# Patient Record
Sex: MALE | Race: WHITE | NOT HISPANIC OR LATINO
[De-identification: names, ages, dates, MRNs, and addresses within clinical notes are randomized per-mention and may not be internally consistent; named-entity substitution may affect disease eponyms.]

---

## 2017-04-24 ENCOUNTER — APPOINTMENT (OUTPATIENT)
Dept: THORACIC SURGERY | Facility: CLINIC | Age: 82
End: 2017-04-24

## 2017-05-02 ENCOUNTER — OUTPATIENT (OUTPATIENT)
Dept: OUTPATIENT SERVICES | Facility: HOSPITAL | Age: 82
LOS: 1 days | End: 2017-05-02
Payer: MEDICARE

## 2017-05-02 ENCOUNTER — APPOINTMENT (OUTPATIENT)
Dept: CT IMAGING | Facility: HOSPITAL | Age: 82
End: 2017-05-02

## 2017-05-02 DIAGNOSIS — Z98.89 OTHER SPECIFIED POSTPROCEDURAL STATES: Chronic | ICD-10-CM

## 2017-05-02 PROCEDURE — 71250 CT THORAX DX C-: CPT

## 2017-05-15 ENCOUNTER — APPOINTMENT (OUTPATIENT)
Dept: THORACIC SURGERY | Facility: CLINIC | Age: 82
End: 2017-05-15

## 2017-05-15 VITALS
BODY MASS INDEX: 21.76 KG/M2 | HEART RATE: 57 BPM | WEIGHT: 152 LBS | RESPIRATION RATE: 16 BRPM | OXYGEN SATURATION: 98 % | HEIGHT: 70 IN | DIASTOLIC BLOOD PRESSURE: 66 MMHG | SYSTOLIC BLOOD PRESSURE: 124 MMHG

## 2017-07-10 ENCOUNTER — APPOINTMENT (OUTPATIENT)
Dept: DERMATOLOGY | Facility: CLINIC | Age: 82
End: 2017-07-10

## 2017-07-10 VITALS — WEIGHT: 162 LBS | BODY MASS INDEX: 22.93 KG/M2 | HEIGHT: 70.5 IN

## 2017-07-10 DIAGNOSIS — Z87.39 PERSONAL HISTORY OF OTHER DISEASES OF THE MUSCULOSKELETAL SYSTEM AND CONNECTIVE TISSUE: ICD-10-CM

## 2017-07-11 ENCOUNTER — APPOINTMENT (OUTPATIENT)
Dept: GASTROENTEROLOGY | Facility: CLINIC | Age: 82
End: 2017-07-11

## 2017-07-11 VITALS
DIASTOLIC BLOOD PRESSURE: 75 MMHG | HEART RATE: 54 BPM | SYSTOLIC BLOOD PRESSURE: 153 MMHG | WEIGHT: 162 LBS | BODY MASS INDEX: 22.93 KG/M2 | OXYGEN SATURATION: 98 % | HEIGHT: 70.5 IN

## 2017-07-11 DIAGNOSIS — Z78.9 OTHER SPECIFIED HEALTH STATUS: ICD-10-CM

## 2017-07-13 LAB — CORE LAB BIOPSY: NORMAL

## 2017-07-21 ENCOUNTER — APPOINTMENT (OUTPATIENT)
Dept: ULTRASOUND IMAGING | Facility: HOSPITAL | Age: 82
End: 2017-07-21

## 2017-07-21 ENCOUNTER — OUTPATIENT (OUTPATIENT)
Dept: OUTPATIENT SERVICES | Facility: HOSPITAL | Age: 82
LOS: 1 days | End: 2017-07-21
Payer: MEDICARE

## 2017-07-21 DIAGNOSIS — Z98.89 OTHER SPECIFIED POSTPROCEDURAL STATES: Chronic | ICD-10-CM

## 2017-07-21 PROCEDURE — 76856 US EXAM PELVIC COMPLETE: CPT

## 2017-07-21 PROCEDURE — 76700 US EXAM ABDOM COMPLETE: CPT

## 2017-08-02 ENCOUNTER — APPOINTMENT (OUTPATIENT)
Dept: DERMATOLOGY | Facility: CLINIC | Age: 82
End: 2017-08-02

## 2017-08-14 ENCOUNTER — APPOINTMENT (OUTPATIENT)
Dept: DERMATOLOGY | Facility: CLINIC | Age: 82
End: 2017-08-14
Payer: MEDICARE

## 2017-08-14 PROCEDURE — 99213 OFFICE O/P EST LOW 20 MIN: CPT

## 2017-09-29 ENCOUNTER — APPOINTMENT (OUTPATIENT)
Dept: CT IMAGING | Facility: HOSPITAL | Age: 82
End: 2017-09-29
Payer: MEDICARE

## 2017-09-29 ENCOUNTER — OUTPATIENT (OUTPATIENT)
Dept: OUTPATIENT SERVICES | Facility: HOSPITAL | Age: 82
LOS: 1 days | End: 2017-09-29
Payer: MEDICARE

## 2017-09-29 DIAGNOSIS — Z98.89 OTHER SPECIFIED POSTPROCEDURAL STATES: Chronic | ICD-10-CM

## 2017-09-29 PROCEDURE — 70491 CT SOFT TISSUE NECK W/DYE: CPT | Mod: 26

## 2017-09-29 PROCEDURE — 70491 CT SOFT TISSUE NECK W/DYE: CPT

## 2017-09-29 PROCEDURE — 71260 CT THORAX DX C+: CPT | Mod: 26

## 2017-09-29 PROCEDURE — 71260 CT THORAX DX C+: CPT

## 2017-10-09 ENCOUNTER — APPOINTMENT (OUTPATIENT)
Dept: THORACIC SURGERY | Facility: CLINIC | Age: 82
End: 2017-10-09
Payer: MEDICARE

## 2017-10-09 VITALS
WEIGHT: 162 LBS | BODY MASS INDEX: 22.93 KG/M2 | RESPIRATION RATE: 16 BRPM | HEIGHT: 70.5 IN | HEART RATE: 60 BPM | SYSTOLIC BLOOD PRESSURE: 146 MMHG | OXYGEN SATURATION: 98 % | DIASTOLIC BLOOD PRESSURE: 63 MMHG

## 2017-10-09 DIAGNOSIS — R91.1 SOLITARY PULMONARY NODULE: ICD-10-CM

## 2017-10-09 PROCEDURE — 99214 OFFICE O/P EST MOD 30 MIN: CPT

## 2017-10-17 ENCOUNTER — APPOINTMENT (OUTPATIENT)
Dept: DERMATOLOGY | Facility: CLINIC | Age: 82
End: 2017-10-17
Payer: MEDICARE

## 2017-10-17 VITALS — WEIGHT: 155 LBS | BODY MASS INDEX: 21.94 KG/M2 | HEIGHT: 70.5 IN

## 2017-10-17 DIAGNOSIS — Z85.828 PERSONAL HISTORY OF OTHER MALIGNANT NEOPLASM OF SKIN: ICD-10-CM

## 2017-10-17 DIAGNOSIS — L90.5 SCAR CONDITIONS AND FIBROSIS OF SKIN: ICD-10-CM

## 2017-10-17 PROCEDURE — 99213 OFFICE O/P EST LOW 20 MIN: CPT

## 2017-10-23 ENCOUNTER — APPOINTMENT (OUTPATIENT)
Dept: DERMATOLOGY | Facility: CLINIC | Age: 82
End: 2017-10-23

## 2017-11-16 ENCOUNTER — APPOINTMENT (OUTPATIENT)
Dept: DERMATOLOGY | Facility: CLINIC | Age: 82
End: 2017-11-16
Payer: MEDICARE

## 2017-11-16 PROCEDURE — 99213 OFFICE O/P EST LOW 20 MIN: CPT

## 2018-04-24 ENCOUNTER — TRANSCRIPTION ENCOUNTER (OUTPATIENT)
Age: 83
End: 2018-04-24

## 2018-07-22 PROBLEM — Z78.9 ALCOHOL USE: Status: ACTIVE | Noted: 2017-07-11

## 2018-09-13 ENCOUNTER — TRANSCRIPTION ENCOUNTER (OUTPATIENT)
Age: 83
End: 2018-09-13

## 2018-09-28 ENCOUNTER — OUTPATIENT (OUTPATIENT)
Dept: OUTPATIENT SERVICES | Facility: HOSPITAL | Age: 83
LOS: 1 days | End: 2018-09-28
Payer: MEDICARE

## 2018-09-28 ENCOUNTER — APPOINTMENT (OUTPATIENT)
Dept: CT IMAGING | Facility: HOSPITAL | Age: 83
End: 2018-09-28
Payer: MEDICARE

## 2018-09-28 DIAGNOSIS — Z98.89 OTHER SPECIFIED POSTPROCEDURAL STATES: Chronic | ICD-10-CM

## 2018-09-28 DIAGNOSIS — D36.10 BENIGN NEOPLASM OF PERIPHERAL NERVES AND AUTONOMIC NERVOUS SYSTEM, UNSPECIFIED: ICD-10-CM

## 2018-09-28 DIAGNOSIS — R91.1 SOLITARY PULMONARY NODULE: ICD-10-CM

## 2018-09-28 PROCEDURE — 71250 CT THORAX DX C-: CPT

## 2018-09-28 PROCEDURE — 71250 CT THORAX DX C-: CPT | Mod: 26

## 2018-10-01 ENCOUNTER — APPOINTMENT (OUTPATIENT)
Dept: THORACIC SURGERY | Facility: CLINIC | Age: 83
End: 2018-10-01
Payer: MEDICARE

## 2018-10-08 ENCOUNTER — APPOINTMENT (OUTPATIENT)
Dept: THORACIC SURGERY | Facility: CLINIC | Age: 83
End: 2018-10-08
Payer: MEDICARE

## 2018-10-08 VITALS
TEMPERATURE: 98.2 F | SYSTOLIC BLOOD PRESSURE: 130 MMHG | HEIGHT: 70 IN | DIASTOLIC BLOOD PRESSURE: 74 MMHG | RESPIRATION RATE: 16 BRPM | OXYGEN SATURATION: 98 % | BODY MASS INDEX: 21.33 KG/M2 | WEIGHT: 149 LBS | HEART RATE: 70 BPM

## 2018-10-08 PROCEDURE — 99213 OFFICE O/P EST LOW 20 MIN: CPT

## 2018-12-04 ENCOUNTER — APPOINTMENT (OUTPATIENT)
Dept: DERMATOLOGY | Facility: CLINIC | Age: 83
End: 2018-12-04
Payer: MEDICARE

## 2018-12-04 DIAGNOSIS — L28.0 LICHEN SIMPLEX CHRONICUS: ICD-10-CM

## 2018-12-04 PROCEDURE — 11900 INJECT SKIN LESIONS </W 7: CPT | Mod: 59

## 2018-12-04 PROCEDURE — 99214 OFFICE O/P EST MOD 30 MIN: CPT | Mod: 25

## 2018-12-04 PROCEDURE — 17000 DESTRUCT PREMALG LESION: CPT | Mod: 59

## 2018-12-04 PROCEDURE — 17110 DESTRUCTION B9 LES UP TO 14: CPT | Mod: 59

## 2018-12-04 PROCEDURE — 11100 BX SKIN SUBCUTANEOUS&/MUCOUS MEMBRANE 1 LESION: CPT | Mod: 59

## 2018-12-06 LAB — CORE LAB BIOPSY: NORMAL

## 2018-12-10 ENCOUNTER — APPOINTMENT (OUTPATIENT)
Dept: DERMATOLOGY | Facility: CLINIC | Age: 83
End: 2018-12-10
Payer: MEDICARE

## 2018-12-10 PROCEDURE — 17110 DESTRUCTION B9 LES UP TO 14: CPT | Mod: 79

## 2018-12-10 PROCEDURE — 99213 OFFICE O/P EST LOW 20 MIN: CPT | Mod: 25,24

## 2018-12-10 PROCEDURE — 17000 DESTRUCT PREMALG LESION: CPT | Mod: 59,79

## 2019-06-09 ENCOUNTER — TRANSCRIPTION ENCOUNTER (OUTPATIENT)
Age: 84
End: 2019-06-09

## 2019-06-17 ENCOUNTER — APPOINTMENT (OUTPATIENT)
Dept: DERMATOLOGY | Facility: CLINIC | Age: 84
End: 2019-06-17
Payer: MEDICARE

## 2019-06-17 PROCEDURE — 99213 OFFICE O/P EST LOW 20 MIN: CPT

## 2019-06-17 PROCEDURE — 0023D: CPT

## 2019-06-17 PROCEDURE — 0050D: CPT

## 2019-06-17 NOTE — HISTORY OF PRESENT ILLNESS
[FreeTextEntry1] : Patient presents for skin examination. [de-identified] : Denies new, changing, bleeding or tender lesions on the skin over the past 6 months.\par

## 2019-06-17 NOTE — PHYSICAL EXAM
[Alert] : alert [Oriented x 3] : ~L oriented x 3 [Full Body Skin Exam Performed] : performed [Well Nourished] : well nourished [FreeTextEntry3] : A full skin exam was performed including the scalp, face (including lips, ears, nose and eyes), neck, chest, abdomen, back, buttocks, upper extremities and lower extremities.  The patient declined examination of the genitalia.  \par The exam revealed the following benign growths:\par Morrison pigmented nevi.\par Seborrheic keratoses.\par Lentigines.\par Cherry angioma.\par

## 2019-07-06 ENCOUNTER — INPATIENT (INPATIENT)
Facility: HOSPITAL | Age: 84
LOS: 2 days | Discharge: NURSING, REHAB OR LONG-TERM CARE | DRG: 470 | End: 2019-07-09
Attending: INTERNAL MEDICINE | Admitting: INTERNAL MEDICINE
Payer: MEDICARE

## 2019-07-06 ENCOUNTER — TRANSCRIPTION ENCOUNTER (OUTPATIENT)
Age: 84
End: 2019-07-06

## 2019-07-06 VITALS
SYSTOLIC BLOOD PRESSURE: 132 MMHG | RESPIRATION RATE: 15 BRPM | HEART RATE: 66 BPM | WEIGHT: 149.91 LBS | OXYGEN SATURATION: 97 % | TEMPERATURE: 98 F | HEIGHT: 70 IN | DIASTOLIC BLOOD PRESSURE: 63 MMHG

## 2019-07-06 DIAGNOSIS — Z98.89 OTHER SPECIFIED POSTPROCEDURAL STATES: Chronic | ICD-10-CM

## 2019-07-06 DIAGNOSIS — S72.002S FRACTURE OF UNSPECIFIED PART OF NECK OF LEFT FEMUR, SEQUELA: ICD-10-CM

## 2019-07-06 DIAGNOSIS — Z29.9 ENCOUNTER FOR PROPHYLACTIC MEASURES, UNSPECIFIED: ICD-10-CM

## 2019-07-06 DIAGNOSIS — S72.002A FRACTURE OF UNSPECIFIED PART OF NECK OF LEFT FEMUR, INITIAL ENCOUNTER FOR CLOSED FRACTURE: ICD-10-CM

## 2019-07-06 LAB
ALBUMIN SERPL ELPH-MCNC: 3.8 G/DL — SIGNIFICANT CHANGE UP (ref 3.3–5)
ALP SERPL-CCNC: 68 U/L — SIGNIFICANT CHANGE UP (ref 30–120)
ALT FLD-CCNC: 23 U/L DA — SIGNIFICANT CHANGE UP (ref 10–60)
ANION GAP SERPL CALC-SCNC: 11 MMOL/L — SIGNIFICANT CHANGE UP (ref 5–17)
APPEARANCE UR: ABNORMAL
APTT BLD: 27.6 SEC — LOW (ref 28.5–37)
AST SERPL-CCNC: 22 U/L — SIGNIFICANT CHANGE UP (ref 10–40)
BASOPHILS # BLD AUTO: 0.02 K/UL — SIGNIFICANT CHANGE UP (ref 0–0.2)
BASOPHILS NFR BLD AUTO: 0.3 % — SIGNIFICANT CHANGE UP (ref 0–2)
BILIRUB SERPL-MCNC: 0.4 MG/DL — SIGNIFICANT CHANGE UP (ref 0.2–1.2)
BILIRUB UR-MCNC: NEGATIVE — SIGNIFICANT CHANGE UP
BLD GP AB SCN SERPL QL: SIGNIFICANT CHANGE UP
BUN SERPL-MCNC: 23 MG/DL — SIGNIFICANT CHANGE UP (ref 7–23)
CALCIUM SERPL-MCNC: 9.4 MG/DL — SIGNIFICANT CHANGE UP (ref 8.4–10.5)
CHLORIDE SERPL-SCNC: 105 MMOL/L — SIGNIFICANT CHANGE UP (ref 96–108)
CO2 SERPL-SCNC: 25 MMOL/L — SIGNIFICANT CHANGE UP (ref 22–31)
COLOR SPEC: YELLOW — SIGNIFICANT CHANGE UP
CREAT SERPL-MCNC: 1.29 MG/DL — SIGNIFICANT CHANGE UP (ref 0.5–1.3)
DIFF PNL FLD: NEGATIVE — SIGNIFICANT CHANGE UP
EOSINOPHIL # BLD AUTO: 0.09 K/UL — SIGNIFICANT CHANGE UP (ref 0–0.5)
EOSINOPHIL NFR BLD AUTO: 1.2 % — SIGNIFICANT CHANGE UP (ref 0–6)
GLUCOSE SERPL-MCNC: 106 MG/DL — HIGH (ref 70–99)
GLUCOSE UR QL: NEGATIVE MG/DL — SIGNIFICANT CHANGE UP
HCT VFR BLD CALC: 39.7 % — SIGNIFICANT CHANGE UP (ref 39–50)
HGB BLD-MCNC: 13.6 G/DL — SIGNIFICANT CHANGE UP (ref 13–17)
IMM GRANULOCYTES NFR BLD AUTO: 0.3 % — SIGNIFICANT CHANGE UP (ref 0–1.5)
INR BLD: 0.96 RATIO — SIGNIFICANT CHANGE UP (ref 0.88–1.16)
KETONES UR-MCNC: ABNORMAL
LEUKOCYTE ESTERASE UR-ACNC: NEGATIVE — SIGNIFICANT CHANGE UP
LYMPHOCYTES # BLD AUTO: 0.96 K/UL — LOW (ref 1–3.3)
LYMPHOCYTES # BLD AUTO: 12.9 % — LOW (ref 13–44)
MCHC RBC-ENTMCNC: 32.2 PG — SIGNIFICANT CHANGE UP (ref 27–34)
MCHC RBC-ENTMCNC: 34.3 GM/DL — SIGNIFICANT CHANGE UP (ref 32–36)
MCV RBC AUTO: 94.1 FL — SIGNIFICANT CHANGE UP (ref 80–100)
MONOCYTES # BLD AUTO: 0.62 K/UL — SIGNIFICANT CHANGE UP (ref 0–0.9)
MONOCYTES NFR BLD AUTO: 8.3 % — SIGNIFICANT CHANGE UP (ref 2–14)
NEUTROPHILS # BLD AUTO: 5.74 K/UL — SIGNIFICANT CHANGE UP (ref 1.8–7.4)
NEUTROPHILS NFR BLD AUTO: 77 % — SIGNIFICANT CHANGE UP (ref 43–77)
NITRITE UR-MCNC: NEGATIVE — SIGNIFICANT CHANGE UP
NRBC # BLD: 0 /100 WBCS — SIGNIFICANT CHANGE UP (ref 0–0)
PH UR: 8 — SIGNIFICANT CHANGE UP (ref 5–8)
PLATELET # BLD AUTO: 151 K/UL — SIGNIFICANT CHANGE UP (ref 150–400)
POTASSIUM SERPL-MCNC: 3.7 MMOL/L — SIGNIFICANT CHANGE UP (ref 3.5–5.3)
POTASSIUM SERPL-SCNC: 3.7 MMOL/L — SIGNIFICANT CHANGE UP (ref 3.5–5.3)
PROT SERPL-MCNC: 6.9 G/DL — SIGNIFICANT CHANGE UP (ref 6–8.3)
PROT UR-MCNC: NEGATIVE MG/DL — SIGNIFICANT CHANGE UP
PROTHROM AB SERPL-ACNC: 10.5 SEC — SIGNIFICANT CHANGE UP (ref 10–12.9)
RBC # BLD: 4.22 M/UL — SIGNIFICANT CHANGE UP (ref 4.2–5.8)
RBC # FLD: 12.4 % — SIGNIFICANT CHANGE UP (ref 10.3–14.5)
RBC CASTS # UR COMP ASSIST: NEGATIVE /HPF — SIGNIFICANT CHANGE UP (ref 0–4)
SODIUM SERPL-SCNC: 141 MMOL/L — SIGNIFICANT CHANGE UP (ref 135–145)
SP GR SPEC: 1.01 — SIGNIFICANT CHANGE UP (ref 1.01–1.02)
UROBILINOGEN FLD QL: NEGATIVE MG/DL — SIGNIFICANT CHANGE UP
WBC # BLD: 7.45 K/UL — SIGNIFICANT CHANGE UP (ref 3.8–10.5)
WBC # FLD AUTO: 7.45 K/UL — SIGNIFICANT CHANGE UP (ref 3.8–10.5)
WBC UR QL: NEGATIVE — SIGNIFICANT CHANGE UP

## 2019-07-06 PROCEDURE — 93010 ELECTROCARDIOGRAM REPORT: CPT

## 2019-07-06 PROCEDURE — 99284 EMERGENCY DEPT VISIT MOD MDM: CPT

## 2019-07-06 PROCEDURE — 99223 1ST HOSP IP/OBS HIGH 75: CPT | Mod: AI

## 2019-07-06 PROCEDURE — 71045 X-RAY EXAM CHEST 1 VIEW: CPT | Mod: 26

## 2019-07-06 PROCEDURE — 73502 X-RAY EXAM HIP UNI 2-3 VIEWS: CPT | Mod: 26,LT

## 2019-07-06 RX ORDER — OXYCODONE HYDROCHLORIDE 5 MG/1
10 TABLET ORAL
Refills: 0 | Status: DISCONTINUED | OUTPATIENT
Start: 2019-07-06 | End: 2019-07-06

## 2019-07-06 RX ORDER — SENNA PLUS 8.6 MG/1
2 TABLET ORAL AT BEDTIME
Refills: 0 | Status: DISCONTINUED | OUTPATIENT
Start: 2019-07-06 | End: 2019-07-07

## 2019-07-06 RX ORDER — HYDROMORPHONE HYDROCHLORIDE 2 MG/ML
0.5 INJECTION INTRAMUSCULAR; INTRAVENOUS; SUBCUTANEOUS
Refills: 0 | Status: DISCONTINUED | OUTPATIENT
Start: 2019-07-06 | End: 2019-07-07

## 2019-07-06 RX ORDER — FAMOTIDINE 10 MG/ML
20 INJECTION INTRAVENOUS DAILY
Refills: 0 | Status: DISCONTINUED | OUTPATIENT
Start: 2019-07-06 | End: 2019-07-07

## 2019-07-06 RX ORDER — OXYCODONE HYDROCHLORIDE 5 MG/1
5 TABLET ORAL
Refills: 0 | Status: DISCONTINUED | OUTPATIENT
Start: 2019-07-06 | End: 2019-07-06

## 2019-07-06 RX ORDER — MORPHINE SULFATE 50 MG/1
1 CAPSULE, EXTENDED RELEASE ORAL EVERY 4 HOURS
Refills: 0 | Status: DISCONTINUED | OUTPATIENT
Start: 2019-07-06 | End: 2019-07-07

## 2019-07-06 RX ORDER — SODIUM CHLORIDE 9 MG/ML
1000 INJECTION, SOLUTION INTRAVENOUS
Refills: 0 | Status: DISCONTINUED | OUTPATIENT
Start: 2019-07-06 | End: 2019-07-07

## 2019-07-06 RX ORDER — DOCUSATE SODIUM 100 MG
100 CAPSULE ORAL THREE TIMES A DAY
Refills: 0 | Status: DISCONTINUED | OUTPATIENT
Start: 2019-07-06 | End: 2019-07-07

## 2019-07-06 RX ORDER — MORPHINE SULFATE 50 MG/1
2 CAPSULE, EXTENDED RELEASE ORAL EVERY 4 HOURS
Refills: 0 | Status: DISCONTINUED | OUTPATIENT
Start: 2019-07-06 | End: 2019-07-07

## 2019-07-06 RX ORDER — ENOXAPARIN SODIUM 100 MG/ML
40 INJECTION SUBCUTANEOUS EVERY 24 HOURS
Refills: 0 | Status: DISCONTINUED | OUTPATIENT
Start: 2019-07-06 | End: 2019-07-07

## 2019-07-06 RX ADMIN — ENOXAPARIN SODIUM 40 MILLIGRAM(S): 100 INJECTION SUBCUTANEOUS at 23:23

## 2019-07-06 RX ADMIN — MORPHINE SULFATE 1 MILLIGRAM(S): 50 CAPSULE, EXTENDED RELEASE ORAL at 23:38

## 2019-07-06 RX ADMIN — SODIUM CHLORIDE 125 MILLILITER(S): 9 INJECTION, SOLUTION INTRAVENOUS at 23:23

## 2019-07-06 RX ADMIN — MORPHINE SULFATE 1 MILLIGRAM(S): 50 CAPSULE, EXTENDED RELEASE ORAL at 23:23

## 2019-07-06 NOTE — ED ADULT NURSE NOTE - PMH
Lactose intolerance in adult    Osteoarthritis    Scoliosis    Shellfish allergy    Vitamin D deficiency

## 2019-07-06 NOTE — PATIENT PROFILE ADULT - FAMILY NEEDS
From: Nicole Lincoln  To: ALLEN Montano  Sent: 5/21/2018 8:39 AM CDT  Subject: Blood sugar levels 5/17-5/21    Good morning Debby,   I couldn’t remember when you wanted me to send my levels next so I thought I should just do it today. Overall I feel like my levels have been pretty good, but there are a couple highs. I actually had two high numbers yesterday that I thought were a little odd. The only thing is could think that was different was that I was pretty stressed yesterday, I don’t know if that makes a difference or not.    Thursday R- 87   B 78   L 78   D 107   Insulin at 11:00  Friday R- 83   B 85   L 126- just a bad choice for lunch   D 91   Insulin at 11:10  Saturday R- 86   B 98   L 75- I did test about a half hour late on this one    D 122- few too many chips with dinner    Insulin at 12:30  Sunday R-86   B 140- one of the highs I can’t explain other than possibly stress   L 76   D 150-combo of pizza and stress? Maybe just the pizza   Insulin at 10:10  Monday R- 99-didn’t sleep very well    Thanks Giana Guardado  
no

## 2019-07-06 NOTE — ED PROVIDER NOTE - MUSCULOSKELETAL, MLM
decrease rom left hip secondary to pain, no obvious deformity tenderness lateral aspect of hip and neurovascularly intact.
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
## 2019-07-06 NOTE — H&P ADULT - PROBLEM SELECTOR PLAN 2
IMPROVE VTE Individual Risk Assessment          RISK                                                          Points    [  ] Previous VTE                                                3  [  ] Thrombophilia                                             2  [  ] Lower limb paralysis                                    2        (unable to hold up >15 seconds)    [  ] Current Cancer                                             2         (within 6 months)  [ x ] Immobilization > 24 hrs                              1  [  ] ICU/CCU stay > 24 hours                            1  [ x ] Age > 60                                                    1    IMPROVE VTE Score 2.      **IMPROVE score of 2 in addition to the other risk factors not included in this scoring system, started on LMWH 40 mg sub Q daily for DVT prophylaxis.

## 2019-07-06 NOTE — H&P ADULT - NSICDXPASTMEDICALHX_GEN_ALL_CORE_FT
PAST MEDICAL HISTORY:  Lactose intolerance in adult     Osteoarthritis     Scoliosis     Shellfish allergy     Vitamin D deficiency

## 2019-07-06 NOTE — ED ADULT NURSE NOTE - NSIMPLEMENTINTERV_GEN_ALL_ED
Implemented All Fall Risk Interventions:  [place] to call system. Call bell, personal items and telephone within reach. Instruct patient to call for assistance. Room bathroom lighting operational. Non-slip footwear when patient is off stretcher. Physically safe environment: no spills, clutter or unnecessary equipment. Stretcher in lowest position, wheels locked, appropriate side rails in place. Provide visual cue, wrist band, yellow gown, etc. Monitor gait and stability. Monitor for mental status changes and reorient to person, place, and time. Review medications for side effects contributing to fall risk. Reinforce activity limits and safety measures with patient and family.
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
## 2019-07-06 NOTE — H&P ADULT - ATTENDING COMMENTS
Patient is asymptomatic with no active issues, medically optimized, he is a former smoker, does a lot of walking & stairs climbing as per wife at the bedside, never got chest pain on effort or SOB, at low to intermediate risk for cardiopulmonary events for an intermediate risk surgery, can proceed with ORIF surgery without further cardiopulmonary testing.

## 2019-07-06 NOTE — ED PROVIDER NOTE - NS_ ATTENDINGSCRIBEDETAILS _ED_A_ED_FT
Efra Og MD - The scribe's documentation has been prepared under my direction and personally reviewed by me in its entirety. I confirm that the note above accurately reflects all work, treatment, procedures, and medical decision making performed by me.

## 2019-07-06 NOTE — H&P ADULT - NSHPREVIEWOFSYSTEMS_GEN_ALL_CORE
-    CONSTITUTIONAL: No fever, weight loss, or fatigue.  EYES: No eye pain, visual disturbances, or discharge.  ENMT:  No difficulty hearing, tinnitus, vertigo; No sinus or throat pain.  NECK: No pain or stiffness.	  RESPIRATORY: No cough, wheezing, or hemoptysis; No shortness of breath.  CARDIOVASCULAR: No chest pain, palpitations, dizziness, or leg swelling.  GASTROINTESTINAL: No abdominal pain, no nausea, vomiting, or hematemesis; No diarrhea or Change in bowel habits. No melena or hematochezia.  GENITOURINARY: No dysuria, frequency, hematuria, or incontinence.  NEUROLOGICAL: No headaches, focal muscle weakness, numbness, or tremors.  SKIN: No itching, burning or rashes.  MUSCULOSKELETAL: Left hip pain radiating to groin.  PSYCHIATRIC: No depression, anxiety, or agitation.  HEME/LYMPH: No easy bruising, bleeding gums, or nose bleed.  ALLERGY AND IMMUNOLOGIC: No hives or eczema.

## 2019-07-06 NOTE — H&P ADULT - NSHPLABSRESULTS_GEN_ALL_CORE
-                            13.6   7.45  )-----------( 151      ( 2019 19:40 )             39.7            -    141  |  105  |  23  ----------------------------<  106<H>  3.7   |  25  |  1.29    Ca    9.4      2019 19:40    TPro  6.9  /  Alb  3.8  /  TBili  0.4  /  DBili  x   /  AST  22  /  ALT  23  /  AlkPhos  68  -          Urinalysis Basic - ( 2019 20:19 )    Color: Yellow / Appearance: Slightly Turbid / S.015 / pH: x  Gluc: x / Ketone: Small  / Bili: Negative / Urobili: Negative mg/dL   Blood: x / Protein: Negative mg/dL / Nitrite: Negative   Leuk Esterase: Negative / RBC: Negative /HPF / WBC Negative   Sq Epi: x / Non Sq Epi: x / Bacteria: x      PT/INR - ( 2019 19:40 )   PT: 10.5 sec;   INR: 0.96 ratio         PTT - ( 2019 19:40 )  PTT:27.6 sec           LEFT HIP X RAY:  As per my review shows femoral neck fracture, B/L hip degenerative changes, normal bowel gas pattern. Pending official report.          CXR:	    As per my review shows elevated left hemidiaphragm, normal cardiac shadow size, clear lung fields B/L, no pulmonary infiltrates, pleural effusion, or pneumothorax. Pending official report.          EKG:    As per my review shows SR at 70/min, with infrequent PACs, normal MD  interval, prolonged QTc interval, normal QRS voltage, duration, and axis (+60), with normal transition, no ST-T abnormality.        -

## 2019-07-06 NOTE — H&P ADULT - PROBLEM SELECTOR PLAN 1
Admitted to medicine, pain control, type & screen, NPO after midnight, IVF hydration with LR at 125 ml/h, monitor I & O, orthopedic consult with Dr. Melendez was called earlier by ED team.

## 2019-07-06 NOTE — H&P ADULT - NSHPPHYSICALEXAM_GEN_ALL_CORE
-    Vital Signs Last 24 Hrs  T(C): 36.8 (06 Jul 2019 18:17), Max: 36.8 (06 Jul 2019 18:17)  T(F): 98.3 (06 Jul 2019 18:17), Max: 98.3 (06 Jul 2019 18:17)  HR: 66 (06 Jul 2019 18:17) (66 - 66)  BP: 132/63 (06 Jul 2019 18:17) (132/63 - 132/63)  BP(mean): --  RR: 15 (06 Jul 2019 18:17) (15 - 15)  SpO2: 97% (06 Jul 2019 18:17) (97% - 97%)        PHYSICAL EXAM:  	  GENERAL: NAD, well-groomed, well-developed.  HEAD:  Atraumatic, Norm cephalic.  EYES: PERRLA, conjunctiva clear, (+) B/L IOL.  ENMT: no nasal discharge, no leah-pharyngeal erythema or exudates, MMM.   NECK: Supple, No JVD.  NERVOUS SYSTEM:  Alert & oriented X3, neurologically intact grossly.  CHEST/LUNG: Good air entry B/L, no rales, rhonchi, or wheezing.  HEART: Normal S1 & S2, no murmurs, or extra sounds.  ABDOMEN: Soft, non-tender, non-distended; bowel sounds present, no palpable masses or organomegaly.  EXTREMITIES:  No clubbing, cyanosis, or edema.  VASCULAR: 2+ radial, DPA / PTA pulses B/L.  SKIN: No rashes or lesions.  PSYCH: normal affect & behavior.
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
## 2019-07-07 ENCOUNTER — RESULT REVIEW (OUTPATIENT)
Age: 84
End: 2019-07-07

## 2019-07-07 DIAGNOSIS — C61 MALIGNANT NEOPLASM OF PROSTATE: ICD-10-CM

## 2019-07-07 LAB
ABO RH CONFIRMATION: SIGNIFICANT CHANGE UP
ANION GAP SERPL CALC-SCNC: 6 MMOL/L — SIGNIFICANT CHANGE UP (ref 5–17)
ANION GAP SERPL CALC-SCNC: 7 MMOL/L — SIGNIFICANT CHANGE UP (ref 5–17)
BUN SERPL-MCNC: 14 MG/DL — SIGNIFICANT CHANGE UP (ref 7–23)
BUN SERPL-MCNC: 18 MG/DL — SIGNIFICANT CHANGE UP (ref 7–23)
CALCIUM SERPL-MCNC: 8.5 MG/DL — SIGNIFICANT CHANGE UP (ref 8.4–10.5)
CALCIUM SERPL-MCNC: 8.9 MG/DL — SIGNIFICANT CHANGE UP (ref 8.4–10.5)
CHLORIDE SERPL-SCNC: 104 MMOL/L — SIGNIFICANT CHANGE UP (ref 96–108)
CHLORIDE SERPL-SCNC: 106 MMOL/L — SIGNIFICANT CHANGE UP (ref 96–108)
CO2 SERPL-SCNC: 28 MMOL/L — SIGNIFICANT CHANGE UP (ref 22–31)
CO2 SERPL-SCNC: 30 MMOL/L — SIGNIFICANT CHANGE UP (ref 22–31)
CREAT SERPL-MCNC: 1.06 MG/DL — SIGNIFICANT CHANGE UP (ref 0.5–1.3)
CREAT SERPL-MCNC: 1.1 MG/DL — SIGNIFICANT CHANGE UP (ref 0.5–1.3)
GLUCOSE SERPL-MCNC: 116 MG/DL — HIGH (ref 70–99)
GLUCOSE SERPL-MCNC: 139 MG/DL — HIGH (ref 70–99)
HCT VFR BLD CALC: 38.3 % — LOW (ref 39–50)
HCT VFR BLD CALC: 39.5 % — SIGNIFICANT CHANGE UP (ref 39–50)
HGB BLD-MCNC: 12.9 G/DL — LOW (ref 13–17)
HGB BLD-MCNC: 13.1 G/DL — SIGNIFICANT CHANGE UP (ref 13–17)
MCHC RBC-ENTMCNC: 32 PG — SIGNIFICANT CHANGE UP (ref 27–34)
MCHC RBC-ENTMCNC: 32.2 PG — SIGNIFICANT CHANGE UP (ref 27–34)
MCHC RBC-ENTMCNC: 33.2 GM/DL — SIGNIFICANT CHANGE UP (ref 32–36)
MCHC RBC-ENTMCNC: 33.7 GM/DL — SIGNIFICANT CHANGE UP (ref 32–36)
MCV RBC AUTO: 95 FL — SIGNIFICANT CHANGE UP (ref 80–100)
MCV RBC AUTO: 97.1 FL — SIGNIFICANT CHANGE UP (ref 80–100)
NRBC # BLD: 0 /100 WBCS — SIGNIFICANT CHANGE UP (ref 0–0)
NRBC # BLD: 0 /100 WBCS — SIGNIFICANT CHANGE UP (ref 0–0)
PLATELET # BLD AUTO: 141 K/UL — LOW (ref 150–400)
PLATELET # BLD AUTO: 152 K/UL — SIGNIFICANT CHANGE UP (ref 150–400)
POTASSIUM SERPL-MCNC: 3.6 MMOL/L — SIGNIFICANT CHANGE UP (ref 3.5–5.3)
POTASSIUM SERPL-MCNC: 3.8 MMOL/L — SIGNIFICANT CHANGE UP (ref 3.5–5.3)
POTASSIUM SERPL-SCNC: 3.6 MMOL/L — SIGNIFICANT CHANGE UP (ref 3.5–5.3)
POTASSIUM SERPL-SCNC: 3.8 MMOL/L — SIGNIFICANT CHANGE UP (ref 3.5–5.3)
RBC # BLD: 4.03 M/UL — LOW (ref 4.2–5.8)
RBC # BLD: 4.07 M/UL — LOW (ref 4.2–5.8)
RBC # FLD: 12.5 % — SIGNIFICANT CHANGE UP (ref 10.3–14.5)
RBC # FLD: 12.6 % — SIGNIFICANT CHANGE UP (ref 10.3–14.5)
SODIUM SERPL-SCNC: 140 MMOL/L — SIGNIFICANT CHANGE UP (ref 135–145)
SODIUM SERPL-SCNC: 141 MMOL/L — SIGNIFICANT CHANGE UP (ref 135–145)
WBC # BLD: 13.83 K/UL — HIGH (ref 3.8–10.5)
WBC # BLD: 7.83 K/UL — SIGNIFICANT CHANGE UP (ref 3.8–10.5)
WBC # FLD AUTO: 13.83 K/UL — HIGH (ref 3.8–10.5)
WBC # FLD AUTO: 7.83 K/UL — SIGNIFICANT CHANGE UP (ref 3.8–10.5)

## 2019-07-07 PROCEDURE — 27236 TREAT THIGH FRACTURE: CPT | Mod: LT

## 2019-07-07 PROCEDURE — 88305 TISSUE EXAM BY PATHOLOGIST: CPT | Mod: 26

## 2019-07-07 PROCEDURE — 88311 DECALCIFY TISSUE: CPT | Mod: 26

## 2019-07-07 PROCEDURE — 99233 SBSQ HOSP IP/OBS HIGH 50: CPT

## 2019-07-07 PROCEDURE — 73502 X-RAY EXAM HIP UNI 2-3 VIEWS: CPT | Mod: 26,LT

## 2019-07-07 RX ORDER — ACETAMINOPHEN 500 MG
1000 TABLET ORAL ONCE
Refills: 0 | Status: DISCONTINUED | OUTPATIENT
Start: 2019-07-07 | End: 2019-07-09

## 2019-07-07 RX ORDER — HYDROMORPHONE HYDROCHLORIDE 2 MG/ML
0.25 INJECTION INTRAMUSCULAR; INTRAVENOUS; SUBCUTANEOUS
Refills: 0 | Status: DISCONTINUED | OUTPATIENT
Start: 2019-07-07 | End: 2019-07-07

## 2019-07-07 RX ORDER — ONDANSETRON 8 MG/1
4 TABLET, FILM COATED ORAL EVERY 6 HOURS
Refills: 0 | Status: DISCONTINUED | OUTPATIENT
Start: 2019-07-07 | End: 2019-07-09

## 2019-07-07 RX ORDER — OXYCODONE HYDROCHLORIDE 5 MG/1
5 TABLET ORAL
Refills: 0 | Status: DISCONTINUED | OUTPATIENT
Start: 2019-07-07 | End: 2019-07-09

## 2019-07-07 RX ORDER — APREPITANT 80 MG/1
40 CAPSULE ORAL ONCE
Refills: 0 | Status: DISCONTINUED | OUTPATIENT
Start: 2019-07-07 | End: 2019-07-09

## 2019-07-07 RX ORDER — SODIUM CHLORIDE 9 MG/ML
1000 INJECTION, SOLUTION INTRAVENOUS
Refills: 0 | Status: DISCONTINUED | OUTPATIENT
Start: 2019-07-07 | End: 2019-07-09

## 2019-07-07 RX ORDER — MAGNESIUM HYDROXIDE 400 MG/1
30 TABLET, CHEWABLE ORAL DAILY
Refills: 0 | Status: DISCONTINUED | OUTPATIENT
Start: 2019-07-07 | End: 2019-07-09

## 2019-07-07 RX ORDER — POLYETHYLENE GLYCOL 3350 17 G/17G
17 POWDER, FOR SOLUTION ORAL DAILY
Refills: 0 | Status: DISCONTINUED | OUTPATIENT
Start: 2019-07-07 | End: 2019-07-09

## 2019-07-07 RX ORDER — DIPHENHYDRAMINE HCL 50 MG
25 CAPSULE ORAL EVERY 4 HOURS
Refills: 0 | Status: DISCONTINUED | OUTPATIENT
Start: 2019-07-07 | End: 2019-07-09

## 2019-07-07 RX ORDER — DOCUSATE SODIUM 100 MG
100 CAPSULE ORAL THREE TIMES A DAY
Refills: 0 | Status: DISCONTINUED | OUTPATIENT
Start: 2019-07-07 | End: 2019-07-09

## 2019-07-07 RX ORDER — HYDROMORPHONE HYDROCHLORIDE 2 MG/ML
0.5 INJECTION INTRAMUSCULAR; INTRAVENOUS; SUBCUTANEOUS
Refills: 0 | Status: DISCONTINUED | OUTPATIENT
Start: 2019-07-07 | End: 2019-07-09

## 2019-07-07 RX ORDER — ACETAMINOPHEN 500 MG
1000 TABLET ORAL EVERY 6 HOURS
Refills: 0 | Status: COMPLETED | OUTPATIENT
Start: 2019-07-07 | End: 2019-07-08

## 2019-07-07 RX ORDER — ACETAMINOPHEN 500 MG
1000 TABLET ORAL EVERY 8 HOURS
Refills: 0 | Status: DISCONTINUED | OUTPATIENT
Start: 2019-07-08 | End: 2019-07-09

## 2019-07-07 RX ORDER — SENNA PLUS 8.6 MG/1
2 TABLET ORAL AT BEDTIME
Refills: 0 | Status: DISCONTINUED | OUTPATIENT
Start: 2019-07-07 | End: 2019-07-09

## 2019-07-07 RX ORDER — CEFAZOLIN SODIUM 1 G
2000 VIAL (EA) INJECTION EVERY 8 HOURS
Refills: 0 | Status: COMPLETED | OUTPATIENT
Start: 2019-07-07 | End: 2019-07-08

## 2019-07-07 RX ORDER — OXYCODONE HYDROCHLORIDE 5 MG/1
10 TABLET ORAL
Refills: 0 | Status: DISCONTINUED | OUTPATIENT
Start: 2019-07-07 | End: 2019-07-09

## 2019-07-07 RX ORDER — SODIUM CHLORIDE 9 MG/ML
1000 INJECTION, SOLUTION INTRAVENOUS
Refills: 0 | Status: DISCONTINUED | OUTPATIENT
Start: 2019-07-07 | End: 2019-07-07

## 2019-07-07 RX ORDER — CHLORHEXIDINE GLUCONATE 213 G/1000ML
1 SOLUTION TOPICAL ONCE
Refills: 0 | Status: DISCONTINUED | OUTPATIENT
Start: 2019-07-07 | End: 2019-07-09

## 2019-07-07 RX ORDER — PANTOPRAZOLE SODIUM 20 MG/1
40 TABLET, DELAYED RELEASE ORAL
Refills: 0 | Status: DISCONTINUED | OUTPATIENT
Start: 2019-07-07 | End: 2019-07-09

## 2019-07-07 RX ORDER — ENOXAPARIN SODIUM 100 MG/ML
40 INJECTION SUBCUTANEOUS EVERY 24 HOURS
Refills: 0 | Status: DISCONTINUED | OUTPATIENT
Start: 2019-07-08 | End: 2019-07-09

## 2019-07-07 RX ORDER — CEFAZOLIN SODIUM 1 G
2000 VIAL (EA) INJECTION ONCE
Refills: 0 | Status: DISCONTINUED | OUTPATIENT
Start: 2019-07-07 | End: 2019-07-07

## 2019-07-07 RX ADMIN — Medication 1000 MILLIGRAM(S): at 20:15

## 2019-07-07 RX ADMIN — MORPHINE SULFATE 1 MILLIGRAM(S): 50 CAPSULE, EXTENDED RELEASE ORAL at 05:14

## 2019-07-07 RX ADMIN — Medication 400 MILLIGRAM(S): at 19:54

## 2019-07-07 RX ADMIN — SODIUM CHLORIDE 100 MILLILITER(S): 9 INJECTION, SOLUTION INTRAVENOUS at 16:32

## 2019-07-07 RX ADMIN — MORPHINE SULFATE 1 MILLIGRAM(S): 50 CAPSULE, EXTENDED RELEASE ORAL at 05:29

## 2019-07-07 RX ADMIN — Medication 100 MILLIGRAM(S): at 21:23

## 2019-07-07 RX ADMIN — SODIUM CHLORIDE 125 MILLILITER(S): 9 INJECTION, SOLUTION INTRAVENOUS at 21:23

## 2019-07-07 NOTE — CONSULT NOTE ADULT - ASSESSMENT
The patient is an 83 year old male with a history of OA, borderline HTN who is admitted with hip fracture.    Plan:  - ECG with no evidence of ischemia or infarction  - Recent echo one year ago reportedly normal  - The patient is asymptomatic from a cardiac perspective and there are no active cardiac issues. He is at low/intermediate risk for cardiac events for an intermediate risk surgery. He is optimized to proceed for surgery without additional cardiac testing.

## 2019-07-07 NOTE — PROGRESS NOTE ADULT - PROBLEM SELECTOR PLAN 1
hydration with LR at 125 ml/h, monitor I & O, orthopedic consult with Dr. Melendez was called earlier by ED team.  cardio clearence obtained

## 2019-07-07 NOTE — PROGRESS NOTE ADULT - SUBJECTIVE AND OBJECTIVE BOX
INTERVAL HPI/OVERNIGHT EVENTS:   Patient seen and examined. left hip pain 2/2 fx    MEDICATIONS  (STANDING):  ceFAZolin   IVPB 2000 milliGRAM(s) IV Intermittent once  docusate sodium 100 milliGRAM(s) Oral three times a day  enoxaparin Injectable 40 milliGRAM(s) SubCutaneous every 24 hours  famotidine    Tablet 20 milliGRAM(s) Oral daily  lactated ringers. 1000 milliLiter(s) (125 mL/Hr) IV Continuous <Continuous>  senna 2 Tablet(s) Oral at bedtime    MEDICATIONS  (PRN):  HYDROmorphone  Injectable 0.5 milliGRAM(s) IV Push every 3 hours PRN Severe Pain (7 - 10)  morphine  - Injectable 2 milliGRAM(s) IV Push every 4 hours PRN Moderate Pain (4 - 6)  morphine  - Injectable 1 milliGRAM(s) IV Push every 4 hours PRN Mild Pain (1 - 3)      REVIEW OF SYSTEMS:  See HPI,  all others negative    PHYSICAL EXAM:  Vital Signs Last 24 Hrs  T(C): 36.9 (2019 07:40), Max: 36.9 (2019 02:45)  T(F): 98.5 (2019 07:40), Max: 98.5 (2019 02:45)  HR: 81 (2019 07:40) (66 - 86)  BP: 140/64 (2019 07:40) (121/75 - 163/80)  BP(mean): --  RR: 16 (2019 07:40) (15 - 16)  SpO2: 96% (2019 07:40) (96% - 98%)    GENERAL: NAD, well-groomed, well-developed, awake, alert, oriented x 3, fluent and coherent speech  HEAD:  Atraumatic, Normocephalic  EYES: EOMI, PERRLA, conjunctiva and sclera clear  ENMT: No tonsillar erythema, exudates, or enlargement; Moist mucous membranes, Good dentition, No lesions  NECK: Supple, No JVD, No Cervical LAD, No thyromegaly, No thyroid nodules felt  NERVOUS SYSTEM:  Good concentration; Moving all 4 extremities; No gross sensory deficits, No facial droop  CHEST WALL: No masses  CHEST/LUNG: Clear to auscultation bilaterally; No rales, rhonchi, wheezing, or rubs  HEART: Regular rate and rhythm; No murmurs, rubs, or gallops  ABDOMEN: Soft, Nontender, Nondistended, Bowel sounds present, No palpable masses or organomegaly, No bruits  EXTREMITIES:  left hip pain  LYMPH: No lymphadenopathy  SKIN: No rashes or lesions    LABS:                        12.9   7.83  )-----------( 152      ( 2019 06:53 )             38.3     2019 06:53    140    |  106    |  18     ----------------------------<  116    3.6     |  28     |  1.06     Ca    8.9        2019 06:53    TPro  6.9    /  Alb  3.8    /  TBili  0.4    /  DBili  x      /  AST  22     /  ALT  23     /  AlkPhos  68     2019 19:40    PT/INR - ( 2019 19:40 )   PT: 10.5 sec;   INR: 0.96 ratio         PTT - ( 2019 19:40 )  PTT:27.6 sec  Urinalysis Basic - ( 2019 20:19 )    Color: Yellow / Appearance: Slightly Turbid / S.015 / pH: x  Gluc: x / Ketone: Small  / Bili: Negative / Urobili: Negative mg/dL   Blood: x / Protein: Negative mg/dL / Nitrite: Negative   Leuk Esterase: Negative / RBC: Negative /HPF / WBC Negative   Sq Epi: x / Non Sq Epi: x / Bacteria: x         RADIOLOGY & ADDITIONAL TESTS:

## 2019-07-07 NOTE — PROGRESS NOTE ADULT - SUBJECTIVE AND OBJECTIVE BOX
Ortho Preop Note    Patient is a 83y old  Male who presents with a chief complaint of Left hip pain s/p fall. (2019 11:21)    Diagnosis: Displaced left femoral neck fracture  Procedure: Left hip hemiarthroplasty  Surgeon: Vega Melendez                          12.9   7.83  )-----------( 152      ( 2019 06:53 )             38.3     07-    140  |  106  |  18  ----------------------------<  116<H>  3.6   |  28  |  1.06    Ca    8.9      2019 06:53    TPro  6.9  /  Alb  3.8  /  TBili  0.4  /  DBili  x   /  AST  22  /  ALT  23  /  AlkPhos  68  07-06    PT/INR - ( 2019 19:40 )   PT: 10.5 sec;   INR: 0.96 ratio         PTT - ( 2019 19:40 )  PTT:27.6 sec  Urinalysis Basic - ( 2019 20:19 )    Color: Yellow / Appearance: Slightly Turbid / S.015 / pH: x  Gluc: x / Ketone: Small  / Bili: Negative / Urobili: Negative mg/dL   Blood: x / Protein: Negative mg/dL / Nitrite: Negative   Leuk Esterase: Negative / RBC: Negative /HPF / WBC Negative   Sq Epi: x / Non Sq Epi: x / Bacteria: x        Type & Screen at blood bank  Chest X-ray: NAD  EKG - NSR, no acute abnormalities  NPO/IVF ordered  Medical and cardiology Clearance in EMG  Added on to OR Schedule  Anti-coagulation held        Assessment & Plan: Left Femoral Neck fracture  82yo Male with Left Hip Hemiarthroplasty  -For OR today

## 2019-07-07 NOTE — PROGRESS NOTE ADULT - ASSESSMENT
84 y/o M with PMH of Prostate CA s/p RT 20 years ago, OA, and Lactose Intolerance presented with left hip fracture s/p mechanical fall.

## 2019-07-08 LAB
ANION GAP SERPL CALC-SCNC: 7 MMOL/L — SIGNIFICANT CHANGE UP (ref 5–17)
BUN SERPL-MCNC: 11 MG/DL — SIGNIFICANT CHANGE UP (ref 7–23)
CALCIUM SERPL-MCNC: 8.4 MG/DL — SIGNIFICANT CHANGE UP (ref 8.4–10.5)
CHLORIDE SERPL-SCNC: 105 MMOL/L — SIGNIFICANT CHANGE UP (ref 96–108)
CO2 SERPL-SCNC: 27 MMOL/L — SIGNIFICANT CHANGE UP (ref 22–31)
CREAT SERPL-MCNC: 0.97 MG/DL — SIGNIFICANT CHANGE UP (ref 0.5–1.3)
GLUCOSE SERPL-MCNC: 132 MG/DL — HIGH (ref 70–99)
HCT VFR BLD CALC: 34.7 % — LOW (ref 39–50)
HGB BLD-MCNC: 11.4 G/DL — LOW (ref 13–17)
MCHC RBC-ENTMCNC: 31.7 PG — SIGNIFICANT CHANGE UP (ref 27–34)
MCHC RBC-ENTMCNC: 32.9 GM/DL — SIGNIFICANT CHANGE UP (ref 32–36)
MCV RBC AUTO: 96.4 FL — SIGNIFICANT CHANGE UP (ref 80–100)
NRBC # BLD: 0 /100 WBCS — SIGNIFICANT CHANGE UP (ref 0–0)
PLATELET # BLD AUTO: 118 K/UL — LOW (ref 150–400)
POTASSIUM SERPL-MCNC: 4.1 MMOL/L — SIGNIFICANT CHANGE UP (ref 3.5–5.3)
POTASSIUM SERPL-SCNC: 4.1 MMOL/L — SIGNIFICANT CHANGE UP (ref 3.5–5.3)
RBC # BLD: 3.6 M/UL — LOW (ref 4.2–5.8)
RBC # FLD: 12.7 % — SIGNIFICANT CHANGE UP (ref 10.3–14.5)
SODIUM SERPL-SCNC: 139 MMOL/L — SIGNIFICANT CHANGE UP (ref 135–145)
WBC # BLD: 10.35 K/UL — SIGNIFICANT CHANGE UP (ref 3.8–10.5)
WBC # FLD AUTO: 10.35 K/UL — SIGNIFICANT CHANGE UP (ref 3.8–10.5)

## 2019-07-08 PROCEDURE — 99233 SBSQ HOSP IP/OBS HIGH 50: CPT

## 2019-07-08 RX ADMIN — Medication 1000 MILLIGRAM(S): at 22:14

## 2019-07-08 RX ADMIN — Medication 100 MILLIGRAM(S): at 22:08

## 2019-07-08 RX ADMIN — SENNA PLUS 2 TABLET(S): 8.6 TABLET ORAL at 22:08

## 2019-07-08 RX ADMIN — Medication 1000 MILLIGRAM(S): at 13:05

## 2019-07-08 RX ADMIN — Medication 1000 MILLIGRAM(S): at 09:15

## 2019-07-08 RX ADMIN — Medication 100 MILLIGRAM(S): at 13:07

## 2019-07-08 RX ADMIN — Medication 400 MILLIGRAM(S): at 02:08

## 2019-07-08 RX ADMIN — Medication 400 MILLIGRAM(S): at 09:01

## 2019-07-08 RX ADMIN — Medication 1000 MILLIGRAM(S): at 22:07

## 2019-07-08 RX ADMIN — Medication 100 MILLIGRAM(S): at 05:25

## 2019-07-08 RX ADMIN — PANTOPRAZOLE SODIUM 40 MILLIGRAM(S): 20 TABLET, DELAYED RELEASE ORAL at 05:26

## 2019-07-08 RX ADMIN — SODIUM CHLORIDE 125 MILLILITER(S): 9 INJECTION, SOLUTION INTRAVENOUS at 05:25

## 2019-07-08 RX ADMIN — Medication 1000 MILLIGRAM(S): at 02:30

## 2019-07-08 RX ADMIN — ENOXAPARIN SODIUM 40 MILLIGRAM(S): 100 INJECTION SUBCUTANEOUS at 09:04

## 2019-07-08 NOTE — PROGRESS NOTE ADULT - SUBJECTIVE AND OBJECTIVE BOX
Chief Complaint: Fall    Interval Events: Underwent surgery yesterday. Some pain.    Review of Systems:  General: No fevers, chills, weight loss or gain  Skin: No rashes, color changes  Cardiovascular: No chest pain, orthopnea  Respiratory: No shortness of breath, cough  Gastrointestinal: No nausea, abdominal pain  Genitourinary: No incontinence, pain with urination  Musculoskeletal: (+) pain, swelling, decreased range of motion  Neurological: No headache, weakness  Psychiatric: No depression, anxiety  Endocrine: No weight loss or gain, increased thirst  All other systems are comprehensively negative.    Physical Exam:  Vitals:        Vital Signs Last 24 Hrs  T(C): 37 (08 Jul 2019 07:37), Max: 37 (08 Jul 2019 07:37)  T(F): 98.6 (08 Jul 2019 07:37), Max: 98.6 (08 Jul 2019 07:37)  HR: 71 (08 Jul 2019 07:37) (52 - 93)  BP: 153/66 (08 Jul 2019 07:37) (128/86 - 163/58)  BP(mean): --  RR: 17 (08 Jul 2019 07:37) (13 - 18)  SpO2: 98% (08 Jul 2019 07:37) (98% - 100%)  General: NAD  HEENT: MMM  Neck: No JVD, no carotid bruit  Lungs: CTAB  CV: RRR, nl S1/S2, no M/R/G  Abdomen: S/NT/ND, +BS  Extremities: No LE edema, no cyanosis  Neuro: AAOx3, non-focal  Skin: No rash    Labs:                        11.4   10.35 )-----------( 118      ( 08 Jul 2019 07:05 )             34.7     07-08    139  |  105  |  11  ----------------------------<  132<H>  4.1   |  27  |  0.97    Ca    8.4      08 Jul 2019 07:05    TPro  6.9  /  Alb  3.8  /  TBili  0.4  /  DBili  x   /  AST  22  /  ALT  23  /  AlkPhos  68  07-06        PT/INR - ( 06 Jul 2019 19:40 )   PT: 10.5 sec;   INR: 0.96 ratio         PTT - ( 06 Jul 2019 19:40 )  PTT:27.6 sec

## 2019-07-08 NOTE — PROGRESS NOTE ADULT - SUBJECTIVE AND OBJECTIVE BOX
INTERVAL HPI/OVERNIGHT EVENTS:   Patient seen and examined.    MEDICATIONS  (STANDING):  acetaminophen   Tablet .. 1000 milliGRAM(s) Oral every 8 hours  acetaminophen  IVPB .. 1000 milliGRAM(s) IV Intermittent once  aprepitant 40 milliGRAM(s) Oral once  chlorhexidine 2% Cloths 1 Application(s) Topical once  docusate sodium 100 milliGRAM(s) Oral three times a day  enoxaparin Injectable 40 milliGRAM(s) SubCutaneous every 24 hours  lactated ringers. 1000 milliLiter(s) (125 mL/Hr) IV Continuous <Continuous>  pantoprazole    Tablet 40 milliGRAM(s) Oral before breakfast  senna 2 Tablet(s) Oral at bedtime    MEDICATIONS  (PRN):  aluminum hydroxide/magnesium hydroxide/simethicone Suspension 30 milliLiter(s) Oral four times a day PRN Indigestion  diphenhydrAMINE 25 milliGRAM(s) Oral every 4 hours PRN Rash and/or Itching  HYDROmorphone  Injectable 0.5 milliGRAM(s) IV Push every 3 hours PRN Severe Pain (7 - 10)  magnesium hydroxide Suspension 30 milliLiter(s) Oral daily PRN Constipation  ondansetron Injectable 4 milliGRAM(s) IV Push every 6 hours PRN Nausea and/or Vomiting  oxyCODONE    IR 5 milliGRAM(s) Oral every 3 hours PRN Mild Pain (1 - 3)  oxyCODONE    IR 10 milliGRAM(s) Oral every 3 hours PRN Moderate Pain (4 - 6)  polyethylene glycol 3350 17 Gram(s) Oral daily PRN Constipation      REVIEW OF SYSTEMS:  See HPI,  all others negative    PHYSICAL EXAM:  Vital Signs Last 24 Hrs  T(C): 37 (2019 07:37), Max: 37 (2019 07:37)  T(F): 98.6 (2019 07:37), Max: 98.6 (2019 07:37)  HR: 71 (2019 07:37) (52 - 93)  BP: 153/66 (2019 07:37) (128/86 - 163/58)  BP(mean): --  RR: 17 (2019 07:37) (13 - 18)  SpO2: 98% (2019 07:37) (98% - 100%)    GENERAL: NAD, well-groomed, well-developed, awake, alert, oriented x 3, fluent and coherent speech  HEAD:  Atraumatic, Normocephalic  EYES: EOMI, PERRLA, conjunctiva and sclera clear  ENMT: No tonsillar erythema, exudates, or enlargement; Moist mucous membranes, Good dentition, No lesions  NECK: Supple, No JVD, No Cervical LAD, No thyromegaly, No thyroid nodules felt  NERVOUS SYSTEM:  Good concentration; Moving all 4 extremities; No gross sensory deficits, No facial droop  CHEST WALL: No masses  CHEST/LUNG: Clear to auscultation bilaterally; No rales, rhonchi, wheezing, or rubs  HEART: Regular rate and rhythm; No murmurs, rubs, or gallops  ABDOMEN: Soft, Nontender, Nondistended, Bowel sounds present, No palpable masses or organomegaly, No bruits  EXTREMITIES:  rom dec in lle  LYMPH: No lymphadenopathy  SKIN: No rashes or lesions  LABS:                        11.4   10.35 )-----------( 118      ( 2019 07:05 )             34.7     2019 07:05    139    |  105    |  11     ----------------------------<  132    4.1     |  27     |  0.97     Ca    8.4        2019 07:05      PT/INR - ( 2019 19:40 )   PT: 10.5 sec;   INR: 0.96 ratio         PTT - ( 2019 19:40 )  PTT:27.6 sec  Urinalysis Basic - ( 2019 20:19 )    Color: Yellow / Appearance: Slightly Turbid / S.015 / pH: x  Gluc: x / Ketone: Small  / Bili: Negative / Urobili: Negative mg/dL   Blood: x / Protein: Negative mg/dL / Nitrite: Negative   Leuk Esterase: Negative / RBC: Negative /HPF / WBC Negative   Sq Epi: x / Non Sq Epi: x / Bacteria: x         RADIOLOGY & ADDITIONAL TESTS:
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
## 2019-07-08 NOTE — PHYSICAL THERAPY INITIAL EVALUATION ADULT - ADDITIONAL COMMENTS
Pt lives in a 2 story home with 3 steps to enter and 13 steps inside the home both with hand rails. Pt lives with wife and reports that he will need to get to the second floor to shower.

## 2019-07-08 NOTE — PROGRESS NOTE ADULT - PROBLEM SELECTOR PLAN 1
hydration with LR at 125 ml/h, monitor I & O, orthopedic consult with Dr. Melendez was called earlier by ED team.  cardio clearence obtained for surg  post -op day 1

## 2019-07-08 NOTE — PROGRESS NOTE ADULT - SUBJECTIVE AND OBJECTIVE BOX
Post Op     SASKIA CROCKETT      83y        Male                                                                                                                 T(C): 36.6 (07-08-19 @ 02:25), Max: 36.9 (07-07-19 @ 07:40)  HR: 52 (07-08-19 @ 02:25) (52 - 93)  BP: 139/67 (07-08-19 @ 02:25) (128/86 - 163/58)  RR: 17 (07-08-19 @ 02:25) (13 - 18)  SpO2: 98% (07-08-19 @ 02:25) (96% - 100%)      S/P left hip elgin    Patient denies shortness of breath, chest pain, dyspnea, No complaints  Pain is 3/10    Physical Exam    Extremity: Bilaterally:  No holmon                                           No Cord                                          PAS on                                          Neurovascular intact                                          Motor intact EHL/FHL                                          Sensation intact                                          Pulses intact DP/PT                                         Calves Soft                                         Dressing Clean / Dry / Intact aquacell                                         Capillary refill with 5 seconds                          13.1   13.83 )-----------( 141      ( 07 Jul 2019 18:23 )             39.5       07-07    141  |  104  |  14  ----------------------------<  139<H>  3.8   |  30  |  1.10    Ca    8.5      07 Jul 2019 18:23    TPro  6.9  /  Alb  3.8  /  TBili  0.4  /  DBili  x   /  AST  22  /  ALT  23  /  AlkPhos  68  07-06      A/P  -- S/P left hip elgin     -  Medicine To Follow   - DVT prophylaxis PAS/ lovenox    - PT & OT   - Analagesia  - Incentive Spirometry  - Discharge Planning  - Safety Precautions  -  CBC , BMP daily

## 2019-07-08 NOTE — PROGRESS NOTE ADULT - ASSESSMENT
The patient is an 83 year old male with a history of OA, borderline HTN who is admitted with hip fracture.    Plan:  - ECG with no evidence of ischemia or infarction  - BP acceptable  - Pain control  - PT  - Ortho follow-up

## 2019-07-09 ENCOUNTER — TRANSCRIPTION ENCOUNTER (OUTPATIENT)
Age: 84
End: 2019-07-09

## 2019-07-09 VITALS
TEMPERATURE: 98 F | OXYGEN SATURATION: 96 % | SYSTOLIC BLOOD PRESSURE: 116 MMHG | HEART RATE: 105 BPM | RESPIRATION RATE: 16 BRPM | DIASTOLIC BLOOD PRESSURE: 74 MMHG

## 2019-07-09 PROCEDURE — 88305 TISSUE EXAM BY PATHOLOGIST: CPT

## 2019-07-09 PROCEDURE — 81001 URINALYSIS AUTO W/SCOPE: CPT

## 2019-07-09 PROCEDURE — 80048 BASIC METABOLIC PNL TOTAL CA: CPT

## 2019-07-09 PROCEDURE — 85730 THROMBOPLASTIN TIME PARTIAL: CPT

## 2019-07-09 PROCEDURE — 97161 PT EVAL LOW COMPLEX 20 MIN: CPT

## 2019-07-09 PROCEDURE — 97116 GAIT TRAINING THERAPY: CPT

## 2019-07-09 PROCEDURE — 73502 X-RAY EXAM HIP UNI 2-3 VIEWS: CPT

## 2019-07-09 PROCEDURE — 85610 PROTHROMBIN TIME: CPT

## 2019-07-09 PROCEDURE — 86850 RBC ANTIBODY SCREEN: CPT

## 2019-07-09 PROCEDURE — 97110 THERAPEUTIC EXERCISES: CPT

## 2019-07-09 PROCEDURE — 86900 BLOOD TYPING SEROLOGIC ABO: CPT

## 2019-07-09 PROCEDURE — 97535 SELF CARE MNGMENT TRAINING: CPT

## 2019-07-09 PROCEDURE — 88311 DECALCIFY TISSUE: CPT

## 2019-07-09 PROCEDURE — 71045 X-RAY EXAM CHEST 1 VIEW: CPT

## 2019-07-09 PROCEDURE — 80053 COMPREHEN METABOLIC PANEL: CPT

## 2019-07-09 PROCEDURE — 97530 THERAPEUTIC ACTIVITIES: CPT

## 2019-07-09 PROCEDURE — 99285 EMERGENCY DEPT VISIT HI MDM: CPT | Mod: 25

## 2019-07-09 PROCEDURE — 86901 BLOOD TYPING SEROLOGIC RH(D): CPT

## 2019-07-09 PROCEDURE — 93005 ELECTROCARDIOGRAM TRACING: CPT

## 2019-07-09 PROCEDURE — 85027 COMPLETE CBC AUTOMATED: CPT

## 2019-07-09 PROCEDURE — 97165 OT EVAL LOW COMPLEX 30 MIN: CPT

## 2019-07-09 PROCEDURE — C1776: CPT

## 2019-07-09 PROCEDURE — 36415 COLL VENOUS BLD VENIPUNCTURE: CPT

## 2019-07-09 PROCEDURE — 86923 COMPATIBILITY TEST ELECTRIC: CPT

## 2019-07-09 RX ORDER — MAGNESIUM HYDROXIDE 400 MG/1
30 TABLET, CHEWABLE ORAL
Qty: 0 | Refills: 0 | DISCHARGE
Start: 2019-07-09

## 2019-07-09 RX ADMIN — Medication 1000 MILLIGRAM(S): at 05:59

## 2019-07-09 RX ADMIN — Medication 100 MILLIGRAM(S): at 05:49

## 2019-07-09 RX ADMIN — PANTOPRAZOLE SODIUM 40 MILLIGRAM(S): 20 TABLET, DELAYED RELEASE ORAL at 05:49

## 2019-07-09 RX ADMIN — Medication 1000 MILLIGRAM(S): at 05:49

## 2019-07-09 RX ADMIN — ENOXAPARIN SODIUM 40 MILLIGRAM(S): 100 INJECTION SUBCUTANEOUS at 09:11

## 2019-07-09 NOTE — DISCHARGE NOTE PROVIDER - NSDCCPCAREPLAN_GEN_ALL_CORE_FT
PRINCIPAL DISCHARGE DIAGNOSIS  Diagnosis: Closed fracture of left hip, initial encounter  Assessment and Plan of Treatment: PRINCIPAL DISCHARGE DIAGNOSIS  Diagnosis: Left displaced femoral neck fracture  Assessment and Plan of Treatment: Physical Therapy /Occupational Therapy  Total Hip Protocol (hemiarthroplasty)  - Ambulation  - Transfers   - Stairs   - ADLs (activities of daily living)  Posterior Total Hip Replacement precautions for 4 weeks  - Abduction pillow   - High hip chair for sitting  - No internal rotation,   - No crossing legs   - No sleeping on opposite sides  - Ice packs to hip following Physical Therapy

## 2019-07-09 NOTE — PROGRESS NOTE ADULT - REASON FOR ADMISSION
Left hip pain s/p fall.

## 2019-07-09 NOTE — DISCHARGE NOTE PROVIDER - NSDCACTIVITY_GEN_ALL_CORE
Do not drive or operate machinery/Walking - Indoors allowed/No heavy lifting/straining/Walking - Outdoors allowed/Stairs allowed

## 2019-07-09 NOTE — DISCHARGE NOTE PROVIDER - HOSPITAL COURSE
82 y/o M with PMH of Prostate CA s/p RT 20 years ago, OA, and Lactose Intolerance presented with left hip fracture s/p mechanical fall. Pt underwent surgery by dr. mata and recovered well. His     pain is well controlled and is stable for dc to [facility].    pt was seen and examined on day of dc 82 y/o M with PMH of Prostate CA s/p RT 20 years ago, OA, and Lactose Intolerance presented with left femoral neck fracture s/p mechanical fall on 7/6/19. After medical, cardiac, and anesthesia clearance, patient underwent Left hip hemiarthroplasty by Dr. Vega Melendez without complication. Patient received perioperative antibiotics per SCIP protocol. Lovenox has been given for DVT prophylaxis.  Patient has been receiving physical therapy and occupational therapy per total hip protocol. Hospital course is unremarkable. Pain is well controlled and is stable for dc to [facility].    pt was seen and examined on day of dc

## 2019-07-09 NOTE — DISCHARGE NOTE NURSING/CASE MANAGEMENT/SOCIAL WORK - NSDCDPATPORTLINK_GEN_ALL_CORE
You can access the [facility] Patient Portal, offered by [facility], by registering with the following website: [email or web address]

## 2019-07-09 NOTE — DISCHARGE NOTE PROVIDER - NSDCCPTREATMENT_GEN_ALL_CORE_FT
PRINCIPAL PROCEDURE  Procedure: Fusion of left hip joint  Findings and Treatment: PRINCIPAL PROCEDURE  Procedure: Hemiarthroplasty of left hip  Findings and Treatment: Displaced Left femoral neck fracture

## 2019-07-09 NOTE — DISCHARGE NOTE PROVIDER - CARE PROVIDER_API CALL
Vega Melendez)  Orthopaedic Surgery; Sports Medicine  [address] [place] [address]  Phone: [phone]  Fax: [phone]  Follow Up Time: Vega Melendez)  Orthopaedic Surgery; Sports Medicine  [address] [place] [address]  Phone: [phone]  Fax: [phone]  Follow Up Time: 2 weeks

## 2019-07-09 NOTE — PROGRESS NOTE ADULT - SUBJECTIVE AND OBJECTIVE BOX
Chief Complaint: Fall    Interval Events: No events overnight. No complaints.    Review of Systems:  General: No fevers, chills, weight loss or gain  Skin: No rashes, color changes  Cardiovascular: No chest pain, orthopnea  Respiratory: No shortness of breath, cough  Gastrointestinal: No nausea, abdominal pain  Genitourinary: No incontinence, pain with urination  Musculoskeletal: No pain, swelling, decreased range of motion  Neurological: No headache, weakness  Psychiatric: No depression, anxiety  Endocrine: No weight loss or gain, increased thirst  All other systems are comprehensively negative.    Physical Exam:  Vital Signs Last 24 Hrs  T(C): 36.4 (09 Jul 2019 07:47), Max: 38.3 (08 Jul 2019 23:30)  T(F): 97.6 (09 Jul 2019 07:47), Max: 101 (08 Jul 2019 23:30)  HR: 95 (09 Jul 2019 07:47) (81 - 110)  BP: 115/59 (09 Jul 2019 07:47) (115/59 - 124/68)  BP(mean): --  RR: 16 (09 Jul 2019 07:47) (16 - 16)  SpO2: 95% (09 Jul 2019 07:47) (87% - 98%)  General: NAD  HEENT: MMM  Neck: No JVD, no carotid bruit  Lungs: CTAB  CV: RRR, nl S1/S2, no M/R/G  Abdomen: S/NT/ND, +BS  Extremities: No LE edema, no cyanosis  Neuro: AAOx3, non-focal  Skin: No rash    Labs:             07-08    139  |  105  |  11  ----------------------------<  132<H>  4.1   |  27  |  0.97    Ca    8.4      08 Jul 2019 07:05                          11.4   10.35 )-----------( 118      ( 08 Jul 2019 07:05 )             34.7

## 2019-07-09 NOTE — PROGRESS NOTE ADULT - ASSESSMENT
The patient is an 83 year old male with a history of OA, borderline HTN who is admitted with hip fracture.    Plan:  - ECG with no evidence of ischemia or infarction  - BP acceptable  - Pain control  - PT  - Ortho follow-up  - Discharge planning

## 2019-07-09 NOTE — PROGRESS NOTE ADULT - SUBJECTIVE AND OBJECTIVE BOX
Post Op Day # 2  SUBJECTIVE    84yo Male status post left hip hemiarthroplasty .   Patient is alert and comfortable.    Pain is controlled with current pain regimen.  Denies nausea, vomiting, chest pain, shortness of breath, abdominal pain or fever.   No new complaints.    OBJECTIVE    Vital Signs Last 24 Hrs  T(C): 37.1 (09 Jul 2019 02:39), Max: 38.3 (08 Jul 2019 23:30)  T(F): 98.8 (09 Jul 2019 02:39), Max: 101 (08 Jul 2019 23:30)  HR: 90 (09 Jul 2019 02:39) (81 - 110)  BP: 119/58 (09 Jul 2019 02:39) (115/61 - 124/68)  BP(mean): --  RR: 16 (09 Jul 2019 02:39) (16 - 16)  SpO2: 95% (09 Jul 2019 02:39) (87% - 98%)  I&O's Summary      PHYSICAL EXAM    Left hip incision  is clean, dry and intact.   No erythema/ No exudate/ No blistering/ No ecchymosis.   The calf is supple/nontender.   Sensation to light touch is grossly intact distally.   Motor function distally is intact.   No foot drop.   (2+) dorsalis pedis pulse. Capillary refill is less than 2 seconds. No cyanosis.                          11.4<L>  10.35 )-----------( 118<L>    ( 08 Jul 2019 07:05 )             34.7<L>  08 Jul 2019 07:05                        13.1   13.83<H> )-----------( 141<L>    ( 07 Jul 2019 18:23 )             39.5   07 Jul 2019 18:23    08 Jul 2019 07:05    139    |  105    |  11     ----------------------------<  132<H>  4.1     |  27     |  0.97   07 Jul 2019 18:23    141    |  104    |  14     ----------------------------<  139<H>  3.8     |  30     |  1.10     Ca    8.4        08 Jul 2019 07:05  Ca    8.5        07 Jul 2019 18:23        ASSESSMENT AND PLAN    - Orthopedically stable  - DVT prophylaxis: PAS + Lovenox 40mg daily  - Continue physical therapy and occupational therapy  - Weight bearing as tolerated of the left lower extremity with assistance of a walker  - Incentive spirometry encouraged  - Pain control as clinically indicated  - Disposition: subacute rehabilitation

## 2019-07-09 NOTE — DISCHARGE NOTE PROVIDER - INSTRUCTIONS
regular diet  Pain medicine has been prescribed for you, as needed, and it often causes constipation.    For Constipation :   • Increase your water intake. Drink at least 8 glasses of water daily.  • Try adding fiber to your diet by eating fruits, vegetables and foods that are rich in grains.  • If you do experience constipation, you may take an over-the-counter stool softener/laxative such as Colace, Senokot or  Milk of Magnesia.

## 2019-07-24 ENCOUNTER — APPOINTMENT (OUTPATIENT)
Dept: SURGERY | Facility: ASSISTED LIVING FACILITY | Age: 84
End: 2019-07-24
Payer: MEDICARE

## 2019-07-24 PROCEDURE — 99304 1ST NF CARE SF/LOW MDM 25: CPT

## 2019-07-25 ENCOUNTER — APPOINTMENT (OUTPATIENT)
Dept: ORTHOPEDIC SURGERY | Facility: CLINIC | Age: 84
End: 2019-07-25
Payer: MEDICARE

## 2019-07-25 PROCEDURE — 73502 X-RAY EXAM HIP UNI 2-3 VIEWS: CPT

## 2019-07-25 PROCEDURE — 99024 POSTOP FOLLOW-UP VISIT: CPT

## 2019-07-25 RX ORDER — HYDROCORTISONE 25 MG/G
2.5 OINTMENT TOPICAL TWICE DAILY
Qty: 30 | Refills: 2 | Status: DISCONTINUED | COMMUNITY
Start: 2018-12-10 | End: 2019-07-25

## 2019-07-25 RX ORDER — FLUTICASONE PROPIONATE 0.5 MG/G
0.05 CREAM TOPICAL TWICE DAILY
Qty: 30 | Refills: 2 | Status: DISCONTINUED | COMMUNITY
Start: 2017-07-10 | End: 2019-07-25

## 2019-07-25 RX ORDER — HYDROCORTISONE VALERATE 2 MG/G
0.2 CREAM TOPICAL TWICE DAILY
Qty: 45 | Refills: 1 | Status: DISCONTINUED | COMMUNITY
Start: 2017-08-14 | End: 2019-07-25

## 2019-07-25 RX ORDER — MUPIROCIN 20 MG/G
2 OINTMENT TOPICAL
Qty: 44 | Refills: 0 | Status: DISCONTINUED | COMMUNITY
Start: 2017-08-10 | End: 2019-07-25

## 2019-08-22 ENCOUNTER — APPOINTMENT (OUTPATIENT)
Dept: ORTHOPEDIC SURGERY | Facility: CLINIC | Age: 84
End: 2019-08-22
Payer: MEDICARE

## 2019-08-22 VITALS
HEART RATE: 58 BPM | DIASTOLIC BLOOD PRESSURE: 74 MMHG | WEIGHT: 149 LBS | SYSTOLIC BLOOD PRESSURE: 147 MMHG | BODY MASS INDEX: 21.33 KG/M2 | HEIGHT: 70 IN

## 2019-08-22 PROCEDURE — 73502 X-RAY EXAM HIP UNI 2-3 VIEWS: CPT

## 2019-08-22 PROCEDURE — 99024 POSTOP FOLLOW-UP VISIT: CPT

## 2019-09-03 ENCOUNTER — APPOINTMENT (OUTPATIENT)
Dept: DERMATOLOGY | Facility: CLINIC | Age: 84
End: 2019-09-03
Payer: MEDICARE

## 2019-09-03 DIAGNOSIS — Z12.83 ENCOUNTER FOR SCREENING FOR MALIGNANT NEOPLASM OF SKIN: ICD-10-CM

## 2019-09-03 DIAGNOSIS — L30.0 NUMMULAR DERMATITIS: ICD-10-CM

## 2019-09-03 PROCEDURE — 11102 TANGNTL BX SKIN SINGLE LES: CPT | Mod: 59

## 2019-09-03 PROCEDURE — 17110 DESTRUCTION B9 LES UP TO 14: CPT | Mod: 59

## 2019-09-03 PROCEDURE — 99214 OFFICE O/P EST MOD 30 MIN: CPT | Mod: 25

## 2019-09-10 ENCOUNTER — APPOINTMENT (OUTPATIENT)
Dept: ULTRASOUND IMAGING | Facility: HOSPITAL | Age: 84
End: 2019-09-10

## 2019-09-16 ENCOUNTER — APPOINTMENT (OUTPATIENT)
Dept: DERMATOLOGY | Facility: CLINIC | Age: 84
End: 2019-09-16
Payer: MEDICARE

## 2019-09-16 DIAGNOSIS — D48.9 NEOPLASM OF UNCERTAIN BEHAVIOR, UNSPECIFIED: ICD-10-CM

## 2019-09-16 DIAGNOSIS — R21 RASH AND OTHER NONSPECIFIC SKIN ERUPTION: ICD-10-CM

## 2019-09-16 DIAGNOSIS — C44.310 BASAL CELL CARCINOMA OF SKIN OF UNSPECIFIED PARTS OF FACE: ICD-10-CM

## 2019-09-16 LAB — CORE LAB BIOPSY: NORMAL

## 2019-09-16 PROCEDURE — 17272 DSTR MAL LES S/N/H/F/G 1.1-2: CPT | Mod: 59

## 2019-09-16 PROCEDURE — 17000 DESTRUCT PREMALG LESION: CPT | Mod: 59

## 2019-09-16 PROCEDURE — 11102 TANGNTL BX SKIN SINGLE LES: CPT | Mod: 59

## 2019-09-16 PROCEDURE — 99213 OFFICE O/P EST LOW 20 MIN: CPT | Mod: 25

## 2019-09-26 ENCOUNTER — APPOINTMENT (OUTPATIENT)
Dept: ORTHOPEDIC SURGERY | Facility: CLINIC | Age: 84
End: 2019-09-26
Payer: MEDICARE

## 2019-09-26 VITALS
WEIGHT: 149 LBS | SYSTOLIC BLOOD PRESSURE: 155 MMHG | HEART RATE: 64 BPM | BODY MASS INDEX: 21.33 KG/M2 | DIASTOLIC BLOOD PRESSURE: 77 MMHG | HEIGHT: 70 IN

## 2019-09-26 PROCEDURE — 99024 POSTOP FOLLOW-UP VISIT: CPT

## 2019-09-26 PROCEDURE — 73502 X-RAY EXAM HIP UNI 2-3 VIEWS: CPT

## 2019-09-27 LAB — CORE LAB BIOPSY: NORMAL

## 2019-09-27 NOTE — HISTORY OF PRESENT ILLNESS
[Procedure: ___] : status post [unfilled] [2] : the patient reports pain that is 2/10 in severity [___ Months Post Op] : [unfilled] months post op [Fever] : no fever [Chills] : no chills [Nausea] : no nausea [Clean/Dry/Intact] : clean, dry and intact [Vomiting] : no vomiting [Erythema] : not erythematous [Healed] : healed [Discharge] : absent of discharge [Dehiscence] : not dehisced [Neuro Intact] : an unremarkable neurological exam [Xray (Date:___)] : [unfilled] Xray -  [Vascular Intact] : ~T peripheral vascular exam normal [Doing Well] : is doing well [No Sign of Infection] : is showing no signs of infection [Excellent Pain Control] : has excellent pain control [de-identified] : s/p Left hip hemiarthroplasty for displaced femoral neck fracture.  [de-identified] : clinically stable\par doing well overall\par no significant left hip pain complaints (other then some minor pain with bending)\par ambulates independently\par still doing therapy 2 times a week\par remains pleased with surgical results to date\par clinically stable otherwise [de-identified] : a/p pelvis and lateral left hip xrays\par s/p left hip bipolar hemiarthroplasty\par components stable\par neg loosening, migration, osteolysis or change in component position/alignment vs initial postop films\par neg new fxs\par neg dislocations [de-identified] : neg pain with gentle passive ROM left hip\par stable to stress\par mild residual antalgic gait \par  [de-identified] : continue physical therapy until reaches maximum recovery/benefit\par continue WBAT LLE\par f/up in 3 months for repeat clinical and xray f/up at that time and/or prn

## 2019-10-07 ENCOUNTER — FORM ENCOUNTER (OUTPATIENT)
Age: 84
End: 2019-10-07

## 2019-10-08 ENCOUNTER — APPOINTMENT (OUTPATIENT)
Dept: CT IMAGING | Facility: HOSPITAL | Age: 84
End: 2019-10-08
Payer: MEDICARE

## 2019-10-08 ENCOUNTER — OUTPATIENT (OUTPATIENT)
Dept: OUTPATIENT SERVICES | Facility: HOSPITAL | Age: 84
LOS: 1 days | End: 2019-10-08
Payer: MEDICARE

## 2019-10-08 DIAGNOSIS — Z98.89 OTHER SPECIFIED POSTPROCEDURAL STATES: Chronic | ICD-10-CM

## 2019-10-08 DIAGNOSIS — D36.10 BENIGN NEOPLASM OF PERIPHERAL NERVES AND AUTONOMIC NERVOUS SYSTEM, UNSPECIFIED: ICD-10-CM

## 2019-10-08 PROCEDURE — 71250 CT THORAX DX C-: CPT

## 2019-10-08 PROCEDURE — 71250 CT THORAX DX C-: CPT | Mod: 26

## 2019-10-10 ENCOUNTER — APPOINTMENT (OUTPATIENT)
Dept: THORACIC SURGERY | Facility: CLINIC | Age: 84
End: 2019-10-10
Payer: MEDICARE

## 2019-10-10 VITALS
TEMPERATURE: 98 F | HEART RATE: 59 BPM | BODY MASS INDEX: 21.05 KG/M2 | DIASTOLIC BLOOD PRESSURE: 63 MMHG | SYSTOLIC BLOOD PRESSURE: 152 MMHG | OXYGEN SATURATION: 98 % | HEIGHT: 70 IN | RESPIRATION RATE: 17 BRPM | WEIGHT: 147 LBS

## 2019-10-10 DIAGNOSIS — D36.10 BENIGN NEOPLASM OF PERIPHERAL NERVES AND AUTONOMIC NERVOUS SYSTEM, UNSPECIFIED: ICD-10-CM

## 2019-10-10 PROCEDURE — 99214 OFFICE O/P EST MOD 30 MIN: CPT

## 2019-10-10 NOTE — PHYSICAL EXAM
[PERRL With Normal Accommodation] : pupils were equal in size, round, and reactive to light [Sclera] : the sclera and conjunctiva were normal [Neck Appearance] : the appearance of the neck was normal [] : no respiratory distress [Respiration, Rhythm And Depth] : normal respiratory rhythm and effort [Auscultation Breath Sounds / Voice Sounds] : lungs were clear to auscultation bilaterally [Heart Sounds] : normal S1 and S2 [Murmurs] : no murmurs [Examination Of The Chest] : the chest was normal in appearance [Edema] : there was no peripheral edema [Abdomen Soft] : soft [Abdomen Tenderness] : non-tender [Cervical Lymph Nodes Enlarged Anterior Bilaterally] : anterior cervical [Cervical Lymph Nodes Enlarged Posterior Bilaterally] : posterior cervical [Supraclavicular Lymph Nodes Enlarged Bilaterally] : supraclavicular [Abnormal Walk] : normal gait [Skin Color & Pigmentation] : normal skin color and pigmentation [Skin Turgor] : normal skin turgor [No Focal Deficits] : no focal deficits [Mood] : the mood was normal [Affect] : the affect was normal [Oriented To Time, Place, And Person] : oriented to person, place, and time

## 2019-10-16 ENCOUNTER — APPOINTMENT (OUTPATIENT)
Dept: DERMATOLOGY | Facility: CLINIC | Age: 84
End: 2019-10-16
Payer: MEDICARE

## 2019-10-16 DIAGNOSIS — Z00.00 ENCOUNTER FOR GENERAL ADULT MEDICAL EXAMINATION W/OUT ABNORMAL FINDINGS: ICD-10-CM

## 2019-10-16 PROCEDURE — 99213 OFFICE O/P EST LOW 20 MIN: CPT

## 2019-10-16 RX ORDER — HYDROCORTISONE 25 MG/G
2.5 OINTMENT TOPICAL TWICE DAILY
Qty: 1 | Refills: 3 | Status: DISCONTINUED | COMMUNITY
Start: 2019-09-03 | End: 2019-10-16

## 2019-10-16 RX ORDER — CICLOPIROX 80 MG/ML
8 SOLUTION TOPICAL
Qty: 7 | Refills: 0 | Status: DISCONTINUED | COMMUNITY
Start: 2019-07-30 | End: 2019-10-16

## 2019-10-16 NOTE — PHYSICAL EXAM
[Alert] : alert [Oriented x 3] : ~L oriented x 3 [Well Nourished] : well nourished [Full Body Skin Exam Performed] : performed [FreeTextEntry3] : A full skin exam was performed including the scalp, face (including lips, ears, nose and eyes), neck, chest, abdomen, back, buttocks, upper extremities and lower extremities.  The patient declined examination of the genitalia.  \par The exam revealed the following benign growths:\par Pima pigmented nevi.\par Seborrheic keratoses.\par Lentigines.\par Cherry angioma.\par \par Scalp is clear.

## 2019-10-16 NOTE — HISTORY OF PRESENT ILLNESS
[FreeTextEntry1] : Patient presents for skin examination. [de-identified] : Notes lesion, bump, of the right scalp over the past 2 weeks.  No bleeding or tenderness, no self tx.
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
## 2019-10-17 NOTE — ASSESSMENT
[FreeTextEntry1] : 83 year old male who presents today for follow up.  He is s/p Right VATS, resection of chest wall mass on 9/22/2015, pathology revealed schwannoma.\par  \par Chest CT on 10/8/19 revealed:\par - Mild biapical pleural and parenchymal scarring\par - Stable RUL 2 mm calcified nodule + RLL 3 mm calcified nodule\par - Minimal nodular thickening along the right major fissure, stable \par - Few punctate nodules peripherally in RML + RLL, stable\par - No new nodules, pleural effusion, or lymphadenopathy\par \par I have reviewed the patient's medical records and diagnostic images during the time of this office visit, and I have made the following recommendation: No further visits are required at this time, however, he may return to the office as needed if any issues arise.\par \par \par Written by Sangeetha Ojeda NP, acting as a scribe for Noelle Magaña MD.\par \par The documentation recorded by the scribe accurately reflects the service I personally performed and the decisions made by me. NOELLE MAGAÑA MD

## 2019-10-17 NOTE — CONSULT LETTER
[Dear  ___] : Dear  [unfilled], [Courtesy Letter:] : I had the pleasure of seeing your patient, [unfilled], in my office today. [Please see my note below.] : Please see my note below. [Sincerely,] : Sincerely, [FreeTextEntry2] : Dr. Ankush Wilcox \par     [FreeTextEntry3] : \par \par \par Ming Singh MD\par Attending Surgeon\par Division of Thoracic Surgery\par , Cardiovascular and Thoracic Surgery\par [facility] [employer] [facility]

## 2019-10-31 ENCOUNTER — APPOINTMENT (OUTPATIENT)
Dept: ORTHOPEDIC SURGERY | Facility: CLINIC | Age: 84
End: 2019-10-31
Payer: MEDICARE

## 2019-10-31 VITALS — HEART RATE: 54 BPM | HEIGHT: 70 IN | SYSTOLIC BLOOD PRESSURE: 174 MMHG | DIASTOLIC BLOOD PRESSURE: 74 MMHG

## 2019-10-31 PROCEDURE — 99213 OFFICE O/P EST LOW 20 MIN: CPT

## 2019-10-31 PROCEDURE — 73502 X-RAY EXAM HIP UNI 2-3 VIEWS: CPT

## 2019-11-25 ENCOUNTER — APPOINTMENT (OUTPATIENT)
Dept: DERMATOLOGY | Facility: CLINIC | Age: 84
End: 2019-11-25

## 2020-04-29 ENCOUNTER — APPOINTMENT (OUTPATIENT)
Dept: DERMATOLOGY | Facility: CLINIC | Age: 85
End: 2020-04-29

## 2020-06-22 ENCOUNTER — APPOINTMENT (OUTPATIENT)
Dept: DERMATOLOGY | Facility: CLINIC | Age: 85
End: 2020-06-22

## 2020-09-01 NOTE — PATIENT PROFILE ADULT - ANY SIGNIFICANT CHANGE IN ABILITY TO PERFORM THE FOLLOWING ADL SINCE THE ONSET OF PRESENT ILLNESS?
[General Appearance - Well Developed] : well developed [General Appearance - Well Nourished] : well nourished [Normal Appearance] : normal appearance [Well Groomed] : well groomed [General Appearance - In No Acute Distress] : no acute distress [Abdomen Soft] : soft [Abdomen Tenderness] : non-tender [Costovertebral Angle Tenderness] : no ~M costovertebral angle tenderness [Urethral Meatus] : meatus normal [Urinary Bladder Findings] : the bladder was normal on palpation [Scrotum] : the scrotum was normal [Testes Mass (___cm)] : there were no testicular masses [No Prostate Nodules] : no prostate nodules [Edema] : no peripheral edema [] : no respiratory distress [Respiration, Rhythm And Depth] : normal respiratory rhythm and effort [Exaggerated Use Of Accessory Muscles For Inspiration] : no accessory muscle use [Oriented To Time, Place, And Person] : oriented to person, place, and time [Affect] : the affect was normal [Mood] : the mood was normal [Not Anxious] : not anxious [Normal Station and Gait] : the gait and station were normal for the patient's age [No Focal Deficits] : no focal deficits [No Palpable Adenopathy] : no palpable adenopathy dressing/personal hygiene

## 2020-09-18 ENCOUNTER — TRANSCRIPTION ENCOUNTER (OUTPATIENT)
Age: 85
End: 2020-09-18

## 2020-10-20 ENCOUNTER — APPOINTMENT (OUTPATIENT)
Dept: DERMATOLOGY | Facility: CLINIC | Age: 85
End: 2020-10-20
Payer: MEDICARE

## 2020-10-20 VITALS — WEIGHT: 147 LBS | HEIGHT: 70 IN | BODY MASS INDEX: 21.05 KG/M2

## 2020-10-20 DIAGNOSIS — Z85.828 PERSONAL HISTORY OF OTHER MALIGNANT NEOPLASM OF SKIN: ICD-10-CM

## 2020-10-20 PROCEDURE — 99213 OFFICE O/P EST LOW 20 MIN: CPT

## 2020-10-20 NOTE — HISTORY OF PRESENT ILLNESS
[FreeTextEntry1] : Patient presents for skin examination. [de-identified] : Notes left shoulder lesion, present for months.  No bleeding or tenderness.  No self tx.

## 2020-10-20 NOTE — PHYSICAL EXAM
[Alert] : alert [Well Nourished] : well nourished [Oriented x 3] : ~L oriented x 3 [Full Body Skin Exam Performed] : performed [FreeTextEntry3] : A full skin exam was performed including the scalp, face (including lips, ears, nose and eyes), neck, chest, abdomen, back, buttocks, upper extremities and lower extremities.  The patient declined examination of the genitalia.  \par The exam revealed the following benign growths:\par [place] pigmented nevi.\par Seborrheic keratoses - includes the left superior shoulder.\par Lentigines.\par Cherry angioma.\par

## 2021-05-05 ENCOUNTER — NON-APPOINTMENT (OUTPATIENT)
Age: 86
End: 2021-05-05

## 2021-05-05 ENCOUNTER — APPOINTMENT (OUTPATIENT)
Dept: FAMILY MEDICINE | Facility: CLINIC | Age: 86
End: 2021-05-05
Payer: MEDICARE

## 2021-05-05 VITALS
DIASTOLIC BLOOD PRESSURE: 58 MMHG | HEIGHT: 70 IN | OXYGEN SATURATION: 99 % | SYSTOLIC BLOOD PRESSURE: 122 MMHG | TEMPERATURE: 98.4 F | HEART RATE: 57 BPM | RESPIRATION RATE: 14 BRPM | BODY MASS INDEX: 22.4 KG/M2 | WEIGHT: 156.44 LBS

## 2021-05-05 DIAGNOSIS — Z96.649 PRESENCE OF UNSPECIFIED ARTIFICIAL HIP JOINT: ICD-10-CM

## 2021-05-05 DIAGNOSIS — R14.0 ABDOMINAL DISTENSION (GASEOUS): ICD-10-CM

## 2021-05-05 PROCEDURE — 99204 OFFICE O/P NEW MOD 45 MIN: CPT | Mod: 25

## 2021-05-05 PROCEDURE — 93000 ELECTROCARDIOGRAM COMPLETE: CPT

## 2021-05-05 NOTE — ASSESSMENT
[FreeTextEntry1] : Overall patient seems to be quite functional and doing well his memory is impaired he has been less active according to his wife he used to bicycle and the do a great deal of walking which she no longer does however he does do all of his activities of daily living without much in the way of help of systems is unremarkable EKG is normal we will send the patient for routine laboratory work.  Low up after lab may need further work-up for dementia

## 2021-05-05 NOTE — PHYSICAL EXAM
[No Acute Distress] : no acute distress [Well Nourished] : well nourished [Well Developed] : well developed [Well-Appearing] : well-appearing [Normal Voice/Communication] : normal voice/communication [Normal Sclera/Conjunctiva] : normal sclera/conjunctiva [PERRL] : pupils equal round and reactive to light [Normal Outer Ear/Nose] : the outer ears and nose were normal in appearance [Normal Oropharynx] : the oropharynx was normal [Normal TMs] : both tympanic membranes were normal [No JVD] : no jugular venous distention [No Lymphadenopathy] : no lymphadenopathy [Thyroid Normal, No Nodules] : the thyroid was normal and there were no nodules present [No Respiratory Distress] : no respiratory distress  [No Accessory Muscle Use] : no accessory muscle use [Normal Rate] : normal rate  [Regular Rhythm] : with a regular rhythm [No Murmur] : no murmur heard [No Edema] : there was no peripheral edema [Soft] : abdomen soft [Non Tender] : non-tender [Non-distended] : non-distended [No Masses] : no abdominal mass palpated [No HSM] : no HSM [Normal Bowel Sounds] : normal bowel sounds [Normal Supraclavicular Nodes] : no supraclavicular lymphadenopathy [Normal Posterior Cervical Nodes] : no posterior cervical lymphadenopathy [Normal Anterior Cervical Nodes] : no anterior cervical lymphadenopathy [No CVA Tenderness] : no CVA  tenderness [No Spinal Tenderness] : no spinal tenderness [No Joint Swelling] : no joint swelling [Coordination Grossly Intact] : coordination grossly intact [No Focal Deficits] : no focal deficits [de-identified] : Thin body habitus [de-identified] : Patient is alert and responds to questioning he cannot name the year or the  however

## 2021-05-07 LAB
25(OH)D3 SERPL-MCNC: 51.3 NG/ML
ALBUMIN SERPL ELPH-MCNC: 4.3 G/DL
ALP BLD-CCNC: 78 U/L
ALT SERPL-CCNC: 12 U/L
ANION GAP SERPL CALC-SCNC: 10 MMOL/L
APPEARANCE: CLEAR
AST SERPL-CCNC: 19 U/L
BASOPHILS # BLD AUTO: 0.03 K/UL
BASOPHILS NFR BLD AUTO: 0.5 %
BILIRUB SERPL-MCNC: 0.3 MG/DL
BILIRUBIN URINE: NEGATIVE
BLOOD URINE: NEGATIVE
BUN SERPL-MCNC: 19 MG/DL
CALCIUM SERPL-MCNC: 9.7 MG/DL
CHLORIDE SERPL-SCNC: 103 MMOL/L
CHOLEST SERPL-MCNC: 234 MG/DL
CO2 SERPL-SCNC: 27 MMOL/L
COLOR: NORMAL
CREAT SERPL-MCNC: 1.34 MG/DL
EOSINOPHIL # BLD AUTO: 0.19 K/UL
EOSINOPHIL NFR BLD AUTO: 3.3 %
ESTIMATED AVERAGE GLUCOSE: 123 MG/DL
FOLATE SERPL-MCNC: 18.5 NG/ML
GLUCOSE QUALITATIVE U: NEGATIVE
GLUCOSE SERPL-MCNC: 92 MG/DL
HBA1C MFR BLD HPLC: 5.9 %
HCT VFR BLD CALC: 42.5 %
HDLC SERPL-MCNC: 65 MG/DL
HGB BLD-MCNC: 13.6 G/DL
IMM GRANULOCYTES NFR BLD AUTO: 0.2 %
KETONES URINE: NEGATIVE
LDLC SERPL CALC-MCNC: 153 MG/DL
LEUKOCYTE ESTERASE URINE: NEGATIVE
LYMPHOCYTES # BLD AUTO: 1.58 K/UL
LYMPHOCYTES NFR BLD AUTO: 27.7 %
MAN DIFF?: NORMAL
MCHC RBC-ENTMCNC: 31.3 PG
MCHC RBC-ENTMCNC: 32 GM/DL
MCV RBC AUTO: 97.9 FL
MONOCYTES # BLD AUTO: 0.61 K/UL
MONOCYTES NFR BLD AUTO: 10.7 %
NEUTROPHILS # BLD AUTO: 3.29 K/UL
NEUTROPHILS NFR BLD AUTO: 57.6 %
NITRITE URINE: NEGATIVE
NONHDLC SERPL-MCNC: 169 MG/DL
PH URINE: 6.5
PLATELET # BLD AUTO: 182 K/UL
POTASSIUM SERPL-SCNC: 4.3 MMOL/L
PROT SERPL-MCNC: 6.7 G/DL
PROTEIN URINE: NEGATIVE
RBC # BLD: 4.34 M/UL
RBC # FLD: 12.6 %
SODIUM SERPL-SCNC: 141 MMOL/L
SPECIFIC GRAVITY URINE: 1.01
TRIGL SERPL-MCNC: 80 MG/DL
TSH SERPL-ACNC: 2.16 UIU/ML
UROBILINOGEN URINE: NORMAL
VIT B12 SERPL-MCNC: 738 PG/ML
WBC # FLD AUTO: 5.71 K/UL

## 2021-05-28 ENCOUNTER — APPOINTMENT (OUTPATIENT)
Dept: FAMILY MEDICINE | Facility: CLINIC | Age: 86
End: 2021-05-28
Payer: MEDICARE

## 2021-05-28 VITALS
TEMPERATURE: 98.4 F | WEIGHT: 154.44 LBS | HEIGHT: 70 IN | RESPIRATION RATE: 14 BRPM | HEART RATE: 101 BPM | DIASTOLIC BLOOD PRESSURE: 60 MMHG | OXYGEN SATURATION: 97 % | SYSTOLIC BLOOD PRESSURE: 118 MMHG | BODY MASS INDEX: 22.11 KG/M2

## 2021-05-28 PROCEDURE — 99213 OFFICE O/P EST LOW 20 MIN: CPT

## 2021-05-28 NOTE — PLAN
[FreeTextEntry1] : We had a long discussion with the patient and his wife about memory loss and have decided on a trial of Aricept 5 mg also we discussed his elevated LDLs and at 85 he would rather not start a another new medication we have told him that there might be benefit to using a low-dose statin and we will reserve that for future date he also will undergo a Cologuard test instead of a repeat colonoscopy

## 2021-05-28 NOTE — PHYSICAL EXAM
[No Acute Distress] : no acute distress [Well Nourished] : well nourished [Well Developed] : well developed [Well-Appearing] : well-appearing [Normal Sclera/Conjunctiva] : normal sclera/conjunctiva [PERRL] : pupils equal round and reactive to light [Normal Oropharynx] : the oropharynx was normal [No Respiratory Distress] : no respiratory distress  [No Accessory Muscle Use] : no accessory muscle use [Clear to Auscultation] : lungs were clear to auscultation bilaterally [Normal Rate] : normal rate  [Regular Rhythm] : with a regular rhythm [No Murmur] : no murmur heard [No Edema] : there was no peripheral edema [No CVA Tenderness] : no CVA  tenderness [de-identified] : Patient does have memory deficits but carries on a rational and logical conversation

## 2021-05-28 NOTE — HISTORY OF PRESENT ILLNESS
[FreeTextEntry1] : memory loss,irritable bowel [de-identified] : Patient is seen here today in follow-up on his memory loss he recently had laboratory work done which was all within acceptable range with the exception of a slightly elevated creatinine and an elevated LDL irritable bowel syndrome seems to be in good control on dietary measures he has received a note that he is due for a follow-up colonoscopy and he is not anxious to have this done apparently there was some difficulty with his last 1 otherwise review of systems is unremarkable he does have memory loss but still is quite functional and performs all of his ADLs and some of his IADLs he does receive some assistance from his wife who also has some memory issues

## 2021-05-28 NOTE — REVIEW OF SYSTEMS
[Chills] : no chills [Fever] : no fever [Fatigue] : no fatigue [Vision Problems] : no vision problems [Sore Throat] : no sore throat [Chest Pain] : no chest pain [Palpitations] : no palpitations [Shortness Of Breath] : no shortness of breath [Wheezing] : no wheezing [Cough] : no cough [Dyspnea on Exertion] : not dyspnea on exertion [Abdominal Pain] : no abdominal pain [Nausea] : no nausea [Vomiting] : no vomiting [Dysuria] : no dysuria [Joint Pain] : joint pain [Back Pain] : back pain [Headache] : no headache [Dizziness] : no dizziness [Confusion] : no confusion [Unsteady Walk] : ataxia [Memory Loss] : memory loss [de-identified] : Ambulates with a cane

## 2021-07-07 ENCOUNTER — APPOINTMENT (OUTPATIENT)
Dept: DERMATOLOGY | Facility: CLINIC | Age: 86
End: 2021-07-07
Payer: MEDICARE

## 2021-07-07 PROCEDURE — 17110 DESTRUCTION B9 LES UP TO 14: CPT

## 2021-07-07 RX ORDER — DICYCLOMINE HYDROCHLORIDE 10 MG/1
10 CAPSULE ORAL
Qty: 60 | Refills: 0 | Status: ACTIVE | COMMUNITY
Start: 2021-04-16

## 2021-07-15 ENCOUNTER — APPOINTMENT (OUTPATIENT)
Dept: FAMILY MEDICINE | Facility: CLINIC | Age: 86
End: 2021-07-15
Payer: MEDICARE

## 2021-07-15 VITALS
TEMPERATURE: 97.8 F | OXYGEN SATURATION: 96 % | WEIGHT: 153.25 LBS | HEART RATE: 62 BPM | DIASTOLIC BLOOD PRESSURE: 52 MMHG | SYSTOLIC BLOOD PRESSURE: 116 MMHG | BODY MASS INDEX: 21.94 KG/M2 | RESPIRATION RATE: 14 BRPM | HEIGHT: 70 IN

## 2021-07-15 DIAGNOSIS — Z98.890 OTHER SPECIFIED POSTPROCEDURAL STATES: ICD-10-CM

## 2021-07-15 DIAGNOSIS — Z87.81 OTHER SPECIFIED POSTPROCEDURAL STATES: ICD-10-CM

## 2021-07-15 PROCEDURE — 99213 OFFICE O/P EST LOW 20 MIN: CPT

## 2021-07-15 NOTE — REVIEW OF SYSTEMS
[Fever] : no fever [Chills] : no chills [Fatigue] : fatigue [Pain] : no pain [Chest Pain] : no chest pain [Palpitations] : no palpitations [Claudication] : no  leg claudication [Lower Ext Edema] : no lower extremity edema [Orthopena] : no orthopnea [Shortness Of Breath] : no shortness of breath [Wheezing] : no wheezing [Cough] : no cough [Dyspnea on Exertion] : not dyspnea on exertion [Abdominal Pain] : no abdominal pain [Nausea] : no nausea [Constipation] : no constipation [Diarrhea] : no diarrhea [Vomiting] : no vomiting [Heartburn] : no heartburn [Dysuria] : no dysuria [Incontinence] : no incontinence [Hesitancy] : no hesitancy [Hematuria] : no hematuria [Joint Pain] : joint pain [Joint Stiffness] : joint stiffness [Muscle Pain] : muscle pain [Muscle Weakness] : muscle weakness [Back Pain] : back pain [Headache] : no headache [Dizziness] : no dizziness [Fainting] : no fainting [Confusion] : confusion [Unsteady Walk] : ataxia [Memory Loss] : no memory loss

## 2021-07-15 NOTE — HISTORY OF PRESENT ILLNESS
[FreeTextEntry1] : Post left hip replacement osteoarthritis dementia [de-identified] : Patient is seen here in follow-up on his dementia memory loss osteoarthritis he is status post left hip replacement wife is asking for a parking permit form to be filled out this seems quite appropriate as his gait is quite unstable and halting and there is no likelihood of this improving significantly wise and no complaints of shortness of breath or chest pain headaches or fainting spells patient does have episodic loose stools from irritable bowel syndrome

## 2021-08-02 ENCOUNTER — APPOINTMENT (OUTPATIENT)
Dept: FAMILY MEDICINE | Facility: CLINIC | Age: 86
End: 2021-08-02
Payer: MEDICARE

## 2021-08-02 VITALS
BODY MASS INDEX: 21.91 KG/M2 | WEIGHT: 153.06 LBS | HEIGHT: 70 IN | RESPIRATION RATE: 14 BRPM | SYSTOLIC BLOOD PRESSURE: 120 MMHG | TEMPERATURE: 97.8 F | HEART RATE: 60 BPM | DIASTOLIC BLOOD PRESSURE: 56 MMHG | OXYGEN SATURATION: 98 %

## 2021-08-02 DIAGNOSIS — M65.30 TRIGGER FINGER, UNSPECIFIED FINGER: ICD-10-CM

## 2021-08-02 DIAGNOSIS — K58.9 IRRITABLE BOWEL SYNDROME W/OUT DIARRHEA: ICD-10-CM

## 2021-08-02 PROCEDURE — 99213 OFFICE O/P EST LOW 20 MIN: CPT

## 2021-08-02 NOTE — PHYSICAL EXAM
[Normal Sclera/Conjunctiva] : normal sclera/conjunctiva [No Edema] : there was no peripheral edema [No Extremity Clubbing/Cyanosis] : no extremity clubbing/cyanosis [Soft] : abdomen soft [Non Tender] : non-tender [Non-distended] : non-distended [No Masses] : no abdominal mass palpated [Normal Bowel Sounds] : normal bowel sounds [Normal] : no rash [Coordination Grossly Intact] : coordination grossly intact [No Focal Deficits] : no focal deficits [Normal Gait] : normal gait

## 2021-08-02 NOTE — HISTORY OF PRESENT ILLNESS
[FreeTextEntry1] : hand [de-identified] : Patient reports right hand fingers have been getting stuck clenched. He reports it has been going on past couple years, but more noticeably now. He denies any RA, just OA. He reports hitting hand on leg and that makes it better. He reports a little bit of pain, has not taking been taking any meds for hand/ He reports that it has been happening more times per week. Pt reports not seeing anyone for this in the past. \par \par Patient has not been taking aricept d/t IBS symptoms. Pharmacist recommends aricept evess dissolvable form no lactose. He reports IBS symptoms have been unchanged, he tried metamucil but triggered his s/s more. He has to be very careful with what he eats. He is not currently seeing GI.\par \par Pt denies any SOB, cough, chest pain, palpitations, N/V, fever, or chills. No other health concerns today.

## 2021-08-02 NOTE — PLAN
[FreeTextEntry1] : \par \par Trigger finger: will give pt ortho referral, discussed steroid injection is what is done to relieve it from sticking in same position. Pt reports not bothering him enough to get that at this time, will give referral if they change their mind. Advised patient to call with any questions or concerns. Patient verbalized understanding. \par \par IBS: patient will continue monitoring his diet, interested in seeing GI - referral placed. Advised patient to call with any questions or concerns. Patient verbalized understanding. \par \par Dementia: as per Dr. Powell, patient will stop medication for now as it might be contributing to his s/s of IBS. Patient will follow up with Dr. Powell regarding med/dementia at f/u appt in october. Advised patient to call with any questions or concerns. Patient verbalized understanding.

## 2021-08-30 ENCOUNTER — APPOINTMENT (OUTPATIENT)
Dept: DERMATOLOGY | Facility: CLINIC | Age: 86
End: 2021-08-30

## 2021-09-21 ENCOUNTER — APPOINTMENT (OUTPATIENT)
Dept: FAMILY MEDICINE | Facility: CLINIC | Age: 86
End: 2021-09-21
Payer: MEDICARE

## 2021-09-21 VITALS
WEIGHT: 148.5 LBS | OXYGEN SATURATION: 97 % | DIASTOLIC BLOOD PRESSURE: 60 MMHG | SYSTOLIC BLOOD PRESSURE: 150 MMHG | BODY MASS INDEX: 21.26 KG/M2 | TEMPERATURE: 97.7 F | HEART RATE: 69 BPM | HEIGHT: 70 IN | RESPIRATION RATE: 14 BRPM

## 2021-09-21 DIAGNOSIS — Z23 ENCOUNTER FOR IMMUNIZATION: ICD-10-CM

## 2021-09-21 PROCEDURE — 99214 OFFICE O/P EST MOD 30 MIN: CPT | Mod: 25

## 2021-09-21 PROCEDURE — G0008: CPT

## 2021-09-21 PROCEDURE — 90662 IIV NO PRSV INCREASED AG IM: CPT

## 2021-09-21 NOTE — REVIEW OF SYSTEMS
[Fatigue] : fatigue [Frequency] : frequency [Joint Stiffness] : joint stiffness [Unsteady Walk] : ataxia [Memory Loss] : memory loss [Fever] : no fever [Chills] : no chills [Vision Problems] : no vision problems [Nasal Discharge] : no nasal discharge [Sore Throat] : no sore throat [Chest Pain] : no chest pain [Palpitations] : no palpitations [Lower Ext Edema] : no lower extremity edema [Orthopena] : no orthopnea [Paroxysmal Nocturnal Dyspnea] : no paroxysmal nocturnal dyspnea [Shortness Of Breath] : no shortness of breath [Wheezing] : no wheezing [Cough] : no cough [Dyspnea on Exertion] : not dyspnea on exertion [Abdominal Pain] : no abdominal pain [Nausea] : no nausea [Constipation] : no constipation [Diarrhea] : no diarrhea [Dysuria] : no dysuria [Incontinence] : no incontinence [Hematuria] : no hematuria [Skin Rash] : no skin rash [Headache] : no headache [Dizziness] : no dizziness [Fainting] : no fainting [Confusion] : no confusion [FreeTextEntry7] : Occasionally 1 or 2 loose stools per day

## 2021-09-21 NOTE — PHYSICAL EXAM
[No Acute Distress] : no acute distress [Well Nourished] : well nourished [Well Developed] : well developed [Well-Appearing] : well-appearing [Normal Sclera/Conjunctiva] : normal sclera/conjunctiva [Normal Outer Ear/Nose] : the outer ears and nose were normal in appearance [Normal Oropharynx] : the oropharynx was normal [No JVD] : no jugular venous distention [No Lymphadenopathy] : no lymphadenopathy [Normal Rate] : normal rate  [Thyroid Normal, No Nodules] : the thyroid was normal and there were no nodules present [Regular Rhythm] : with a regular rhythm [No Murmur] : no murmur heard [No Edema] : there was no peripheral edema [Soft] : abdomen soft [Non Tender] : non-tender [No HSM] : no HSM [No Masses] : no abdominal mass palpated [Normal Supraclavicular Nodes] : no supraclavicular lymphadenopathy [Normal Posterior Cervical Nodes] : no posterior cervical lymphadenopathy [Normal Anterior Cervical Nodes] : no anterior cervical lymphadenopathy [Speech Grossly Normal] : speech grossly normal [Normal Mood] : the mood was normal

## 2021-09-21 NOTE — HISTORY OF PRESENT ILLNESS
[FreeTextEntry1] : Memory loss [de-identified] : Patient is here today in routine follow-up he feels well his memory is not good he also has somewhat of a shuffling gait he urinates frequently he states but does not lose control family members of question whether he might need a work-up for normal pressure hydrocephalus review of systems is otherwise unremarkable his vital signs are stable he is sleeping quite a bit during the day and not so well at night for many months now he did not tolerate Aricept his wife says because of a lactase intolerance and the coating of the Aricept being partially dairy derived in any event this could be a primary effect from the Aricept

## 2021-09-21 NOTE — ASSESSMENT
[FreeTextEntry1] : Patient seems stable GI side effects attributed to the sugar coating of donepezil pill may well be primary effect of Aricept patient be switched to a low-dose Exelon patch a CAT scan of the head will be performed to rule out normal pressure hydrocephalus and routine laboratory will be drawn on the patient will be followed up after that high-dose influenza shot is administered patient is up-to-date on Covid shots

## 2021-09-23 LAB
ALBUMIN SERPL ELPH-MCNC: 4.2 G/DL
ALP BLD-CCNC: 78 U/L
ALT SERPL-CCNC: 12 U/L
ANION GAP SERPL CALC-SCNC: 14 MMOL/L
AST SERPL-CCNC: 14 U/L
BASOPHILS # BLD AUTO: 0.02 K/UL
BASOPHILS NFR BLD AUTO: 0.4 %
BILIRUB SERPL-MCNC: 0.4 MG/DL
BUN SERPL-MCNC: 15 MG/DL
CALCIUM SERPL-MCNC: 9.4 MG/DL
CHLORIDE SERPL-SCNC: 101 MMOL/L
CO2 SERPL-SCNC: 25 MMOL/L
CREAT SERPL-MCNC: 1.14 MG/DL
EOSINOPHIL # BLD AUTO: 0.17 K/UL
EOSINOPHIL NFR BLD AUTO: 3 %
ESTIMATED AVERAGE GLUCOSE: 126 MG/DL
FOLATE SERPL-MCNC: >20 NG/ML
GLUCOSE SERPL-MCNC: 136 MG/DL
HBA1C MFR BLD HPLC: 6 %
HCT VFR BLD CALC: 40.9 %
HGB BLD-MCNC: 13.3 G/DL
IMM GRANULOCYTES NFR BLD AUTO: 0.2 %
LYMPHOCYTES # BLD AUTO: 0.9 K/UL
LYMPHOCYTES NFR BLD AUTO: 15.9 %
MAN DIFF?: NORMAL
MCHC RBC-ENTMCNC: 31.4 PG
MCHC RBC-ENTMCNC: 32.5 GM/DL
MCV RBC AUTO: 96.7 FL
MONOCYTES # BLD AUTO: 0.55 K/UL
MONOCYTES NFR BLD AUTO: 9.7 %
NEUTROPHILS # BLD AUTO: 4.02 K/UL
NEUTROPHILS NFR BLD AUTO: 70.8 %
PLATELET # BLD AUTO: 173 K/UL
POTASSIUM SERPL-SCNC: 3.9 MMOL/L
PROT SERPL-MCNC: 6.3 G/DL
RBC # BLD: 4.23 M/UL
RBC # FLD: 12.7 %
SODIUM SERPL-SCNC: 139 MMOL/L
TSH SERPL-ACNC: 0.77 UIU/ML
VIT B12 SERPL-MCNC: 834 PG/ML
WBC # FLD AUTO: 5.67 K/UL

## 2021-10-06 ENCOUNTER — APPOINTMENT (OUTPATIENT)
Dept: CT IMAGING | Facility: HOSPITAL | Age: 86
End: 2021-10-06
Payer: MEDICARE

## 2021-10-06 ENCOUNTER — OUTPATIENT (OUTPATIENT)
Dept: OUTPATIENT SERVICES | Facility: HOSPITAL | Age: 86
LOS: 1 days | End: 2021-10-06
Payer: MEDICARE

## 2021-10-06 DIAGNOSIS — F03.90 UNSPECIFIED DEMENTIA WITHOUT BEHAVIORAL DISTURBANCE: ICD-10-CM

## 2021-10-06 DIAGNOSIS — Z98.89 OTHER SPECIFIED POSTPROCEDURAL STATES: Chronic | ICD-10-CM

## 2021-10-06 PROCEDURE — 70450 CT HEAD/BRAIN W/O DYE: CPT | Mod: 26,MH

## 2021-10-06 PROCEDURE — 70450 CT HEAD/BRAIN W/O DYE: CPT

## 2021-10-15 ENCOUNTER — APPOINTMENT (OUTPATIENT)
Dept: FAMILY MEDICINE | Facility: CLINIC | Age: 86
End: 2021-10-15

## 2021-12-09 ENCOUNTER — APPOINTMENT (OUTPATIENT)
Dept: FAMILY MEDICINE | Facility: CLINIC | Age: 86
End: 2021-12-09
Payer: MEDICARE

## 2021-12-09 VITALS
HEART RATE: 57 BPM | DIASTOLIC BLOOD PRESSURE: 58 MMHG | HEIGHT: 70 IN | TEMPERATURE: 97.6 F | OXYGEN SATURATION: 98 % | RESPIRATION RATE: 16 BRPM | SYSTOLIC BLOOD PRESSURE: 128 MMHG | BODY MASS INDEX: 21.37 KG/M2 | WEIGHT: 149.25 LBS

## 2021-12-09 PROCEDURE — 99213 OFFICE O/P EST LOW 20 MIN: CPT

## 2021-12-09 NOTE — ASSESSMENT
[FreeTextEntry1] : Patient will be sent to neurology for evaluation regarding possible early Parkinson's disease his dementia seems to be at least the same if not somewhat better we will continue Exelon as written and follow-up again in several months review of laboratory is unremarkable patient up-to-date on Covid shots

## 2021-12-09 NOTE — HISTORY OF PRESENT ILLNESS
[FreeTextEntry1] : Dementia [de-identified] : Patient is here for follow-up visit he is accompanied by his wife his follow-up on dementia he was started on Exelon patch last visit CAT scan of the brain was unremarkable laboratory work was all essentially normal his wife states he may be a little bit more functional than he had been still has issues with the shuffling gait he had a fall several weeks ago while trying to walk in a parking lot no major injury review of systems is remarkable only for some loose stools occasionally no abdominal pain he responds to questions and is quite alert wife also says he spends a great deal of time sleeping

## 2021-12-09 NOTE — PHYSICAL EXAM
[No Acute Distress] : no acute distress [Well Nourished] : well nourished [Well Developed] : well developed [Well-Appearing] : well-appearing [Normal Voice/Communication] : normal voice/communication [Normal Sclera/Conjunctiva] : normal sclera/conjunctiva [Normal Outer Ear/Nose] : the outer ears and nose were normal in appearance [Normal Oropharynx] : the oropharynx was normal [No JVD] : no jugular venous distention [No Lymphadenopathy] : no lymphadenopathy [No Respiratory Distress] : no respiratory distress  [No Accessory Muscle Use] : no accessory muscle use [Clear to Auscultation] : lungs were clear to auscultation bilaterally [Normal Rate] : normal rate  [Regular Rhythm] : with a regular rhythm [No Murmur] : no murmur heard [No Edema] : there was no peripheral edema [Soft] : abdomen soft [Non Tender] : non-tender [Non-distended] : non-distended [No HSM] : no HSM [Normal Bowel Sounds] : normal bowel sounds [Normal Supraclavicular Nodes] : no supraclavicular lymphadenopathy [Normal Posterior Cervical Nodes] : no posterior cervical lymphadenopathy [Normal Anterior Cervical Nodes] : no anterior cervical lymphadenopathy [No CVA Tenderness] : no CVA  tenderness [No Spinal Tenderness] : no spinal tenderness [No Joint Swelling] : no joint swelling [Grossly Normal Strength/Tone] : grossly normal strength/tone [No Rash] : no rash [No Skin Lesions] : no skin lesions [Normal Affect] : the affect was normal [Normal Mood] : the mood was normal [de-identified] : He has a shuffling gait tremor

## 2022-02-04 ENCOUNTER — APPOINTMENT (OUTPATIENT)
Dept: NEUROLOGY | Facility: CLINIC | Age: 87
End: 2022-02-04
Payer: MEDICARE

## 2022-02-04 VITALS
HEIGHT: 70 IN | HEART RATE: 62 BPM | DIASTOLIC BLOOD PRESSURE: 80 MMHG | WEIGHT: 149 LBS | BODY MASS INDEX: 21.33 KG/M2 | SYSTOLIC BLOOD PRESSURE: 126 MMHG

## 2022-02-04 PROCEDURE — 99205 OFFICE O/P NEW HI 60 MIN: CPT

## 2022-02-04 NOTE — PHYSICAL EXAM
[FreeTextEntry1] : Head:  Normocephalic Neck: Supple nontender no carotid bruits.  Spine:  Nontender negative straight leg raising.\par \par Mental Status:  Alert and he knows the month but not the year date or the day of the week.  He is confused.  Recall 0 out of 3 words at 5 minutes.  He had difficulty subtracting serial sevens.  He is a poor historian.  He does not appear depressed..  Speech is not hypophonic or dysarthric.\par \par Cranial Nerves: No significant facial masking and negative Myerson sign.  Aphakic pupils., Visual Fields full  EOMI no diplopia no ptosis no nystagmus, V through XII intact.\par \par Motor: No drift no definite cogwheeling.  There is paratonia.  There is no significant tremor.  There is mild weakness and discoordination left lower extremity which in part could be related to the prior history of left hip fracture and repair.\par \par DTRs: Symmetric and 2+.  Plantars bilaterally equivocal.\par \par Sensory: Uncooperative for testing.  No lateralizing findings.\par \par Gait: Unsteady with spinal deformity short stepped favors left lower limb.  He has essentially normal arm swing and does not festinate.  Unable to tandem walk.\par

## 2022-02-04 NOTE — ASSESSMENT
[FreeTextEntry1] : Impression: This 86-year-old male patient has dementia and a chronic gait disorder.  Suspect Alzheimer's disease or possibly vascular dementia.  Doubt Parkinson's disease.  Doubt Lewy body disease.  Doubt NPH.  Doubt depression.  Rule out cervical myelopathy due to spondylosis. \par \par Recommendations: MRI brain and cervical spine both noncontrast.  Physical therapy.  Issues of fall prevention discussed with patient and his wife was present at time of the visit.  He should not be driving.  Agree with the Exelon patch.  Office follow-up as directed.\par

## 2022-02-04 NOTE — REASON FOR VISIT
[Initial Evaluation] : an initial evaluation [FreeTextEntry1] : Patient with dementia and a progressive gait disorder

## 2022-02-04 NOTE — CONSULT LETTER
[Dear  ___] : Dear  [unfilled], [Consult Letter:] : I had the pleasure of evaluating your patient, [unfilled]. [Please see my note below.] : Please see my note below. [Consult Closing:] : Thank you very much for allowing me to participate in the care of this patient.  If you have any questions, please do not hesitate to contact me. [Sincerely,] : Sincerely, [FreeTextEntry3] : Tanner Burt MD\par

## 2022-02-04 NOTE — HISTORY OF PRESENT ILLNESS
[FreeTextEntry1] : Madi Marte is seen for an office consultation with his wife.  This patient is an 86-year-old male who has a diagnosis of dementia treated with Exelon patch.  He has had progressive unsteadiness of gait which is chronic and he has occasional falls.  He has used a cane on occasion.  He is described as having short shuffling gait.  He occasionally has poorly characterized dizziness without vertigo.  He has progressive dementia with memory loss and his daughter who provides a note for this visit is concerned about whether he could be depressed.  He gets lost easily.  He is no longer driving.  He has evidence of apraxia.  Headache is denied there is no history of stroke.  He is not having any problems with his vision.  He has some decrease in hearing.  No facial droop.  No described motor weakness.  He denies sensory loss.\par \par CAT scan of the brain noncontrast on 10/6/2021 revealed atrophy and chronic microvascular change.\par \par He status post hip fracture and surgical repair left-sided in 2018.\par \par Blood test 9/22/2021 reviewed.\par \par

## 2022-02-10 ENCOUNTER — APPOINTMENT (OUTPATIENT)
Dept: FAMILY MEDICINE | Facility: CLINIC | Age: 87
End: 2022-02-10

## 2022-02-15 ENCOUNTER — APPOINTMENT (OUTPATIENT)
Dept: DERMATOLOGY | Facility: CLINIC | Age: 87
End: 2022-02-15
Payer: MEDICARE

## 2022-02-15 DIAGNOSIS — L82.0 INFLAMED SEBORRHEIC KERATOSIS: ICD-10-CM

## 2022-02-15 DIAGNOSIS — D18.01 HEMANGIOMA OF SKIN AND SUBCUTANEOUS TISSUE: ICD-10-CM

## 2022-02-15 PROCEDURE — 17110 DESTRUCTION B9 LES UP TO 14: CPT

## 2022-02-15 PROCEDURE — 99213 OFFICE O/P EST LOW 20 MIN: CPT | Mod: 25

## 2022-02-15 NOTE — PHYSICAL EXAM
[Alert] : alert [Oriented x 3] : ~L oriented x 3 [Well Nourished] : well nourished [Full Body Skin Exam Performed] : performed [FreeTextEntry3] : A full skin exam was performed including the scalp, face (including lips, ears, nose and eyes), neck, chest, abdomen, back, buttocks, upper extremities and lower extremities.  The patient declined examination of the genitalia.  \par The exam revealed the following benign growths:\par [place] pigmented nevi.\par Seborrheic keratoses.\par Lentigines.\par Cherry angioma.\par \par Greasy tan erythematous stuck-on papule, right neck.

## 2022-02-15 NOTE — HISTORY OF PRESENT ILLNESS
[FreeTextEntry1] : Patient presents for skin examination. [de-identified] : Notes irritated lesion of the right neck, present for months.  No bleeding, but with slow growth.

## 2022-02-17 ENCOUNTER — APPOINTMENT (OUTPATIENT)
Dept: MRI IMAGING | Facility: HOSPITAL | Age: 87
End: 2022-02-17
Payer: MEDICARE

## 2022-02-17 ENCOUNTER — OUTPATIENT (OUTPATIENT)
Dept: OUTPATIENT SERVICES | Facility: HOSPITAL | Age: 87
LOS: 1 days | End: 2022-02-17
Payer: MEDICARE

## 2022-02-17 DIAGNOSIS — Z98.89 OTHER SPECIFIED POSTPROCEDURAL STATES: Chronic | ICD-10-CM

## 2022-02-17 DIAGNOSIS — R26.9 UNSPECIFIED ABNORMALITIES OF GAIT AND MOBILITY: ICD-10-CM

## 2022-02-17 DIAGNOSIS — F03.90 UNSPECIFIED DEMENTIA WITHOUT BEHAVIORAL DISTURBANCE: ICD-10-CM

## 2022-02-17 PROCEDURE — G1004: CPT

## 2022-02-17 PROCEDURE — 70551 MRI BRAIN STEM W/O DYE: CPT | Mod: 26,ME

## 2022-02-17 PROCEDURE — 72141 MRI NECK SPINE W/O DYE: CPT | Mod: 26,ME

## 2022-02-17 PROCEDURE — 70551 MRI BRAIN STEM W/O DYE: CPT | Mod: ME

## 2022-02-17 PROCEDURE — 72141 MRI NECK SPINE W/O DYE: CPT | Mod: ME

## 2022-02-18 ENCOUNTER — NON-APPOINTMENT (OUTPATIENT)
Age: 87
End: 2022-02-18

## 2022-02-21 ENCOUNTER — NON-APPOINTMENT (OUTPATIENT)
Age: 87
End: 2022-02-21

## 2022-02-22 ENCOUNTER — APPOINTMENT (OUTPATIENT)
Dept: DERMATOLOGY | Facility: CLINIC | Age: 87
End: 2022-02-22

## 2022-03-04 ENCOUNTER — APPOINTMENT (OUTPATIENT)
Dept: NEUROLOGY | Facility: CLINIC | Age: 87
End: 2022-03-04
Payer: MEDICARE

## 2022-03-04 VITALS
DIASTOLIC BLOOD PRESSURE: 80 MMHG | HEIGHT: 70 IN | WEIGHT: 149 LBS | HEART RATE: 64 BPM | BODY MASS INDEX: 21.33 KG/M2 | SYSTOLIC BLOOD PRESSURE: 126 MMHG

## 2022-03-04 PROCEDURE — 99214 OFFICE O/P EST MOD 30 MIN: CPT

## 2022-03-04 NOTE — ASSESSMENT
[FreeTextEntry1] : Impression: This 86-year-old male patient has dementia and a chronic progressive gait disorder.  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  Cervical MRI is remarkable for degenerative change but no abnormal cord signal to suggest myelopathy.  Suspect the gait disorder is due to combination of microvascular disease and senile degenerative gait apraxia.  There is probably a vascular component to his dementia as well.\par \par Recommendations: Continue rivastigmine patch as ordered.  Physical therapy and fall prevention.  Office follow-up in 3 months.\par

## 2022-03-04 NOTE — HISTORY OF PRESENT ILLNESS
[FreeTextEntry1] : Madi Marte is seen for an office visit with his wife.  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  Cervical MRI performed on 2/4/2022 revealed multilevel cervical spondylosis with degenerative change at several levels.  In this setting there was normal cord signal throughout.  He has a chronic gait disorder and there is no change in his cognitive impairment while taking rivastigmine 4.6 mg / 24-hour patch.

## 2022-03-04 NOTE — PHYSICAL EXAM
[FreeTextEntry1] : Head:  Normocephalic Neck: Supple nontender no carotid bruits.\par \par Mental Status:  Alert disoriented and recall 03 words at 5 minutes.  There is evidence of dementia which is unchanged.\par \par Cranial Nerves: No significant facial masking and negative Myerson sign.  Pupils aphakic.  Fundi normal Visual Fields full  EOMI no diplopia no ptosis no nystagmus, V through XII intact.\par \par Motor: No drift and no definite cogwheeling.  There is paratonia.  There is no significant tremor.  There is some mild weakness left lower limb could be related to history of left hip fracture and repair.  Otherwise there is no focality.\par \par DTRs: Symmetric and 2+.  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

## 2022-03-04 NOTE — CONSULT LETTER
[Dear  ___] : Dear  [unfilled], [Please see my note below.] : Please see my note below. [Consult Closing:] : Thank you very much for allowing me to participate in the care of this patient.  If you have any questions, please do not hesitate to contact me. [Sincerely,] : Sincerely, [FreeTextEntry3] : Tanner Burt MD\par

## 2022-05-12 ENCOUNTER — INPATIENT (INPATIENT)
Facility: HOSPITAL | Age: 87
LOS: 2 days | Discharge: HOME | DRG: 310 | End: 2022-05-15
Attending: STUDENT IN AN ORGANIZED HEALTH CARE EDUCATION/TRAINING PROGRAM | Admitting: INTERNAL MEDICINE
Payer: MEDICARE

## 2022-05-12 VITALS
HEART RATE: 107 BPM | SYSTOLIC BLOOD PRESSURE: 181 MMHG | TEMPERATURE: 98 F | RESPIRATION RATE: 18 BRPM | WEIGHT: 156.09 LBS | OXYGEN SATURATION: 96 % | DIASTOLIC BLOOD PRESSURE: 97 MMHG | HEIGHT: 70 IN

## 2022-05-12 DIAGNOSIS — Z98.89 OTHER SPECIFIED POSTPROCEDURAL STATES: Chronic | ICD-10-CM

## 2022-05-12 DIAGNOSIS — R42 DIZZINESS AND GIDDINESS: ICD-10-CM

## 2022-05-12 DIAGNOSIS — Z98.890 OTHER SPECIFIED POSTPROCEDURAL STATES: Chronic | ICD-10-CM

## 2022-05-12 LAB
ALBUMIN SERPL ELPH-MCNC: 3.9 G/DL — SIGNIFICANT CHANGE UP (ref 3.3–5)
ALP SERPL-CCNC: 96 U/L — SIGNIFICANT CHANGE UP (ref 40–120)
ALT FLD-CCNC: 24 U/L — SIGNIFICANT CHANGE UP (ref 10–45)
ANION GAP SERPL CALC-SCNC: 10 MMOL/L — SIGNIFICANT CHANGE UP (ref 5–17)
APPEARANCE UR: CLEAR — SIGNIFICANT CHANGE UP
APTT BLD: 32.3 SEC — SIGNIFICANT CHANGE UP (ref 27.5–35.5)
AST SERPL-CCNC: 20 U/L — SIGNIFICANT CHANGE UP (ref 10–40)
BASOPHILS # BLD AUTO: 0.02 K/UL — SIGNIFICANT CHANGE UP (ref 0–0.2)
BASOPHILS NFR BLD AUTO: 0.3 % — SIGNIFICANT CHANGE UP (ref 0–2)
BILIRUB SERPL-MCNC: 0.5 MG/DL — SIGNIFICANT CHANGE UP (ref 0.2–1.2)
BILIRUB UR-MCNC: NEGATIVE — SIGNIFICANT CHANGE UP
BUN SERPL-MCNC: 15 MG/DL — SIGNIFICANT CHANGE UP (ref 7–23)
CALCIUM SERPL-MCNC: 9.7 MG/DL — SIGNIFICANT CHANGE UP (ref 8.4–10.5)
CHLORIDE SERPL-SCNC: 105 MMOL/L — SIGNIFICANT CHANGE UP (ref 96–108)
CO2 SERPL-SCNC: 29 MMOL/L — SIGNIFICANT CHANGE UP (ref 22–31)
COLOR SPEC: YELLOW — SIGNIFICANT CHANGE UP
CREAT SERPL-MCNC: 1.2 MG/DL — SIGNIFICANT CHANGE UP (ref 0.5–1.3)
D DIMER BLD IA.RAPID-MCNC: 313 NG/ML DDU — HIGH
DIFF PNL FLD: NEGATIVE — SIGNIFICANT CHANGE UP
EGFR: 60 ML/MIN/1.73M2 — SIGNIFICANT CHANGE UP
EOSINOPHIL # BLD AUTO: 0.03 K/UL — SIGNIFICANT CHANGE UP (ref 0–0.5)
EOSINOPHIL NFR BLD AUTO: 0.5 % — SIGNIFICANT CHANGE UP (ref 0–6)
GLUCOSE SERPL-MCNC: 152 MG/DL — HIGH (ref 70–99)
GLUCOSE UR QL: NEGATIVE — SIGNIFICANT CHANGE UP
HCT VFR BLD CALC: 42.1 % — SIGNIFICANT CHANGE UP (ref 39–50)
HGB BLD-MCNC: 14.2 G/DL — SIGNIFICANT CHANGE UP (ref 13–17)
IMM GRANULOCYTES NFR BLD AUTO: 0.6 % — SIGNIFICANT CHANGE UP (ref 0–1.5)
INR BLD: 1 RATIO — SIGNIFICANT CHANGE UP (ref 0.88–1.16)
KETONES UR-MCNC: ABNORMAL
LEUKOCYTE ESTERASE UR-ACNC: NEGATIVE — SIGNIFICANT CHANGE UP
LYMPHOCYTES # BLD AUTO: 1.05 K/UL — SIGNIFICANT CHANGE UP (ref 1–3.3)
LYMPHOCYTES # BLD AUTO: 16.3 % — SIGNIFICANT CHANGE UP (ref 13–44)
MCHC RBC-ENTMCNC: 32.6 PG — SIGNIFICANT CHANGE UP (ref 27–34)
MCHC RBC-ENTMCNC: 33.7 GM/DL — SIGNIFICANT CHANGE UP (ref 32–36)
MCV RBC AUTO: 96.6 FL — SIGNIFICANT CHANGE UP (ref 80–100)
MONOCYTES # BLD AUTO: 0.44 K/UL — SIGNIFICANT CHANGE UP (ref 0–0.9)
MONOCYTES NFR BLD AUTO: 6.8 % — SIGNIFICANT CHANGE UP (ref 2–14)
NEUTROPHILS # BLD AUTO: 4.88 K/UL — SIGNIFICANT CHANGE UP (ref 1.8–7.4)
NEUTROPHILS NFR BLD AUTO: 75.5 % — SIGNIFICANT CHANGE UP (ref 43–77)
NITRITE UR-MCNC: NEGATIVE — SIGNIFICANT CHANGE UP
NRBC # BLD: 0 /100 WBCS — SIGNIFICANT CHANGE UP (ref 0–0)
PH UR: 7 — SIGNIFICANT CHANGE UP (ref 5–8)
PLATELET # BLD AUTO: 200 K/UL — SIGNIFICANT CHANGE UP (ref 150–400)
POTASSIUM SERPL-MCNC: 3.8 MMOL/L — SIGNIFICANT CHANGE UP (ref 3.5–5.3)
POTASSIUM SERPL-SCNC: 3.8 MMOL/L — SIGNIFICANT CHANGE UP (ref 3.5–5.3)
PROCALCITONIN SERPL-MCNC: 0.06 NG/ML — SIGNIFICANT CHANGE UP
PROT SERPL-MCNC: 7.3 G/DL — SIGNIFICANT CHANGE UP (ref 6–8.3)
PROT UR-MCNC: NEGATIVE — SIGNIFICANT CHANGE UP
PROTHROM AB SERPL-ACNC: 11.6 SEC — SIGNIFICANT CHANGE UP (ref 10.5–13.4)
RAPID RVP RESULT: SIGNIFICANT CHANGE UP
RBC # BLD: 4.36 M/UL — SIGNIFICANT CHANGE UP (ref 4.2–5.8)
RBC # FLD: 12.4 % — SIGNIFICANT CHANGE UP (ref 10.3–14.5)
SARS-COV-2 RNA SPEC QL NAA+PROBE: SIGNIFICANT CHANGE UP
SARS-COV-2 RNA SPEC QL NAA+PROBE: SIGNIFICANT CHANGE UP
SODIUM SERPL-SCNC: 144 MMOL/L — SIGNIFICANT CHANGE UP (ref 135–145)
SP GR SPEC: 1.01 — SIGNIFICANT CHANGE UP (ref 1.01–1.02)
TROPONIN I, HIGH SENSITIVITY RESULT: 12.2 NG/L — SIGNIFICANT CHANGE UP
TROPONIN I, HIGH SENSITIVITY RESULT: 9.4 NG/L — SIGNIFICANT CHANGE UP
UROBILINOGEN FLD QL: NEGATIVE — SIGNIFICANT CHANGE UP
WBC # BLD: 6.46 K/UL — SIGNIFICANT CHANGE UP (ref 3.8–10.5)
WBC # FLD AUTO: 6.46 K/UL — SIGNIFICANT CHANGE UP (ref 3.8–10.5)

## 2022-05-12 PROCEDURE — 71045 X-RAY EXAM CHEST 1 VIEW: CPT | Mod: 26

## 2022-05-12 PROCEDURE — 70450 CT HEAD/BRAIN W/O DYE: CPT | Mod: 26,MA

## 2022-05-12 PROCEDURE — 99285 EMERGENCY DEPT VISIT HI MDM: CPT | Mod: FS

## 2022-05-12 PROCEDURE — 99222 1ST HOSP IP/OBS MODERATE 55: CPT

## 2022-05-12 PROCEDURE — 93010 ELECTROCARDIOGRAM REPORT: CPT

## 2022-05-12 RX ORDER — ENOXAPARIN SODIUM 100 MG/ML
40 INJECTION SUBCUTANEOUS EVERY 24 HOURS
Refills: 0 | Status: DISCONTINUED | OUTPATIENT
Start: 2022-05-12 | End: 2022-05-15

## 2022-05-12 RX ORDER — ACETAMINOPHEN 500 MG
650 TABLET ORAL EVERY 6 HOURS
Refills: 0 | Status: DISCONTINUED | OUTPATIENT
Start: 2022-05-12 | End: 2022-05-15

## 2022-05-12 RX ORDER — METOPROLOL TARTRATE 50 MG
12.5 TABLET ORAL
Refills: 0 | Status: DISCONTINUED | OUTPATIENT
Start: 2022-05-12 | End: 2022-05-14

## 2022-05-12 RX ORDER — RIVASTIGMINE 4.6 MG/24H
1 PATCH, EXTENDED RELEASE TRANSDERMAL EVERY 24 HOURS
Refills: 0 | Status: DISCONTINUED | OUTPATIENT
Start: 2022-05-12 | End: 2022-05-15

## 2022-05-12 RX ORDER — MECLIZINE HCL 12.5 MG
25 TABLET ORAL ONCE
Refills: 0 | Status: COMPLETED | OUTPATIENT
Start: 2022-05-12 | End: 2022-05-12

## 2022-05-12 RX ADMIN — Medication 25 MILLIGRAM(S): at 18:27

## 2022-05-12 NOTE — H&P ADULT - NSHPLABSRESULTS_GEN_ALL_CORE
14.2   6.46  )-----------( 200      ( 12 May 2022 17:07 )             42.1       05-12    144  |  105  |  15  ----------------------------<  152<H>  3.8   |  29  |  1.20    Ca    9.7      12 May 2022 17:07    TPro  7.3  /  Alb  3.9  /  TBili  0.5  /  DBili  x   /  AST  20  /  ALT  24  /  AlkPhos  96  05-12         LIVER FUNCTIONS - ( 12 May 2022 17:07 )  Alb: 3.9 g/dL / Pro: 7.3 g/dL / ALK PHOS: 96 U/L / ALT: 24 U/L / AST: 20 U/L / GGT: x               PT/INR - ( 12 May 2022 17:07 )   PT: 11.6 sec;   INR: 1.00 ratio         PTT - ( 12 May 2022 17:07 )  PTT:32.3 sec          Urinalysis Basic - ( 12 May 2022 17:15 )    Color: Yellow / Appearance: Clear / S.010 / pH: x  Gluc: x / Ketone: Moderate  / Bili: Negative / Urobili: Negative   Blood: x / Protein: Negative / Nitrite: Negative   Leuk Esterase: Negative / RBC: x / WBC x   Sq Epi: x / Non Sq Epi: x / Bacteria: x        EKG: rapid afib 135bpm, no acute ST changes.       CXR: wet read PENDING 14.2   6.46  )-----------( 200      ( 12 May 2022 17:07 )             42.1       0512    144  |  105  |  15  ----------------------------<  152<H>  3.8   |  29  |  1.20    Ca    9.7      12 May 2022 17:07    TPro  7.3  /  Alb  3.9  /  TBili  0.5  /  DBili  x   /  AST  20  /  ALT  24  /  AlkPhos  96  05-12         LIVER FUNCTIONS - ( 12 May 2022 17:07 )  Alb: 3.9 g/dL / Pro: 7.3 g/dL / ALK PHOS: 96 U/L / ALT: 24 U/L / AST: 20 U/L / GGT: x               PT/INR - ( 12 May 2022 17:07 )   PT: 11.6 sec;   INR: 1.00 ratio         PTT - ( 12 May 2022 17:07 )  PTT:32.3 sec          Urinalysis Basic - ( 12 May 2022 17:15 )    Color: Yellow / Appearance: Clear / S.010 / pH: x  Gluc: x / Ketone: Moderate  / Bili: Negative / Urobili: Negative   Blood: x / Protein: Negative / Nitrite: Negative   Leuk Esterase: Negative / RBC: x / WBC x   Sq Epi: x / Non Sq Epi: x / Bacteria: x        EKG: rapid afib 135bpm, no acute ST changes.       CXR: wet read PENDING - no overt infiltrate.

## 2022-05-12 NOTE — ED PROVIDER NOTE - NS ED ATTENDING STATEMENT MOD
I have seen and examined this patient and fully participated in the care of this patient as the teaching attending.  The service was shared with the PITER.  I reviewed and verified the documentation and independently performed the documented:

## 2022-05-12 NOTE — ED PROVIDER NOTE - ATTENDING CONTRIBUTION TO CARE
86M hx of Dementia, Prostate/RTx PTED with wife because of increasing falls and dizziness as described pw chronic weakness dizziness and multiple falls past month. Patient is a poor historian but recent MRI of the brain "2/17/22 revealed atrophy and micro vasculopathy with small lacunar infarcts in the bilateral cerebellum and right corona radiata. Cervical MRI was remarkable for degenerative change but no abnormal cord signal to suggest myelopathy. "  EKG AF@ 135; pt at time of interview noted to be NSR@67  labs pending  Plan  to be admitted for w/u and for SW needs accessment

## 2022-05-12 NOTE — ED ADULT NURSE NOTE - OBJECTIVE STATEMENT
Pt is confused to time, date, knows bday and full name.  Pt reports progressive dizziness with multiple falls ( 4 in last month).  Pt never received medical care after falls.  Pt reports feeling nauseated.  Pt has baseline dementia and is a poor historian.

## 2022-05-12 NOTE — ED ADULT NURSE NOTE - NSICDXPASTMEDICALHX_GEN_ALL_CORE_FT
PAST MEDICAL HISTORY:  Dementia     Dementia     Lactose intolerance in adult     Osteoarthritis     Scoliosis     Shellfish allergy     Vitamin D deficiency

## 2022-05-12 NOTE — H&P ADULT - NSHPPHYSICALEXAM_GEN_ALL_CORE
Vital Signs Last 24 Hrs  T(C): 37.1 (12 May 2022 20:37), Max: 37.1 (12 May 2022 20:37)  T(F): 98.7 (12 May 2022 20:37), Max: 98.7 (12 May 2022 20:37)  HR: 92 (12 May 2022 20:37) (70 - 107)  BP: 146/74 (12 May 2022 20:37) (146/74 - 181/97)  BP(mean): --  RR: 18 (12 May 2022 20:37) (17 - 24)  SpO2: 95% (12 May 2022 20:37) (95% - 99%)  Daily Height in cm: 177.8 (12 May 2022 16:22)    Daily   CAPILLARY BLOOD GLUCOSE        I&O's Summary      GENERAL: NAD  HEAD:  Normocephalic  EYES: EOMI, PERRLA, conjunctiva and sclera clear  ENMT: No tonsillar erythema, exudates, or enlargement; Moist mucous membranes, No lesions poor dentition  NECK: Supple, No JVD, no bruit, normal thyroid  NERVOUS SYSTEM:  Alert & Oriented x2, grossly moves all fours. no facial droop, tongue midline, neg pronator drift. finger nose and heel shin intact.   CHEST/LUNG: Clear to auscultation bilaterally; No rales, rhonchi, wheezing, or rubs  HEART: Regular rate and rhythm; No murmurs, rubs, or gallops  ABDOMEN: Soft, Nontender, Nondistended; Bowel sounds present  EXTREMITIES:  2+ Peripheral Pulses, No clubbing, cyanosis, or edema  LYMPH: No lymphadenopathy noted  SKIN: No rashes or lesions

## 2022-05-12 NOTE — H&P ADULT - ASSESSMENT
86M hx of dementia, remote prostate ca s/p RT pw chronic dizziness and multiple falls past month pw new onset afib.    #New onset afib  [id number]: 4 but high risk of falls.   trend trops, serial ekgs, ECHO, TSH, d-dimer, cardiology consult.   UA neg. Check CXR, add RVP to exclude occult infection.     CAPRINI SCORE [CLOT]    AGE RELATED RISK FACTORS                                                       MOBILITY RELATED FACTORS  [ ] Age 41-60 years                                            (1 Point)                  [ ] Bed rest                                                        (1 Point)  [ ] Age: 61-74 years                                           (2 Points)                 [ ] Plaster cast                                                   (2 Points)  [3 ] Age= 75 years                                              (3 Points)                 [ ] Bed bound for more than 72 hours                 (2 Points)    DISEASE RELATED RISK FACTORS                                               GENDER SPECIFIC FACTORS  [ ] Edema in the lower extremities                       (1 Point)                  [ ] Pregnancy                                                     (1 Point)  [ ] Varicose veins                                               (1 Point)                  [ ] Post-partum < 6 weeks                                   (1 Point)             [ ] BMI > 25 Kg/m2                                            (1 Point)                  [ ] Hormonal therapy  or oral contraception          (1 Point)                 [ ] Sepsis (in the previous month)                        (1 Point)                  [ ] History of pregnancy complications                 (1 point)  [ ] Pneumonia or serious lung disease                                               [ ] Unexplained or recurrent                     (1 Point)           (in the previous month)                               (1 Point)  [ ] Abnormal pulmonary function test                     (1 Point)                 SURGERY RELATED RISK FACTORS  [ ] Acute myocardial infarction                              (1 Point)                 [ ]  Section                                             (1 Point)  [ ] Congestive heart failure (in the previous month)  (1 Point)               [ ] Minor surgery                                                  (1 Point)   [ ] Inflammatory bowel disease                             (1 Point)                 [ ] Arthroscopic surgery                                        (2 Points)  [ ] Central venous access                                      (2 Points)                [ ] General surgery lasting more than 45 minutes   (2 Points)       [ ] Stroke (in the previous month)                          (5 Points)               [ ] Elective arthroplasty                                         (5 Points)                                                                                                                                               HEMATOLOGY RELATED FACTORS                                                 TRAUMA RELATED RISK FACTORS  [ ] Prior episodes of VTE                                     (3 Points)                [ ] Fracture of the hip, pelvis, or leg                       (5 Points)  [ ] Positive family history for VTE                         (3 Points)                 [ ] Acute spinal cord injury (in the previous month)  (5 Points)  [ ] Prothrombin 37064 A                                     (3 Points)                 [ ] Paralysis  (less than 1 month)                             (5 Points)  [ ] Factor V Leiden                                             (3 Points)                  [ ] Multiple Trauma within 1 month                        (5 Points)  [ ] Lupus anticoagulants                                     (3 Points)                                                           [ ] Anticardiolipin antibodies                               (3 Points)                                                       [ ] High homocysteine in the blood                      (3 Points)                                             [ ] Other congenital or acquired thrombophilia      (3 Points)                                                [ ] Heparin induced thrombocytopenia                  (3 Points)                                          Total Score [  3        ]    Caprini Score 0 - 2:  Low Risk, No VTE Prophylaxis required for most patients, encourage ambulation  Caprini Score 3 - 6:  At Risk, pharmacologic VTE prophylaxis is indicated for most patients (in the absence of a contraindication)  Caprini Score Greater than or = 7:  High Risk, pharmacologic VTE prophylaxis is indicated for most patients (in the absence of a contraindication) 86M hx of dementia, remote prostate ca s/p RT pw chronic dizziness and multiple falls past month pw new onset afib.    #New onset afib  [id number]: 4 but high risk of falls.   intermittent rapid afib mostly on movement - trial of lopressor 12.5mg bid.   trend trops, serial ekgs, ECHO, TSH, d-dimer, cardiology consult.   UA neg. Check CXR, add RVP/procal to exclude occult infection.     #Dementia  cont Rivastigmine for now.     #DVT proph    Updated wife Loren Marte [phone]      CAPRINI SCORE [CLOT]    AGE RELATED RISK FACTORS                                                       MOBILITY RELATED FACTORS  [ ] Age 41-60 years                                            (1 Point)                  [ ] Bed rest                                                        (1 Point)  [ ] Age: 61-74 years                                           (2 Points)                 [ ] Plaster cast                                                   (2 Points)  [3 ] Age= 75 years                                              (3 Points)                 [ ] Bed bound for more than 72 hours                 (2 Points)    DISEASE RELATED RISK FACTORS                                               GENDER SPECIFIC FACTORS  [ ] Edema in the lower extremities                       (1 Point)                  [ ] Pregnancy                                                     (1 Point)  [ ] Varicose veins                                               (1 Point)                  [ ] Post-partum < 6 weeks                                   (1 Point)             [ ] BMI > 25 Kg/m2                                            (1 Point)                  [ ] Hormonal therapy  or oral contraception          (1 Point)                 [ ] Sepsis (in the previous month)                        (1 Point)                  [ ] History of pregnancy complications                 (1 point)  [ ] Pneumonia or serious lung disease                                               [ ] Unexplained or recurrent                     (1 Point)           (in the previous month)                               (1 Point)  [ ] Abnormal pulmonary function test                     (1 Point)                 SURGERY RELATED RISK FACTORS  [ ] Acute myocardial infarction                              (1 Point)                 [ ]  Section                                             (1 Point)  [ ] Congestive heart failure (in the previous month)  (1 Point)               [ ] Minor surgery                                                  (1 Point)   [ ] Inflammatory bowel disease                             (1 Point)                 [ ] Arthroscopic surgery                                        (2 Points)  [ ] Central venous access                                      (2 Points)                [ ] General surgery lasting more than 45 minutes   (2 Points)       [ ] Stroke (in the previous month)                          (5 Points)               [ ] Elective arthroplasty                                         (5 Points)                                                                                                                                               HEMATOLOGY RELATED FACTORS                                                 TRAUMA RELATED RISK FACTORS  [ ] Prior episodes of VTE                                     (3 Points)                [ ] Fracture of the hip, pelvis, or leg                       (5 Points)  [ ] Positive family history for VTE                         (3 Points)                 [ ] Acute spinal cord injury (in the previous month)  (5 Points)  [ ] Prothrombin 33580 A                                     (3 Points)                 [ ] Paralysis  (less than 1 month)                             (5 Points)  [ ] Factor V Leiden                                             (3 Points)                  [ ] Multiple Trauma within 1 month                        (5 Points)  [ ] Lupus anticoagulants                                     (3 Points)                                                           [ ] Anticardiolipin antibodies                               (3 Points)                                                       [ ] High homocysteine in the blood                      (3 Points)                                             [ ] Other congenital or acquired thrombophilia      (3 Points)                                                [ ] Heparin induced thrombocytopenia                  (3 Points)                                          Total Score [  3        ]    Caprini Score 0 - 2:  Low Risk, No VTE Prophylaxis required for most patients, encourage ambulation  Caprini Score 3 - 6:  At Risk, pharmacologic VTE prophylaxis is indicated for most patients (in the absence of a contraindication)  Caprini Score Greater than or = 7:  High Risk, pharmacologic VTE prophylaxis is indicated for most patients (in the absence of a contraindication)

## 2022-05-12 NOTE — ED PROVIDER NOTE - CLINICAL SUMMARY MEDICAL DECISION MAKING FREE TEXT BOX
pt 87 yo m brought in by his wife for multiple falls over the last month and dizziness. 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 Cervical MRI performed on 2/4/2022 revealed multilevel cervical spondylosis with degenerative change at several levels. In this setting there was normal cord signal throughout. He has a chronic gait disorder and there is no change in his cognitive impairment while taking rivastigmine 4.6 mg / 24-hour patch.    while in ed pt going from rapid afib 130's to nsr 70s    will admit

## 2022-05-12 NOTE — ED PROVIDER NOTE - PHYSICAL EXAMINATION
General:     NAD  Eyes: PERRL  Head:     NC/AT   Neck:     trachea midline  Lungs:     CTA b/l  CVS:     RRR  Abd:     +BS, s/nt/nd  Ext:   no deformities   Neuro: AAOx2, confused, follows commands

## 2022-05-12 NOTE — H&P ADULT - NSICDXPASTSURGICALHX_GEN_ALL_CORE_FT
PAST SURGICAL HISTORY:  History of hip surgery L.    History of tonsillectomy in childhood    S/P colonoscopy

## 2022-05-12 NOTE — ED PROVIDER NOTE - OBJECTIVE STATEMENT
pt 87 yo m is seen for an office visit with his wife. 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 Cervical MRI performed on 2/4/2022 revealed multilevel cervical spondylosis with degenerative change at several levels. In this setting there was normal cord signal throughout. He has a chronic gait disorder and there is no change in his cognitive impairment while taking rivastigmine 4.6 mg / 24-hour patch. pt 87 yo m brought in by his waife for multiple falls over the last month and dizziness. 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 Cervical MRI performed on 2/4/2022 revealed multilevel cervical spondylosis with degenerative change at several levels. In this setting there was normal cord signal throughout. He has a chronic gait disorder and there is no change in his cognitive impairment while taking rivastigmine 4.6 mg / 24-hour patch. pt 85 yo m brought in by his wife for multiple falls over the last month and dizziness. 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 Cervical MRI performed on 2/4/2022 revealed multilevel cervical spondylosis with degenerative change at several levels. In this setting there was normal cord signal throughout. He has a chronic gait disorder and there is no change in his cognitive impairment while taking rivastigmine 4.6 mg / 24-hour patch.

## 2022-05-12 NOTE — H&P ADULT - HISTORY OF PRESENT ILLNESS
86M hx of dementia, remote prostate ca s/p RT pw chronic dizziness and multiple falls past month. 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 Cervical MRI was remarkable for degenerative change but no abnormal cord signal to suggest myelopathy.  86M hx of dementia, remote prostate ca s/p RT pw chronic dizziness and multiple falls past month. 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 Cervical MRI was remarkable for degenerative change but no abnormal cord signal to suggest myelopathy. Brought in by wife to ED for chronic dizziness and frequent falls. Per pt, wife is 'upset because he likes to sleep in bed all day'. He denied any dizziness. He states he didn't feel that he is falling much but that his wife says he does. Pt is poor historian and denied any HA, dizziness, SOB, CP, palps, NVD, dysuria. In ED afebrile P: 107 BP: 181/97 sat 96% on RA. with noted intermittent brief episodes of rapid afib. CT head neg for any acute changes.     Denied use of any meds but R arm with rivastigmine patch.   Hx obtained from review of [facility] charts.  86M hx of dementia, remote prostate ca s/p RT pw chronic dizziness and multiple falls past month. 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 Cervical MRI was remarkable for degenerative change but no abnormal cord signal to suggest myelopathy. Brought in by wife to ED for chronic dizziness and frequent falls. Per pt, wife is 'upset because he likes to sleep in bed all day'. He denied any dizziness. He states he didn't feel that he is falling much but that his wife says he does. Pt is poor historian and denied any HA, dizziness, SOB, CP, palps, NVD, dysuria. In ED afebrile P: 107 BP: 181/97 sat 96% on RA. with noted intermittent brief episodes of rapid afib. CT head neg for any acute changes.     Denied use of any meds but R arm with rivastigmine patch.   Hx obtained from review of [facility] charts.     Spoke with wife Loren over phone - stated that pt has been sleeping a lot more than usual. This am was especially dizzy and wouldn't get out of bed. She had taken his temp which was normal and checked for COVID which was negative and called PMD who advised that she bring pt to hospital.

## 2022-05-13 LAB
ALBUMIN SERPL ELPH-MCNC: 3.3 G/DL — SIGNIFICANT CHANGE UP (ref 3.3–5)
ALP SERPL-CCNC: 88 U/L — SIGNIFICANT CHANGE UP (ref 40–120)
ALT FLD-CCNC: 19 U/L — SIGNIFICANT CHANGE UP (ref 10–45)
ANION GAP SERPL CALC-SCNC: 8 MMOL/L — SIGNIFICANT CHANGE UP (ref 5–17)
AST SERPL-CCNC: 21 U/L — SIGNIFICANT CHANGE UP (ref 10–40)
BASOPHILS # BLD AUTO: 0.04 K/UL — SIGNIFICANT CHANGE UP (ref 0–0.2)
BASOPHILS NFR BLD AUTO: 0.5 % — SIGNIFICANT CHANGE UP (ref 0–2)
BILIRUB SERPL-MCNC: 0.5 MG/DL — SIGNIFICANT CHANGE UP (ref 0.2–1.2)
BUN SERPL-MCNC: 17 MG/DL — SIGNIFICANT CHANGE UP (ref 7–23)
CALCIUM SERPL-MCNC: 9.3 MG/DL — SIGNIFICANT CHANGE UP (ref 8.4–10.5)
CHLORIDE SERPL-SCNC: 109 MMOL/L — HIGH (ref 96–108)
CO2 SERPL-SCNC: 28 MMOL/L — SIGNIFICANT CHANGE UP (ref 22–31)
CREAT SERPL-MCNC: 1.1 MG/DL — SIGNIFICANT CHANGE UP (ref 0.5–1.3)
EGFR: 66 ML/MIN/1.73M2 — SIGNIFICANT CHANGE UP
EOSINOPHIL # BLD AUTO: 0.16 K/UL — SIGNIFICANT CHANGE UP (ref 0–0.5)
EOSINOPHIL NFR BLD AUTO: 2.1 % — SIGNIFICANT CHANGE UP (ref 0–6)
GLUCOSE SERPL-MCNC: 95 MG/DL — SIGNIFICANT CHANGE UP (ref 70–99)
HCT VFR BLD CALC: 40 % — SIGNIFICANT CHANGE UP (ref 39–50)
HGB BLD-MCNC: 13.3 G/DL — SIGNIFICANT CHANGE UP (ref 13–17)
IMM GRANULOCYTES NFR BLD AUTO: 0.4 % — SIGNIFICANT CHANGE UP (ref 0–1.5)
LYMPHOCYTES # BLD AUTO: 1.45 K/UL — SIGNIFICANT CHANGE UP (ref 1–3.3)
LYMPHOCYTES # BLD AUTO: 19.4 % — SIGNIFICANT CHANGE UP (ref 13–44)
MCHC RBC-ENTMCNC: 31.8 PG — SIGNIFICANT CHANGE UP (ref 27–34)
MCHC RBC-ENTMCNC: 33.3 GM/DL — SIGNIFICANT CHANGE UP (ref 32–36)
MCV RBC AUTO: 95.7 FL — SIGNIFICANT CHANGE UP (ref 80–100)
MONOCYTES # BLD AUTO: 0.67 K/UL — SIGNIFICANT CHANGE UP (ref 0–0.9)
MONOCYTES NFR BLD AUTO: 8.9 % — SIGNIFICANT CHANGE UP (ref 2–14)
NEUTROPHILS # BLD AUTO: 5.14 K/UL — SIGNIFICANT CHANGE UP (ref 1.8–7.4)
NEUTROPHILS NFR BLD AUTO: 68.7 % — SIGNIFICANT CHANGE UP (ref 43–77)
NRBC # BLD: 0 /100 WBCS — SIGNIFICANT CHANGE UP (ref 0–0)
PLATELET # BLD AUTO: 181 K/UL — SIGNIFICANT CHANGE UP (ref 150–400)
POTASSIUM SERPL-MCNC: 3.5 MMOL/L — SIGNIFICANT CHANGE UP (ref 3.5–5.3)
POTASSIUM SERPL-SCNC: 3.5 MMOL/L — SIGNIFICANT CHANGE UP (ref 3.5–5.3)
PROT SERPL-MCNC: 6.6 G/DL — SIGNIFICANT CHANGE UP (ref 6–8.3)
RBC # BLD: 4.18 M/UL — LOW (ref 4.2–5.8)
RBC # FLD: 12.5 % — SIGNIFICANT CHANGE UP (ref 10.3–14.5)
SODIUM SERPL-SCNC: 145 MMOL/L — SIGNIFICANT CHANGE UP (ref 135–145)
TROPONIN I, HIGH SENSITIVITY RESULT: 17.3 NG/L — SIGNIFICANT CHANGE UP
TSH SERPL-MCNC: 0.9 UIU/ML — SIGNIFICANT CHANGE UP (ref 0.36–3.74)
WBC # BLD: 7.49 K/UL — SIGNIFICANT CHANGE UP (ref 3.8–10.5)
WBC # FLD AUTO: 7.49 K/UL — SIGNIFICANT CHANGE UP (ref 3.8–10.5)

## 2022-05-13 PROCEDURE — 93306 TTE W/DOPPLER COMPLETE: CPT | Mod: 26

## 2022-05-13 PROCEDURE — 99222 1ST HOSP IP/OBS MODERATE 55: CPT

## 2022-05-13 PROCEDURE — 99233 SBSQ HOSP IP/OBS HIGH 50: CPT

## 2022-05-13 RX ADMIN — RIVASTIGMINE 1 PATCH: 4.6 PATCH, EXTENDED RELEASE TRANSDERMAL at 05:24

## 2022-05-13 RX ADMIN — Medication 12.5 MILLIGRAM(S): at 17:22

## 2022-05-13 RX ADMIN — ENOXAPARIN SODIUM 40 MILLIGRAM(S): 100 INJECTION SUBCUTANEOUS at 05:24

## 2022-05-13 RX ADMIN — Medication 12.5 MILLIGRAM(S): at 05:24

## 2022-05-13 RX ADMIN — RIVASTIGMINE 1 PATCH: 4.6 PATCH, EXTENDED RELEASE TRANSDERMAL at 07:00

## 2022-05-13 RX ADMIN — RIVASTIGMINE 1 PATCH: 4.6 PATCH, EXTENDED RELEASE TRANSDERMAL at 19:49

## 2022-05-13 RX ADMIN — Medication 1 TABLET(S): at 14:56

## 2022-05-13 NOTE — PROGRESS NOTE ADULT - ASSESSMENT
87 yo M with PMH of Dementia, remote Prostate CA s/p RT who presents with chronic dizziness, recent falls; admitted for new onset A-fib.     # New onset Atrial Fibrillation  -     # Dementia   -    DVT Prophylaxis:  - Lovenox     Updated wife at bedside.  87 yo M with PMH of Dementia, remote Prostate CA s/p RT who presents with chronic dizziness, recent falls; admitted for new onset A-fib.     # New onset Atrial Fibrillation  - TTE pending  - Cardio consult - Dr. Castillo  - [id number] of 2. Wife and daughter report frequent falls due to debility. AC discussion ongoing. has had 3 falls within 1 week.   - Tele monitoring   - Continue lopressor 12.5mg bid     # Dementia   - Rivastigmine patch daily     DVT Prophylaxis:  - Lovenox     Discussed patient's overall clinical progress since admission, reviewed most recent labs and medications with patient and wife at bedside. The opportunity for questions was provided and all questions asked were answered.   Updated daughter Lola [phone], who would like daily updates as she is in [place].  85 yo M with PMH of Dementia, remote Prostate CA s/p RT who presents with chronic dizziness, recent falls; admitted for new onset A-fib.     # New onset Atrial Fibrillation  - TTE pending  - Cardio consult - Dr. Castillo  - [id number] of 2. Wife and daughter report frequent falls due to debility. AC discussion ongoing. has had 3 falls within 1 week.   - Tele monitoring   - Continue lopressor 12.5mg bid   - TSH wnl    # Dementia   - Rivastigmine patch daily     DVT Prophylaxis:  - Lovenox     Discussed patient's overall clinical progress since admission, reviewed most recent labs and medications with patient and wife at bedside. The opportunity for questions was provided and all questions asked were answered.   Updated daughter Lola [phone], who would like daily updates as she is in [place].

## 2022-05-13 NOTE — PATIENT PROFILE ADULT - FALL HARM RISK - HARM RISK INTERVENTIONS

## 2022-05-13 NOTE — CONSULT NOTE ADULT - SUBJECTIVE AND OBJECTIVE BOX
`Chief Complaint:     86M hx of dementia, remote prostate ca s/p RT pw chronic dizziness and multiple falls past month. 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 Cervical MRI was remarkable for degenerative change but no abnormal cord signal to suggest myelopathy. Brought in by wife to ED for chronic dizziness and frequent falls. Per pt, wife is 'upset because he likes to sleep in bed all day'. He denied any dizziness. He states he didn't feel that he is falling much but that his wife says he does. Pt is poor historian and denied any HA, dizziness, SOB, CP, palps, NVD, dysuria. In ED afebrile P: 107 BP: 181/97 sat 96% on RA. with noted intermittent brief episodes of rapid afib. CT head neg for any acute changes. Denied use of any meds but R arm with rivastigmine patch. Hx obtained from review of [facility] charts. Spoke with wife Loren over phone - stated that pt has been sleeping a lot more than usual. This am was especially dizzy and wouldn't get out of bed. She had taken his temp which was normal and checked for COVID which was negative and called PMD who advised that she bring pt to hospital. cardio requested for afib    HPI:    PMH:   Lactose intolerance in adult    Shellfish allergy    Osteoarthritis    Scoliosis    Vitamin D deficiency    Dementia    Dementia      PSH:   History of tonsillectomy    S/P colonoscopy    History of hip surgery      Family History:  FAMILY HISTORY:  No pertinent family history in first degree relatives        Social History:  Smoking:  Alcohol:  Drugs:    Allergies:  lactose (Diarrhea)  No Known Drug Allergies  shellfish (Other)      Medications:  acetaminophen     Tablet .. 650 milliGRAM(s) Oral every 6 hours PRN  enoxaparin Injectable 40 milliGRAM(s) SubCutaneous every 24 hours  metoprolol tartrate 12.5 milliGRAM(s) Oral two times a day  multivitamin 1 Tablet(s) Oral daily  rivastigmine patch  4.6 mG/24 Hr(s). 1 Patch Transdermal every 24 hours      REVIEW OF SYSTEMS:  CONSTITUTIONAL: No fever, weight loss, or fatigue  EYES: No eye pain, visual disturbances, or discharge  ENMT:  No difficulty hearing, tinnitus, vertigo; No sinus or throat pain  NECK: No pain or stiffness  BREASTS: No pain, masses, or nipple discharge  RESPIRATORY: No cough, wheezing, chills or hemoptysis; No shortness of breath  CARDIOVASCULAR: No chest pain, palpitations, dizziness, or leg swelling  GASTROINTESTINAL: No abdominal or epigastric pain. No nausea, vomiting, or hematemesis; No diarrhea or constipation. No melena or hematochezia.  GENITOURINARY: No dysuria, frequency, hematuria, or incontinence  NEUROLOGICAL: No headaches, memory loss, loss of strength, numbness, or tremors  SKIN: No itching, burning, rashes, or lesions   LYMPH NODES: No enlarged glands  ENDOCRINE: No heat or cold intolerance; No hair loss  MUSCULOSKELETAL: No joint pain or swelling; No muscle, back, or extremity pain  PSYCHIATRIC: No depression, anxiety, mood swings, or difficulty sleeping  HEME/LYMPH: No easy bruising, or bleeding gums  ALLERY AND IMMUNOLOGIC: No hives or eczema    Physical Exam:  T(C): 36.5 (05-13-22 @ 17:04), Max: 37.1 (05-12-22 @ 20:37)  HR: 62 (05-13-22 @ 17:04) (52 - 92)  BP: 169/61 (05-13-22 @ 17:04) (140/49 - 169/61)  RR: 20 (05-13-22 @ 17:04) (15 - 20)  SpO2: 98% (05-13-22 @ 17:04) (95% - 98%)  Wt(kg): --    GENERAL: NAD, eldelry poor functinal sttus  HEAD:  Atraumatic, Normocephalic  EYES: EOMI, conjunctiva and sclera clear  ENT: Moist mucous membranes,  NECK: Supple, No JVD, no bruits  CHEST/LUNG: Clear to percussion bilaterally; No rales, rhonchi, wheezing, or rubs  HEART: Regular rate and rhythm; No murmurs, rubs, or gallops PMI non displaced.  ABDOMEN: Soft, Nontender, Nondistended; Bowel sounds present  EXTREMITIES:  2+ Peripheral Pulses, No clubbing, cyanosis, or edema  SKIN: No rashes or lesions  NERVOUS SYSTEM:  Cranial Nerves II-XII intact     Cardiovascular Diagnostic Testing:  ECG:    < from: 12 Lead ECG (07.06.19 @ 20:07) >  Diagnosis Line Sinus rhythm with premature atrial complexes  Prolonged QT  Abnormal ECG  No previous ECGs available  Confirmed by Palla MD, Christiano ([id number]) on 7/8/2019 2:43:12 PM    < end of copied text >      ECHO:    < from: TTE Echo Complete w/o Contrast w/ Doppler (05.13.22 @ 14:02) >    Summary:   1. Left ventricular ejection fraction, by visualestimation, is 55%%.   2. Normal global left ventricular systolic function.   3. Increased LV wall thickness.   4. Normal left ventricular internal cavity size.   5. Spectral Doppler shows impaired relaxation pattern of left   ventricular myocardial filling (Grade I diastolic dysfunction).   6. There is mild concentric left ventricular hypertrophy.   7. Mild mitral annular calcification.   8. No evidence of mitral valve regurgitation.   9. Thickening and calcification of the anterior and posterior mitral   valve leaflets.    [id number] Juan C Castillo , Electronically signed on 5/13/2022 at   3:16:02 PM            *** Final ***    < end of copied text >      Labs:                        13.3   7.49  )-----------( 181      ( 13 May 2022 06:15 )             40.0     05-13    145  |  109<H>  |  17  ----------------------------<  95  3.5   |  28  |  1.10    Ca    9.3      13 May 2022 06:15    TPro  6.6  /  Alb  3.3  /  TBili  0.5  /  DBili  x   /  AST  21  /  ALT  19  /  AlkPhos  88  05-13    PT/INR - ( 12 May 2022 17:07 )   PT: 11.6 sec;   INR: 1.00 ratio         PTT - ( 12 May 2022 17:07 )  PTT:32.3 sec      Thyroid Stimulating Hormone, Serum: 0.900 uIU/mL (05-13 @ 06:15)      Imaging:    < from: Xray Chest 1 View- PORTABLE-Urgent (Xray Chest 1 View- PORTABLE-Urgent .) (05.12.22 @ 21:34) >  IMPRESSION:  No acute findings and no change    --- End of Report ---      JOSEPHINE PAYAN DO; Attending Radiologist  This document has been electronically signed. May 13 2022  9:20AM    < end of copied text >    impression  elderly gentleman with paroxysmal atrial fibrillation with prior risk fo remote tobacco ho right VATS resection of chest wall mass skin cancer s/p mohs surgery seen post ca sp rt y cxr neg pcr neg head negative cervic spine c56 disease neg PCr neg would consider patiens for anticoagulant with Eliquis if deemed a canidae we note a history of falls. pros and cons [facility]  [facility] consider ischemic evaluation if patient deemed a candidate down the road.     care coordinated with daughter serg [phone]

## 2022-05-13 NOTE — PROGRESS NOTE ADULT - SUBJECTIVE AND OBJECTIVE BOX
Patient is a 86y old  Male who presents with a chief complaint of new onset afib. (12 May 2022 21:05)    Patient seen and examined at bedside. No acute overnight events. Denies chest pain/discomfort. Reports better sleep overnight.     ALLERGIES:  lactose (Diarrhea)  No Known Drug Allergies  shellfish (Other)    MEDICATIONS  (STANDING):  enoxaparin Injectable 40 milliGRAM(s) SubCutaneous every 24 hours  metoprolol tartrate 12.5 milliGRAM(s) Oral two times a day  multivitamin 1 Tablet(s) Oral daily  rivastigmine patch  4.6 mG/24 Hr(s). 1 Patch Transdermal every 24 hours    MEDICATIONS  (PRN):  acetaminophen     Tablet .. 650 milliGRAM(s) Oral every 6 hours PRN Temp greater or equal to 38C (100.4F), Mild Pain (1 - 3)    Vital Signs Last 24 Hrs  T(F): 98.4 (13 May 2022 08:27), Max: 98.7 (12 May 2022 20:37)  HR: 52 (13 May 2022 08:27) (52 - 107)  BP: 146/57 (13 May 2022 08:27) (146/57 - 181/97)  RR: 16 (13 May 2022 08:27) (15 - 24)  SpO2: 96% (13 May 2022 08:27) (95% - 99%)  I&O's Summary    BMI (kg/m2): 21.7 (22 @ 02:47)    PHYSICAL EXAM:  General: NAD, awake, alert elderly M   ENT: MMM, no tonsilar exudate  Neck: Supple, No JVD  Lungs: Clear to auscultation bilaterally, no wheezes. Good air entry bilaterally   Cardio: RRR, S1/S2, No murmurs  Abdomen: Soft, Nontender, Nondistended; Bowel sounds present  Extremities: No calf tenderness, No pitting edema    LABS:                        13.3   7.49  )-----------( 181      ( 13 May 2022 06:15 )             40.0           145  |  109  |  17  ----------------------------<  95  3.5   |  28  |  1.10    Ca    9.3      13 May 2022 06:15    TPro  6.6  /  Alb  3.3  /  TBili  0.5  /  DBili  x   /  AST  21  /  ALT  19  /  AlkPhos  88  05-13       PT/INR - ( 12 May 2022 17:07 )   PT: 11.6 sec;   INR: 1.00 ratio         PTT - ( 12 May 2022 17:07 )  PTT:32.3 sec     CARDIAC MARKERS ( 13 May 2022 06:15 )  x     / 17.3 ng/L / x     / x     / x      CARDIAC MARKERS ( 12 May 2022 23:05 )  x     / 12.2 ng/L / x     / x     / x      CARDIAC MARKERS ( 12 May 2022 17:07 )  x     / 9.4 ng/L / x     / x     / x        TSH 0.900   TSH with FT4 reflex --  Total T3 --    Urinalysis Basic - ( 12 May 2022 17:15 )    Color: Yellow / Appearance: Clear / S.010 / pH: x  Gluc: x / Ketone: Moderate  / Bili: Negative / Urobili: Negative   Blood: x / Protein: Negative / Nitrite: Negative   Leuk Esterase: Negative / RBC: x / WBC x   Sq Epi: x / Non Sq Epi: x / Bacteria: x    COVID-19 PCR: NotDetec (22 @ 17:07)    RADIOLOGY & ADDITIONAL TESTS:     Care Discussed with Consultants/Other Providers: d/w Dr. Castillo who will eval

## 2022-05-14 LAB
A1C WITH ESTIMATED AVERAGE GLUCOSE RESULT: 5.9 % — HIGH (ref 4–5.6)
ESTIMATED AVERAGE GLUCOSE: 123 MG/DL — HIGH (ref 68–114)

## 2022-05-14 PROCEDURE — 99233 SBSQ HOSP IP/OBS HIGH 50: CPT

## 2022-05-14 RX ORDER — METOPROLOL TARTRATE 50 MG
25 TABLET ORAL
Refills: 0 | Status: DISCONTINUED | OUTPATIENT
Start: 2022-05-14 | End: 2022-05-15

## 2022-05-14 RX ORDER — METOPROLOL TARTRATE 50 MG
12.5 TABLET ORAL ONCE
Refills: 0 | Status: COMPLETED | OUTPATIENT
Start: 2022-05-14 | End: 2022-05-14

## 2022-05-14 RX ADMIN — Medication 12.5 MILLIGRAM(S): at 05:42

## 2022-05-14 RX ADMIN — RIVASTIGMINE 1 PATCH: 4.6 PATCH, EXTENDED RELEASE TRANSDERMAL at 09:42

## 2022-05-14 RX ADMIN — RIVASTIGMINE 1 PATCH: 4.6 PATCH, EXTENDED RELEASE TRANSDERMAL at 05:43

## 2022-05-14 RX ADMIN — ENOXAPARIN SODIUM 40 MILLIGRAM(S): 100 INJECTION SUBCUTANEOUS at 05:42

## 2022-05-14 RX ADMIN — RIVASTIGMINE 1 PATCH: 4.6 PATCH, EXTENDED RELEASE TRANSDERMAL at 19:48

## 2022-05-14 RX ADMIN — Medication 12.5 MILLIGRAM(S): at 12:22

## 2022-05-14 RX ADMIN — Medication 25 MILLIGRAM(S): at 17:33

## 2022-05-14 RX ADMIN — Medication 1 TABLET(S): at 11:20

## 2022-05-14 NOTE — PROGRESS NOTE ADULT - NS ATTEND AMEND GEN_ALL_CORE FT
Case, medication discussed with cardio Dr Zarate - increased PO Lopressor. Discussed risks and benefits of AC in A fib with patient's wife Loren Marte and cardio - given history of falls and wife's hesitancy on being able to ensure he will not fall again, wife decided not to start AC.  PT eval pending - wife does not want BAKARI, open to HCPT.

## 2022-05-14 NOTE — PROGRESS NOTE ADULT - SUBJECTIVE AND OBJECTIVE BOX
SUBJ:  Patient is a 86y old  Male who presents with a chief complaint of new onset afib. (14 May 2022 07:01)  patient seen and examined  chart is reviewed  case discussed with Dr. Castillo  patient in bed... no distress     PAST MEDICAL & SURGICAL HISTORY:  Lactose intolerance in adult      Shellfish allergy      Osteoarthritis      Scoliosis      Vitamin D deficiency      Dementia      Dementia      History of tonsillectomy  in childhood      S/P colonoscopy      History of hip surgery  L.      Home Medications:  multivitamin: 1 tab(s) orally once a day stopped on 9/8/2015 (13 May 2022 10:07)  rivastigmine 4.6 mg/24 hr transdermal film, extended release: 1 patch transdermal once a day (13 May 2022 10:07)      MEDICATIONS  (STANDING):  enoxaparin Injectable 40 milliGRAM(s) SubCutaneous every 24 hours  metoprolol tartrate 25 milliGRAM(s) Oral two times a day  multivitamin 1 Tablet(s) Oral daily  rivastigmine patch  4.6 mG/24 Hr(s). 1 Patch Transdermal every 24 hours    MEDICATIONS  (PRN):  acetaminophen     Tablet .. 650 milliGRAM(s) Oral every 6 hours PRN Temp greater or equal to 38C (100.4F), Mild Pain (1 - 3)          Vital Signs Last 24 Hrs  T(C): 36.4 (14 May 2022 12:16), Max: 37.1 (14 May 2022 04:47)  T(F): 97.6 (14 May 2022 12:16), Max: 98.7 (14 May 2022 04:47)  HR: 67 (14 May 2022 12:16) (62 - 89)  BP: 112/62 (14 May 2022 12:16) (112/62 - 169/61)  BP(mean): --  RR: 14 (14 May 2022 12:16) (14 - 20)  SpO2: 97% (14 May 2022 12:16) (96% - 98%)    REVIEW OF SYSTEMS:  CONSTITUTIONAL: No fever, weight loss, or fatigue  RESPIRATORY: No cough, wheezing, chills or hemoptysis; No shortness of breath  CARDIOVASCULAR: No chest pain or chest pressure.  No shortness of breath or dyspnea on exertion.  No palpitations, dizziness, light headedness, syncope or near syncope.  No edema, no orthopnea.   NEUROLOGICAL: No headaches, memory loss, loss of strength, numbness, or tremors      PHYSICAL EXAM  Constitutional:   elderly man in NAD   HEENT: normocephalic, atraumatic.  PERRLA. EOMI  Neck : No JVD. no carotid bruits  Lungs: decreased breath sounds bilateral bases   Heart:  S1 and S2. No S3, S4. II/VI systolic murmur.  Abdomen:  soft, non tender.  Extremities: No clubbing, cyanoisis or edema  Nuerologic:  A+O x 3. No focal deficits  Skin:  no rashes        LABS:                        13.3   7.49  )-----------( 181      ( 13 May 2022 06:15 )             40.0     05-13    145  |  109<H>  |  17  ----------------------------<  95  3.5   |  28  |  1.10    Ca    9.3      13 May 2022 06:15    TPro  6.6  /  Alb  3.3  /  TBili  0.5  /  DBili  x   /  AST  21  /  ALT  19  /  AlkPhos  88  05-13    < from: 12 Lead ECG (05.12.22 @ 16:34) >  Diagnosis Line Atrial fibrillation with rapid ventricular response with premature ventricular or aberrantly conducted complexes  Abnormal ECG  When compared with ECG of 07-AUG-2015 20:24,  Atrial fibrillation has replaced Sinus rhythm  Vent. rate has increased BY  61 BPM  Nonspecific T wave abnormality now evident in Inferiorleads    < end of copied text >  < from: 12 Lead ECG (07.06.19 @ 20:07) >  Diagnosis Line Sinus rhythm with premature atrial complexes  Prolonged QT  Abnormal ECG  No previous ECGs available    < end of copied text >  < from: TTE Echo Complete w/o Contrast w/ Doppler (05.13.22 @ 14:02) >  1. Left ventricular ejection fraction, by visualestimation, is 55%%.   2. Normal global left ventricular systolic function.   3. Increased LV wall thickness.   4. Normal left ventricular internal cavity size.   5. Spectral Doppler shows impaired relaxation pattern of left   ventricular myocardial filling (Grade I diastolic dysfunction).   6. There is mild concentric left ventricular hypertrophy.   7. Mild mitral annular calcification.   8. No evidence of mitral valve regurgitation.   9. Thickening and calcification of the anterior and posterior mitral   valve leaflets.    [id number] Juan C Castillo , Electronically signed on 5/13/2022 at   3:16:02 PM      < end of copied text >    tele SR and AF

## 2022-05-14 NOTE — PROGRESS NOTE ADULT - ASSESSMENT
86 year old man admitted with dizziness and falls.    Found to have new atrial fibrillation   Medical problems include dementia  Echo with normal LV function, no significant valvular or pericardial disease     Plan  - continue metoprolol for rate control of AF  - currently not on anticoagulation... multiple falls in past, chronic gait disorder - the risks of a/c felt to outweigh benefits  - continue telemetry monitoring    discussed with Medicine team

## 2022-05-14 NOTE — PROGRESS NOTE ADULT - SUBJECTIVE AND OBJECTIVE BOX
< from: TTE Echo Complete w/o Contrast w/ Doppler (05.13.22 @ 14:02) >  Summary:   1. Left ventricular ejection fraction, by visualestimation, is 55%%.   2. Normal global left ventricular systolic function.   3. Increased LV wall thickness.   4. Normal left ventricular internal cavity size.   5. Spectral Doppler shows impaired relaxation pattern of left   ventricular myocardial filling (Grade I diastolic dysfunction).   6. There is mild concentric left ventricular hypertrophy.   7. Mild mitral annular calcification.   8. No evidence of mitral valve regurgitation.   9. Thickening and calcification of the anterior and posterior mitral   valve leaflets.    < end of copied text >   Patient is a 86y old  Male who presents with a chief complaint of new onset afib. (14 May 2022 07:01)    Patient seen and examined at bedside. Overnight patient was in SR. While in room this morning patient briefly went into A Fib RVR 130s, then back to SR    ALLERGIES:  lactose (Diarrhea)  No Known Drug Allergies  shellfish (Other)    MEDICATIONS  (STANDING):  enoxaparin Injectable 40 milliGRAM(s) SubCutaneous every 24 hours  metoprolol tartrate 25 milliGRAM(s) Oral two times a day  multivitamin 1 Tablet(s) Oral daily  rivastigmine patch  4.6 mG/24 Hr(s). 1 Patch Transdermal every 24 hours    MEDICATIONS  (PRN):  acetaminophen     Tablet .. 650 milliGRAM(s) Oral every 6 hours PRN Temp greater or equal to 38C (100.4F), Mild Pain (1 - 3)    Vital Signs Last 24 Hrs  T(F): 97.6 (14 May 2022 12:16), Max: 98.7 (14 May 2022 04:47)  HR: 67 (14 May 2022 12:16) (62 - 89)  BP: 112/62 (14 May 2022 12:16) (112/62 - 169/61)  RR: 14 (14 May 2022 12:16) (14 - 20)  SpO2: 97% (14 May 2022 12:16) (96% - 98%)    I&O's Summary  13 May 2022 07:01  -  14 May 2022 07:00  --------------------------------------------------------  IN: 100 mL / OUT: 900 mL / NET: -800 mL    PHYSICAL EXAM:  General: NAD, A/O x 1  ENT: No gross hearing impairment, Moist mucous membranes, no thrush  Neck: Supple, No JVD  Lungs: Clear to auscultation bilaterally, good air entry, non-labored breathing  Cardio: RRR, S1/S2, No murmur  Abdomen: Soft, Nontender, Nondistended; Bowel sounds present  Extremities: No calf tenderness, No cyanosis, No pitting edema  Psych: Appropriate mood and affect    LABS:                        13.3   7.49  )-----------( 181      ( 13 May 2022 06:15 )             40.0     05-    145  |  109  |  17  ----------------------------<  95  3.5   |  28  |  1.10    Ca    9.3      13 May 2022 06:15    TPro  6.6  /  Alb  3.3  /  TBili  0.5  /  DBili  x   /  AST  21  /  ALT  19  /  AlkPhos  88  0513    PT/INR - ( 12 May 2022 17:07 )   PT: 11.6 sec;   INR: 1.00 ratio      PTT - ( 12 May 2022 17:07 )  PTT:32.3 sec    Procalcitonin, Serum: 0.06 ng/mL (22 @ 17:07)    CARDIAC MARKERS ( 13 May 2022 06:15 )  x     / 17.3 ng/L / x     / x     / x      CARDIAC MARKERS ( 12 May 2022 23:05 )  x     / 12.2 ng/L / x     / x     / x      CARDIAC MARKERS ( 12 May 2022 17:07 )  x     / 9.4 ng/L / x     / x     / x        TSH 0.900   TSH with FT4 reflex --  Total T3 --    Urinalysis Basic - ( 12 May 2022 17:15 )    Color: Yellow / Appearance: Clear / S.010 / pH: x  Gluc: x / Ketone: Moderate  / Bili: Negative / Urobili: Negative   Blood: x / Protein: Negative / Nitrite: Negative   Leuk Esterase: Negative / RBC: x / WBC x   Sq Epi: x / Non Sq Epi: x / Bacteria: x    COVID-19 PCR: NotDetec (22 @ 17:07)    RADIOLOGY & ADDITIONAL TESTS:  < from: TTE Echo Complete w/o Contrast w/ Doppler (22 @ 14:02) >  Summary:   1. Left ventricular ejection fraction, by visualestimation, is 55%%.   2. Normal global left ventricular systolic function.   3. Increased LV wall thickness.   4. Normal left ventricular internal cavity size.   5. Spectral Doppler shows impaired relaxation pattern of left   ventricular myocardial filling (Grade I diastolic dysfunction).   6. There is mild concentric left ventricular hypertrophy.   7. Mild mitral annular calcification.   8. No evidence of mitral valve regurgitation.   9. Thickening and calcification of the anterior and posterior mitral   valve leaflets.    < end of copied text >

## 2022-05-14 NOTE — PROGRESS NOTE ADULT - ASSESSMENT
87 yo M with PMH of Dementia, remote Prostate CA s/p RT who presents with chronic dizziness, recent falls; admitted for new onset A-fib.     #New onset Atrial Fibrillation  - HR controlled. Overnight tele monitor showed ____________  - TTE reviewed   - TSH wnl  - [id number] of 2. Wife and daughter report frequent falls due to debility. AC discussion ongoing. has had 3 falls within 1 week.   - Continue lopressor 12.5mg BID  - Cardiology following    #Dementia   - Rivastigmine patch daily     #DVT Prophylaxis:  - Lovenox     Dispo: PT eval? Ev?      Daughter Lola [phone], in [place] 85 yo M with PMH of Dementia, remote Prostate CA s/p RT who presents with chronic dizziness, recent falls; admitted for new onset A-fib.     #New onset Atrial Fibrillation  - HR controlled. Overnight tele monitor showed ____________  - TTE reviewed   - TSH wnl  - [id number] of 2. Wife and daughter report frequent falls due to debility. 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 AC discussion ongoing. has had 3 falls within 1 week.   - Continue lopressor 12.5mg BID  - Cardiology following    #Dementia   - Rivastigmine patch daily     #DVT Prophylaxis:  - Lovenox     Dispo: PT omid? Ev?      Daughter Lola [phone], in [place] 85 yo M with PMH of Dementia, remote Prostate CA s/p RT who presents with chronic dizziness, recent falls; admitted for new onset A-fib.     #New onset Atrial Fibrillation  - HR controlled. Overnight tele monitor showed SR 58-67, but this AM went back into rapid A fib briefly   - TTE reviewed   - TSH wnl  - [id number] of 2. Wife and daughter report frequent falls due to debility. 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 Discussed with family and with cardiology, will not start AC at this time as risks outweigh benefits   - Increase lopressor from 12.5mg BID to 25mg BID with hold parameters   - Cardiology following    #Dementia   - Rivastigmine patch daily     #DVT Prophylaxis:  - Lovenox     Dispo: Pending above, will observe response to new dose of metoprolol and PT eval pending.     5/14: Updated wife Loren ([phone]) at bedside with daughter Lola (in [place]) on speakerphone at [phone]

## 2022-05-15 ENCOUNTER — TRANSCRIPTION ENCOUNTER (OUTPATIENT)
Age: 87
End: 2022-05-15

## 2022-05-15 VITALS — TEMPERATURE: 99 F

## 2022-05-15 LAB
ANION GAP SERPL CALC-SCNC: 9 MMOL/L — SIGNIFICANT CHANGE UP (ref 5–17)
BUN SERPL-MCNC: 25 MG/DL — HIGH (ref 7–23)
CALCIUM SERPL-MCNC: 9.5 MG/DL — SIGNIFICANT CHANGE UP (ref 8.4–10.5)
CHLORIDE SERPL-SCNC: 105 MMOL/L — SIGNIFICANT CHANGE UP (ref 96–108)
CO2 SERPL-SCNC: 28 MMOL/L — SIGNIFICANT CHANGE UP (ref 22–31)
CREAT SERPL-MCNC: 1.14 MG/DL — SIGNIFICANT CHANGE UP (ref 0.5–1.3)
EGFR: 63 ML/MIN/1.73M2 — SIGNIFICANT CHANGE UP
GLUCOSE SERPL-MCNC: 87 MG/DL — SIGNIFICANT CHANGE UP (ref 70–99)
HCT VFR BLD CALC: 42.6 % — SIGNIFICANT CHANGE UP (ref 39–50)
HGB BLD-MCNC: 14 G/DL — SIGNIFICANT CHANGE UP (ref 13–17)
MCHC RBC-ENTMCNC: 31.6 PG — SIGNIFICANT CHANGE UP (ref 27–34)
MCHC RBC-ENTMCNC: 32.9 GM/DL — SIGNIFICANT CHANGE UP (ref 32–36)
MCV RBC AUTO: 96.2 FL — SIGNIFICANT CHANGE UP (ref 80–100)
NRBC # BLD: 0 /100 WBCS — SIGNIFICANT CHANGE UP (ref 0–0)
PLATELET # BLD AUTO: 188 K/UL — SIGNIFICANT CHANGE UP (ref 150–400)
POTASSIUM SERPL-MCNC: 3.5 MMOL/L — SIGNIFICANT CHANGE UP (ref 3.5–5.3)
POTASSIUM SERPL-SCNC: 3.5 MMOL/L — SIGNIFICANT CHANGE UP (ref 3.5–5.3)
RBC # BLD: 4.43 M/UL — SIGNIFICANT CHANGE UP (ref 4.2–5.8)
RBC # FLD: 12.6 % — SIGNIFICANT CHANGE UP (ref 10.3–14.5)
SODIUM SERPL-SCNC: 142 MMOL/L — SIGNIFICANT CHANGE UP (ref 135–145)
WBC # BLD: 6.91 K/UL — SIGNIFICANT CHANGE UP (ref 3.8–10.5)
WBC # FLD AUTO: 6.91 K/UL — SIGNIFICANT CHANGE UP (ref 3.8–10.5)

## 2022-05-15 PROCEDURE — 84443 ASSAY THYROID STIM HORMONE: CPT

## 2022-05-15 PROCEDURE — 85027 COMPLETE CBC AUTOMATED: CPT

## 2022-05-15 PROCEDURE — 71045 X-RAY EXAM CHEST 1 VIEW: CPT

## 2022-05-15 PROCEDURE — 85379 FIBRIN DEGRADATION QUANT: CPT

## 2022-05-15 PROCEDURE — 85730 THROMBOPLASTIN TIME PARTIAL: CPT

## 2022-05-15 PROCEDURE — 80048 BASIC METABOLIC PNL TOTAL CA: CPT

## 2022-05-15 PROCEDURE — 0225U NFCT DS DNA&RNA 21 SARSCOV2: CPT

## 2022-05-15 PROCEDURE — 85025 COMPLETE CBC W/AUTO DIFF WBC: CPT

## 2022-05-15 PROCEDURE — 84145 PROCALCITONIN (PCT): CPT

## 2022-05-15 PROCEDURE — 99285 EMERGENCY DEPT VISIT HI MDM: CPT | Mod: 25

## 2022-05-15 PROCEDURE — 84484 ASSAY OF TROPONIN QUANT: CPT

## 2022-05-15 PROCEDURE — 97162 PT EVAL MOD COMPLEX 30 MIN: CPT

## 2022-05-15 PROCEDURE — 93306 TTE W/DOPPLER COMPLETE: CPT

## 2022-05-15 PROCEDURE — 70450 CT HEAD/BRAIN W/O DYE: CPT | Mod: MA

## 2022-05-15 PROCEDURE — 87635 SARS-COV-2 COVID-19 AMP PRB: CPT

## 2022-05-15 PROCEDURE — 80053 COMPREHEN METABOLIC PANEL: CPT

## 2022-05-15 PROCEDURE — 85610 PROTHROMBIN TIME: CPT

## 2022-05-15 PROCEDURE — 83036 HEMOGLOBIN GLYCOSYLATED A1C: CPT

## 2022-05-15 PROCEDURE — 99239 HOSP IP/OBS DSCHRG MGMT >30: CPT

## 2022-05-15 PROCEDURE — 93005 ELECTROCARDIOGRAM TRACING: CPT

## 2022-05-15 PROCEDURE — 36415 COLL VENOUS BLD VENIPUNCTURE: CPT

## 2022-05-15 RX ORDER — METOPROLOL TARTRATE 50 MG
25 TABLET ORAL
Refills: 0 | Status: DISCONTINUED | OUTPATIENT
Start: 2022-05-15 | End: 2022-05-15

## 2022-05-15 RX ORDER — METOPROLOL TARTRATE 50 MG
1 TABLET ORAL
Qty: 60 | Refills: 0
Start: 2022-05-15 | End: 2022-06-13

## 2022-05-15 RX ORDER — METOPROLOL TARTRATE 50 MG
12.5 TABLET ORAL
Refills: 0 | Status: DISCONTINUED | OUTPATIENT
Start: 2022-05-15 | End: 2022-05-15

## 2022-05-15 RX ORDER — METOPROLOL TARTRATE 50 MG
1 TABLET ORAL
Qty: 60 | Refills: 0 | DISCHARGE
Start: 2022-05-15 | End: 2022-06-13

## 2022-05-15 RX ADMIN — ENOXAPARIN SODIUM 40 MILLIGRAM(S): 100 INJECTION SUBCUTANEOUS at 05:30

## 2022-05-15 RX ADMIN — RIVASTIGMINE 1 PATCH: 4.6 PATCH, EXTENDED RELEASE TRANSDERMAL at 07:37

## 2022-05-15 RX ADMIN — Medication 25 MILLIGRAM(S): at 05:30

## 2022-05-15 RX ADMIN — Medication 1 TABLET(S): at 11:43

## 2022-05-15 RX ADMIN — RIVASTIGMINE 1 PATCH: 4.6 PATCH, EXTENDED RELEASE TRANSDERMAL at 05:30

## 2022-05-15 NOTE — PROGRESS NOTE ADULT - SUBJECTIVE AND OBJECTIVE BOX
Patient is a 86y old  Male who presents with a chief complaint of new onset afib. (14 May 2022 13:22)    Patient seen and examined at bedside. No overnight events reported.     ALLERGIES:  lactose (Diarrhea)  No Known Drug Allergies  shellfish (Other)    MEDICATIONS  (STANDING):  enoxaparin Injectable 40 milliGRAM(s) SubCutaneous every 24 hours  metoprolol tartrate 12.5 milliGRAM(s) Oral two times a day  multivitamin 1 Tablet(s) Oral daily  rivastigmine patch  4.6 mG/24 Hr(s). 1 Patch Transdermal every 24 hours    MEDICATIONS  (PRN):  acetaminophen     Tablet .. 650 milliGRAM(s) Oral every 6 hours PRN Temp greater or equal to 38C (100.4F), Mild Pain (1 - 3)    Vital Signs Last 24 Hrs  T(F): 98 (15 May 2022 08:03), Max: 98.6 (14 May 2022 20:03)  HR: 50 (15 May 2022 08:03) (50 - 77)  BP: 132/68 (15 May 2022 08:03) (110/66 - 132/68)  RR: 17 (15 May 2022 08:03) (14 - 17)  SpO2: 96% (15 May 2022 08:03) (96% - 98%)    PHYSICAL EXAM:  General: NAD, A/O x 1  ENT: No gross hearing impairment, Moist mucous membranes, no thrush  Neck: Supple, No JVD  Lungs: Clear to auscultation bilaterally, good air entry, non-labored breathing  Cardio: bradycardic, S1/S2, No murmur  Abdomen: Soft, Nontender, Nondistended; Bowel sounds present  Extremities: No calf tenderness, No cyanosis, No pitting edema  Psych: Appropriate mood and affect    LABS:                        14.0   6.91  )-----------( 188      ( 15 May 2022 06:09 )             42.6     05-15    142  |  105  |  25  ----------------------------<  87  3.5   |  28  |  1.14    Ca    9.5      15 May 2022 06:09    TPro  6.6  /  Alb  3.3  /  TBili  0.5  /  DBili  x   /  AST  21  /  ALT  19  /  AlkPhos  88  05-13    PT/INR - ( 12 May 2022 17:07 )   PT: 11.6 sec;   INR: 1.00 ratio      PTT - ( 12 May 2022 17:07 )  PTT:32.3 sec    Procalcitonin, Serum: 0.06 ng/mL (22 @ 17:07)    CARDIAC MARKERS ( 13 May 2022 06:15 )  x     / 17.3 ng/L / x     / x     / x      CARDIAC MARKERS ( 12 May 2022 23:05 )  x     / 12.2 ng/L / x     / x     / x      CARDIAC MARKERS ( 12 May 2022 17:07 )  x     / 9.4 ng/L / x     / x     / x        TSH 0.900   TSH with FT4 reflex --  Total T3 --    Urinalysis Basic - ( 12 May 2022 17:15 )    Color: Yellow / Appearance: Clear / S.010 / pH: x  Gluc: x / Ketone: Moderate  / Bili: Negative / Urobili: Negative   Blood: x / Protein: Negative / Nitrite: Negative   Leuk Esterase: Negative / RBC: x / WBC x   Sq Epi: x / Non Sq Epi: x / Bacteria: x    COVID-19 PCR: NotDetec (22 @ 17:07)    RADIOLOGY & ADDITIONAL TESTS:    Care Discussed with Consultants/Other Providers:

## 2022-05-15 NOTE — DISCHARGE NOTE PROVIDER - NSDCFUSCHEDAPPT_GEN_ALL_CORE_FT
Tanner Burt  [facility]  Neurology [address]  Scheduled Appointment: 06/06/2022

## 2022-05-15 NOTE — DISCHARGE NOTE PROVIDER - NSDCMRMEDTOKEN_GEN_ALL_CORE_FT
multivitamin: 1 tab(s) orally once a day stopped on 9/8/2015  rivastigmine 4.6 mg/24 hr transdermal film, extended release: 1 patch transdermal once a day   metoprolol tartrate 25 mg oral tablet: 1 tab(s) orally 2 times a day  multivitamin: 1 tab(s) orally once a day stopped on 9/8/2015  rivastigmine 4.6 mg/24 hr transdermal film, extended release: 1 patch transdermal once a day

## 2022-05-15 NOTE — DISCHARGE NOTE PROVIDER - NSDCCPCAREPLAN_GEN_ALL_CORE_FT
PRINCIPAL DISCHARGE DIAGNOSIS  Diagnosis: Rapid atrial fibrillation  Assessment and Plan of Treatment: - You came to the hospital for dizziness.   - You were diagnosed with rapid atrial fibrillation, which is an abnormal heart rhythm.  - Your heart rate is improved. Continue taking medication to help control your heart rate.   - Use caution when walking and changing positions.  - Follow up with your primary care doctor and your cardiologist.

## 2022-05-15 NOTE — PHYSICAL THERAPY INITIAL EVALUATION ADULT - GAIT DEVIATIONS NOTED, PT EVAL
increased time in double stance/decreased step length/decreased stride length/decreased swing-to-stance ratio

## 2022-05-15 NOTE — PHYSICAL THERAPY INITIAL EVALUATION ADULT - ORIENTATION, REHAB EVAL
Pt can report he is "at the hospital", states the year is "2000-something" unable to state further, pt reoriented/person/place

## 2022-05-15 NOTE — DISCHARGE NOTE PROVIDER - HOSPITAL COURSE
Hospital Course  HPI:  86M hx of dementia, remote prostate ca s/p RT pw chronic dizziness and multiple falls past month. 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 Cervical MRI was remarkable for degenerative change but no abnormal cord signal to suggest myelopathy. Brought in by wife to ED for chronic dizziness and frequent falls. Per pt, wife is 'upset because he likes to sleep in bed all day'. He denied any dizziness. He states he didn't feel that he is falling much but that his wife says he does. Pt is poor historian and denied any HA, dizziness, SOB, CP, palps, NVD, dysuria. In ED afebrile P: 107 BP: 181/97 sat 96% on RA. with noted intermittent brief episodes of rapid afib. CT head neg for any acute changes.     Denied use of any meds but R arm with rivastigmine patch.   Hx obtained from review of [facility] charts.     Spoke with wife Loren over phone - stated that pt has been sleeping a lot more than usual. This am was especially dizzy and wouldn't get out of bed. She had taken his temp which was normal and checked for COVID which was negative and called PMD who advised that she bring pt to hospital.  (12 May 2022 21:05)    Patient was found to be in new onset atrial fibrillation. He was started on metoprolol for rate control, HR controlled on 25 mg BID. [id number] of 2. Discussed with family and with cardiology, will not start AC at this time as risks outweigh benefits due to frequent falls.     PT evaluated patient, recommended BAKARI, family would prefer to bring patient home, patient to go home with home PT    Discharging Provider:  No Arceo NP  Contact Info: Cell [phone] - Please call with any questions or concerns.    Outpatient Provider: Dr Powell - notified

## 2022-05-15 NOTE — DISCHARGE NOTE PROVIDER - CARE PROVIDER_API CALL
Madi Powell)  Family Medicine; Geriatric Medicine  [address] [place] [address]  Phone: [phone]  Fax: [phone]  Follow Up Time:     Allan Zarate  CARDIOLOGY  [address], [address]  [place] [address]  Phone: [phone]  Fax: [phone]  Follow Up Time:

## 2022-05-15 NOTE — PROGRESS NOTE ADULT - NS ATTEND AMEND GEN_ALL_CORE FT
Patient bradycardic overnight while sleeping, HR increased by time of AM Lopressor 25 mg which he tolerated - will continue same dosing. Plan for discharge today.

## 2022-05-15 NOTE — DISCHARGE NOTE PROVIDER - ATTENDING DISCHARGE PHYSICAL EXAMINATION:
General: NAD, A/O x 1  ENT: No gross hearing impairment, Moist mucous membranes, no thrush  Neck: Supple, No JVD  Lungs: Clear to auscultation bilaterally, good air entry, non-labored breathing  Cardio: bradycardic, S1/S2, No murmur  Abdomen: Soft, Nontender, Nondistended; Bowel sounds present  Extremities: No calf tenderness, No cyanosis, No pitting edema  Psych: Appropriate mood and affect

## 2022-05-15 NOTE — DISCHARGE NOTE NURSING/CASE MANAGEMENT/SOCIAL WORK - PATIENT PORTAL LINK FT
You can access the FollowMyHealth Patient Portal offered by [facility] by registering at the following website: [email or web address]. By joining [facility]’s FollowMyHealth portal, you will also be able to view your health information using other applications (apps) compatible with our system.

## 2022-05-15 NOTE — PROGRESS NOTE ADULT - ASSESSMENT
85 yo M with PMH of Dementia, remote Prostate CA s/p RT who presents with chronic dizziness, recent falls; admitted for new onset A-fib.     #New onset Atrial Fibrillation  - HR controlled. Overnight tele monitor showed SR 58-67, but this AM went back into rapid A fib briefly   - TTE reviewed   - TSH wnl  - [id number] of 2. Wife and daughter report frequent falls due to debility. 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 Discussed with family and with cardiology, will not start AC at this time as risks outweigh benefits   - Yesterday increased lopressor from 12.5mg BID to 25mg BID with hold parameters, patient tolerating this dose   - Cardiology following    #Dementia   - Rivastigmine patch daily     #DVT Prophylaxis:  - Lovenox     Dispo: PT eval pending. Family wants to bring patient home     5/14: Updated wife Loren ([phone]) at bedside with daughter Lola (in [place]) on speakerphone at [phone]  87 yo M with PMH of Dementia, remote Prostate CA s/p RT who presents with chronic dizziness, recent falls; admitted for new onset A-fib.     #New onset Atrial Fibrillation  - HR controlled. Overnight tele monitor showed SR 58-67, but this AM went back into rapid A fib briefly   - TTE reviewed   - TSH wnl  - [id number] of 2. Wife and daughter report frequent falls due to debility. 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 Discussed with family and with cardiology, will not start AC at this time as risks outweigh benefits   - Yesterday increased lopressor from 12.5mg BID to 25mg BID with hold parameters, patient tolerating this dose   - Cardiology following    #Dementia   - Rivastigmine patch daily     #DVT Prophylaxis:  - Lovenox     Dispo: PT eval pending. Family wants to bring patient home     5/15: Updated wife Loren ([phone]) at bedside. Also updated daughter Lola (in [place]) [phone]

## 2022-05-16 ENCOUNTER — NON-APPOINTMENT (OUTPATIENT)
Age: 87
End: 2022-05-16

## 2022-05-16 PROBLEM — F03.90 UNSPECIFIED DEMENTIA WITHOUT BEHAVIORAL DISTURBANCE: Chronic | Status: ACTIVE | Noted: 2022-05-12

## 2022-05-18 ENCOUNTER — APPOINTMENT (OUTPATIENT)
Dept: FAMILY MEDICINE | Facility: CLINIC | Age: 87
End: 2022-05-18
Payer: MEDICARE

## 2022-05-18 VITALS
HEART RATE: 80 BPM | OXYGEN SATURATION: 97 % | RESPIRATION RATE: 18 BRPM | DIASTOLIC BLOOD PRESSURE: 70 MMHG | HEIGHT: 70 IN | BODY MASS INDEX: 20.76 KG/M2 | TEMPERATURE: 98.9 F | SYSTOLIC BLOOD PRESSURE: 110 MMHG | WEIGHT: 145 LBS

## 2022-05-18 PROCEDURE — 99496 TRANSJ CARE MGMT HIGH F2F 7D: CPT

## 2022-05-19 ENCOUNTER — APPOINTMENT (OUTPATIENT)
Dept: FAMILY MEDICINE | Facility: CLINIC | Age: 87
End: 2022-05-19

## 2022-05-19 LAB
APPEARANCE: CLEAR
BACTERIA: NEGATIVE
BILIRUBIN URINE: NEGATIVE
BLOOD URINE: NEGATIVE
COLOR: YELLOW
GLUCOSE QUALITATIVE U: NEGATIVE
HYALINE CASTS: 0 /LPF
KETONES URINE: NEGATIVE
LEUKOCYTE ESTERASE URINE: NEGATIVE
MICROSCOPIC-UA: NORMAL
NITRITE URINE: NEGATIVE
PH URINE: 6
PROTEIN URINE: NORMAL
RED BLOOD CELLS URINE: 4 /HPF
SPECIFIC GRAVITY URINE: 1.03
SQUAMOUS EPITHELIAL CELLS: 0 /HPF
UROBILINOGEN URINE: NORMAL
WHITE BLOOD CELLS URINE: 2 /HPF

## 2022-05-20 LAB — BACTERIA UR CULT: NORMAL

## 2022-05-25 ENCOUNTER — NON-APPOINTMENT (OUTPATIENT)
Age: 87
End: 2022-05-25

## 2022-05-25 ENCOUNTER — APPOINTMENT (OUTPATIENT)
Dept: CARDIOLOGY | Facility: CLINIC | Age: 87
End: 2022-05-25
Payer: MEDICARE

## 2022-05-25 VITALS
OXYGEN SATURATION: 100 % | RESPIRATION RATE: 18 BRPM | HEART RATE: 61 BPM | HEIGHT: 70 IN | SYSTOLIC BLOOD PRESSURE: 170 MMHG | DIASTOLIC BLOOD PRESSURE: 60 MMHG | TEMPERATURE: 98.2 F | BODY MASS INDEX: 21.05 KG/M2 | WEIGHT: 147 LBS

## 2022-05-25 PROCEDURE — 93000 ELECTROCARDIOGRAM COMPLETE: CPT

## 2022-05-25 PROCEDURE — 99215 OFFICE O/P EST HI 40 MIN: CPT

## 2022-05-29 ENCOUNTER — EMERGENCY (EMERGENCY)
Facility: HOSPITAL | Age: 87
LOS: 1 days | Discharge: HOME | End: 2022-05-29
Attending: EMERGENCY MEDICINE | Admitting: EMERGENCY MEDICINE
Payer: MEDICARE

## 2022-05-29 ENCOUNTER — TRANSCRIPTION ENCOUNTER (OUTPATIENT)
Age: 87
End: 2022-05-29

## 2022-05-29 VITALS
OXYGEN SATURATION: 100 % | HEIGHT: 70 IN | TEMPERATURE: 98 F | WEIGHT: 160.06 LBS | DIASTOLIC BLOOD PRESSURE: 67 MMHG | HEART RATE: 69 BPM | RESPIRATION RATE: 16 BRPM | SYSTOLIC BLOOD PRESSURE: 131 MMHG

## 2022-05-29 DIAGNOSIS — Z98.89 OTHER SPECIFIED POSTPROCEDURAL STATES: Chronic | ICD-10-CM

## 2022-05-29 DIAGNOSIS — Z98.890 OTHER SPECIFIED POSTPROCEDURAL STATES: Chronic | ICD-10-CM

## 2022-05-29 LAB — SARS-COV-2 RNA SPEC QL NAA+PROBE: DETECTED

## 2022-05-29 PROCEDURE — 87635 SARS-COV-2 COVID-19 AMP PRB: CPT

## 2022-05-29 PROCEDURE — 71045 X-RAY EXAM CHEST 1 VIEW: CPT | Mod: 26

## 2022-05-29 PROCEDURE — 71045 X-RAY EXAM CHEST 1 VIEW: CPT

## 2022-05-29 PROCEDURE — 99284 EMERGENCY DEPT VISIT MOD MDM: CPT | Mod: FS,CS

## 2022-05-29 PROCEDURE — 99284 EMERGENCY DEPT VISIT MOD MDM: CPT | Mod: 25

## 2022-05-29 PROCEDURE — 70450 CT HEAD/BRAIN W/O DYE: CPT | Mod: 26,MA

## 2022-05-29 PROCEDURE — 70450 CT HEAD/BRAIN W/O DYE: CPT | Mod: MA

## 2022-05-29 NOTE — ED PROVIDER NOTE - ATTENDING APP SHARED VISIT CONTRIBUTION OF CARE
I, Rachel Jerome MD, performed the initial face to face bedside interview with this patient regarding history of present illness, review of symptoms and relevant past medical, social and family history.  I completed an independent physical examination.  I was the initial provider who evaluated this patient. I have signed out the follow up of any pending tests (i.e. labs, radiological studies) to the ACP.  I have communicated the patient’s plan of care and disposition with the ACP.  The history, relevant review of systems, past medical and surgical history, medical decision making, and physical examination was documented by the scribe in my presence and I attest to the accuracy of the documentation.

## 2022-05-29 NOTE — ED ADULT TRIAGE NOTE - PAIN: PRESENCE, MLM
denies pain/discomfort Full Thickness Lip Wedge Repair (Flap) Text: Given the location of the defect and the proximity to free margins a full thickness wedge repair was deemed most appropriate.  Using a sterile surgical marker, the appropriate repair was drawn incorporating the defect and placing the expected incisions perpendicular to the vermilion border.  The vermilion border was also meticulously outlined to ensure appropriate reapproximation during the repair.  The area thus outlined was incised through and through with a #15 scalpel blade.  The muscularis and dermis were reaproximated with deep sutures following hemostasis. Care was taken to realign the vermilion border before proceeding with the superficial closure.  Once the vermilion was realigned the superfical and mucosal closure was finished.

## 2022-05-29 NOTE — ED PROVIDER NOTE - CLINICAL SUMMARY MEDICAL DECISION MAKING FREE TEXT BOX
86M hx of dementia, remote prostate ca s/p RT, Recent admission for chronic dizziness and multiple falls past month dx with new onset afib during his admission and started on metoprolol, NO AC due to freq fall.  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 Pt was BIB his wife today because he fell out of bed while sleeping this morning, incidentally she reported pt has a cough x few days and wants him covid tested. pt denies all medical complaints, including pain, HA, dizziness, CP, SOB or f/c. PE as noted above. head CT pending. CXR. covid swab, reassess 86M hx of dementia, remote prostate ca s/p RT, Recent admission for chronic dizziness and multiple falls past month dx with new onset afib during his admission and started on metoprolol, NO AC due to freq fall.  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 Pt was BIB his wife today because he fell out of bed while sleeping this morning, incidentally she reported pt has a cough x few days and wants him covid tested. pt denies all medical complaints, including pain, HA, dizziness, CP, SOB or f/c. PE as noted above. head CT pending. CXR. covid swab, reassess    5:15pm: head CT results reviewed with Dr. Jerome and family at bedside. NPH noted. Pt follows with Dr. Burt, will dc with neuro f/u. Pt is also COVID+, on multiple medications. will refer for MAB. CXR negative.  will dc with pulse OX and return precautions, pt is vaccinated x 3 86M hx of dementia, remote prostate ca s/p RT, Recent admission for chronic dizziness and multiple falls past month dx with new onset afib during his admission and started on metoprolol, NO AC due to freq fall.  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 Pt was BIB his wife today because he fell out of bed while sleeping this morning, incidentally she reported pt has a cough x few days and wants him covid tested. pt denies all medical complaints, including pain, HA, dizziness, CP, SOB or f/c. PE as noted above. head CT pending. CXR. covid swab, reassess    5:15pm: head CT results reviewed with Dr. Jerome and family at bedside. NPH noted. Pt follows with Dr. Burt, will dc with neuro f/u. Pt is also COVID+. will refer for MAB. CXR negative.  will dc with pulse OX and return precautions, pt is vaccinated x 3

## 2022-05-29 NOTE — ED PROVIDER NOTE - CARE PROVIDER_API CALL
Tanner Burt)  Neurology  [address], [address]  [place], [place] [id number]  Phone: [phone]  Fax: [phone]  Follow Up Time:     Madi Powell)  Family Medicine; Geriatric Medicine  [address] [place] [address]  Phone: [phone]  Fax: [phone]  Follow Up Time:

## 2022-05-29 NOTE — ED PROVIDER NOTE - MUSCULOSKELETAL, MLM
Spine appears normal, range of motion is not limited, no muscle or joint tenderness. no midline spinal TTP. pelvis stable. FROM of hips and shoulders b/l

## 2022-05-29 NOTE — ED PROVIDER NOTE - PATIENT PORTAL LINK FT
You can access the FollowMyHealth Patient Portal offered by [facility] by registering at the following website: [email or web address]. By joining [employer]’s FollowMyHealth portal, you will also be able to view your health information using other applications (apps) compatible with our system.

## 2022-05-29 NOTE — ED ADULT TRIAGE NOTE - CHIEF COMPLAINT QUOTE
Trip and fall this morning - pt has no complaints but has hx of dementia. Sent by visiting nurse for evaluation. denies blood thinners or loc.

## 2022-05-29 NOTE — ED ADULT NURSE NOTE - NSIMPLEMENTINTERV_GEN_ALL_ED
Implemented All Fall Risk Interventions:  [place] to call system. Call bell, personal items and telephone within reach. Instruct patient to call for assistance. Room bathroom lighting operational. Non-slip footwear when patient is off stretcher. Physically safe environment: no spills, clutter or unnecessary equipment. Stretcher in lowest position, wheels locked, appropriate side rails in place. Provide visual cue, wrist band, yellow gown, etc. Monitor gait and stability. Monitor for mental status changes and reorient to person, place, and time. Review medications for side effects contributing to fall risk. Reinforce activity limits and safety measures with patient and family.

## 2022-05-29 NOTE — ED PROVIDER NOTE - OBJECTIVE STATEMENT
86M hx of dementia, remote prostate ca s/p RT, Recent admission for chronic dizziness and multiple falls past month dx with new onset afib during his admission and started on metoprolol, NO AC due to freq fall.  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 Pt was BIB his wife today because he fell out of bed while sleeping this morning, incidentally she reported pt has a cough x few days and wants him covid tested. pt denies all medical complaints, including pain, HA, dizziness, CP, SOB or f/c.

## 2022-05-29 NOTE — ED PROVIDER NOTE - CARE PLAN
Principal Discharge DX:	Fall at home  Secondary Diagnosis:	2019 novel coronavirus disease (COVID-19)   1

## 2022-05-29 NOTE — ED PROVIDER NOTE - NSFOLLOWUPINSTRUCTIONS_ED_ALL_ED_FT
A     Take Tylenol 650mg every 6 hours as needed for fever or pain.     RETURN TO THE HOSPITAL IF YOUR OXYGEN LEVEL DROPS BELOW 92% ON THE DEVICE YOU WERE GIVEN     Drink fluids, rest, and sanitize at home!    Home quarantine is recommended to monitor symptoms.     Isolate from others as much as possible.     We will contact you if there are any findings.     Worsening, continued or ANY new concerning symptoms return to the emergency department.    -------------    What is a coronavirus?  Coronaviruses are a large family of viruses that cause illnesses ranging from the common cold to more severe diseases such as Middle East Respiratory Syndrome (MERS) and Severe Acute Respiratory Syndrome (SARS).    What is Novel Coronavirus (COVID-19)?  The Centers for Disease Control and Prevention (CDC) is closely monitoring the outbreak caused by COVID-19. For the latest information about COVID-19, visit the CDC website at CDC.gov/Coronavirus    How are coronaviruses spread?  Coronaviruses can be transmitted from person to person, usually after close contact with an infected  person (for example, in a household, workplace, or healthcare setting), via droplets that become airborne after a cough or sneeze. These droplets can then infect a nearby person. Transmission can also occur by touching recently contaminated surfaces.    Is there a treatment for a COVID-19?  There is no specific treatment for disease caused by COVID-19. However, many of the symptoms can be treated based on the patient’s clinical condition. Supportive care for infected persons can be highly effective.    What are the symptoms of coronavirus infection?  It depends on the virus, but common signs include fever and/or respiratory symptoms such as cough and shortness of breath. In more severe cases, infection can cause pneumonia, severe acute respiratory syndrome, kidney failure and even death. Fortunately, most cases of COVID-19 have an illness no different than the influenza (flu), with a majority of these patients having mild symptoms and overall mortality which appears to be not much different than the flu.    What can I do to protect myself?  The best precautionary measures:  – washing your hands  – covering your cough  – disinfecting surfaces  – it is also advisable to avoid close contact with anyone showing symptoms of respiratory illness such as coughing and sneezing  – those with symptoms should wear a surgical mask when around others    What can I do to protect those around me?  If you have been identified as someone who may be infected with COVID-19, we recommend you follow the self-isolation procedures outlined on the following page to protect those around you and to limit the spread of this virus.    We recommend the below precautionary steps from now until 14 days from when you returned from your travel or date of your last known possible contact:    — Do not go to work, school or public areas. Avoid using public transportation, ridesharing or taxis.  — As much as possible, separate yourself from other people in your home. If you can, you should stay in a room and away from other people. Also, you should use a separate bathroom if available.  — Wear the supplied mask whenever you are around other people.  — If you have a non-urgent medical appointment, please reschedule for a later date. If the appointment is urgent, please call the health care provider and tell them that you are on self-isolation for possible COVID-19. This will help the health care provider’s office take steps to keep other people from getting infected or exposed. If you can reschedule routine appointments, do so.  — Wash your hands often with soap and water for at least 15 to 20 seconds or clean your hands with an alcohol-based hand  that contains 60 to 95% alcohol, covering all surfaces of your hands and rubbing them together until they feel dry. Soap and water should be used preferentially if hands are visibly dirty.  — Cover your mouth and nose with a tissue when you cough or sneeze. Throw used tissues in a lined trash can. Immediately wash your hands.  — Avoid touching your eyes, nose, and mouth with your hands.  — Avoid sharing personal household items. You should not share dishes, drinking glasses, cups, eating utensils, towels, or bedding with other people or pets in your home. After using these items, they should be washed thoroughly with soap and water.  — Clean and disinfect all “high-touch” surfaces every day. High touch surfaces include counters, tabletops, doorknobs, light switches, remote controls, bathroom fixtures, toilets, phones, keyboards, tablets, and bedside tables. Also, clean any surfaces that may have blood, stool, or body fluids on them.    ------------------------------------------  Information for patients who have received a COVID-19 test.    The COVID-19 (novel coronavirus) test  Results may take up to 7 days to become available.      If your result is positive, you will receive a phone call from one of our coronavirus specialists. While we will do our best to also call patients with a negative test result, the sheer volume of tests being performed may make this difficult to do in a timely fashion. If you haven’t heard from us within 5 days and you’d like to check on your results, you can check our [employer] melody or call one of our coronavirus specialists at [address] [place] [address] (available 24/7)    Please DO NOT call the site where you received the test to obtain your results.    If the test is positive -   You will continue home isolation until you are completely well, you have no fever, and it has been at least 14 days since your positive test. The health department in your city or county may also contact you with additional instructions.    If your test is negative -    You will be able to stop home isolation and resume standard precautions, similiar to how you would manage the common cold or flu.  If you have any questions, you can reach out to one of our coronavirus specialists at [address] [place] [address].    REMEMBER - a negative COVID test means you were negative AT THE TIME OF TESTING, and it is possible to have contracted COVID after being tested.  **********************************  Fall Prevention for Older Adults    WHAT YOU NEED TO KNOW:    As you age, your muscles weaken and your risk for falls increases. Your risk also increases if you take medicines that make you sleepy or dizzy. You may also be at risk if you have vision or joint problems, have low blood pressure, or are not active.    DISCHARGE INSTRUCTIONS:    Call 911 or have someone else call if:   •You have fallen and are unconscious.      •You have fallen and cannot move part of your body.      Contact your healthcare provider if:   •You have fallen and have pain or a headache.      •You have questions or concerns about your condition or care.      Fall prevention tips:   •Stay active. Exercise can help strengthen your muscles and improve your balance. Your healthcare provider may recommend water aerobics, walking, or Dionte Chi. He or she may also recommend physical therapy to improve your coordination. Never start an exercise program without asking your healthcare provider first.  Water Aerobics for Seniors       Dionte Chi for Seniors           •Wear shoes that fit well and have soles that . Wear shoes both inside and outside. Use slippers with good . Avoid shoes with high heels.      •Use assistive devices as directed. Your healthcare provider may suggest that you use a cane or walker to help you keep your balance. You may need to have grab bars put in your bathroom near the toilet or in the shower.      •Stand or sit up slowly. This may help you keep your balance and prevent falls.      •Wear a personal alarm. This is a device that allows you to call 911 if you need help. Ask for more information on personal alarms.      •Manage your medical conditions.  Keep all appointments with your healthcare providers. Visit your eye doctor as directed.      Home safety tips:     Fall Prevention for Seniors     •Add items to prevent falls in the bathroom. Put nonslip strips on your bath or shower floor to prevent you from slipping. Use a bath mat if you do not have carpet in the bathroom. This will prevent you from falling when you step out of the bath or shower. Use a shower seat so you do not need to stand while you shower. Sit on the toilet or a chair in your bathroom to dry yourself and put on clothing. This will prevent you from losing your balance from drying or dressing yourself while you are standing.      •Keep paths clear. Remove books, shoes, and other objects from walkways and stairs. Place cords for telephones and lamps out of the way so that you do not need to walk over them. Tape them down if you cannot move them. Remove small rugs. If you cannot remove a rug, secure it with double-sided tape. This will prevent you from tripping.      •Install bright lights in your home. Use night lights to help light paths to the bathroom or kitchen. Always turn on the light before you start walking.      •Keep items you use often on shelves within reach. Do not use a step stool to help you reach an item.      •Paint or place reflective tape on the edges of your stairs. This will help you see the stairs better.      Follow up with your healthcare provider as directed: Write down your questions so you remember to ask them during your visits. A REFERRAL FOR MONOCLONAL ANTIBODY INFUSION WAS DONE IN THE ER.  REFERENCE#[id number]    Take Tylenol 650mg every 6 hours as needed for fever or pain.     RETURN TO THE HOSPITAL IF YOUR OXYGEN LEVEL DROPS BELOW 92% ON THE DEVICE YOU WERE GIVEN     Drink fluids, rest, and sanitize at home!    Home quarantine is recommended to monitor symptoms.     Isolate from others as much as possible.     We will contact you if there are any findings.     Worsening, continued or ANY new concerning symptoms return to the emergency department.    -------------    What is a coronavirus?  Coronaviruses are a large family of viruses that cause illnesses ranging from the common cold to more severe diseases such as Middle East Respiratory Syndrome (MERS) and Severe Acute Respiratory Syndrome (SARS).    What is Novel Coronavirus (COVID-19)?  The Centers for Disease Control and Prevention (CDC) is closely monitoring the outbreak caused by COVID-19. For the latest information about COVID-19, visit the CDC website at CDC.gov/Coronavirus    How are coronaviruses spread?  Coronaviruses can be transmitted from person to person, usually after close contact with an infected  person (for example, in a household, workplace, or healthcare setting), via droplets that become airborne after a cough or sneeze. These droplets can then infect a nearby person. Transmission can also occur by touching recently contaminated surfaces.    Is there a treatment for a COVID-19?  There is no specific treatment for disease caused by COVID-19. However, many of the symptoms can be treated based on the patient’s clinical condition. Supportive care for infected persons can be highly effective.    What are the symptoms of coronavirus infection?  It depends on the virus, but common signs include fever and/or respiratory symptoms such as cough and shortness of breath. In more severe cases, infection can cause pneumonia, severe acute respiratory syndrome, kidney failure and even death. Fortunately, most cases of COVID-19 have an illness no different than the influenza (flu), with a majority of these patients having mild symptoms and overall mortality which appears to be not much different than the flu.    What can I do to protect myself?  The best precautionary measures:  – washing your hands  – covering your cough  – disinfecting surfaces  – it is also advisable to avoid close contact with anyone showing symptoms of respiratory illness such as coughing and sneezing  – those with symptoms should wear a surgical mask when around others    What can I do to protect those around me?  If you have been identified as someone who may be infected with COVID-19, we recommend you follow the self-isolation procedures outlined on the following page to protect those around you and to limit the spread of this virus.    We recommend the below precautionary steps from now until 14 days from when you returned from your travel or date of your last known possible contact:    — Do not go to work, school or public areas. Avoid using public transportation, ridesharing or taxis.  — As much as possible, separate yourself from other people in your home. If you can, you should stay in a room and away from other people. Also, you should use a separate bathroom if available.  — Wear the supplied mask whenever you are around other people.  — If you have a non-urgent medical appointment, please reschedule for a later date. If the appointment is urgent, please call the health care provider and tell them that you are on self-isolation for possible COVID-19. This will help the health care provider’s office take steps to keep other people from getting infected or exposed. If you can reschedule routine appointments, do so.  — Wash your hands often with soap and water for at least 15 to 20 seconds or clean your hands with an alcohol-based hand  that contains 60 to 95% alcohol, covering all surfaces of your hands and rubbing them together until they feel dry. Soap and water should be used preferentially if hands are visibly dirty.  — Cover your mouth and nose with a tissue when you cough or sneeze. Throw used tissues in a lined trash can. Immediately wash your hands.  — Avoid touching your eyes, nose, and mouth with your hands.  — Avoid sharing personal household items. You should not share dishes, drinking glasses, cups, eating utensils, towels, or bedding with other people or pets in your home. After using these items, they should be washed thoroughly with soap and water.  — Clean and disinfect all “high-touch” surfaces every day. High touch surfaces include counters, tabletops, doorknobs, light switches, remote controls, bathroom fixtures, toilets, phones, keyboards, tablets, and bedside tables. Also, clean any surfaces that may have blood, stool, or body fluids on them.    ------------------------------------------  Information for patients who have received a COVID-19 test.    The COVID-19 (novel coronavirus) test  Results may take up to 7 days to become available.      If your result is positive, you will receive a phone call from one of our coronavirus specialists. While we will do our best to also call patients with a negative test result, the sheer volume of tests being performed may make this difficult to do in a timely fashion. If you haven’t heard from us within 5 days and you’d like to check on your results, you can check our [employer] melody or call one of our coronavirus specialists at [address] [place] [address] (available 24/7)    Please DO NOT call the site where you received the test to obtain your results.    If the test is positive -   You will continue home isolation until you are completely well, you have no fever, and it has been at least 14 days since your positive test. The health department in your city or county may also contact you with additional instructions.    If your test is negative -    You will be able to stop home isolation and resume standard precautions, similiar to how you would manage the common cold or flu.  If you have any questions, you can reach out to one of our coronavirus specialists at [address] [place] [address].    REMEMBER - a negative COVID test means you were negative AT THE TIME OF TESTING, and it is possible to have contracted COVID after being tested.  **********************************  Fall Prevention for Older Adults    WHAT YOU NEED TO KNOW:    As you age, your muscles weaken and your risk for falls increases. Your risk also increases if you take medicines that make you sleepy or dizzy. You may also be at risk if you have vision or joint problems, have low blood pressure, or are not active.    DISCHARGE INSTRUCTIONS:    Call 911 or have someone else call if:   •You have fallen and are unconscious.      •You have fallen and cannot move part of your body.      Contact your healthcare provider if:   •You have fallen and have pain or a headache.      •You have questions or concerns about your condition or care.      Fall prevention tips:   •Stay active. Exercise can help strengthen your muscles and improve your balance. Your healthcare provider may recommend water aerobics, walking, or Dionte Chi. He or she may also recommend physical therapy to improve your coordination. Never start an exercise program without asking your healthcare provider first.  Water Aerobics for Seniors       Dionte Chi for Seniors           •Wear shoes that fit well and have soles that . Wear shoes both inside and outside. Use slippers with good . Avoid shoes with high heels.      •Use assistive devices as directed. Your healthcare provider may suggest that you use a cane or walker to help you keep your balance. You may need to have grab bars put in your bathroom near the toilet or in the shower.      •Stand or sit up slowly. This may help you keep your balance and prevent falls.      •Wear a personal alarm. This is a device that allows you to call 911 if you need help. Ask for more information on personal alarms.      •Manage your medical conditions.  Keep all appointments with your healthcare providers. Visit your eye doctor as directed.      Home safety tips:     Fall Prevention for Seniors     •Add items to prevent falls in the bathroom. Put nonslip strips on your bath or shower floor to prevent you from slipping. Use a bath mat if you do not have carpet in the bathroom. This will prevent you from falling when you step out of the bath or shower. Use a shower seat so you do not need to stand while you shower. Sit on the toilet or a chair in your bathroom to dry yourself and put on clothing. This will prevent you from losing your balance from drying or dressing yourself while you are standing.      •Keep paths clear. Remove books, shoes, and other objects from walkways and stairs. Place cords for telephones and lamps out of the way so that you do not need to walk over them. Tape them down if you cannot move them. Remove small rugs. If you cannot remove a rug, secure it with double-sided tape. This will prevent you from tripping.      •Install bright lights in your home. Use night lights to help light paths to the bathroom or kitchen. Always turn on the light before you start walking.      •Keep items you use often on shelves within reach. Do not use a step stool to help you reach an item.      •Paint or place reflective tape on the edges of your stairs. This will help you see the stairs better.      Follow up with your healthcare provider as directed: Write down your questions so you remember to ask them during your visits.

## 2022-05-29 NOTE — ED PROVIDER NOTE - NS ED ATTENDING STATEMENT MOD
This was a shared visit with the PITER. I reviewed and verified the documentation and independently performed the documented:

## 2022-05-31 ENCOUNTER — NON-APPOINTMENT (OUTPATIENT)
Age: 87
End: 2022-05-31

## 2022-06-01 NOTE — CHART NOTE - NSCHARTNOTEFT_GEN_A_CORE
SW placed call to patient to discuss and assist with follow up care.  Patient presented to ED on 5/29/22 due to a fall.  Patient did not answer. As per [facility], patient has follow up appointment on 6/6/22 with neurology Dr Burt.

## 2022-06-02 ENCOUNTER — NON-APPOINTMENT (OUTPATIENT)
Age: 87
End: 2022-06-02

## 2022-06-06 ENCOUNTER — APPOINTMENT (OUTPATIENT)
Dept: NEUROLOGY | Facility: CLINIC | Age: 87
End: 2022-06-06
Payer: MEDICARE

## 2022-06-08 ENCOUNTER — NON-APPOINTMENT (OUTPATIENT)
Age: 87
End: 2022-06-08

## 2022-06-08 PROCEDURE — 93224 XTRNL ECG REC UP TO 48 HRS: CPT

## 2022-06-13 ENCOUNTER — TRANSCRIPTION ENCOUNTER (OUTPATIENT)
Age: 87
End: 2022-06-13

## 2022-06-23 ENCOUNTER — APPOINTMENT (OUTPATIENT)
Dept: NEUROLOGY | Facility: CLINIC | Age: 87
End: 2022-06-23
Payer: MEDICARE

## 2022-06-23 VITALS
BODY MASS INDEX: 20.76 KG/M2 | SYSTOLIC BLOOD PRESSURE: 120 MMHG | HEIGHT: 70 IN | HEART RATE: 70 BPM | DIASTOLIC BLOOD PRESSURE: 72 MMHG | WEIGHT: 145 LBS

## 2022-06-23 DIAGNOSIS — I67.89 OTHER CEREBROVASCULAR DISEASE: ICD-10-CM

## 2022-06-23 PROCEDURE — 99215 OFFICE O/P EST HI 40 MIN: CPT

## 2022-06-23 NOTE — ASSESSMENT
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  Brain MRI reveals atrophy and microvascular disease and small infarcts.  Symptoms have worsened since he had COVID virus 3 months ago.  He was recently hospitalized with new onset atrial fibrillation.  Neurological exam is basically unchanged.  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  Rule out Alzheimer's disease.  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  Continue rivastigmine 4.6 mg patch each 24 hours.  Discussed with the patient's wife and daughter.  Guarded prognosis.  Office follow-up.\par

## 2022-06-23 NOTE — PHYSICAL EXAM
[FreeTextEntry1] : Head:  Normocephalic Neck: Supple nontender no carotid bruits.  \par \par Mental Status:  Alert awake and attentive disoriented confused.  Recall 0/3 words at 5 minutes..\par \par Cranial Nerves: No masking.  PERRL, Visual Fields full  EOMI no diplopia no ptosis no nystagmus, V through XII intact.\par \par Motor: No drift or definite cogwheeling.  Paratonia.  Diffusely somewhat weak especially left lower extremity status post toe fracture.  Unchanged.\par \par DTRs: Symmetric and 2+.  Plantars equivocal.\par \par Sensory: Equal pin and touch.\par \par Gait: Unsteady short stepped apraxic.\par

## 2022-06-23 NOTE — HISTORY OF PRESENT ILLNESS
[FreeTextEntry1] : Madi Marte is seen for an office visit with his wife.  I also discussed the patient's condition with his daughter by telephone.  He did have COVID 3 months ago and since then he has been more lethargic.  He was hospitalized from 5/12/2022 to 5/15/2022 with falls and found to be in atrial fibrillation.  Anticoagulation was deferred because of his dementia and risk of falls.  CAT scan of the brain at that time did not reveal evidence of CVA.  Continues to receive rivastigmine 4.6 mg/24-hour patch.  Other medications have been reviewed.  His cognitive status and gait are both declined.

## 2022-07-27 LAB
25(OH)D3 SERPL-MCNC: 56.4 NG/ML
ALBUMIN SERPL ELPH-MCNC: 4.1 G/DL
ALP BLD-CCNC: 82 U/L
ALT SERPL-CCNC: 12 U/L
ANION GAP SERPL CALC-SCNC: 9 MMOL/L
AST SERPL-CCNC: 17 U/L
BASOPHILS # BLD AUTO: 0.03 K/UL
BASOPHILS NFR BLD AUTO: 0.5 %
BILIRUB SERPL-MCNC: 0.4 MG/DL
BUN SERPL-MCNC: 18 MG/DL
CALCIUM SERPL-MCNC: 9.6 MG/DL
CHLORIDE SERPL-SCNC: 104 MMOL/L
CHOLEST SERPL-MCNC: 235 MG/DL
CO2 SERPL-SCNC: 27 MMOL/L
CREAT SERPL-MCNC: 1.47 MG/DL
EGFR: 47 ML/MIN/1.73M2
EOSINOPHIL # BLD AUTO: 0.27 K/UL
EOSINOPHIL NFR BLD AUTO: 4.4 %
ESTIMATED AVERAGE GLUCOSE: 126 MG/DL
GLUCOSE SERPL-MCNC: 89 MG/DL
HBA1C MFR BLD HPLC: 6 %
HCT VFR BLD CALC: 40 %
HDLC SERPL-MCNC: 59 MG/DL
HGB BLD-MCNC: 13.1 G/DL
IMM GRANULOCYTES NFR BLD AUTO: 0.3 %
LDLC SERPL CALC-MCNC: 157 MG/DL
LYMPHOCYTES # BLD AUTO: 1.82 K/UL
LYMPHOCYTES NFR BLD AUTO: 29.8 %
MAN DIFF?: NORMAL
MCHC RBC-ENTMCNC: 31.6 PG
MCHC RBC-ENTMCNC: 32.8 GM/DL
MCV RBC AUTO: 96.4 FL
MONOCYTES # BLD AUTO: 0.64 K/UL
MONOCYTES NFR BLD AUTO: 10.5 %
NEUTROPHILS # BLD AUTO: 3.33 K/UL
NEUTROPHILS NFR BLD AUTO: 54.5 %
NONHDLC SERPL-MCNC: 175 MG/DL
PLATELET # BLD AUTO: 167 K/UL
POTASSIUM SERPL-SCNC: 4.3 MMOL/L
PROT SERPL-MCNC: 6.5 G/DL
RBC # BLD: 4.15 M/UL
RBC # FLD: 13.3 %
SODIUM SERPL-SCNC: 140 MMOL/L
TRIGL SERPL-MCNC: 89 MG/DL
TSH SERPL-ACNC: 2.08 UIU/ML
WBC # FLD AUTO: 6.11 K/UL

## 2022-08-11 ENCOUNTER — NON-APPOINTMENT (OUTPATIENT)
Age: 87
End: 2022-08-11

## 2022-08-11 ENCOUNTER — APPOINTMENT (OUTPATIENT)
Dept: FAMILY MEDICINE | Facility: CLINIC | Age: 87
End: 2022-08-11

## 2022-08-11 VITALS
OXYGEN SATURATION: 98 % | WEIGHT: 146 LBS | HEIGHT: 70 IN | SYSTOLIC BLOOD PRESSURE: 120 MMHG | RESPIRATION RATE: 18 BRPM | HEART RATE: 70 BPM | DIASTOLIC BLOOD PRESSURE: 58 MMHG | BODY MASS INDEX: 20.9 KG/M2 | TEMPERATURE: 98.5 F

## 2022-08-11 DIAGNOSIS — R26.9 UNSPECIFIED ABNORMALITIES OF GAIT AND MOBILITY: ICD-10-CM

## 2022-08-11 PROCEDURE — 99214 OFFICE O/P EST MOD 30 MIN: CPT

## 2022-08-11 NOTE — HISTORY OF PRESENT ILLNESS
[FreeTextEntry1] : Dementia atrial fibrillation [de-identified] : Patient is here in follow-up he has a history of progressive dementia recently seen by neurology he did develop the atrial fibrillation for which she was hospitalized back in May where he was having repetitive falls and was found to be in atrial fibrillation with a rapid ventricular response after that he was developed COVID and was admitted rather was followed at [facility] for apparently for antibody treatment right now his wife complains that he is not performing at his previous level and seems to be sleepy he had laboratory work done several weeks ago which all within acceptable levels he is seeing neurology also feels he should remain on his parent current plan he has not been anticoagulated because of his dementia and his propensity to falls and and his age

## 2022-08-11 NOTE — ASSESSMENT
[FreeTextEntry1] : Patient seems to be essentially stable although he is debilitated between his gait disorder his dementia etc. and his weakness laboratory work and neurology consult are acceptable showing no change he is due to see cardiology in several weeks at this point no change in plan we will discussed the possibility of increasing the Exelon dosage with neurology EKG today reveals a sinus bradycardia he will be followed up here in in 2 months or as needed reviewed his hospital records and recent laboratory work and consultations TREVA Quijano/ [facility] and CESARIO Alfredo

## 2022-08-23 ENCOUNTER — APPOINTMENT (OUTPATIENT)
Dept: CARDIOLOGY | Facility: CLINIC | Age: 87
End: 2022-08-23

## 2022-08-23 ENCOUNTER — NON-APPOINTMENT (OUTPATIENT)
Age: 87
End: 2022-08-23

## 2022-08-23 VITALS
BODY MASS INDEX: 21.62 KG/M2 | OXYGEN SATURATION: 99 % | TEMPERATURE: 98.2 F | DIASTOLIC BLOOD PRESSURE: 72 MMHG | HEART RATE: 78 BPM | WEIGHT: 151 LBS | SYSTOLIC BLOOD PRESSURE: 154 MMHG | HEIGHT: 70 IN | RESPIRATION RATE: 16 BRPM

## 2022-08-23 PROCEDURE — 93000 ELECTROCARDIOGRAM COMPLETE: CPT

## 2022-08-23 PROCEDURE — 99215 OFFICE O/P EST HI 40 MIN: CPT

## 2022-09-19 ENCOUNTER — RX RENEWAL (OUTPATIENT)
Age: 87
End: 2022-09-19

## 2022-09-26 ENCOUNTER — APPOINTMENT (OUTPATIENT)
Dept: GERIATRICS | Facility: CLINIC | Age: 87
End: 2022-09-26

## 2022-09-26 PROCEDURE — 90662 IIV NO PRSV INCREASED AG IM: CPT

## 2022-09-26 PROCEDURE — G0008: CPT

## 2022-09-27 ENCOUNTER — NON-APPOINTMENT (OUTPATIENT)
Age: 87
End: 2022-09-27

## 2022-09-29 ENCOUNTER — APPOINTMENT (OUTPATIENT)
Dept: GERIATRICS | Facility: CLINIC | Age: 87
End: 2022-09-29

## 2022-09-29 ENCOUNTER — INPATIENT (INPATIENT)
Facility: HOSPITAL | Age: 87
LOS: 1 days | Discharge: OTHER INSTITUTION | DRG: 536 | End: 2022-10-01
Attending: INTERNAL MEDICINE | Admitting: INTERNAL MEDICINE
Payer: MEDICARE

## 2022-09-29 VITALS
HEIGHT: 70 IN | DIASTOLIC BLOOD PRESSURE: 80 MMHG | SYSTOLIC BLOOD PRESSURE: 125 MMHG | OXYGEN SATURATION: 96 % | HEART RATE: 57 BPM | RESPIRATION RATE: 15 BRPM | WEIGHT: 139.99 LBS | TEMPERATURE: 98 F

## 2022-09-29 VITALS
BODY MASS INDEX: 20.62 KG/M2 | RESPIRATION RATE: 18 BRPM | HEIGHT: 70 IN | SYSTOLIC BLOOD PRESSURE: 122 MMHG | OXYGEN SATURATION: 96 % | DIASTOLIC BLOOD PRESSURE: 60 MMHG | TEMPERATURE: 98.9 F | HEART RATE: 70 BPM | WEIGHT: 144 LBS

## 2022-09-29 DIAGNOSIS — S32.9XXA FRACTURE OF UNSPECIFIED PARTS OF LUMBOSACRAL SPINE AND PELVIS, INITIAL ENCOUNTER FOR CLOSED FRACTURE: ICD-10-CM

## 2022-09-29 DIAGNOSIS — Z98.89 OTHER SPECIFIED POSTPROCEDURAL STATES: Chronic | ICD-10-CM

## 2022-09-29 DIAGNOSIS — Z98.890 OTHER SPECIFIED POSTPROCEDURAL STATES: Chronic | ICD-10-CM
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
## 2022-09-29 DIAGNOSIS — R26.9 UNSPECIFIED ABNORMALITIES OF GAIT AND MOBILITY: ICD-10-CM

## 2022-09-29 LAB
ALBUMIN SERPL ELPH-MCNC: 3.5 G/DL — SIGNIFICANT CHANGE UP (ref 3.3–5)
ALP SERPL-CCNC: 78 U/L — SIGNIFICANT CHANGE UP (ref 40–120)
ALT FLD-CCNC: 22 U/L — SIGNIFICANT CHANGE UP (ref 10–45)
ANION GAP SERPL CALC-SCNC: 8 MMOL/L — SIGNIFICANT CHANGE UP (ref 5–17)
AST SERPL-CCNC: 30 U/L — SIGNIFICANT CHANGE UP (ref 10–40)
BASOPHILS # BLD AUTO: 0.02 K/UL — SIGNIFICANT CHANGE UP (ref 0–0.2)
BASOPHILS NFR BLD AUTO: 0.2 % — SIGNIFICANT CHANGE UP (ref 0–2)
BILIRUB SERPL-MCNC: 0.6 MG/DL — SIGNIFICANT CHANGE UP (ref 0.2–1.2)
BUN SERPL-MCNC: 22 MG/DL — SIGNIFICANT CHANGE UP (ref 7–23)
CALCIUM SERPL-MCNC: 9.4 MG/DL — SIGNIFICANT CHANGE UP (ref 8.4–10.5)
CHLORIDE SERPL-SCNC: 104 MMOL/L — SIGNIFICANT CHANGE UP (ref 96–108)
CO2 SERPL-SCNC: 27 MMOL/L — SIGNIFICANT CHANGE UP (ref 22–31)
CREAT SERPL-MCNC: 1.19 MG/DL — SIGNIFICANT CHANGE UP (ref 0.5–1.3)
EGFR: 60 ML/MIN/1.73M2 — SIGNIFICANT CHANGE UP
EOSINOPHIL # BLD AUTO: 0.08 K/UL — SIGNIFICANT CHANGE UP (ref 0–0.5)
EOSINOPHIL NFR BLD AUTO: 0.8 % — SIGNIFICANT CHANGE UP (ref 0–6)
GLUCOSE SERPL-MCNC: 98 MG/DL — SIGNIFICANT CHANGE UP (ref 70–99)
HCT VFR BLD CALC: 37.1 % — LOW (ref 39–50)
HGB BLD-MCNC: 12.4 G/DL — LOW (ref 13–17)
IMM GRANULOCYTES NFR BLD AUTO: 0.1 % — SIGNIFICANT CHANGE UP (ref 0–0.9)
LYMPHOCYTES # BLD AUTO: 1.64 K/UL — SIGNIFICANT CHANGE UP (ref 1–3.3)
LYMPHOCYTES # BLD AUTO: 17 % — SIGNIFICANT CHANGE UP (ref 13–44)
MCHC RBC-ENTMCNC: 32 PG — SIGNIFICANT CHANGE UP (ref 27–34)
MCHC RBC-ENTMCNC: 33.4 GM/DL — SIGNIFICANT CHANGE UP (ref 32–36)
MCV RBC AUTO: 95.6 FL — SIGNIFICANT CHANGE UP (ref 80–100)
MONOCYTES # BLD AUTO: 1.08 K/UL — HIGH (ref 0–0.9)
MONOCYTES NFR BLD AUTO: 11.2 % — SIGNIFICANT CHANGE UP (ref 2–14)
NEUTROPHILS # BLD AUTO: 6.84 K/UL — SIGNIFICANT CHANGE UP (ref 1.8–7.4)
NEUTROPHILS NFR BLD AUTO: 70.7 % — SIGNIFICANT CHANGE UP (ref 43–77)
NRBC # BLD: 0 /100 WBCS — SIGNIFICANT CHANGE UP (ref 0–0)
PLATELET # BLD AUTO: 173 K/UL — SIGNIFICANT CHANGE UP (ref 150–400)
POTASSIUM SERPL-MCNC: 4.1 MMOL/L — SIGNIFICANT CHANGE UP (ref 3.5–5.3)
POTASSIUM SERPL-SCNC: 4.1 MMOL/L — SIGNIFICANT CHANGE UP (ref 3.5–5.3)
PROT SERPL-MCNC: 6.7 G/DL — SIGNIFICANT CHANGE UP (ref 6–8.3)
RBC # BLD: 3.88 M/UL — LOW (ref 4.2–5.8)
RBC # FLD: 13 % — SIGNIFICANT CHANGE UP (ref 10.3–14.5)
SARS-COV-2 RNA SPEC QL NAA+PROBE: SIGNIFICANT CHANGE UP
SODIUM SERPL-SCNC: 139 MMOL/L — SIGNIFICANT CHANGE UP (ref 135–145)
WBC # BLD: 9.67 K/UL — SIGNIFICANT CHANGE UP (ref 3.8–10.5)
WBC # FLD AUTO: 9.67 K/UL — SIGNIFICANT CHANGE UP (ref 3.8–10.5)

## 2022-09-29 PROCEDURE — 99222 1ST HOSP IP/OBS MODERATE 55: CPT | Mod: AI

## 2022-09-29 PROCEDURE — 70450 CT HEAD/BRAIN W/O DYE: CPT | Mod: 26,MA

## 2022-09-29 PROCEDURE — 71045 X-RAY EXAM CHEST 1 VIEW: CPT | Mod: 26

## 2022-09-29 PROCEDURE — 99285 EMERGENCY DEPT VISIT HI MDM: CPT

## 2022-09-29 PROCEDURE — 76376 3D RENDER W/INTRP POSTPROCES: CPT | Mod: 26

## 2022-09-29 PROCEDURE — 93010 ELECTROCARDIOGRAM REPORT: CPT

## 2022-09-29 PROCEDURE — 72192 CT PELVIS W/O DYE: CPT | Mod: 26,MA

## 2022-09-29 PROCEDURE — 99205 OFFICE O/P NEW HI 60 MIN: CPT

## 2022-09-29 RX ORDER — ACETAMINOPHEN 500 MG
650 TABLET ORAL EVERY 6 HOURS
Refills: 0 | Status: DISCONTINUED | OUTPATIENT
Start: 2022-09-29 | End: 2022-10-01

## 2022-09-29 RX ORDER — ASPIRIN/CALCIUM CARB/MAGNESIUM 324 MG
81 TABLET ORAL DAILY
Refills: 0 | Status: DISCONTINUED | OUTPATIENT
Start: 2022-09-29 | End: 2022-10-01

## 2022-09-29 RX ORDER — ACETAMINOPHEN 500 MG
650 TABLET ORAL ONCE
Refills: 0 | Status: COMPLETED | OUTPATIENT
Start: 2022-09-29 | End: 2022-09-29

## 2022-09-29 RX ORDER — METOPROLOL TARTRATE 50 MG
25 TABLET ORAL
Refills: 0 | Status: DISCONTINUED | OUTPATIENT
Start: 2022-09-29 | End: 2022-10-01

## 2022-09-29 RX ORDER — PANTOPRAZOLE SODIUM 20 MG/1
40 TABLET, DELAYED RELEASE ORAL
Refills: 0 | Status: DISCONTINUED | OUTPATIENT
Start: 2022-09-29 | End: 2022-09-30

## 2022-09-29 RX ORDER — RIVASTIGMINE 4.6 MG/24H
1 PATCH, EXTENDED RELEASE TRANSDERMAL EVERY 24 HOURS
Refills: 0 | Status: DISCONTINUED | OUTPATIENT
Start: 2022-09-29 | End: 2022-10-01

## 2022-09-29 RX ORDER — ENOXAPARIN SODIUM 100 MG/ML
40 INJECTION SUBCUTANEOUS EVERY 24 HOURS
Refills: 0 | Status: DISCONTINUED | OUTPATIENT
Start: 2022-09-29 | End: 2022-10-01

## 2022-09-29 RX ADMIN — Medication 650 MILLIGRAM(S): at 16:52

## 2022-09-29 RX ADMIN — Medication 650 MILLIGRAM(S): at 17:22

## 2022-09-29 NOTE — REASON FOR VISIT
[Initial Evaluation] : an initial evaluation [FreeTextEntry1] : s/p fall, worse functional status, needing assistance

## 2022-09-29 NOTE — H&P ADULT - HISTORY OF PRESENT ILLNESS
86M hx of dementia, remote prostate ca s/p RT, chronic gait d/o with frequent multiple falls, PAF on asa (though has not been taking it) pw s/p mechanical fall yesterday. Reportedly pt was walking with his PT outside without his walker and had a slip and fall. No LOC and was able to walk home. But since then has been unable to ambulate sec to R hip pain. Denied any recent fevers, chills, cough, SOB, CP, NVD, dysuria. Pt poor historian. Hx obtained from wife Loren. In ED afebrile P: 59 BP: 125/80 sat 96% on RA. WBC: 9.7 cr: 1.2 CT head: unchanged from previous. CT pelvis with R sup and inf pubic rami fx

## 2022-09-29 NOTE — H&P ADULT - NSHPPOAPRESSUREULCER_GEN_ALL_CORE
no Body Location Override (Optional - Billing Will Still Be Based On Selected Body Map Location If Applicable): Left cheek Detail Level: Detailed Size Of Lesion In Cm (Optional): 0

## 2022-09-29 NOTE — ED ADULT NURSE NOTE - NSIMPLEMENTINTERV_GEN_ALL_ED
Implemented All Fall Risk Interventions:  [place] to call system. Call bell, personal items and telephone within reach. Instruct patient to call for assistance. Room bathroom lighting operational. Non-slip footwear when patient is off stretcher. Physically safe environment: no spills, clutter or unnecessary equipment. Stretcher in lowest position, wheels locked, appropriate side rails in place. Provide visual cue, wrist band, yellow gown, etc. Monitor gait and stability. Monitor for mental status changes and reorient to person, place, and time. Review medications for side effects contributing to fall risk. Reinforce activity limits and safety measures with patient and family.

## 2022-09-29 NOTE — PATIENT PROFILE ADULT - FALL HARM RISK - HARM RISK INTERVENTIONS
Assistance with ambulation/Assistance OOB with selected safe patient handling equipment/Communicate Risk of Fall with Harm to all staff/Discuss with provider need for PT consult/Monitor for mental status changes/Monitor gait and stability/Provide patient with walking aids - walker, cane, crutches/Reinforce activity limits and safety measures with patient and family/Tailored Fall Risk Interventions/Visual Cue: Yellow wristband and red socks/Bed in lowest position, wheels locked, appropriate side rails in place/Call bell, personal items and telephone in reach/Instruct patient to call for assistance before getting out of bed or chair/Non-slip footwear when patient is out of bed/[place] to call system/Physically safe environment - no spills, clutter or unnecessary equipment/Purposeful Proactive Rounding/Room/bathroom lighting operational, light cord in reach

## 2022-09-29 NOTE — PHYSICAL EXAM
[Alert] : alert [No Acute Distress] : in no acute distress [Sclera] : the sclera and conjunctiva were normal [Normal Outer Ear/Nose] : the ears and nose were normal in appearance [Normal Appearance] : the appearance of the neck was normal [No Respiratory Distress] : no respiratory distress [No Acc Muscle Use] : no accessory muscle use [Respiration, Rhythm And Depth] : normal respiratory rhythm and effort [Auscultation Breath Sounds / Voice Sounds] : lungs were clear to auscultation bilaterally [Normal S1, S2] : normal S1 and S2 [Heart Rate And Rhythm] : heart rate was normal and rhythm regular [Edema] : edema was not present [Abdomen Tenderness] : non-tender [Abdomen Soft] : soft

## 2022-09-29 NOTE — H&P ADULT - NSHPPHYSICALEXAM_GEN_ALL_CORE
Vital Signs Last 24 Hrs  T(C): 36.4 (29 Sep 2022 15:52), Max: 36.4 (29 Sep 2022 15:52)  T(F): 97.5 (29 Sep 2022 15:52), Max: 97.5 (29 Sep 2022 15:52)  HR: 57 (29 Sep 2022 15:52) (57 - 57)  BP: 125/80 (29 Sep 2022 15:52) (125/80 - 125/80)  BP(mean): --  RR: 15 (29 Sep 2022 15:52) (15 - 15)  SpO2: 96% (29 Sep 2022 15:52) (96% - 96%)    Parameters below as of 29 Sep 2022 15:52  Patient On (Oxygen Delivery Method): room air      Daily Height in cm: 177.8 (29 Sep 2022 15:52)    Daily   CAPILLARY BLOOD GLUCOSE        I&O's Summary      GENERAL: NAD  HEAD:  Normocephalic  EYES: EOMI, PERRLA, conjunctiva and sclera clear  ENMT: No tonsillar erythema, exudates, or enlargement; Moist mucous membranes, No lesions  NECK: Supple, No JVD, no bruit, normal thyroid  NERVOUS SYSTEM:  Alert & Oriented X3,  grossly  moves all fours but with some R hip pain on attempted rom.   CHEST/LUNG: Clear to auscultation bilaterally; No rales, rhonchi, wheezing, or rubs  HEART: Regular rate and rhythm; No murmurs, rubs, or gallops  ABDOMEN: Soft, Nontender, Nondistended; Bowel sounds present  EXTREMITIES:  2+ Peripheral Pulses, No clubbing, cyanosis, or edema  LYMPH: No lymphadenopathy noted  SKIN: No rashes or lesions

## 2022-09-29 NOTE — ED PROVIDER NOTE - CLINICAL SUMMARY MEDICAL DECISION MAKING FREE TEXT BOX
86M with hx afib on aspirin, dementia, prostate ca s/p radiation therapy presents with R hip pain after fall. Pt has had increasingly unsteady gait. Following with neurology. Was walking outside with walker yesterday and had mechanical fall. No head strike or LOC. Difficulty walking since then with pt complaining of R hip pain. Denies HA, back pain, CP, SOB, abd pain, numbness, weakness. VSS. Pt well appearing. No external signs of trauma. No focal neuro deficits. TTP R hip and pain with ranging hip. No gross deformity. Pt had XR hip earlier today that did not show fx, was sent to ED by pmd to assess for occult fx. CT head, pelvis, hip. Analgesia, reassess.

## 2022-09-29 NOTE — H&P ADULT - NSHPLABSRESULTS_GEN_ALL_CORE
12.4   9.67  )-----------( 173      ( 29 Sep 2022 18:40 )             37.1       09-29    139  |  104  |  22  ----------------------------<  98  4.1   |  27  |  1.19    Ca    9.4      29 Sep 2022 18:40    TPro  6.7  /  Alb  3.5  /  TBili  0.6  /  DBili  x   /  AST  30  /  ALT  22  /  AlkPhos  78  09-29         LIVER FUNCTIONS - ( 29 Sep 2022 18:40 )  Alb: 3.5 g/dL / Pro: 6.7 g/dL / ALK PHOS: 78 U/L / ALT: 22 U/L / AST: 30 U/L / GGT: x             EKG: personally rev. NSR @ 63bpm, no acute ST changes.       CXR: wet read. ?increased L hilar markings. FU official read.

## 2022-09-29 NOTE — HISTORY OF PRESENT ILLNESS
[FreeTextEntry1] : 86yom with pmhx of AFib not on AC, dementia, unsteady gait, hx of left hip repair, who fell yesterday while walking outside with his .\par states he tripped with subsequent right hip and pelvic pain. \par he went to [facility] urgent care and had imaging that was negative for fracture.\par \par however today, he continues to have pain and severe impairment in his gait.\par normally able to ambulate with cane.\par today in our office required 2 person assist to use the bathroom.\par \par lives with wife who is unable to provide care for him\par \par also with urinary and fecal incontinence episodes.\par \par they have help with  but only twice a week.\par \par dtr serg lives in [phone]\par \par

## 2022-09-29 NOTE — H&P ADULT - ASSESSMENT
86M hx of dementia, remote prostate ca s/p RT, chronic gait d/o with frequent multiple falls, PAF on asa (though has not been taking it) pw s/p mechanical fall yesterday with pelvic fx.    #Pelvic fx  analgesics  DVT proph  PT eval for acute rehab.   ortho consult.   check urinalysis/micro, orthostatics to be complete. FU cxr report.     #PAF  cont asa  cont lopressor with BP parameters.    Wife Loren [phone] updated.       IMPROVE VTE Individual Risk Assessment    RISK                                                                Points    [  ] Previous VTE                                                  3    [  ] Thrombophilia                                               2    [2  ] Lower limb paralysis                                      2        (unable to hold up >15 seconds)      [  ] Current Cancer                                              2         (within 6 months)    [  ] Immobilization > 24 hrs                                1    [  ] ICU/CCU stay > 24 hours                              1    [ 1 ] Age > 60                                                      1    IMPROVE VTE Score __3_______    IMPROVE Score 0-1: Low Risk, No VTE prophylaxis required for most patients, encourage ambulation.   IMPROVE Score 2-3: At risk, pharmacologic VTE prophylaxis is indicated for most patients (in the absence of a contraindication)  IMPROVE Score > or = 4: High Risk, pharmacologic VTE prophylaxis is indicated for most patients (in the absence of a contraindication)

## 2022-09-29 NOTE — ED PROVIDER NOTE - OBJECTIVE STATEMENT
EGD scheduled for 10/19/17  
Hi!  Can you call her to arrange an EGD given her fh she appears due for this now?  Thanks!  
Fall

## 2022-09-29 NOTE — ED PROVIDER NOTE - MUSCULOSKELETAL, MLM
Spine appears normal, range of motion is not limited, R hip TTP and pain with ranging R hip no focal midline spinal TTP

## 2022-09-29 NOTE — ED ADULT TRIAGE NOTE - CHIEF COMPLAINT QUOTE
BIB EMS from doctors office for c/o right hip pain s/p trip & fall yesterday at home. Per EMS, pt is unable to walk and pt c/o has worsening pain. PMH of dementia. Per EMS, xray was neg but sent by MD for CT scan.

## 2022-09-29 NOTE — ED PROVIDER NOTE - PROGRESS NOTE DETAILS
CT shows R inferior and superior pubic ramus fx. Pt remains HD stable. Hgb at baseline. Will admit to hospital for PT and ortho eval

## 2022-09-29 NOTE — DATA REVIEWED
[FreeTextEntry1] :  Procedure was performed at the [facility]\par \par EXAM:  HIP RIGHT  \par \par \par PROCEDURE DATE:  09/28/2022  \par \par \par INTERPRETATION:  XR HIP RIGHT\par \par HISTORY: Right hip pain. Unable to bear weight.\par \par VIEWS: AP pelvis with 2 views of the right hip. IMAGES: 3\par \par COMPARISON: Postoperative pelvis x-ray dated 7/7/2019 and pelvis and left \par hip x-rays dated 7/6/2019.\par \par FINDINGS:\par \par OSSEOUS STRUCTURES\par   Fractures:  None.\par   Postoperative Changes: Left hip hemiarthroplasty is again seen with a \par press-fit femoral component.\par \par RIGHT HIP AND SACROILIAC JOINTS\par   Joint Space(s):  The right hip demonstrates moderate medial joint space \par narrowing with small osteophytes and chondrocalcinosis. Osseous bridging \par of the sacroiliac joints is suggested.\par \par VISUALIZED LUMBAR SPINE: Moderate lower lumbar degenerative disc disease \par is noted.\par \par SOFT TISSUES:  Severe atherosclerotic calcifications are present.\par \par IMPRESSION:\par 1.  No acute osseous abnormalities.\par 2.  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  Consider follow-up imaging if there is concern for occult fracture.\par \par \par --- End of Report ---\par \par  \par \par \par \par \par ELODIA DIAMOND MD; Attending Radiologist\par This document has been electronically signed. Sep 28 2022  4:57PM

## 2022-09-29 NOTE — DATA REVIEWED
[FreeTextEntry1] :  Procedure was performed at the [facility]\par \par EXAM:  HIP RIGHT  \par \par \par PROCEDURE DATE:  09/28/2022  \par \par \par INTERPRETATION:  XR HIP RIGHT\par \par HISTORY: Right hip pain. Unable to bear weight.\par \par VIEWS: AP pelvis with 2 views of the right hip. IMAGES: 3\par \par COMPARISON: Postoperative pelvis x-ray dated 7/7/2019 and pelvis and left \par hip x-rays dated 7/6/2019.\par \par FINDINGS:\par \par OSSEOUS STRUCTURES\par   Fractures:  None.\par   Postoperative Changes: Left hip hemiarthroplasty is again seen with a \par press-fit femoral component.\par \par RIGHT HIP AND SACROILIAC JOINTS\par   Joint Space(s):  The right hip demonstrates moderate medial joint space \par narrowing with small osteophytes and chondrocalcinosis. Osseous bridging \par of the sacroiliac joints is suggested.\par \par VISUALIZED LUMBAR SPINE: Moderate lower lumbar degenerative disc disease \par is noted.\par \par SOFT TISSUES:  Severe atherosclerotic calcifications are present.\par \par IMPRESSION:\par 1.  No acute osseous abnormalities.\par 2.  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  Consider follow-up imaging if there is concern for occult fracture.\par \par \par --- End of Report ---\par \par  \par \par \par \par \par ELODIA DIAMOND MD; Attending Radiologist\par This document has been electronically signed. Sep 28 2022  4:57PM

## 2022-09-29 NOTE — ED PROVIDER NOTE - NEUROLOGICAL, MLM
Alert and oriented, no focal deficits, no motor or sensory deficits. 5/5 strength UE and LE b/l, gait testing deferred

## 2022-09-29 NOTE — ASSESSMENT
[FreeTextEntry1] : fall/ pelvic pain:\par severely impaired gait after fall\par  xray initally negative\par however given symptoms suspect occult fracture\par recommend ER for CT scan and admission due to unsafe for patient to return home given his significantly reduced mobility.\par Even if negative imaging, given reduced ability to ambulate, patient is not safe to return home without added services in place.\par he will likely need Rehab or fully time HHA put in place at home \par called and discussed plan with patient's dtr Brenda\par \par urinary and fecal incontinence:  needs assistance with aDL's\par \par dementia: needs assistance with medications- \par \par afib- not on ac, remains on metoprolol

## 2022-09-29 NOTE — HISTORY OF PRESENT ILLNESS
[FreeTextEntry1] : 86yom with pmhx of AFib not on AC, dementia, unsteady gait, hx of left hip repair, who fell yesterday while walking outside with his .\par states he tripped with subsequent right hip and pelvic pain. \par he went to [facility] urgent care and had imaging that was negative for fracture.\par \par however today, he continues to have pain and severe impairment in his gait.\par normally able to ambulate with cane.\par today in our office required 2 person assist to use the bathroom.\par \par lives with wife who is unable to provide care for him\par \par also with urinary and fecal incontinence episodes.\par \par they have help with  but only twice a week.\par \par dtr serg lives in [phone]\par \par

## 2022-09-30 ENCOUNTER — NON-APPOINTMENT (OUTPATIENT)
Age: 87
End: 2022-09-30

## 2022-09-30 LAB
ANION GAP SERPL CALC-SCNC: 6 MMOL/L — SIGNIFICANT CHANGE UP (ref 5–17)
BASOPHILS # BLD AUTO: 0.02 K/UL — SIGNIFICANT CHANGE UP (ref 0–0.2)
BASOPHILS NFR BLD AUTO: 0.3 % — SIGNIFICANT CHANGE UP (ref 0–2)
BUN SERPL-MCNC: 19 MG/DL — SIGNIFICANT CHANGE UP (ref 7–23)
CALCIUM SERPL-MCNC: 9.5 MG/DL — SIGNIFICANT CHANGE UP (ref 8.4–10.5)
CHLORIDE SERPL-SCNC: 105 MMOL/L — SIGNIFICANT CHANGE UP (ref 96–108)
CO2 SERPL-SCNC: 29 MMOL/L — SIGNIFICANT CHANGE UP (ref 22–31)
CREAT SERPL-MCNC: 1.2 MG/DL — SIGNIFICANT CHANGE UP (ref 0.5–1.3)
EGFR: 60 ML/MIN/1.73M2 — SIGNIFICANT CHANGE UP
EOSINOPHIL # BLD AUTO: 0.11 K/UL — SIGNIFICANT CHANGE UP (ref 0–0.5)
EOSINOPHIL NFR BLD AUTO: 1.4 % — SIGNIFICANT CHANGE UP (ref 0–6)
GLUCOSE SERPL-MCNC: 94 MG/DL — SIGNIFICANT CHANGE UP (ref 70–99)
HCT VFR BLD CALC: 38.1 % — LOW (ref 39–50)
HGB BLD-MCNC: 12.4 G/DL — LOW (ref 13–17)
IMM GRANULOCYTES NFR BLD AUTO: 0.4 % — SIGNIFICANT CHANGE UP (ref 0–0.9)
LYMPHOCYTES # BLD AUTO: 1.13 K/UL — SIGNIFICANT CHANGE UP (ref 1–3.3)
LYMPHOCYTES # BLD AUTO: 14.5 % — SIGNIFICANT CHANGE UP (ref 13–44)
MCHC RBC-ENTMCNC: 30.9 PG — SIGNIFICANT CHANGE UP (ref 27–34)
MCHC RBC-ENTMCNC: 32.5 GM/DL — SIGNIFICANT CHANGE UP (ref 32–36)
MCV RBC AUTO: 95 FL — SIGNIFICANT CHANGE UP (ref 80–100)
MONOCYTES # BLD AUTO: 0.8 K/UL — SIGNIFICANT CHANGE UP (ref 0–0.9)
MONOCYTES NFR BLD AUTO: 10.2 % — SIGNIFICANT CHANGE UP (ref 2–14)
NEUTROPHILS # BLD AUTO: 5.72 K/UL — SIGNIFICANT CHANGE UP (ref 1.8–7.4)
NEUTROPHILS NFR BLD AUTO: 73.2 % — SIGNIFICANT CHANGE UP (ref 43–77)
NRBC # BLD: 0 /100 WBCS — SIGNIFICANT CHANGE UP (ref 0–0)
PLATELET # BLD AUTO: 168 K/UL — SIGNIFICANT CHANGE UP (ref 150–400)
POTASSIUM SERPL-MCNC: 4 MMOL/L — SIGNIFICANT CHANGE UP (ref 3.5–5.3)
POTASSIUM SERPL-SCNC: 4 MMOL/L — SIGNIFICANT CHANGE UP (ref 3.5–5.3)
RBC # BLD: 4.01 M/UL — LOW (ref 4.2–5.8)
RBC # FLD: 12.9 % — SIGNIFICANT CHANGE UP (ref 10.3–14.5)
SODIUM SERPL-SCNC: 140 MMOL/L — SIGNIFICANT CHANGE UP (ref 135–145)
WBC # BLD: 7.81 K/UL — SIGNIFICANT CHANGE UP (ref 3.8–10.5)
WBC # FLD AUTO: 7.81 K/UL — SIGNIFICANT CHANGE UP (ref 3.8–10.5)

## 2022-09-30 PROCEDURE — 99232 SBSQ HOSP IP/OBS MODERATE 35: CPT

## 2022-09-30 RX ORDER — DONEPEZIL HYDROCHLORIDE 10 MG/1
5 TABLET, FILM COATED ORAL AT BEDTIME
Refills: 0 | Status: DISCONTINUED | OUTPATIENT
Start: 2022-09-30 | End: 2022-10-01

## 2022-09-30 RX ADMIN — ENOXAPARIN SODIUM 40 MILLIGRAM(S): 100 INJECTION SUBCUTANEOUS at 05:39

## 2022-09-30 RX ADMIN — DONEPEZIL HYDROCHLORIDE 5 MILLIGRAM(S): 10 TABLET, FILM COATED ORAL at 22:44

## 2022-09-30 RX ADMIN — PANTOPRAZOLE SODIUM 40 MILLIGRAM(S): 20 TABLET, DELAYED RELEASE ORAL at 05:41

## 2022-09-30 RX ADMIN — Medication 81 MILLIGRAM(S): at 13:16

## 2022-09-30 RX ADMIN — RIVASTIGMINE 1 PATCH: 4.6 PATCH, EXTENDED RELEASE TRANSDERMAL at 13:16

## 2022-09-30 RX ADMIN — Medication 25 MILLIGRAM(S): at 05:39

## 2022-09-30 RX ADMIN — RIVASTIGMINE 1 PATCH: 4.6 PATCH, EXTENDED RELEASE TRANSDERMAL at 17:41

## 2022-09-30 RX ADMIN — Medication 25 MILLIGRAM(S): at 17:37

## 2022-09-30 NOTE — PHYSICAL THERAPY INITIAL EVALUATION ADULT - ADDITIONAL COMMENTS
pt is a poor historian, info per previous chart. pt lives w/spouse in private house, 3 briseida, 1 flight to br. pt uses RW, also has sac

## 2022-09-30 NOTE — PROGRESS NOTE ADULT - ASSESSMENT
86M hx of dementia, remote prostate ca s/p RT, chronic gait d/o with frequent multiple falls, PAF on asa (though has not been taking it) pw s/p mechanical fall yesterday with pelvic fx.    #Pelvic fx  -Pain control  -WBAT  -Ortho consulted  -PT eval  -DVT ppx    #PAF  -High risk for full dose A/C due to frequent falls  -c/w ASA (although suboptimal from a stroke reduction standpoint)  -c/w lopressor     #Dementia  -c/w Donepezil   -c/w Rivastigmine patch     DVT ppx: Lovenox    Wife Loren [phone] updated. All questions answered

## 2022-09-30 NOTE — PHYSICAL THERAPY INITIAL EVALUATION ADULT - PERTINENT HX OF CURRENT PROBLEM, REHAB EVAL
86M hx of dementia, remote prostate ca s/p RT, chronic gait d/o with frequent multiple falls, PAF on asa (though has not been taking it) pw s/p mechanical fall yesterday. Reportedly pt was walking with his PT outside without his walker and had a slip and fall. No LOC and was able to walk home. But since then has been unable to ambulate sec to R hip pain

## 2022-10-01 ENCOUNTER — TRANSCRIPTION ENCOUNTER (OUTPATIENT)
Age: 87
End: 2022-10-01

## 2022-10-01 VITALS
TEMPERATURE: 98 F | SYSTOLIC BLOOD PRESSURE: 126 MMHG | DIASTOLIC BLOOD PRESSURE: 71 MMHG | OXYGEN SATURATION: 95 % | HEART RATE: 94 BPM | RESPIRATION RATE: 18 BRPM

## 2022-10-01 PROCEDURE — 99239 HOSP IP/OBS DSCHRG MGMT >30: CPT

## 2022-10-01 PROCEDURE — 99222 1ST HOSP IP/OBS MODERATE 55: CPT

## 2022-10-01 RX ORDER — ASPIRIN/CALCIUM CARB/MAGNESIUM 324 MG
1 TABLET ORAL
Qty: 0 | Refills: 0 | DISCHARGE

## 2022-10-01 RX ORDER — ASPIRIN/CALCIUM CARB/MAGNESIUM 324 MG
1 TABLET ORAL
Qty: 0 | Refills: 0 | DISCHARGE
Start: 2022-10-01

## 2022-10-01 RX ORDER — ACETAMINOPHEN 500 MG
2 TABLET ORAL
Qty: 0 | Refills: 0 | DISCHARGE
Start: 2022-10-01

## 2022-10-01 RX ORDER — QUETIAPINE FUMARATE 200 MG/1
12.5 TABLET, FILM COATED ORAL ONCE
Refills: 0 | Status: COMPLETED | OUTPATIENT
Start: 2022-10-01 | End: 2022-10-01

## 2022-10-01 RX ORDER — ASPIRIN/CALCIUM CARB/MAGNESIUM 324 MG
81 TABLET ORAL
Refills: 0 | Status: DISCONTINUED | OUTPATIENT
Start: 2022-10-01 | End: 2022-10-01

## 2022-10-01 RX ADMIN — Medication 81 MILLIGRAM(S): at 11:26

## 2022-10-01 RX ADMIN — Medication 25 MILLIGRAM(S): at 06:35

## 2022-10-01 RX ADMIN — RIVASTIGMINE 1 PATCH: 4.6 PATCH, EXTENDED RELEASE TRANSDERMAL at 11:26

## 2022-10-01 RX ADMIN — ENOXAPARIN SODIUM 40 MILLIGRAM(S): 100 INJECTION SUBCUTANEOUS at 06:35

## 2022-10-01 RX ADMIN — QUETIAPINE FUMARATE 12.5 MILLIGRAM(S): 200 TABLET, FILM COATED ORAL at 00:54

## 2022-10-01 RX ADMIN — RIVASTIGMINE 1 PATCH: 4.6 PATCH, EXTENDED RELEASE TRANSDERMAL at 09:00

## 2022-10-01 RX ADMIN — RIVASTIGMINE 1 PATCH: 4.6 PATCH, EXTENDED RELEASE TRANSDERMAL at 06:32

## 2022-10-01 NOTE — DISCHARGE NOTE NURSING/CASE MANAGEMENT/SOCIAL WORK - NSDCPEFALRISK_GEN_ALL_CORE
For information on Fall & Injury Prevention, visit: [email or web address] OR  [email or web address] OR  https://www.cdc.gov/steadi/patient.html

## 2022-10-01 NOTE — CONSULT NOTE ADULT - ASSESSMENT
Right pubic ramus fractures    No ortho intervention  WBAT with PT  Pain management  DVT prophylaxis  F/u as outpatient PRN

## 2022-10-01 NOTE — PROGRESS NOTE ADULT - SUBJECTIVE AND OBJECTIVE BOX
Patient is a 86y old  Male who presents with a chief complaint of pelvic fx (29 Sep 2022 20:20)      Patient seen and examined at bedside. No overnight events reported.     ALLERGIES:  lactose (Diarrhea)  No Known Drug Allergies  shellfish (Other)    MEDICATIONS  (STANDING):  aspirin enteric coated 81 milliGRAM(s) Oral daily  donepezil 5 milliGRAM(s) Oral at bedtime  enoxaparin Injectable 40 milliGRAM(s) SubCutaneous every 24 hours  metoprolol tartrate 25 milliGRAM(s) Oral two times a day  rivastigmine patch  4.6 mG/24 Hr(s). 1 Patch Transdermal every 24 hours    MEDICATIONS  (PRN):  acetaminophen     Tablet .. 650 milliGRAM(s) Oral every 6 hours PRN Temp greater or equal to 38C (100.4F), Mild Pain (1 - 3)    Vital Signs Last 24 Hrs  T(F): 98.1 (30 Sep 2022 08:00), Max: 98.1 (30 Sep 2022 08:00)  HR: 59 (30 Sep 2022 08:00) (57 - 70)  BP: 160/70 (30 Sep 2022 08:00) (125/80 - 160/70)  RR: 18 (30 Sep 2022 08:00) (15 - 18)  SpO2: 97% (30 Sep 2022 08:00) (96% - 97%)  I&O's Summary    PHYSICAL EXAM:  General: NAD, A/O x 2-3  ENT: No gross hearing impairment, Moist mucous membranes, no thrush  Neck: Supple, No JVD  Lungs: Clear to auscultation bilaterally, good air entry, non-labored breathing  Cardio: RRR, S1/S2, No murmur  Abdomen: Soft, Nontender, Nondistended; Bowel sounds present  Extremities: No calf tenderness, No cyanosis, No pitting edema  Psych: Appropriate mood and affect  SKin: +tenting    LABS:                        12.4   7.81  )-----------( 168      ( 30 Sep 2022 05:45 )             38.1     09-30    140  |  105  |  19  ----------------------------<  94  4.0   |  29  |  1.20    Ca    9.5      30 Sep 2022 05:45    TPro  6.7  /  Alb  3.5  /  TBili  0.6  /  DBili  x   /  AST  30  /  ALT  22  /  AlkPhos  78  09-29                                          COVID-19 PCR: NotDetec (09-29-22 @ 18:40)    RADIOLOGY & ADDITIONAL TESTS:    Care Discussed with Consultants/Other Providers:   
Patient is a 86y old  Male who presents with a chief complaint of pelvic fx (30 Sep 2022 14:25)      Patient seen and examined at bedside. Overnight, patient was sundowning however how appears calm although wants to be left alone. No acute complaints at this time.    ALLERGIES:  lactose (Diarrhea)  No Known Drug Allergies  shellfish (Other)    MEDICATIONS  (STANDING):  aspirin enteric coated 81 milliGRAM(s) Oral daily  donepezil 5 milliGRAM(s) Oral at bedtime  enoxaparin Injectable 40 milliGRAM(s) SubCutaneous every 24 hours  metoprolol tartrate 25 milliGRAM(s) Oral two times a day  rivastigmine patch  4.6 mG/24 Hr(s). 1 Patch Transdermal every 24 hours    MEDICATIONS  (PRN):  acetaminophen     Tablet .. 650 milliGRAM(s) Oral every 6 hours PRN Temp greater or equal to 38C (100.4F), Mild Pain (1 - 3)    Vital Signs Last 24 Hrs  T(F): 98.4 (01 Oct 2022 08:00), Max: 98.4 (01 Oct 2022 08:00)  HR: 85 (01 Oct 2022 08:00) (71 - 89)  BP: 143/72 (01 Oct 2022 08:00) (124/80 - 148/80)  RR: 18 (01 Oct 2022 08:00) (17 - 18)  SpO2: 95% (01 Oct 2022 08:00) (95% - 98%)  I&O's Summary      PHYSICAL EXAM:  General: NAD, A/O x 1 (knows name, thinks at home)  ENT: No gross hearing impairment, Moist mucous membranes, no thrush  Neck: Supple, No JVD  Lungs: Clear to auscultation bilaterally, good air entry, non-labored breathing  Cardio: RRR, S1/S2, No murmur  Abdomen: Soft, Nontender, Nondistended; Bowel sounds present  Extremities: No calf tenderness, No cyanosis, No pitting edema  Psych: Appropriate mood and affect    LABS:                        12.4   7.81  )-----------( 168      ( 30 Sep 2022 05:45 )             38.1     09-30    140  |  105  |  19  ----------------------------<  94  4.0   |  29  |  1.20    Ca    9.5      30 Sep 2022 05:45    TPro  6.7  /  Alb  3.5  /  TBili  0.6  /  DBili  x   /  AST  30  /  ALT  22  /  AlkPhos  78  09-29              COVID-19 PCR: Ирина (09-29-22 @ 18:40)      RADIOLOGY & ADDITIONAL TESTS:    Care Discussed with Consultants/Other Providers:

## 2022-10-01 NOTE — DISCHARGE NOTE NURSING/CASE MANAGEMENT/SOCIAL WORK - PATIENT PORTAL LINK FT
You can access the FollowMyHealth Patient Portal offered by [facility] by registering at the following website: [email or web address]. By joining [employer]’s FollowMyHealth portal, you will also be able to view your health information using other applications (apps) compatible with our system.

## 2022-10-01 NOTE — DISCHARGE NOTE PROVIDER - CARE PROVIDER_API CALL
Madi Powell)  Family Medicine; Geriatric Medicine  [address] [place] [address]  Phone: [phone]  Fax: [phone]  Follow Up Time:

## 2022-10-01 NOTE — CONSULT NOTE ADULT - SUBJECTIVE AND OBJECTIVE BOX
SASKIA CROCKETT      86y Male with hx of dementia, remote prostate ca s/p RT, chronic gait d/o with frequent multiple falls, PAF on asa (though has not been taking it) pw s/p mechanical fall on 9/28. Reportedly pt was walking with his PT outside without his walker and had a slip and fall. No LOC and was able to walk home. But since then has been unable to ambulate sec to R hip pain. Denied any recent fevers, chills, cough, SOB, CP, NVD, dysuria. Pt poor historian. Hx obtained from wife Loren. In ED afebrile P: 57 BP: 125/80 sat 96% on RA. WBC: 9.7 cr: 1.2 CT head: unchanged from previous. CT pelvis with R sup and inf pubic rami fx                                                                                                                               PAST MEDICAL HISTORY:  Dementia     Dementia     Lactose intolerance in adult     Osteoarthritis     Scoliosis     Shellfish allergy     Vitamin D deficiency.     PAST SURGICAL HISTORY:  History of left hip hemiarthroplasty    History of tonsillectomy in childhood    S/P colonoscopy.                                               T(F): 98.4  HR: 85  BP: 143/72  RR: 18  SpO2: 95%                        12.4   7.81  )-----------( 168      ( 30 Sep 2022 05:45 )             38.1                     09-30    140  |  105  |  19  ----------------------------<  94  4.0   |  29  |  1.20    Ca    9.5      30 Sep 2022 05:45    TPro  6.7  /  Alb  3.5  /  TBili  0.6  /  DBili  x   /  AST  30  /  ALT  22  /  AlkPhos  78  09-29      Physical Exam :    -   Right hip with skin C/D/I, no gross deformity, groin pain with ROM.   -   Distal Neurvascular status intact grossly.   -   Warm well perfused; capillary refill <3 seconds   -   (+)EHL/FHL 5/5  -   (+) Sensation to light touch  -   (-) Calf tenderness Bilaterally    ACC: [id number] EXAM: CT 3D RECONSTRUCT ANN MARIE GOLDBERG  ACC: [id number] EXAM: CT PELVIS BONY ONLY    PROCEDURE DATE: 09/29/2022        INTERPRETATION: INDICATION: Fall    TECHNIQUE: CT of the pelvis without contrast with axial, sagittal, and coronal multiplanar reformats. 3D advanced post-processing was performed.    Comparison:Comparison made to hip radiographs dated 9/28/2022    FINDINGS:    Acute nondisplaced oblique fracture of the right inferior pubic ramus. Comminuted mildly displaced fracture of the right pubic body and superior pubic ramus.    Status post left hip hemiarthroplasty. Streak artifact from the arthroplasty limits evaluation in this region.    Severe right hip osteoarthritis. Degenerative changes within the lower lumbar spine, sacroiliac joints and pubic symphysis.    Colonic diverticulosis. Small fluid noted within the left inguinal canal. Ectatic bilateral common iliac arteries, measuring up to 1.5 cm. Atherosclerotic calcifications are noted.    IMPRESSION:    Acute nondisplaced fracture of the right inferior pubic ramus.    Comminuted mildly displaced fracture of the right pubic body and right superior pubic ramus.    Other findings as above.    --- End of Report ---            OMAR DEAL M.D., ATTENDING RADIOLOGIST  This document has been electronically signed. Sep 29 2022 6:10PM

## 2022-10-01 NOTE — DISCHARGE NOTE PROVIDER - NSDCCPCAREPLAN_GEN_ALL_CORE_FT
PRINCIPAL DISCHARGE DIAGNOSIS  Diagnosis: Pelvic fracture  Assessment and Plan of Treatment: You were admitted for fall  You were diagnosed with pubic rami fracture  You were treated with aspirin 81mg BID  You were prescribed the following new medications: aspirin 81mg BID  You will need to follow up with your primary care physician.       PRINCIPAL DISCHARGE DIAGNOSIS  Diagnosis: Pelvic fracture  Assessment and Plan of Treatment: You were admitted for fall  You were diagnosed with pubic rami fracture  You were treated with aspirin 81mg BID  You were prescribed the following new medications: aspirin 81mg BID until 12/11 then aspirin 81mg daily  You will need to follow up with your primary care physician.

## 2022-10-01 NOTE — PROGRESS NOTE ADULT - ASSESSMENT
86M hx of dementia, remote prostate ca s/p RT, chronic gait d/o with frequent multiple falls, PAF on asa (though has not been taking it) pw s/p mechanical fall yesterday with pelvic fx.    #Pelvic fx  - Pain control  - WBAT  - Ortho consulted  - PT eval- recommend [facility]  - DVT ppx with lovenox    #PAF  - High risk for full dose A/C due to frequent falls  - c/w ASA (although suboptimal from a stroke reduction standpoint)  - c/w lopressor     #Dementia  - c/w Donepezil   - c/w Rivastigmine patch     #DVT ppx  - Lovenox    Wife Loren called, voicemail left  Will follow up with CM regarding choices, medically stable for discharge 86M hx of dementia, remote prostate ca s/p RT, chronic gait d/o with frequent multiple falls, PAF on asa (though has not been taking it) pw s/p mechanical fall yesterday with pelvic fx.    #Pelvic fx  - Pain control  - WBAT  - Ortho consulted  - PT eval- recommend [facility]  - DVT ppx with lovenox and asa 81mg BID    #PAF  - High risk for full dose A/C due to frequent falls  - c/w ASA (although suboptimal from a stroke reduction standpoint)  - c/w lopressor     #Dementia  - c/w Donepezil   - c/w Rivastigmine patch     #DVT ppx  - Lovenox    Wife Loren called, voicemail left  Spoke to daughter and HCP Lola, updated on clinical status and plan for dc today to [facility]. All questions answered [phone]

## 2022-10-01 NOTE — DISCHARGE NOTE PROVIDER - NSDCMRMEDTOKEN_GEN_ALL_CORE_FT
acetaminophen 325 mg oral tablet: 2 tab(s) orally every 6 hours, As needed, Temp greater or equal to 38C (100.4F), Mild Pain (1 - 3)  aspirin 81 mg oral delayed release tablet: 1 tab(s) orally 2 times a day  donepezil 5 mg oral tablet: 1 tab(s) orally once a day (at bedtime)  metoprolol tartrate 25 mg oral tablet: 1 tab(s) orally 2 times a day  rivastigmine 4.6 mg/24 hr transdermal film, extended release: 1 patch transdermal once a day

## 2022-10-01 NOTE — DISCHARGE NOTE PROVIDER - NSDCFUSCHEDAPPT_GEN_ALL_CORE_FT
Tanner Burt  [facility]  NEUROLOGY [address]  Scheduled Appointment: 11/03/2022    Madi Powell  [facility]  FAMILYMED [address]  Scheduled Appointment: 11/10/2022    Allan Zarate  [facility]  CARDIOLOGY [address]  Scheduled Appointment: 12/19/2022

## 2022-10-01 NOTE — DISCHARGE NOTE PROVIDER - HOSPITAL COURSE
Hospital Course  HPI:  86M hx of dementia, remote prostate ca s/p RT, chronic gait d/o with frequent multiple falls, PAF on asa (though has not been taking it) pw s/p mechanical fall yesterday. Reportedly pt was walking with his PT outside without his walker and had a slip and fall. No LOC and was able to walk home. But since then has been unable to ambulate sec to R hip pain. Denied any recent fevers, chills, cough, SOB, CP, NVD, dysuria. Pt poor historian. Hx obtained from wife Loren. In ED afebrile P: 57 BP: 125/80 sat 96% on RA. WBC: 9.7 cr: 1.2 CT head: unchanged from previous. CT pelvis with R sup and inf pubic rami fx (29 Sep 2022 20:20)    Patient admitted for fall, found to have pubic fracture. Patient was started on asa 81mg BID and seen by PT who recommended [facility]. Patient medically stable for discharge to [facility]    You were admitted for fall  You were diagnosed with pubic rami fracture  You were treated with aspirin 81mg BID  You were prescribed the following new medications: aspirin 81mg BID    You will need to follow up with your primary care physician.    Discharging Provider:  Mildred Vital D.O.  Contact Info: Cell [phone] - Please call with any questions or concerns.    Outpatient Provider: Dr. Madi Almanza (informed)   Hospital Course  HPI:  86M hx of dementia, remote prostate ca s/p RT, chronic gait d/o with frequent multiple falls, PAF on asa (though has not been taking it) pw s/p mechanical fall yesterday. Reportedly pt was walking with his PT outside without his walker and had a slip and fall. No LOC and was able to walk home. But since then has been unable to ambulate sec to R hip pain. Denied any recent fevers, chills, cough, SOB, CP, NVD, dysuria. Pt poor historian. Hx obtained from wife Loren. In ED afebrile P: 57 BP: 125/80 sat 96% on RA. WBC: 9.7 cr: 1.2 CT head: unchanged from previous. CT pelvis with R sup and inf pubic rami fx (29 Sep 2022 20:20)    Patient admitted for fall, found to have pubic fracture. Patient was started on asa 81mg BID and seen by PT who recommended [facility]. Patient medically stable for discharge to [facility]    You were admitted for fall  You were diagnosed with pubic rami fracture  You were treated with aspirin 81mg BID  You were prescribed the following new medications: aspirin 81mg BID until 12/11 then aspirin 81mg daily    You will need to follow up with your primary care physician.    Discharging Provider:  Mildred Vital D.O.  Contact Info: Cell [phone] - Please call with any questions or concerns.    Outpatient Provider: Dr. Madi Almanza (informed)

## 2022-10-06 NOTE — PHYSICAL THERAPY INITIAL EVALUATION ADULT - WEIGHT-BEARING RESTRICTIONS: GAIT, REHAB EVAL
Left LE/weight-bearing as tolerated Xolair Pregnancy And Lactation Text: This medication is Pregnancy Category B and is considered safe during pregnancy. This medication is excreted in breast milk.

## 2022-10-13 ENCOUNTER — RX RENEWAL (OUTPATIENT)
Age: 87
End: 2022-10-13

## 2022-10-13 RX ORDER — METOPROLOL TARTRATE 25 MG/1
25 TABLET, FILM COATED ORAL TWICE DAILY
Qty: 180 | Refills: 1 | Status: ACTIVE | COMMUNITY
Start: 2022-10-13 | End: 1900-01-01

## 2022-10-20 PROCEDURE — 80048 BASIC METABOLIC PNL TOTAL CA: CPT

## 2022-10-20 PROCEDURE — 70450 CT HEAD/BRAIN W/O DYE: CPT | Mod: MA

## 2022-10-20 PROCEDURE — 72192 CT PELVIS W/O DYE: CPT | Mod: MA

## 2022-10-20 PROCEDURE — 97162 PT EVAL MOD COMPLEX 30 MIN: CPT

## 2022-10-20 PROCEDURE — 36415 COLL VENOUS BLD VENIPUNCTURE: CPT

## 2022-10-20 PROCEDURE — 71045 X-RAY EXAM CHEST 1 VIEW: CPT

## 2022-10-20 PROCEDURE — 85025 COMPLETE CBC W/AUTO DIFF WBC: CPT

## 2022-10-20 PROCEDURE — 76376 3D RENDER W/INTRP POSTPROCES: CPT

## 2022-10-20 PROCEDURE — 80053 COMPREHEN METABOLIC PANEL: CPT

## 2022-10-20 PROCEDURE — 93005 ELECTROCARDIOGRAM TRACING: CPT

## 2022-10-20 PROCEDURE — 99285 EMERGENCY DEPT VISIT HI MDM: CPT

## 2022-10-20 PROCEDURE — 87635 SARS-COV-2 COVID-19 AMP PRB: CPT

## 2022-10-26 ENCOUNTER — TRANSCRIPTION ENCOUNTER (OUTPATIENT)
Age: 87
End: 2022-10-26

## 2022-10-31 ENCOUNTER — TRANSCRIPTION ENCOUNTER (OUTPATIENT)
Age: 87
End: 2022-10-31

## 2022-11-02 ENCOUNTER — NON-APPOINTMENT (OUTPATIENT)
Age: 87
End: 2022-11-02

## 2022-11-03 ENCOUNTER — APPOINTMENT (OUTPATIENT)
Dept: NEUROLOGY | Facility: CLINIC | Age: 87
End: 2022-11-03

## 2022-11-10 ENCOUNTER — APPOINTMENT (OUTPATIENT)
Dept: FAMILY MEDICINE | Facility: CLINIC | Age: 87
End: 2022-11-10

## 2022-11-15 ENCOUNTER — RX RENEWAL (OUTPATIENT)
Age: 87
End: 2022-11-15

## 2022-11-21 ENCOUNTER — RX RENEWAL (OUTPATIENT)
Age: 87
End: 2022-11-21

## 2022-11-27 NOTE — ED ADULT NURSE NOTE - OBJECTIVE STATEMENT
Problem: Communication Impairment (Mechanical Ventilation, Invasive)  Goal: Effective Communication  Outcome: Ongoing, Progressing     Problem: Device-Related Complication Risk (Mechanical Ventilation, Invasive)  Goal: Optimal Device Function  Outcome: Ongoing, Progressing     Problem: Inability to Wean (Mechanical Ventilation, Invasive)  Goal: Mechanical Ventilation Liberation  Outcome: Ongoing, Progressing     Problem: Nutrition Impairment (Mechanical Ventilation, Invasive)  Goal: Optimal Nutrition Delivery  Outcome: Ongoing, Progressing     Problem: Skin and Tissue Injury (Mechanical Ventilation, Invasive)  Goal: Absence of Device-Related Skin and Tissue Injury  Outcome: Ongoing, Progressing     Problem: Ventilator-Induced Lung Injury (Mechanical Ventilation, Invasive)  Goal: Absence of Ventilator-Induced Lung Injury  Outcome: Ongoing, Progressing      Assumed pt care for a 86 yr old male alert and oriented to self and unit brought in by EMS for hip pain. Pt denies any pain at this time. As per EMS pt was brought from MD office for further evaluation of right hip secondary to a fall at home. Pt denies any LOC. Awaiting further disposition.

## 2022-12-19 ENCOUNTER — APPOINTMENT (OUTPATIENT)
Dept: CARDIOLOGY | Facility: CLINIC | Age: 87
End: 2022-12-19

## 2022-12-19 ENCOUNTER — NON-APPOINTMENT (OUTPATIENT)
Age: 87
End: 2022-12-19

## 2022-12-19 VITALS
TEMPERATURE: 97.3 F | DIASTOLIC BLOOD PRESSURE: 66 MMHG | HEIGHT: 70 IN | OXYGEN SATURATION: 97 % | RESPIRATION RATE: 16 BRPM | HEART RATE: 61 BPM | SYSTOLIC BLOOD PRESSURE: 140 MMHG

## 2022-12-19 DIAGNOSIS — I48.0 PAROXYSMAL ATRIAL FIBRILLATION: ICD-10-CM

## 2022-12-19 PROCEDURE — 93000 ELECTROCARDIOGRAM COMPLETE: CPT

## 2022-12-19 PROCEDURE — 99215 OFFICE O/P EST HI 40 MIN: CPT

## 2022-12-19 NOTE — REASON FOR VISIT
[FreeTextEntry1] : Cardiology follow-up visit for evaluation management of paroxysmal atrial fibrillation.\par \par

## 2022-12-19 NOTE — HISTORY OF PRESENT ILLNESS
[FreeTextEntry1] : Since his last visit with me, he had COVID pneumonia, was hospitalized and then spent several weeks at geriatric rehab.\par Accompanied today to the office by his wife and by his aide.\par Both report that he has been home for a few weeks.\par He is becoming more more active, does physical therapy and aide does exercises with him on a daily basis.\par He still needs help with ambulation.\par No reports of chest pain or chest pressure.  No shortness of breath.  No dizziness.  No syncope.  No edema.  No orthopnea.\par Continues to have memory decline.  Appetite has been fair.\par

## 2022-12-19 NOTE — DISCUSSION/SUMMARY
[FreeTextEntry1] : \par 86-year-old man with paroxysmal atrial fibrillation... currently in sinus rhythm. \par Status post recent hospitalization and geriatric rehab stay for COVID-pneumonia.\par Post discharge he has been asymptomatic with respect to cardiac issues.  Reportedly has been more active than he had been in the recent past.\par Blood pressure stable on today's examination.  There is no JVD.  Lung fields are clear.  No edema.\par EKG is sinus rhythm.  APCs.\par Current cardiac status appears to be at his baseline.\par \par Plan\par 1.  Continue with low-dose metoprolol.\par 2.  As noted earlier, he is at high risk for anticoagulation, and decision has been made previously to not anticoagulate as the risk would outweigh the potential benefits.\par 3.  He will continue follow-up with primary care physician's office.\par 4.  He will follow-up with me in the office in 6 months, echocardiogram.\par 5.  The above was reviewed with the patient and with his aide.  Detailed message regarding above also left on voice mail for his daughter Brenda \par \par

## 2022-12-19 NOTE — CARDIOLOGY SUMMARY
[de-identified] : Dec 19, 2022. Sinus  Rhythm   - frequent PAC s \par # PACs = 2.\par WITHIN NORMAL LIMITS [de-identified] : May 13, 2022. @ [facility]. Normal LV function. Stage 1 diastolic dysfunction

## 2022-12-19 NOTE — PHYSICAL EXAM
[No Acute Distress] : no acute distress [Frail] : frail [Normal] : normal venous pressure, no carotid bruit [Normal S1, S2] : normal S1, S2 [No Rub] : no rub [No Gallop] : no gallop [Murmur] : murmur [de-identified] : elderly man  [de-identified] : ll/Vl systolic

## 2022-12-21 ENCOUNTER — TRANSCRIPTION ENCOUNTER (OUTPATIENT)
Age: 87
End: 2022-12-21

## 2022-12-28 ENCOUNTER — APPOINTMENT (OUTPATIENT)
Dept: GERIATRICS | Facility: CLINIC | Age: 87
End: 2022-12-28
Payer: MEDICARE

## 2022-12-28 VITALS
OXYGEN SATURATION: 95 % | DIASTOLIC BLOOD PRESSURE: 60 MMHG | HEIGHT: 70 IN | TEMPERATURE: 97.8 F | HEART RATE: 74 BPM | SYSTOLIC BLOOD PRESSURE: 140 MMHG | RESPIRATION RATE: 12 BRPM

## 2022-12-28 DIAGNOSIS — L29.9 PRURITUS, UNSPECIFIED: ICD-10-CM

## 2022-12-28 PROCEDURE — 99214 OFFICE O/P EST MOD 30 MIN: CPT

## 2022-12-28 RX ORDER — DONEPEZIL HYDROCHLORIDE 5 MG/1
5 TABLET ORAL
Qty: 90 | Refills: 0 | Status: DISCONTINUED | COMMUNITY
Start: 2021-05-28 | End: 2022-12-28

## 2022-12-28 NOTE — ASSESSMENT
[FreeTextEntry1] : urinary incontinence:  suspect functional related as only problematic at night\par discussed continuing with daytime help with bathroom visits\par nighttime:  since patient only has wife to help during night: recommend using Urinal bedside\par continue with protective underpads and diapers as well\par curb nighttime fluid intake\par \par impaired mobility and ADL's\par c/w supportive care\par wheelchair\par fall prevention\par daily exercise\par c/w HHA \par \par Dementia:\par c/w  supportive care, assistance with IADL's and ADL's\par clarify which medication he is taking- spoke with dtr: verified patient is taking rivastigmine patch\par ?GI adverse effects with aricept\par \par pruritus:\par Sarna antiitch lotion daily\par keep nails trimmed short to limit self harm with excoriations\par monitor for any signs of infection\par \par Afib- not on ac, remains on metoprolol at 1/2 tablet\par \par

## 2022-12-28 NOTE — REASON FOR VISIT
[Follow-Up] : a follow-up visit [FreeTextEntry1] : urinary incontinence, pelvic fracture post rehab discharge

## 2022-12-28 NOTE — PHYSICAL EXAM
[Normal] : alert, in no acute distress [Sclera] : the sclera and conjunctiva were normal [EOMI] : extraocular movements were intact [Normal Outer Ear/Nose] : the ears and nose were normal in appearance [Normal Appearance] : the appearance of the neck was normal [No Respiratory Distress] : no respiratory distress [No Acc Muscle Use] : no accessory muscle use [Respiration, Rhythm And Depth] : normal respiratory rhythm and effort [Auscultation Breath Sounds / Voice Sounds] : lungs were clear to auscultation bilaterally [Normal S1, S2] : normal S1 and S2 [Heart Rate And Rhythm] : heart rate was normal and rhythm regular [Edema] : edema was not present [Bowel Sounds] : normal bowel sounds [Abdomen Tenderness] : non-tender [Abdomen Soft] : soft [Involuntary Movements] : no involuntary movements were seen [No Focal Deficits] : no focal deficits [Normal Affect] : the affect was normal [Normal Mood] : the mood was normal [de-identified] : frail, wheelchair [de-identified] : wheelchair [de-identified] : excoriations over left lower leg from knee to ankle anteriorly without infection, also b/l tops of shoulders  [de-identified] : f

## 2022-12-28 NOTE — HISTORY OF PRESENT ILLNESS
[FreeTextEntry1] : 87yoM seen for follow up post rehab discharge november after pelvic fracture.\par \par seen with his HHA and wife.\par \par main complaint night time urinary incontinence.\par \par during day, he is making it to the bathroom most of the time with the help from his HHA.\par at night, more incontinence and soaking through all layers.\par they have tried limiting fluids prior to bed.\par he remains unsteady and is in wheelchair today\par he is exercising daily with the help of his HHA\par \par eating is very good\par BM's are normal\par \par needs help ADL's aside from feeding\par \par pruritis:  has been scratching his left lower leg and b/l shoulder regions with excoriations and bleeding\par \par

## 2022-12-28 NOTE — REVIEW OF SYSTEMS
[Loss Of Hearing] : hearing loss [As Noted in HPI] : as noted in HPI [Negative] : Gastrointestinal [Fever] : no fever [Lower Ext Edema] : no extremity edema

## 2022-12-30 ENCOUNTER — RX RENEWAL (OUTPATIENT)
Age: 87
End: 2022-12-30

## 2022-12-30 DIAGNOSIS — N18.30 CHRONIC KIDNEY DISEASE, STAGE 3 UNSPECIFIED: Chronic | ICD-10-CM

## 2023-01-18 ENCOUNTER — APPOINTMENT (OUTPATIENT)
Dept: GERIATRICS | Facility: CLINIC | Age: 88
End: 2023-01-18
Payer: MEDICARE

## 2023-01-18 VITALS
WEIGHT: 139 LBS | HEART RATE: 60 BPM | DIASTOLIC BLOOD PRESSURE: 60 MMHG | BODY MASS INDEX: 19.9 KG/M2 | RESPIRATION RATE: 16 BRPM | OXYGEN SATURATION: 96 % | HEIGHT: 70 IN | TEMPERATURE: 98.4 F | SYSTOLIC BLOOD PRESSURE: 146 MMHG

## 2023-01-18 DIAGNOSIS — R15.9 UNSPECIFIED URINARY INCONTINENCE: ICD-10-CM

## 2023-01-18 DIAGNOSIS — R32 UNSPECIFIED URINARY INCONTINENCE: ICD-10-CM

## 2023-01-18 PROCEDURE — 99214 OFFICE O/P EST MOD 30 MIN: CPT

## 2023-01-18 NOTE — ASSESSMENT
[FreeTextEntry1] : urinary incontinence: suspect functional related as only problematic at night\par discussed continuing with daytime help with bathroom visits\par nighttime: since patient only has wife to help during night: again recommend using Urinal bedside\par continue with protective underpads and diapers as well\par curb nighttime fluid intake\par continue with supportive care for fecal incontinence \par \par impaired mobility and ADL's\par c/w supportive care\par wheelchair- emphasized importance to use wheelchair for longer distances\par fall prevention\par daily exercise\par c/w HHA \par \par Dementia:\par c/w supportive care, assistance with IADL's and ADL's\par cw  rivastigmine patch\par ?GI adverse effects with aricept\par \par pruritus:\par Sarna antiitch lotion daily\par keep nails trimmed short to limit self harm with excoriations\par

## 2023-01-18 NOTE — PHYSICAL EXAM
[Alert] : alert [No Acute Distress] : in no acute distress [Sclera] : the sclera and conjunctiva were normal [EOMI] : extraocular movements were intact [Normal Outer Ear/Nose] : the ears and nose were normal in appearance [No Respiratory Distress] : no respiratory distress [No Acc Muscle Use] : no accessory muscle use [Respiration, Rhythm And Depth] : normal respiratory rhythm and effort [de-identified] : wheelchair from office, unsteady gait with poor balance [de-identified] : excoriations over left lower leg

## 2023-01-18 NOTE — HISTORY OF PRESENT ILLNESS
[FreeTextEntry1] : 87yoM seen for follow up - wife complains of ongoing night time fecal and urinary incontinence.\par \par seen with his HHA and wife.\par \par \par during day, he is making it to the bathroom most of the time with the help from his HHA.\par at night, more incontinence\par wife has been trying to make him wake up  to use bathroom in middle of night, usually also with small fecal incontinece found.\par \par continues with poor functional status after recent pelvic fracture\par working with PT twice weekly\par does walking with HHA daily\par \par needs help ADL's aside from feeding\par \par pruritis: has been scratching his left lower leg and b/l shoulder regions with excoriations and bleeding-  his nails are still sharp and too long\par \par

## 2023-01-26 DIAGNOSIS — L30.9 DERMATITIS, UNSPECIFIED: ICD-10-CM

## 2023-01-26 RX ORDER — TRIAMCINOLONE ACETONIDE 1 MG/G
0.1 CREAM TOPICAL
Qty: 1 | Refills: 0 | Status: ACTIVE | COMMUNITY
Start: 2023-01-26 | End: 1900-01-01

## 2023-02-07 ENCOUNTER — APPOINTMENT (OUTPATIENT)
Dept: UROLOGY | Facility: CLINIC | Age: 88
End: 2023-02-07
Payer: MEDICARE

## 2023-02-07 VITALS
WEIGHT: 140 LBS | OXYGEN SATURATION: 96 % | TEMPERATURE: 98.2 F | BODY MASS INDEX: 20.04 KG/M2 | HEIGHT: 70 IN | HEART RATE: 62 BPM | SYSTOLIC BLOOD PRESSURE: 124 MMHG | DIASTOLIC BLOOD PRESSURE: 80 MMHG

## 2023-02-07 DIAGNOSIS — R35.0 FREQUENCY OF MICTURITION: ICD-10-CM

## 2023-02-07 DIAGNOSIS — R32 UNSPECIFIED URINARY INCONTINENCE: ICD-10-CM

## 2023-02-07 DIAGNOSIS — K59.00 CONSTIPATION, UNSPECIFIED: ICD-10-CM

## 2023-02-07 PROCEDURE — 51798 US URINE CAPACITY MEASURE: CPT

## 2023-02-07 PROCEDURE — 99204 OFFICE O/P NEW MOD 45 MIN: CPT | Mod: 25

## 2023-02-07 RX ORDER — POLYETHYLENE GLYCOL 3350 17 G/17G
17 POWDER, FOR SOLUTION ORAL TWICE DAILY
Qty: 60 | Refills: 5 | Status: ACTIVE | COMMUNITY
Start: 2023-02-07 | End: 1900-01-01

## 2023-02-07 RX ORDER — TOLTERODINE TARTARATE 2 MG/1
2 TABLET ORAL AT BEDTIME
Qty: 30 | Refills: 6 | Status: ACTIVE | COMMUNITY
Start: 2023-02-07 | End: 1900-01-01

## 2023-02-07 RX ORDER — DOCUSATE SODIUM - SENNOSIDES 50; 8.6 MG/1; MG/1
8.6-5 TABLET, FILM COATED ORAL TWICE DAILY
Qty: 60 | Refills: 6 | Status: ACTIVE | COMMUNITY
Start: 2023-02-07 | End: 1900-01-01

## 2023-02-07 RX ORDER — TAMSULOSIN HYDROCHLORIDE 0.4 MG/1
0.4 CAPSULE ORAL
Qty: 30 | Refills: 6 | Status: ACTIVE | COMMUNITY
Start: 2023-02-07 | End: 1900-01-01

## 2023-02-07 RX ORDER — ASPIRIN 81 MG
81 TABLET, DELAYED RELEASE (ENTERIC COATED) ORAL
Refills: 0 | Status: DISCONTINUED | COMMUNITY
End: 2023-02-07

## 2023-02-08 ENCOUNTER — APPOINTMENT (OUTPATIENT)
Dept: DERMATOLOGY | Facility: CLINIC | Age: 88
End: 2023-02-08
Payer: MEDICARE

## 2023-02-08 DIAGNOSIS — L57.0 ACTINIC KERATOSIS: ICD-10-CM

## 2023-02-08 DIAGNOSIS — L81.4 OTHER MELANIN HYPERPIGMENTATION: ICD-10-CM

## 2023-02-08 DIAGNOSIS — L82.1 OTHER SEBORRHEIC KERATOSIS: ICD-10-CM

## 2023-02-08 DIAGNOSIS — D22.9 MELANOCYTIC NEVI, UNSPECIFIED: ICD-10-CM

## 2023-02-08 PROCEDURE — 99213 OFFICE O/P EST LOW 20 MIN: CPT | Mod: 25

## 2023-02-08 PROCEDURE — 17000 DESTRUCT PREMALG LESION: CPT

## 2023-02-08 NOTE — HISTORY OF PRESENT ILLNESS
[FreeTextEntry1] : Patient presents for skin examination. [de-identified] : Denies new, changing, bleeding or tender lesions on the skin over the past year.\par

## 2023-02-08 NOTE — PHYSICAL EXAM
[Alert] : alert [Oriented x 3] : ~L oriented x 3 [Well Nourished] : well nourished [Full Body Skin Exam Performed] : performed [FreeTextEntry3] : A full skin exam was performed including the scalp, face (including lips, ears, nose and eyes), neck, chest, abdomen, back, buttocks, upper extremities and lower extremities.  The patient declined examination of the genitalia.  \par The exam revealed the following benign growths:\par [place] pigmented nevi.\par Seborrheic keratoses.\par Lentigines.\par \par keratotic papule, nose.\par

## 2023-02-09 PROBLEM — R35.0 URINARY FREQUENCY: Status: ACTIVE | Noted: 2022-05-18

## 2023-02-09 PROBLEM — R32 URINARY INCONTINENCE: Status: ACTIVE | Noted: 2022-12-28

## 2023-02-09 LAB
APPEARANCE: CLEAR
BACTERIA UR CULT: NORMAL
BACTERIA: NEGATIVE
BILIRUBIN URINE: NEGATIVE
BLOOD URINE: NEGATIVE
COLOR: YELLOW
GLUCOSE QUALITATIVE U: NEGATIVE
HYALINE CASTS: 0 /LPF
KETONES URINE: NEGATIVE
LEUKOCYTE ESTERASE URINE: NEGATIVE
MICROSCOPIC-UA: NORMAL
NITRITE URINE: NEGATIVE
PH URINE: 7.5
PROTEIN URINE: NEGATIVE
RED BLOOD CELLS URINE: 0 /HPF
SPECIFIC GRAVITY URINE: 1.02
SQUAMOUS EPITHELIAL CELLS: 0 /HPF
UROBILINOGEN URINE: NORMAL
WHITE BLOOD CELLS URINE: 0 /HPF

## 2023-02-09 NOTE — ASSESSMENT
[FreeTextEntry1] : Mr. CROCKETT is a 87 year  White  M who comes today to clinic establish care. Referred by Lilian for nocturia and night time and urinary incontinence needing at least 2 diapers over night. Unsteady gait limiting trips to the toilet over night. Low PVRs. CT scan: Prostate 40cc, dilated rectum with high amount of stool

## 2023-02-09 NOTE — HISTORY OF PRESENT ILLNESS
[FreeTextEntry1] : Mr. CROCKETT is a 87 year  White  M who comes today to clinic establish care. Referred by Lilian for nocturia and night time and urinary incontinence needing at least 2 diapers over night. Unsteady gait limiting trips to the toilet over night, not so much during the day. Frequency during the day not as bad, virtually no incontinence during the day. \par PVR: 10cc\par CT scan: Prostate 40cc, dilated rectum with high amount of stool \par \par Denies fevers, chills, hematuria, flank pain.

## 2023-02-27 ENCOUNTER — INPATIENT (INPATIENT)
Facility: HOSPITAL | Age: 88
LOS: 1 days | Discharge: NURSING, REHAB OR LONG-TERM CARE | DRG: 194 | End: 2023-03-01
Attending: STUDENT IN AN ORGANIZED HEALTH CARE EDUCATION/TRAINING PROGRAM | Admitting: INTERNAL MEDICINE
Payer: MEDICARE

## 2023-02-27 VITALS
RESPIRATION RATE: 17 BRPM | OXYGEN SATURATION: 94 % | SYSTOLIC BLOOD PRESSURE: 150 MMHG | HEIGHT: 71 IN | WEIGHT: 164.91 LBS | HEART RATE: 81 BPM | TEMPERATURE: 97 F | DIASTOLIC BLOOD PRESSURE: 81 MMHG

## 2023-02-27 DIAGNOSIS — Z98.89 OTHER SPECIFIED POSTPROCEDURAL STATES: Chronic | ICD-10-CM

## 2023-02-27 DIAGNOSIS — Z98.890 OTHER SPECIFIED POSTPROCEDURAL STATES: Chronic | ICD-10-CM

## 2023-02-27 LAB
ALBUMIN SERPL ELPH-MCNC: 3.4 G/DL — SIGNIFICANT CHANGE UP (ref 3.3–5)
ALP SERPL-CCNC: 89 U/L — SIGNIFICANT CHANGE UP (ref 40–120)
ALT FLD-CCNC: 17 U/L — SIGNIFICANT CHANGE UP (ref 10–45)
ANION GAP SERPL CALC-SCNC: 8 MMOL/L — SIGNIFICANT CHANGE UP (ref 5–17)
AST SERPL-CCNC: 30 U/L — SIGNIFICANT CHANGE UP (ref 10–40)
BASOPHILS # BLD AUTO: 0.02 K/UL — SIGNIFICANT CHANGE UP (ref 0–0.2)
BASOPHILS NFR BLD AUTO: 0.2 % — SIGNIFICANT CHANGE UP (ref 0–2)
BILIRUB SERPL-MCNC: 0.5 MG/DL — SIGNIFICANT CHANGE UP (ref 0.2–1.2)
BUN SERPL-MCNC: 22 MG/DL — SIGNIFICANT CHANGE UP (ref 7–23)
CALCIUM SERPL-MCNC: 9.7 MG/DL — SIGNIFICANT CHANGE UP (ref 8.4–10.5)
CHLORIDE SERPL-SCNC: 103 MMOL/L — SIGNIFICANT CHANGE UP (ref 96–108)
CO2 SERPL-SCNC: 27 MMOL/L — SIGNIFICANT CHANGE UP (ref 22–31)
CREAT SERPL-MCNC: 1.2 MG/DL — SIGNIFICANT CHANGE UP (ref 0.5–1.3)
EGFR: 59 ML/MIN/1.73M2 — LOW
EOSINOPHIL # BLD AUTO: 0.02 K/UL — SIGNIFICANT CHANGE UP (ref 0–0.5)
EOSINOPHIL NFR BLD AUTO: 0.2 % — SIGNIFICANT CHANGE UP (ref 0–6)
GLUCOSE SERPL-MCNC: 122 MG/DL — HIGH (ref 70–99)
HCT VFR BLD CALC: 38.2 % — LOW (ref 39–50)
HGB BLD-MCNC: 12.8 G/DL — LOW (ref 13–17)
IMM GRANULOCYTES NFR BLD AUTO: 0.4 % — SIGNIFICANT CHANGE UP (ref 0–0.9)
LYMPHOCYTES # BLD AUTO: 1.23 K/UL — SIGNIFICANT CHANGE UP (ref 1–3.3)
LYMPHOCYTES # BLD AUTO: 10.9 % — LOW (ref 13–44)
MCHC RBC-ENTMCNC: 31.8 PG — SIGNIFICANT CHANGE UP (ref 27–34)
MCHC RBC-ENTMCNC: 33.5 GM/DL — SIGNIFICANT CHANGE UP (ref 32–36)
MCV RBC AUTO: 94.8 FL — SIGNIFICANT CHANGE UP (ref 80–100)
MONOCYTES # BLD AUTO: 1.2 K/UL — HIGH (ref 0–0.9)
MONOCYTES NFR BLD AUTO: 10.6 % — SIGNIFICANT CHANGE UP (ref 2–14)
NEUTROPHILS # BLD AUTO: 8.77 K/UL — HIGH (ref 1.8–7.4)
NEUTROPHILS NFR BLD AUTO: 77.7 % — HIGH (ref 43–77)
NRBC # BLD: 0 /100 WBCS — SIGNIFICANT CHANGE UP (ref 0–0)
PLATELET # BLD AUTO: 163 K/UL — SIGNIFICANT CHANGE UP (ref 150–400)
POTASSIUM SERPL-MCNC: 4.2 MMOL/L — SIGNIFICANT CHANGE UP (ref 3.5–5.3)
POTASSIUM SERPL-SCNC: 4.2 MMOL/L — SIGNIFICANT CHANGE UP (ref 3.5–5.3)
PROT SERPL-MCNC: 7.2 G/DL — SIGNIFICANT CHANGE UP (ref 6–8.3)
RAPID RVP RESULT: SIGNIFICANT CHANGE UP
RBC # BLD: 4.03 M/UL — LOW (ref 4.2–5.8)
RBC # FLD: 12.7 % — SIGNIFICANT CHANGE UP (ref 10.3–14.5)
SARS-COV-2 RNA SPEC QL NAA+PROBE: SIGNIFICANT CHANGE UP
SODIUM SERPL-SCNC: 138 MMOL/L — SIGNIFICANT CHANGE UP (ref 135–145)
TROPONIN I, HIGH SENSITIVITY RESULT: 14.6 NG/L — SIGNIFICANT CHANGE UP
WBC # BLD: 11.28 K/UL — HIGH (ref 3.8–10.5)
WBC # FLD AUTO: 11.28 K/UL — HIGH (ref 3.8–10.5)

## 2023-02-27 PROCEDURE — 99285 EMERGENCY DEPT VISIT HI MDM: CPT

## 2023-02-27 PROCEDURE — 76376 3D RENDER W/INTRP POSTPROCES: CPT | Mod: 26

## 2023-02-27 PROCEDURE — 70450 CT HEAD/BRAIN W/O DYE: CPT | Mod: 26,MA

## 2023-02-27 PROCEDURE — 71250 CT THORAX DX C-: CPT | Mod: 26,MA

## 2023-02-27 PROCEDURE — 93010 ELECTROCARDIOGRAM REPORT: CPT

## 2023-02-27 PROCEDURE — 72192 CT PELVIS W/O DYE: CPT | Mod: 26,MA

## 2023-02-27 RX ORDER — CEFTRIAXONE 500 MG/1
1000 INJECTION, POWDER, FOR SOLUTION INTRAMUSCULAR; INTRAVENOUS ONCE
Refills: 0 | Status: COMPLETED | OUTPATIENT
Start: 2023-02-27 | End: 2023-02-27

## 2023-02-27 RX ORDER — AZITHROMYCIN 500 MG/1
500 TABLET, FILM COATED ORAL ONCE
Refills: 0 | Status: COMPLETED | OUTPATIENT
Start: 2023-02-27 | End: 2023-02-28

## 2023-02-27 RX ORDER — ACETAMINOPHEN 500 MG
650 TABLET ORAL ONCE
Refills: 0 | Status: COMPLETED | OUTPATIENT
Start: 2023-02-27 | End: 2023-02-27

## 2023-02-27 RX ORDER — SODIUM CHLORIDE 9 MG/ML
500 INJECTION INTRAMUSCULAR; INTRAVENOUS; SUBCUTANEOUS ONCE
Refills: 0 | Status: COMPLETED | OUTPATIENT
Start: 2023-02-27 | End: 2023-02-27

## 2023-02-27 RX ADMIN — SODIUM CHLORIDE 1000 MILLILITER(S): 9 INJECTION INTRAMUSCULAR; INTRAVENOUS; SUBCUTANEOUS at 22:47

## 2023-02-27 RX ADMIN — CEFTRIAXONE 100 MILLIGRAM(S): 500 INJECTION, POWDER, FOR SOLUTION INTRAMUSCULAR; INTRAVENOUS at 22:56

## 2023-02-27 RX ADMIN — Medication 650 MILLIGRAM(S): at 22:48

## 2023-02-27 NOTE — ED PROVIDER NOTE - OBJECTIVE STATEMENT
87-year-old male presents by EMS from home after sustaining a fall last night and again tonight.  Patient has history of dementia, remote prostate cancer, chronic gait disorder with multiple falls in the past.  According to his wife, she was helping him back into bed last night after using the bathroom and as he was climbing into the bed he slid down to the floor.  She witnessed it; says no LOC or head injury.  He had been ambulating since however today she says he had not been like himself.  More lethargic than usual.  Not eating the same.  Patient's wife is a poor historian.  Patient also has an aide at bedside who unfortunately was not present during either of these falls.  However he was concerned because today while sitting to have dinner, he slid from the chair and fell to the ground.  His aide also noted that while he was working with him today, he had increased difficulty with ambulation.  They are concerned because he cannot walk.  No fever reported.  No vomiting.  No other reported complaints.  Patient has dementia but when asked, he denies pain.  According to wife and aide, although he has dementia, he can make his needs known in the states that he has been demonstrating worsening confusion as of today.

## 2023-02-27 NOTE — ED PROVIDER NOTE - CLINICAL SUMMARY MEDICAL DECISION MAKING FREE TEXT BOX
87-year-old male presents by EMS from home after sustaining a fall last night and again tonight.  Patient has history of dementia, remote prostate cancer, chronic gait disorder with multiple falls in the past.  According to his wife, she was helping him back into bed last night after using the bathroom and as he was climbing into the bed he slid down to the floor.  She witnessed it; says no LOC or head injury.  He had been ambulating since however today she says he had not been like himself.  More lethargic than usual.  Not eating the same.  Patient's wife is a poor historian.  Patient also has an aide at bedside who unfortunately was not present during either of these falls.  However he was concerned because today while sitting to have dinner, he slid from the chair and fell to the ground.  His aide also noted that while he was working with him today, he had increased difficulty with ambulation.  They are concerned because he cannot walk.  No fever reported.  No vomiting.  No other reported complaints.  Patient has dementia but when asked, he denies pain.  According to wife and aide, although he has dementia, he can make his needs known in the states that he has been demonstrating worsening confusion as of today.  Exam as stated. Plan for labs and CT. Will eval head chest and pelvis. Aide said he complained earlier of rib pain, right side.   Likely will require admission. Aide only present from 9-3 most days. Wife is poor historian and incapable of managing alone.

## 2023-02-27 NOTE — ED PROVIDER NOTE - PHYSICAL EXAMINATION
General:     NAD, well-nourished, well-appearing  Eyes: PERRL, white sclera  Head:     NC/AT, EOMI  Pharynx: pharynx wnl, oral mucosa dry  Neck:     trachea midline  Lungs:     CTA b/l  CVS:     RRR  Abd:     +BS, s/nt/nd  Ext:   no deformities , nontender chest wall. Ranging b/l hips normally.   Skin: no rash  Neuro: Alert and awake, no sensory/motor deficits, disoriented, following all commands. Strength equal b/l to upper and lower extremities. No pronator drift.

## 2023-02-27 NOTE — ED PROVIDER NOTE - CARE PLAN
Principal Discharge DX:	Weakness  Secondary Diagnosis:	Multiple falls   1 Principal Discharge DX:	LLL pneumonia  Secondary Diagnosis:	Multiple falls

## 2023-02-27 NOTE — ED ADULT NURSE NOTE - OBJECTIVE STATEMENT
Patient presents to ED after a fall. Patient denies head injury, denies blood thinners. Patients wife states he is more confused than normal.

## 2023-02-27 NOTE — ED ADULT TRIAGE NOTE - CHIEF COMPLAINT QUOTE
BIB EMS from home for witnessed fall by aide tonight. Per EMS, no LOC, no head trauma, no blood thinners. pt denies any pain but per EMS pt more confused since fall.

## 2023-02-27 NOTE — ED ADULT NURSE NOTE - NSIMPLEMENTINTERV_GEN_ALL_ED
Implemented All Fall Risk Interventions:  [place] to call system. Call bell, personal items and telephone within reach. Instruct patient to call for assistance. Room bathroom lighting operational. Non-slip footwear when patient is off stretcher. Physically safe environment: no spills, clutter or unnecessary equipment. Stretcher in lowest position, wheels locked, appropriate side rails in place. Provide visual cue, wrist band, yellow gown, etc. Monitor gait and stability. Monitor for mental status changes and reorient to person, place, and time. Review medications for side effects contributing to fall risk. Reinforce activity limits and safety measures with patient and family.

## 2023-02-28 DIAGNOSIS — J18.9 PNEUMONIA, UNSPECIFIED ORGANISM: ICD-10-CM

## 2023-02-28 LAB
ALBUMIN SERPL ELPH-MCNC: 3 G/DL — LOW (ref 3.3–5)
ALP SERPL-CCNC: 79 U/L — SIGNIFICANT CHANGE UP (ref 40–120)
ALT FLD-CCNC: 16 U/L — SIGNIFICANT CHANGE UP (ref 10–45)
ANION GAP SERPL CALC-SCNC: 7 MMOL/L — SIGNIFICANT CHANGE UP (ref 5–17)
APPEARANCE UR: CLEAR — SIGNIFICANT CHANGE UP
AST SERPL-CCNC: 32 U/L — SIGNIFICANT CHANGE UP (ref 10–40)
BACTERIA # UR AUTO: NEGATIVE /HPF — SIGNIFICANT CHANGE UP
BASOPHILS # BLD AUTO: 0.02 K/UL — SIGNIFICANT CHANGE UP (ref 0–0.2)
BASOPHILS NFR BLD AUTO: 0.2 % — SIGNIFICANT CHANGE UP (ref 0–2)
BILIRUB SERPL-MCNC: 0.4 MG/DL — SIGNIFICANT CHANGE UP (ref 0.2–1.2)
BILIRUB UR-MCNC: NEGATIVE — SIGNIFICANT CHANGE UP
BUN SERPL-MCNC: 17 MG/DL — SIGNIFICANT CHANGE UP (ref 7–23)
CALCIUM SERPL-MCNC: 9.1 MG/DL — SIGNIFICANT CHANGE UP (ref 8.4–10.5)
CHLORIDE SERPL-SCNC: 104 MMOL/L — SIGNIFICANT CHANGE UP (ref 96–108)
CO2 SERPL-SCNC: 28 MMOL/L — SIGNIFICANT CHANGE UP (ref 22–31)
COLOR SPEC: YELLOW — SIGNIFICANT CHANGE UP
CREAT SERPL-MCNC: 1.08 MG/DL — SIGNIFICANT CHANGE UP (ref 0.5–1.3)
DIFF PNL FLD: ABNORMAL
EGFR: 67 ML/MIN/1.73M2 — SIGNIFICANT CHANGE UP
EOSINOPHIL # BLD AUTO: 0.09 K/UL — SIGNIFICANT CHANGE UP (ref 0–0.5)
EOSINOPHIL NFR BLD AUTO: 1.1 % — SIGNIFICANT CHANGE UP (ref 0–6)
EPI CELLS # UR: SIGNIFICANT CHANGE UP
GLUCOSE SERPL-MCNC: 98 MG/DL — SIGNIFICANT CHANGE UP (ref 70–99)
GLUCOSE UR QL: NEGATIVE — SIGNIFICANT CHANGE UP
HCT VFR BLD CALC: 35.7 % — LOW (ref 39–50)
HGB BLD-MCNC: 11.5 G/DL — LOW (ref 13–17)
IMM GRANULOCYTES NFR BLD AUTO: 0.2 % — SIGNIFICANT CHANGE UP (ref 0–0.9)
KETONES UR-MCNC: NEGATIVE — SIGNIFICANT CHANGE UP
LEUKOCYTE ESTERASE UR-ACNC: NEGATIVE — SIGNIFICANT CHANGE UP
LYMPHOCYTES # BLD AUTO: 1.33 K/UL — SIGNIFICANT CHANGE UP (ref 1–3.3)
LYMPHOCYTES # BLD AUTO: 16.2 % — SIGNIFICANT CHANGE UP (ref 13–44)
MAGNESIUM SERPL-MCNC: 2.1 MG/DL — SIGNIFICANT CHANGE UP (ref 1.6–2.6)
MCHC RBC-ENTMCNC: 31.2 PG — SIGNIFICANT CHANGE UP (ref 27–34)
MCHC RBC-ENTMCNC: 32.2 GM/DL — SIGNIFICANT CHANGE UP (ref 32–36)
MCV RBC AUTO: 96.7 FL — SIGNIFICANT CHANGE UP (ref 80–100)
MONOCYTES # BLD AUTO: 0.9 K/UL — SIGNIFICANT CHANGE UP (ref 0–0.9)
MONOCYTES NFR BLD AUTO: 11 % — SIGNIFICANT CHANGE UP (ref 2–14)
NEUTROPHILS # BLD AUTO: 5.85 K/UL — SIGNIFICANT CHANGE UP (ref 1.8–7.4)
NEUTROPHILS NFR BLD AUTO: 71.3 % — SIGNIFICANT CHANGE UP (ref 43–77)
NITRITE UR-MCNC: NEGATIVE — SIGNIFICANT CHANGE UP
NRBC # BLD: 0 /100 WBCS — SIGNIFICANT CHANGE UP (ref 0–0)
PH UR: 6 — SIGNIFICANT CHANGE UP (ref 5–8)
PLATELET # BLD AUTO: 142 K/UL — LOW (ref 150–400)
POTASSIUM SERPL-MCNC: 3.7 MMOL/L — SIGNIFICANT CHANGE UP (ref 3.5–5.3)
POTASSIUM SERPL-SCNC: 3.7 MMOL/L — SIGNIFICANT CHANGE UP (ref 3.5–5.3)
PROT SERPL-MCNC: 6.5 G/DL — SIGNIFICANT CHANGE UP (ref 6–8.3)
PROT UR-MCNC: 30 MG/DL
RBC # BLD: 3.69 M/UL — LOW (ref 4.2–5.8)
RBC # FLD: 13 % — SIGNIFICANT CHANGE UP (ref 10.3–14.5)
RBC CASTS # UR COMP ASSIST: ABNORMAL /HPF (ref 0–4)
SODIUM SERPL-SCNC: 139 MMOL/L — SIGNIFICANT CHANGE UP (ref 135–145)
SP GR SPEC: 1.02 — SIGNIFICANT CHANGE UP (ref 1.01–1.02)
UROBILINOGEN FLD QL: NEGATIVE — SIGNIFICANT CHANGE UP
WBC # BLD: 8.21 K/UL — SIGNIFICANT CHANGE UP (ref 3.8–10.5)
WBC # FLD AUTO: 8.21 K/UL — SIGNIFICANT CHANGE UP (ref 3.8–10.5)
WBC UR QL: NEGATIVE /HPF — SIGNIFICANT CHANGE UP (ref 0–5)

## 2023-02-28 PROCEDURE — 99223 1ST HOSP IP/OBS HIGH 75: CPT

## 2023-02-28 PROCEDURE — 93970 EXTREMITY STUDY: CPT | Mod: 26

## 2023-02-28 RX ORDER — ACETAMINOPHEN 500 MG
650 TABLET ORAL EVERY 6 HOURS
Refills: 0 | Status: DISCONTINUED | OUTPATIENT
Start: 2023-02-28 | End: 2023-03-01

## 2023-02-28 RX ORDER — AZITHROMYCIN 500 MG/1
500 TABLET, FILM COATED ORAL EVERY 24 HOURS
Refills: 0 | Status: DISCONTINUED | OUTPATIENT
Start: 2023-02-28 | End: 2023-03-01

## 2023-02-28 RX ORDER — METOPROLOL TARTRATE 50 MG
25 TABLET ORAL
Refills: 0 | Status: DISCONTINUED | OUTPATIENT
Start: 2023-02-28 | End: 2023-03-01

## 2023-02-28 RX ORDER — CEFTRIAXONE 500 MG/1
1000 INJECTION, POWDER, FOR SOLUTION INTRAMUSCULAR; INTRAVENOUS EVERY 24 HOURS
Refills: 0 | Status: DISCONTINUED | OUTPATIENT
Start: 2023-02-28 | End: 2023-03-01

## 2023-02-28 RX ORDER — ENOXAPARIN SODIUM 100 MG/ML
75 INJECTION SUBCUTANEOUS EVERY 12 HOURS
Refills: 0 | Status: DISCONTINUED | OUTPATIENT
Start: 2023-02-28 | End: 2023-03-01

## 2023-02-28 RX ORDER — ASPIRIN/CALCIUM CARB/MAGNESIUM 324 MG
81 TABLET ORAL DAILY
Refills: 0 | Status: DISCONTINUED | OUTPATIENT
Start: 2023-02-28 | End: 2023-03-01

## 2023-02-28 RX ORDER — TAMSULOSIN HYDROCHLORIDE 0.4 MG/1
0.4 CAPSULE ORAL AT BEDTIME
Refills: 0 | Status: DISCONTINUED | OUTPATIENT
Start: 2023-02-28 | End: 2023-03-01

## 2023-02-28 RX ORDER — PANTOPRAZOLE SODIUM 20 MG/1
40 TABLET, DELAYED RELEASE ORAL
Refills: 0 | Status: DISCONTINUED | OUTPATIENT
Start: 2023-02-28 | End: 2023-02-28

## 2023-02-28 RX ORDER — OXYBUTYNIN CHLORIDE 5 MG
5 TABLET ORAL
Refills: 0 | Status: DISCONTINUED | OUTPATIENT
Start: 2023-02-28 | End: 2023-03-01

## 2023-02-28 RX ORDER — DONEPEZIL HYDROCHLORIDE 10 MG/1
1 TABLET, FILM COATED ORAL
Qty: 0 | Refills: 0 | DISCHARGE

## 2023-02-28 RX ORDER — RIVASTIGMINE 4.6 MG/24H
1 PATCH, EXTENDED RELEASE TRANSDERMAL EVERY 24 HOURS
Refills: 0 | Status: DISCONTINUED | OUTPATIENT
Start: 2023-02-28 | End: 2023-03-01

## 2023-02-28 RX ADMIN — RIVASTIGMINE 1 PATCH: 4.6 PATCH, EXTENDED RELEASE TRANSDERMAL at 12:55

## 2023-02-28 RX ADMIN — Medication 25 MILLIGRAM(S): at 06:34

## 2023-02-28 RX ADMIN — PANTOPRAZOLE SODIUM 40 MILLIGRAM(S): 20 TABLET, DELAYED RELEASE ORAL at 06:34

## 2023-02-28 RX ADMIN — Medication 81 MILLIGRAM(S): at 12:55

## 2023-02-28 RX ADMIN — AZITHROMYCIN 255 MILLIGRAM(S): 500 TABLET, FILM COATED ORAL at 01:35

## 2023-02-28 NOTE — H&P ADULT - HISTORY OF PRESENT ILLNESS
87M hx of dementia, remote prostate cancer, PAF on asa, chronic gait d/o with frequent falls. Hx obtained from chart, wife reportedly poor historian, Evan DEY does not have list of meds [phone]. Pt s/p yesterday night as witnessed by wife with no LOC or head injury. But apparently became more weak and confused today. Today while sitting, pt apparently had slid off the chair and fell. Pt with complaint of mostly some pain in the R buttock, otherwise he is conversant, pleasant. Denied any HA, chest pain, SOB, abd pain, dysuria. In ED afebrile P: 81 BP: 150/81 sat 94% on RA. WBC: 11.28 78%N UA neg. COVID/RVP neg.  CT head/chest/pelvis with LLL atelectasis vs pna with again demonstrated R superior rami nondisplaced fx and R healing inferior rami fx

## 2023-02-28 NOTE — PHYSICAL THERAPY INITIAL EVALUATION ADULT - ADDITIONAL COMMENTS
hx per hha Evan. pt lives w/spouse in private house, 3 briseida w/rail, 1st fl setup. HHA 6 days/8 hrs, Sunday 4 hrs. pt Ambulates w/sac and assist, requires assist w/most ADL's. pt has rw and shower chair. pt  has had 2 fall per hha.

## 2023-02-28 NOTE — PHARMACOTHERAPY INTERVENTION NOTE - COMMENTS
Recommended to order a Legionella urinary antigen since patient has been empirically started on azithromycin for the treatment of pneumonia.    Bashir Paredes, PharmD, BCIDP  Clinical Pharmacy Specialist, Infectious Diseases  Tele-Antimicrobial Stewardship Program (Tele-ASP)  Tele-ASP Phone: [phone]  
discussed current medications with patient   patient expressed understanding  all questions answered

## 2023-02-28 NOTE — H&P ADULT - NSHPPHYSICALEXAM_GEN_ALL_CORE
Vital Signs Last 24 Hrs  T(C): 37.6 (27 Feb 2023 21:43), Max: 37.6 (27 Feb 2023 21:43)  T(F): 99.6 (27 Feb 2023 21:43), Max: 99.6 (27 Feb 2023 21:43)  HR: 81 (27 Feb 2023 20:35) (81 - 81)  BP: 150/81 (27 Feb 2023 20:35) (150/81 - 150/81)  BP(mean): --  RR: 17 (27 Feb 2023 20:35) (17 - 17)  SpO2: 94% (27 Feb 2023 20:35) (94% - 94%)    Parameters below as of 27 Feb 2023 20:35  Patient On (Oxygen Delivery Method): room air      Daily Height in cm: 180.34 (27 Feb 2023 20:35)    Daily   CAPILLARY BLOOD GLUCOSE        I&O's Summary      GENERAL: NAD  HEAD:  Normocephalic  EYES: EOMI, PERRLA, conjunctiva and sclera clear  ENMT: No tonsillar erythema, exudates, or enlargement; Moist mucous membranes, No lesions  NECK: Supple, No JVD, no bruit, normal thyroid  NERVOUS SYSTEM:  Alert & Oriented X 2, grossly moves all fours.   CHEST/LUNG: Clear to auscultation bilaterally; No rales, rhonchi, wheezing, or rubs. no rib tenderness.   HEART: Regular rate and rhythm; No murmurs, rubs, or gallops  ABDOMEN: Soft, Nontender, ?urinary bladder distention; Bowel sounds present  EXTREMITIES:  2+ Peripheral Pulses, No clubbing, cyanosis, or bl 1+ LE R >L edema to mid shin. nearly FROM in both hips with no pain  LYMPH: No lymphadenopathy noted  SKIN: No rashes or lesions

## 2023-02-28 NOTE — H&P ADULT - NSHPLABSRESULTS_GEN_ALL_CORE
12.8   11.28 )-----------( 163      ( 2023 21:40 )             38.2           138  |  103  |  22  ----------------------------<  122<H>  4.2   |  27  |  1.20    Ca    9.7      2023 21:40    TPro  7.2  /  Alb  3.4  /  TBili  0.5  /  DBili  x   /  AST  30  /  ALT  17  /  AlkPhos  89           LIVER FUNCTIONS - ( 2023 21:40 )  Alb: 3.4 g/dL / Pro: 7.2 g/dL / ALK PHOS: 89 U/L / ALT: 17 U/L / AST: 30 U/L / GGT: x                 Urinalysis Basic - ( 2023 23:15 )    Color: Yellow / Appearance: Clear / S.020 / pH: x  Gluc: x / Ketone: Negative  / Bili: Negative / Urobili: Negative   Blood: x / Protein: 30 mg/dL / Nitrite: Negative   Leuk Esterase: Negative / RBC: 5-10 /HPF / WBC Negative /HPF   Sq Epi: x / Non Sq Epi: Neg.-Few / Bacteria: Negative /HPF      EKG: personally rev NSR at 74bpm, no acute ST changes.       CXR: wet read    rad< from: CT 3D Reconstruct w/o Workstation (23 @ 21:53) >    IMPRESSION:    No acute fracture or dislocation of the pelvis or either hip.   Redemonstration of a comminuted, nondisplaced right superior pubic ramus   fracture and healing right inferior pubic ramus fracture.    < end of copied text >    < from: CT Chest No Cont (23 @ 21:31) >    IMPRESSION:    No acute fracture.    There are linear opacities in the left lower lobe which likely represent   atelectasis. There are additional patchy left lower lobe opacities which   are indeterminant question atelectasis versus aspiration versus pneumonia.    < end of copied text >    < from: CT Head No Cont (23 @ 21:30) >      IMPRESSION:    No acute intracranial hemorrhage, mass effect, or acute osseous fracture.    < end of copied text >

## 2023-02-28 NOTE — H&P ADULT - ASSESSMENT
87M hx of dementia, remote prostate cancer, PAF on asa, chronic gait d/o with frequent falls pw s/p falls x 2 and pneumonia.    #Pneumonia.  cont IV Ceftriaxone and Azithromycin  check LE dopplers to be complete.     #HTN  cont lopressor with holding parameters.   check orthostatics    #PT consult    Elderly wife already aware that pt in hospital.   contact YISSEL Gordon in AM for med list - does not have that available until morning. current med list obtained from [facility].      CAPRINI SCORE [CLOT]    AGE RELATED RISK FACTORS                                                       MOBILITY RELATED FACTORS  [ ] Age 41-60 years                                            (1 Point)                  [ ] Bed rest                                                        (1 Point)  [ ] Age: 61-74 years                                           (2 Points)                 [ ] Plaster cast                                                   (2 Points)  [3 ] Age= 75 years                                              (3 Points)                 [ ] Bed bound for more than 72 hours                 (2 Points)    DISEASE RELATED RISK FACTORS                                               GENDER SPECIFIC FACTORS  [1 ] Edema in the lower extremities                       (1 Point)                  [ ] Pregnancy                                                     (1 Point)  [ ] Varicose veins                                               (1 Point)                  [ ] Post-partum < 6 weeks                                   (1 Point)             [ ] BMI > 25 Kg/m2                                            (1 Point)                  [ ] Hormonal therapy  or oral contraception          (1 Point)                 [ ] Sepsis (in the previous month)                        (1 Point)                  [ ] History of pregnancy complications                 (1 point)  [ ] Pneumonia or serious lung disease                                               [ ] Unexplained or recurrent                     (1 Point)           (in the previous month)                               (1 Point)  [ ] Abnormal pulmonary function test                     (1 Point)                 SURGERY RELATED RISK FACTORS  [ ] Acute myocardial infarction                              (1 Point)                 [ ]  Section                                             (1 Point)  [ ] Congestive heart failure (in the previous month)  (1 Point)               [ ] Minor surgery                                                  (1 Point)   [ ] Inflammatory bowel disease                             (1 Point)                 [ ] Arthroscopic surgery                                        (2 Points)  [ ] Central venous access                                      (2 Points)                [ ] General surgery lasting more than 45 minutes   (2 Points)       [ ] Stroke (in the previous month)                          (5 Points)               [ ] Elective arthroplasty                                         (5 Points)                                                                                                                                               HEMATOLOGY RELATED FACTORS                                                 TRAUMA RELATED RISK FACTORS  [ ] Prior episodes of VTE                                     (3 Points)                [ ] Fracture of the hip, pelvis, or leg                       (5 Points)  [ ] Positive family history for VTE                         (3 Points)                 [ ] Acute spinal cord injury (in the previous month)  (5 Points)  [ ] Prothrombin 99582 A                                     (3 Points)                 [ ] Paralysis  (less than 1 month)                             (5 Points)  [ ] Factor V Leiden                                             (3 Points)                  [ ] Multiple Trauma within 1 month                        (5 Points)  [ ] Lupus anticoagulants                                     (3 Points)                                                           [ ] Anticardiolipin antibodies                               (3 Points)                                                       [ ] High homocysteine in the blood                      (3 Points)                                             [ ] Other congenital or acquired thrombophilia      (3 Points)                                                [ ] Heparin induced thrombocytopenia                  (3 Points)                                          Total Score [    4      ]    Caprini Score 0 - 2:  Low Risk, No VTE Prophylaxis required for most patients, encourage ambulation  Caprini Score 3 - 6:  At Risk, pharmacologic VTE prophylaxis is indicated for most patients (in the absence of a contraindication)  Caprini Score Greater than or = 7:  High Risk, pharmacologic VTE prophylaxis is indicated for most patients (in the absence of a contraindication)

## 2023-02-28 NOTE — CHART NOTE - NSCHARTNOTEFT_GEN_A_CORE
87M hx of dementia, remote prostate cancer, PAF on asa, chronic gait d/o with frequent falls pw s/p falls x 2 and pneumonia.    #Pneumonia.  cont IV Ceftriaxone and Azithromycin for one more dose tonight then transition to PO tomorrow.   check LE dopplers to be complete.     #LLE Acute DVT  start Lovenox BID. Transition to PO DOAC prior to DC. Bleeding risk discussed with patient and family    #HTN  cont lopressor with holding parameters.   check orthostatics    #weakness   PT consult     #BPH  tamsulosin  Tolterodine therapeutic interchange for oxybutynin     Updated wife Sonya and YISSEL Gordon at bedside. Medications updated 87M hx of dementia, remote prostate cancer, PAF on asa, chronic gait d/o with frequent falls pw s/p falls x 2 and pneumonia.    #Pneumonia.  cont IV Ceftriaxone and Azithromycin for one more dose tonight then transition to PO tomorrow.   check LE dopplers to be complete.     #comminuted, non displaced right superior pubic ramus fx, healing right inferior pubic ramus fx  - pain control  - BAKARI eventually    #LLE Acute DVT  start Lovenox BID. Transition to PO DOAC prior to DC. Bleeding risk discussed with patient and family    #HTN  cont lopressor with holding parameters.   check orthostatics    #weakness  #Recurrent falls    PT consult     #BPH  tamsulosin  Tolterodine therapeutic interchange for oxybutynin     Updated wife Sonya and YISSEL Gordon at bedside. Medications updated

## 2023-02-28 NOTE — PHYSICAL THERAPY INITIAL EVALUATION ADULT - PERTINENT HX OF CURRENT PROBLEM, REHAB EVAL
87M hx of dementia, remote prostate cancer, PAF on asa, chronic gait d/o with frequent falls. Hx obtained from chart, wife reportedly poor historian, Evan DEY does not have list of meds [phone]. Pt s/p yesterday night as witnessed by wife with no LOC or head injury. But apparently became more weak and confused today. Today while sitting, pt apparently had slid off the chair and fell. Pt with complaint of mostly some pain in the R buttock, otherwise he is conversant, pleasant. Denied any HA, chest pain, SOB, abd pain, dysuria. In ED afebrile P: 81 BP: 150/81 sat 94% on RA. WBC: 11.28 78%N UA neg. COVID/RVP neg.  CT head/chest/pelvis with LLL atelectasis vs pna with again demonstrated R superior rami nondisplaced fx and R healing inferior rami fx

## 2023-02-28 NOTE — PATIENT PROFILE ADULT - FALL HARM RISK - HARM RISK INTERVENTIONS

## 2023-03-01 ENCOUNTER — TRANSCRIPTION ENCOUNTER (OUTPATIENT)
Age: 88
End: 2023-03-01

## 2023-03-01 VITALS
SYSTOLIC BLOOD PRESSURE: 149 MMHG | HEART RATE: 86 BPM | OXYGEN SATURATION: 94 % | DIASTOLIC BLOOD PRESSURE: 74 MMHG | RESPIRATION RATE: 18 BRPM | TEMPERATURE: 98 F

## 2023-03-01 LAB
ANION GAP SERPL CALC-SCNC: 10 MMOL/L — SIGNIFICANT CHANGE UP (ref 5–17)
BUN SERPL-MCNC: 15 MG/DL — SIGNIFICANT CHANGE UP (ref 7–23)
CALCIUM SERPL-MCNC: 9.6 MG/DL — SIGNIFICANT CHANGE UP (ref 8.4–10.5)
CHLORIDE SERPL-SCNC: 103 MMOL/L — SIGNIFICANT CHANGE UP (ref 96–108)
CO2 SERPL-SCNC: 26 MMOL/L — SIGNIFICANT CHANGE UP (ref 22–31)
CREAT SERPL-MCNC: 1.17 MG/DL — SIGNIFICANT CHANGE UP (ref 0.5–1.3)
CULTURE RESULTS: SIGNIFICANT CHANGE UP
EGFR: 61 ML/MIN/1.73M2 — SIGNIFICANT CHANGE UP
GLUCOSE SERPL-MCNC: 94 MG/DL — SIGNIFICANT CHANGE UP (ref 70–99)
HCT VFR BLD CALC: 39.7 % — SIGNIFICANT CHANGE UP (ref 39–50)
HGB BLD-MCNC: 12.7 G/DL — LOW (ref 13–17)
LEGIONELLA AG UR QL: NEGATIVE — SIGNIFICANT CHANGE UP
MCHC RBC-ENTMCNC: 30.7 PG — SIGNIFICANT CHANGE UP (ref 27–34)
MCHC RBC-ENTMCNC: 32 GM/DL — SIGNIFICANT CHANGE UP (ref 32–36)
MCV RBC AUTO: 95.9 FL — SIGNIFICANT CHANGE UP (ref 80–100)
NRBC # BLD: 0 /100 WBCS — SIGNIFICANT CHANGE UP (ref 0–0)
PLATELET # BLD AUTO: 147 K/UL — LOW (ref 150–400)
POTASSIUM SERPL-MCNC: 3.9 MMOL/L — SIGNIFICANT CHANGE UP (ref 3.5–5.3)
POTASSIUM SERPL-SCNC: 3.9 MMOL/L — SIGNIFICANT CHANGE UP (ref 3.5–5.3)
RBC # BLD: 4.14 M/UL — LOW (ref 4.2–5.8)
RBC # FLD: 13 % — SIGNIFICANT CHANGE UP (ref 10.3–14.5)
SODIUM SERPL-SCNC: 139 MMOL/L — SIGNIFICANT CHANGE UP (ref 135–145)
SPECIMEN SOURCE: SIGNIFICANT CHANGE UP
WBC # BLD: 6.1 K/UL — SIGNIFICANT CHANGE UP (ref 3.8–10.5)
WBC # FLD AUTO: 6.1 K/UL — SIGNIFICANT CHANGE UP (ref 3.8–10.5)

## 2023-03-01 PROCEDURE — 99239 HOSP IP/OBS DSCHRG MGMT >30: CPT

## 2023-03-01 PROCEDURE — 70450 CT HEAD/BRAIN W/O DYE: CPT | Mod: MA

## 2023-03-01 PROCEDURE — 72192 CT PELVIS W/O DYE: CPT | Mod: MA

## 2023-03-01 PROCEDURE — 0225U NFCT DS DNA&RNA 21 SARSCOV2: CPT

## 2023-03-01 PROCEDURE — 96374 THER/PROPH/DIAG INJ IV PUSH: CPT

## 2023-03-01 PROCEDURE — 87449 NOS EACH ORGANISM AG IA: CPT

## 2023-03-01 PROCEDURE — 76376 3D RENDER W/INTRP POSTPROCES: CPT

## 2023-03-01 PROCEDURE — 97162 PT EVAL MOD COMPLEX 30 MIN: CPT

## 2023-03-01 PROCEDURE — 83735 ASSAY OF MAGNESIUM: CPT

## 2023-03-01 PROCEDURE — 85025 COMPLETE CBC W/AUTO DIFF WBC: CPT

## 2023-03-01 PROCEDURE — 93005 ELECTROCARDIOGRAM TRACING: CPT

## 2023-03-01 PROCEDURE — 93970 EXTREMITY STUDY: CPT

## 2023-03-01 PROCEDURE — 99285 EMERGENCY DEPT VISIT HI MDM: CPT

## 2023-03-01 PROCEDURE — 87086 URINE CULTURE/COLONY COUNT: CPT

## 2023-03-01 PROCEDURE — 36415 COLL VENOUS BLD VENIPUNCTURE: CPT

## 2023-03-01 PROCEDURE — 84484 ASSAY OF TROPONIN QUANT: CPT

## 2023-03-01 PROCEDURE — 71250 CT THORAX DX C-: CPT | Mod: MA

## 2023-03-01 PROCEDURE — 80053 COMPREHEN METABOLIC PANEL: CPT

## 2023-03-01 PROCEDURE — 80048 BASIC METABOLIC PNL TOTAL CA: CPT

## 2023-03-01 PROCEDURE — 81001 URINALYSIS AUTO W/SCOPE: CPT

## 2023-03-01 PROCEDURE — 85027 COMPLETE CBC AUTOMATED: CPT

## 2023-03-01 RX ORDER — CEFUROXIME AXETIL 250 MG
1 TABLET ORAL
Qty: 0 | Refills: 0 | DISCHARGE
Start: 2023-03-01

## 2023-03-01 RX ORDER — AZITHROMYCIN 500 MG/1
1 TABLET, FILM COATED ORAL
Qty: 0 | Refills: 0 | DISCHARGE
Start: 2023-03-01

## 2023-03-01 RX ORDER — AZITHROMYCIN 500 MG/1
500 TABLET, FILM COATED ORAL DAILY
Refills: 0 | Status: DISCONTINUED | OUTPATIENT
Start: 2023-03-02 | End: 2023-03-01

## 2023-03-01 RX ORDER — APIXABAN 2.5 MG/1
10 TABLET, FILM COATED ORAL EVERY 12 HOURS
Refills: 0 | Status: DISCONTINUED | OUTPATIENT
Start: 2023-03-01 | End: 2023-03-01

## 2023-03-01 RX ORDER — CEFUROXIME AXETIL 250 MG
500 TABLET ORAL
Refills: 0 | Status: DISCONTINUED | OUTPATIENT
Start: 2023-03-02 | End: 2023-03-01

## 2023-03-01 RX ORDER — TAMSULOSIN HYDROCHLORIDE 0.4 MG/1
1 CAPSULE ORAL
Qty: 0 | Refills: 0 | DISCHARGE
Start: 2023-03-01

## 2023-03-01 RX ORDER — APIXABAN 2.5 MG/1
2 TABLET, FILM COATED ORAL
Qty: 0 | Refills: 0 | DISCHARGE
Start: 2023-03-01

## 2023-03-01 RX ORDER — OXYBUTYNIN CHLORIDE 5 MG
1 TABLET ORAL
Qty: 0 | Refills: 0 | DISCHARGE
Start: 2023-03-01

## 2023-03-01 RX ADMIN — AZITHROMYCIN 255 MILLIGRAM(S): 500 TABLET, FILM COATED ORAL at 01:45

## 2023-03-01 RX ADMIN — RIVASTIGMINE 1 PATCH: 4.6 PATCH, EXTENDED RELEASE TRANSDERMAL at 12:49

## 2023-03-01 RX ADMIN — APIXABAN 10 MILLIGRAM(S): 2.5 TABLET, FILM COATED ORAL at 12:03

## 2023-03-01 RX ADMIN — Medication 5 MILLIGRAM(S): at 05:36

## 2023-03-01 RX ADMIN — RIVASTIGMINE 1 PATCH: 4.6 PATCH, EXTENDED RELEASE TRANSDERMAL at 12:04

## 2023-03-01 RX ADMIN — Medication 25 MILLIGRAM(S): at 05:36

## 2023-03-01 NOTE — PROGRESS NOTE ADULT - SUBJECTIVE AND OBJECTIVE BOX
Patient is a 87y old  Male who presents with a chief complaint of s/p fall. pna (2023 01:47)      Patient seen and examined at bedside.    ALLERGIES:  lactose (Diarrhea)  No Known Drug Allergies  shellfish (Other)    MEDICATIONS  (STANDING):  aspirin enteric coated 81 milliGRAM(s) Oral daily  azithromycin  IVPB 500 milliGRAM(s) IV Intermittent every 24 hours  cefTRIAXone   IVPB 1000 milliGRAM(s) IV Intermittent every 24 hours  enoxaparin Injectable 75 milliGRAM(s) SubCutaneous every 12 hours  metoprolol tartrate 25 milliGRAM(s) Oral two times a day  oxybutynin 5 milliGRAM(s) Oral two times a day  rivastigmine patch  4.6 mG/24 Hr(s) 1 Patch Transdermal every 24 hours  tamsulosin 0.4 milliGRAM(s) Oral at bedtime    MEDICATIONS  (PRN):  acetaminophen     Tablet .. 650 milliGRAM(s) Oral every 6 hours PRN Temp greater or equal to 38C (100.4F), Mild Pain (1 - 3)    Vital Signs Last 24 Hrs  T(F): 98 (01 Mar 2023 05:34), Max: 98.1 (2023 13:41)  HR: 80 (01 Mar 2023 05:34) (65 - 84)  BP: 159/86 (01 Mar 2023 05:34) (125/70 - 159/86)  RR: 18 (01 Mar 2023 05:34) (18 - 18)  SpO2: 95% (01 Mar 2023 05:34) (95% - 98%)  I&O's Summary    2023 07:01  -  01 Mar 2023 07:00  --------------------------------------------------------  IN: 0 mL / OUT: 400 mL / NET: -400 mL      PHYSICAL EXAM:  General: NAD, A/O x 3  ENT: MMM  Neck: Supple, No JVD  Lungs: Clear to auscultation bilaterally, Non labored breathing   Cardio: RRR, S1/S2, No murmurs  Abdomen: Soft, Nontender, Nondistended; Bowel sounds present  Extremities: No calf tenderness, No pitting edema    LABS:                        12.7   6.10  )-----------( 147      ( 01 Mar 2023 05:00 )             39.7     03-    139  |  103  |  15  ----------------------------<  94  3.9   |  26  |  1.17    Ca    9.6      01 Mar 2023 05:00  Mg     2.1     -    TPro  6.5  /  Alb  3.0  /  TBili  0.4  /  DBili  x   /  AST  32  /  ALT  16  /  AlkPhos  79            CARDIAC MARKERS ( 2023 23:15 )  x     / 14.6 ng/L / x     / x     / x                            Urinalysis Basic - ( 2023 23:15 )    Color: Yellow / Appearance: Clear / S.020 / pH: x  Gluc: x / Ketone: Negative  / Bili: Negative / Urobili: Negative   Blood: x / Protein: 30 mg/dL / Nitrite: Negative   Leuk Esterase: Negative / RBC: 5-10 /HPF / WBC Negative /HPF   Sq Epi: x / Non Sq Epi: Neg.-Few / Bacteria: Negative /HPF          RADIOLOGY & ADDITIONAL TESTS:    Care Discussed with Consultants/Other Providers:    Patient is a 87y old  Male who presents with a chief complaint of s/p fall. pna (2023 01:47)      Patient seen and examined at bedside.  Pt without complaints.  No events overnight .  Pt confused this am but pleasant     ALLERGIES:  lactose (Diarrhea)  No Known Drug Allergies  shellfish (Other)    MEDICATIONS  (STANDING):  aspirin enteric coated 81 milliGRAM(s) Oral daily  azithromycin  IVPB 500 milliGRAM(s) IV Intermittent every 24 hours  cefTRIAXone   IVPB 1000 milliGRAM(s) IV Intermittent every 24 hours  enoxaparin Injectable 75 milliGRAM(s) SubCutaneous every 12 hours  metoprolol tartrate 25 milliGRAM(s) Oral two times a day  oxybutynin 5 milliGRAM(s) Oral two times a day  rivastigmine patch  4.6 mG/24 Hr(s) 1 Patch Transdermal every 24 hours  tamsulosin 0.4 milliGRAM(s) Oral at bedtime    MEDICATIONS  (PRN):  acetaminophen     Tablet .. 650 milliGRAM(s) Oral every 6 hours PRN Temp greater or equal to 38C (100.4F), Mild Pain (1 - 3)    Vital Signs Last 24 Hrs  T(F): 98 (01 Mar 2023 05:34), Max: 98.1 (2023 13:41)  HR: 80 (01 Mar 2023 05:34) (65 - 84)  BP: 159/86 (01 Mar 2023 05:34) (125/70 - 159/86)  RR: 18 (01 Mar 2023 05:34) (18 - 18)  SpO2: 95% (01 Mar 2023 05:34) (95% - 98%)  I&O's Summary    2023 07:01  -  01 Mar 2023 07:00  --------------------------------------------------------  IN: 0 mL / OUT: 400 mL / NET: -400 mL      PHYSICAL EXAM:  General: 86 y/o confused male in NAD, Awake and  pleasant   ENT: MMM  Neck: Supple, No JVD  Lungs: Clear to auscultation bilaterally, Non labored breathing   Cardio: RRR, S1/S2, No murmurs  Abdomen: Soft, Nontender, Nondistended; Bowel sounds present  Extremities: No calf tenderness, No pitting edema    LABS:                        12.7   6.10  )-----------( 147      ( 01 Mar 2023 05:00 )             39.7     03-    139  |  103  |  15  ----------------------------<  94  3.9   |  26  |  1.17    Ca    9.6      01 Mar 2023 05:00  Mg     2.1         TPro  6.5  /  Alb  3.0  /  TBili  0.4  /  DBili  x   /  AST  32  /  ALT  16  /  AlkPhos  79            CARDIAC MARKERS ( 2023 23:15 )  x     / 14.6 ng/L / x     / x     / x                            Urinalysis Basic - ( 2023 23:15 )    Color: Yellow / Appearance: Clear / S.020 / pH: x  Gluc: x / Ketone: Negative  / Bili: Negative / Urobili: Negative   Blood: x / Protein: 30 mg/dL / Nitrite: Negative   Leuk Esterase: Negative / RBC: 5-10 /HPF / WBC Negative /HPF   Sq Epi: x / Non Sq Epi: Neg.-Few / Bacteria: Negative /HPF          RADIOLOGY & ADDITIONAL TESTS:    Care Discussed with Consultants/Other Providers:

## 2023-03-01 NOTE — DISCHARGE NOTE NURSING/CASE MANAGEMENT/SOCIAL WORK - NSDCPEPTCAREGIVEDUMATLIST _GEN_ALL_CORE
"SUBJECTIVE:  Subjective  Clarissa Zacarias is a 5 wk.o. female who is here with parents for a  checkup.    HPI    Current concerns include none    [place] screen:normal except 3 pending    Feeding: breast feeding.   Feeds on demand q 2-3 hrs.  Longest stretch at night 4 hrs.  Several BMs a day.  Little spit up.  Doest burp well      A comprehensive review of symptoms was completed and negative except as noted above.      Development:  Follows/Regards your face?    Social smile?     OBJECTIVE:  Vital signs  Vitals:    23 1112   Temp: 98.7 °F (37.1 °C)   TempSrc: Axillary   Weight: 4.155 kg (9 lb 2.6 oz)   Height: 1' 8.47" (0.52 m)   HC: 37.6 cm (14.8")        Physical Exam  Vitals and nursing note reviewed.   Constitutional:       General: She is not in acute distress.     Appearance: She is well-developed.   HENT:      Head: No cranial deformity or facial anomaly. Anterior fontanelle is flat.      Nose: Nose normal.      Mouth/Throat:      Mouth: Mucous membranes are moist.   Eyes:      General: Red reflex is present bilaterally.         Right eye: No discharge.         Left eye: No discharge.      Conjunctiva/sclera: Conjunctivae normal.      Pupils: Pupils are equal, round, and reactive to light.   Cardiovascular:      Rate and Rhythm: Normal rate and regular rhythm.      Heart sounds: No murmur heard.  Pulmonary:      Effort: Pulmonary effort is normal. No respiratory distress or nasal flaring.      Breath sounds: Normal breath sounds. No stridor. No wheezing.   Abdominal:      General: There is no distension.      Palpations: Abdomen is soft. There is no mass.   Genitourinary:     Labia: No labial fusion. No rash.        Comments: normal  Musculoskeletal:         General: No deformity. Normal range of motion.      Cervical back: Normal range of motion and neck supple.   Skin:     General: Skin is warm.      Coloration: Skin is not jaundiced.      Findings: Rash (few acne spots on upper cheeks and " temples) present. No petechiae.   Neurological:      Mental Status: She is alert.      Motor: No abnormal muscle tone.      Primitive Reflexes: Suck normal. Symmetric [place].        ASSESSMENT/PLAN:  Clarissa was seen today for follow-up.    Diagnoses and all orders for this visit:    Well baby exam, over 28 days old         Preventive Health Issues Addressed:  1. Anticipatory guidance discussed and a handout addressing well baby issues was provided.    2. Growth and development were reviewed/discussed and are within acceptable ranges for age.    3. Immunizations and screening tests today: per orders.        ANTICIPATORY GUIDANCE:      Car Seat rear facing.  Smoke free environment.  Smoke detectors.  Water less than 120 degrees.  No bottle propping.  Sleep on back.  Crib safety.   Cord care. Signs of illness.  Fever.  Bottle fed: 26-32oz/day.  Breast fed: nurse 8-10 a day.  Talk to baby. Support from mother.      Follow Up: 2mo well  No follow-ups on file.    Well-Baby Checkup: Up to 1 Month   After your first  visit, your baby will likely have a checkup within his or her first month of life. At this checkup, the healthcare provider will examine the baby and ask how things are going at home. This sheet describes some of what you can expect.      Its fine to take the baby out. Avoid prolonged sun exposure and crowds where germs can spread.      Development and Milestones   The healthcare provider will ask questions about your baby. And he or she will observe the baby to get an idea of the infants development. By this visit, your baby is likely doing some of the following:   Smiling for no apparent reason (called a spontaneous smile)   Making eye contact, especially during feeding   Making random sounds (also called vocalizing)   Trying to lift his or her head   Wiggling and squirming (each arm and leg should move about the same amount; if not, tell the healthcare provider)   Becoming startled upon hearing a  loud noise  Feeding Tips   At around 2 weeks old, your baby should be back to his or her birth weight. Continue feeding with breast milk and/or formula. To help your baby eat well:   During the day, feed at least every 2-3 hours. You may need to wake the baby for daytime feedings.   At night, feed when the baby wakes, often every 3-4 hours. You may choose not to wake the baby for nighttime feedings. Discuss this with the healthcare provider.   If you breastfeed, give breast milk in a bottle at some feedings. This helps prepare the baby for times when Mom cant be there during a feeding.   Breastfeeding sessions should last around 15-20 minutes. With breast milk or formula from a bottle, give the baby 2-3 ounces at each feeding.   If youre concerned about how much or how often your baby eats, discuss this with the healthcare provider.   Ask the healthcare provider if your baby should take vitamin D.   Dont give the baby anything to eat besides breast milk or formula. Your baby is too young for solid foods (solids) or other liquids. An infant does not need to be given water.   Be aware that many babies begin to spit up around 1 month of age. In most cases, this is normal. Call the doctor right away if the baby spits up often and forcefully, or spits up anything besides milk or formula.  Hygiene Tips   Some babies poop (stool) a few times a day. Others poop as little as once every 2-3 days. Anything in this range is normal. Change the babys diaper when its wet or dirty.   Its fine if your baby poops even less often than every 2-3 days if the baby is otherwise healthy. But if the baby also becomes fussy, spits up more than normal, eats less than normal, or has very hard stool, tell the healthcare provider. The baby may be constipated (backed up).   Stool may range in color from mustard yellow to pale yellow to green. If its another color, tell the healthcare provider.   Bathe your baby a few times per week. You  may give baths more often if the baby seems to like it. But because youre cleaning the baby during diaper changes, a daily bath often isnt needed.   Its okay to use mild (hypoallergenic) creams or lotions on the babys skin. Avoid putting lotion on the babys hands.  Sleeping Tips   At this age, your baby may sleep up to 18-20 hours each day. Its common to sleep for short spurts throughout the day, rather than for hours at a time. The baby may be fussy before going to bed for the night (around 6:00 PM to 9:00 PM). This is normal. To help your baby sleep safely and soundly:   Always put the baby down to sleep on his or her back. This helps prevent SIDS (sudden infant death syndrome).   Ask the healthcare provider if you should let your baby sleep with a pacifier.   Dont put a pillow, heavy blankets, or stuffed animals in the crib. These could suffocate the baby.   Swaddling (wrapping the baby tightly in a blanket) can help the baby feel safe and fall asleep.   Its okay to put the baby to bed awake. Its also okay to let the baby cry in bed, but only for a few minutes. At this age babies arent ready to cry themselves to sleep.   If you have trouble getting your baby to sleep, ask the healthcare provider for tips.   If you co-sleep (share a bed with the baby), discuss health and safety issues with the babys healthcare provider.  Safety Tips   To avoid burns, dont carry or drink hot liquids, such as coffee, near the baby. Turn the water heater down to a temperature of 120°F (49°C) or below.   Dont smoke or allow others to smoke near the baby. If you or other family members smoke, do so outdoors and never around the baby.   Its usually fine to take a  out of the house. But avoid confined, crowded places where germs can spread.   When you take the baby outside, avoid staying too long in direct sunlight. Keep the baby covered, or seek out the shade.   In the car, always put the baby in a rear-facing car  seat. This should be secured in the back seat according to the car seats directions. Never leave the baby alone in the car.   Do not leave the baby on a high surface such as a table, bed, or couch. He or she could fall and get hurt.   Older siblings will likely want to hold, play with, and get to know the baby. This is fine as long as an adult supervises.   Call the doctor right away if the baby has a rectal temperature over 100.4°F.  Vaccinations   Based on recommendations from the American Association of Pediatrics, at this visit your baby may receive the hepatitis B vaccination.   Signs of Postpartum Depression   Its normal to be weepy and tired right after having a baby. These feelings should go away after 2 to 3 weeks. If youre still feeling this way, it may be a sign of postpartum depression, a more serious problem. Symptoms may include:   Feelings of deep sadness   Gaining or losing a lot of weight   Sleeping too much or too little   Feeling tired all the time   Feeling restless   Feeling worthless or guilty   Fearing that your baby will be harmed   Worrying that youre a bad parent   Having trouble thinking clearly or making decisions   Thinking about death or suicide  If you have any of these symptoms, talk to your OB/GYN or another healthcare provider. Treatment can help you feel better.    Next checkup at: _______________________________   PARENT NOTES:   © 20008640-5037 [employer], [address] [place] [address], [place] [address]. All rights reserved. This information is not intended as a substitute for professional medical care. Always follow your healthcare professional's instructions.       Influenza Vaccination/Apixaban/Eliquis

## 2023-03-01 NOTE — DISCHARGE NOTE PROVIDER - CARE PROVIDER_API CALL
Kiersten Silvestre)  Geriatric Medicine; Internal Medicine  [address] [place] [address], [address]  [place] [address]  Phone: [phone]  Fax: [phone]  Follow Up Time:

## 2023-03-01 NOTE — DISCHARGE NOTE PROVIDER - NSDCFUSCHEDAPPT_GEN_ALL_CORE_FT
Kiersten Silvestre  [facility]  GERIATRICS [address]  Scheduled Appointment: 03/15/2023    Bull Galvan  [facility]  UROLOGY [address]  Scheduled Appointment: 03/22/2023    Kiersten Silvestre  [facility]  GERIATRICS [address]  Scheduled Appointment: 04/10/2023

## 2023-03-01 NOTE — DISCHARGE NOTE PROVIDER - NSDCCPCAREPLAN_GEN_ALL_CORE_FT
PRINCIPAL DISCHARGE DIAGNOSIS  Diagnosis: LLL pneumonia  Assessment and Plan of Treatment: You were admitted for frequent falls   You were diagnosed with pt pneumonia and unsteady gait hx of pelvic fx s, new DVT in Left lower extremity   You were treated with antibiotics therapy,  orthopedic consult, lower ext sonogram and CT of pelvis/ hips   You were prescribed the following new medications: Ceftin and azithromycin antibiotic for pneumonia, and eliquis for new Left lower ext dvt   You will need to follow up with your primary care physician.      SECONDARY DISCHARGE DIAGNOSES  Diagnosis: Multiple falls  Assessment and Plan of Treatment:      PRINCIPAL DISCHARGE DIAGNOSIS  Diagnosis: LLL pneumonia  Assessment and Plan of Treatment: You were admitted for frequent falls   You were diagnosed with pt pneumonia and unsteady gait hx of pelvic fx s, new DVT in Left lower extremity   You were treated with antibiotics therapy,  orthopedic consult, lower ext sonogram and CT of pelvis/ hips   You were prescribed the following new medications: Ceftin and azithromycin antibiotic for pneumonia, and eliquis for new Left lower ext dvt   Antibiotics completed on 3/4  Eliquis to be dosed 10mg twice daily until 3/7 then start Eliquis 5mg twice daily starting on 3/8  You will need to follow up with your primary care physician.      SECONDARY DISCHARGE DIAGNOSES  Diagnosis: Multiple falls  Assessment and Plan of Treatment: You have history of multiple falls and therefore were not on anticoagulation. You have been started on Eliquis while in rehab however this needs to be rediscussed on discharge from rehab

## 2023-03-01 NOTE — PROGRESS NOTE ADULT - ASSESSMENT
86M hx of dementia, remote prostate ca s/p RT, chronic gait d/o with frequent multiple falls, PAF on asa (though has not been taking it) pw s/p mechanical fall yesterday with pelvic fx.    #Pelvic fx  - Pain control  - WBAT  - Ortho consulted  - PT eval- recommend [facility]  - DVT ppx with lovenox and asa 81mg BID    #PAF  - High risk for full dose A/C due to frequent falls  - c/w ASA (although suboptimal from a stroke reduction standpoint)  - c/w lopressor     #Dementia  - c/w Donepezil   - c/w Rivastigmine patch     #DVT ppx  - Lovenox    Wife Loren called, voicemail left  Spoke to daughter and HCP Lola, updated on clinical status and plan for dc today to [facility]. All questions answered [phone] 86M hx of dementia, remote prostate ca s/p RT, chronic gait d/o with frequent multiple falls, PAF on asa (though has not been taking it) pw s/p mechanical fall yesterday with pelvic fx.    #Pelvic fx  - Pain control  - CT--- No acute fracture or dislocation of the pelvis or either hip.       Redemonstration of a comminuted, nondisplaced right superior pubic ramus       fracture and healing right inferior pubic ramus fracture.  - WBAT  - Ortho consulted  - PT eval- recommend [facility]  - DVT ppx with lovenox and asa 81mg BID    #PAF  - High risk for full dose A/C due to frequent falls  - c/w ASA (although suboptimal from a stroke reduction standpoint)  - c/w lopressor     #Dementia  - c/w Donepezil   - c/w Rivastigmine patch     #DVT ppx  - Lovenox    Wife Loren called, voicemail left  Spoke to daughter and HCP Lola, updated on clinical status and plan for dc today to [facility]. All questions answered [phone] 86M hx of dementia, remote prostate ca s/p RT, chronic gait d/o with frequent multiple falls, PAF on asa (though has not been taking it) pw s/p mechanical fall yesterday with pelvic fx.    #Pelvic fx  - Pain control  - CT--- No acute fracture or dislocation of the pelvis or either hip.       Redemonstration of a comminuted, nondisplaced right superior pubic ramus       fracture and healing right inferior pubic ramus fracture.  - WBAT  - Ortho consulted- pending  - PT eval- recommend [facility]  - DVT ppx with lovenox and asa 81mg BID    New LLE DVT  - pt refusing lovenox  -will start eliquis 10mg Bib for 10 days and then change to 5mg bid - pt with hx of PAF and not on prophalyxis due to increase risk of falls but pt going to [facility] so recommending DOAC while in rehab     #PAF  - High risk for full dose A/C due to frequent falls- will start Eliquis for DVT while in [facility]   - will D/C as starting Eliquis while in [facility]  ( suboptimal from a stroke reduction standpoint)  - c/w lopressor     #Dementia  - c/w Donepezil   - c/w Rivastigmine patch     #DVT ppx  - Lovenox    Wife Loren [phone]  Spoke to daughter and HCP Lola, updated on clinical status and plan for dc today to [facility]. All questions answered [phone] 87M hx of dementia, remote prostate cancer, PAF on asa, chronic gait d/o with frequent falls pw s/p falls x 2 and pneumonia.    #Pneumonia.   IV Ceftriaxone and Azithromycin- transition to oral Ceftin and Azithomycin      #comminuted, non displaced right superior pubic ramus fx, healing right inferior pubic ramus fx  CT-No acute fracture or dislocation of the pelvis or either hip.   Redemonstration of a comminuted, nondisplaced right superior pubic ramus   fracture and healing right inferior pubic ramus fracture.  - pain control  - [facility] eventually    #PAF   - will dc [facility] as pt started on eliquis  - not on DOAC due to fall history but due to new DVT will start eliquis while in [facility]     #LLE Acute DVT  Sono-Acute DVT left posterior tibial vein. No evidence for right lower   extremity DVT.  pt started on eliquis today  after refusing lovenox.  Pt with HX of PAF not on DOAC due to falls but PT recommending [facility] if pt going to [facility] can monitor for falls    #HTN  cont lopressor with holding parameters.   check orthostatics    #weakness  #Recurrent falls    PT consult - recommend [facility]     #BPH  tamsulosin  Tolterodine therapeutic interchange for oxybutynin     Updated wife Sonya [phone] message left with call back number

## 2023-03-01 NOTE — PROGRESS NOTE ADULT - NS ATTEND AMEND GEN_ALL_CORE FT
87M hx of dementia, remote prostate cancer, PAF on asa, chronic gait d/o with frequent falls pw s/p falls x 2 and pneumonia    #pneumonia  - on rocephin and azithro, will transition to ceftin and azithro to complete 5 day treatment    #comminuted, non displaced right superior pubic ramus fx, healing right inferior pubic ramus fx  - pain control  - [facility] placement    #LLE Acute DVT  #PAF   - found to have acute DVT L posterior tibial vein  - discontinued full dose lovenox and placed on Eliquis starting dose for DVT 10mg BID x7 days then 5mg daily  - patient was on AC in past for afib however stopped due to frequent falls, can continue AC while in [facility] and then can be rediscussed once discharged with family  - will dc asa as pt started on eliquis    dc to [facility] today

## 2023-03-01 NOTE — DISCHARGE NOTE NURSING/CASE MANAGEMENT/SOCIAL WORK - PATIENT PORTAL LINK FT
You can access the FollowMyHealth Patient Portal offered by [facility] by registering at the following website: [email or web address]. By joining [facility]’s FollowMyHealth portal, you will also be able to view your health information using other applications (apps) compatible with our system.

## 2023-03-01 NOTE — DISCHARGE NOTE PROVIDER - NSDCMRMEDTOKEN_GEN_ALL_CORE_FT
acetaminophen 325 mg oral tablet: 2 tab(s) orally every 6 hours, As needed, Temp greater or equal to 38C (100.4F), Mild Pain (1 - 3)  apixaban 5 mg oral tablet: 2 tab(s) orally every 12 hours  azithromycin 500 mg oral tablet: 1 tab(s) orally once a day for 3 more days   cefuroxime 500 mg oral tablet: 1 tab(s) orally 2 times a day for 3 days   dicyclomine 10 mg oral capsule: 1 tab(s) orally 2 times a day  metoprolol tartrate 25 mg oral tablet: 1 tab(s) orally 2 times a day  oxybutynin 5 mg oral tablet: 1 tab(s) orally 2 times a day  rivastigmine 4.6 mg/24 hr transdermal film, extended release: 1 patch transdermal once a day  tamsulosin 0.4 mg oral capsule: 1 cap(s) orally once a day (at bedtime)

## 2023-03-01 NOTE — DISCHARGE NOTE NURSING/CASE MANAGEMENT/SOCIAL WORK - NSDCPEFALRISK_GEN_ALL_CORE
For information on Fall & Injury Prevention, visit: [email or web address] OR  [email or web address] OR  https://www.cdc.gov/steadi/patient.html

## 2023-03-01 NOTE — DISCHARGE NOTE PROVIDER - HOSPITAL COURSE
Hospital Course  HPI:  87M hx of dementia, remote prostate cancer, PAF on asa, chronic gait d/o with frequent falls. Hx obtained from chart, wife reportedly poor historian, Evan DEY does not have list of meds [phone]. Pt s/p yesterday night as witnessed by wife with no LOC or head injury. But apparently became more weak and confused today. Today while sitting, pt apparently had slid off the chair and fell. Pt with complaint of mostly some pain in the R buttock, otherwise he is conversant, pleasant. Denied any HA, chest pain, SOB, abd pain, dysuria. In ED afebrile P: 81 BP: 150/81 sat 94% on RA. WBC: 11.28 78%N UA neg. COVID/RVP neg.  CT head/chest/pelvis with LLL atelectasis vs pna with again demonstrated R superior rami nondisplaced fx and R healing inferior rami fx (28 Feb 2023 01:47) Pt treated with IVabx for pneumonia and eventually changed to oral ceftin and azithromycin to complete 3 more days.  Pt Lower ext sono positive for LLE DVT . Pt started on eliquis - Pt with history of PAF but not on home anticoagulation due to hx of frequent falls .  Now with positive DVT and being discharged to [facility] where he can be monitored will start course of DOAC-  Will also DC asa.    Pt with CT of pelvis results below . CT of pelvis    CT Head   No acute fracture or dislocation of the pelvis or either hip.   Redemonstration of a comminuted, nondisplaced right superior pubic ramus   fracture and healing right inferior pubic ramus fracture.    Sono of LLE     Acute DVT left posterior tibial vein. No evidence for right lower   extremity DVT.    Chest CT  No acute fracture.    There are linear opacities in the left lower lobe which likely represent   atelectasis. There are additional patchy left lower lobe opacities which   are indeterminant question atelectasis versus aspiration versus pneumonia.        You were admitted for frequent falls   You were diagnosed with pt pneumonia and unsteady gait hx of pelvic fx s, new DVT in Left lower extremity   You were treated with antibiotics therapy,  orthopedic consult, lower ext sonogram and CT of pelvis/ hips   You were prescribed the following new medications: Ceftin and azithromycin antibiotic for pneumonia, and eliquis for new Left lower ext dvt     You will need to follow up with your primary care physician.    Source of Infection: Pneumonia   Antibiotic / Last Day:total of 4 days abx last day being 3/4     Palliative Care / Advanced Care Planning  Code Status: full code   Patient/Family agreeable to Hospice/Palliative (N)?  Summary of Goals of Care Conversation:    Discharging Provider:  Kalli Ayoub   Contact Info: Cell [phone] - Please call with any questions or concerns.    Outpatient Provider:Dr Silvestre     Signout given to  SNF Provider: Dr Wilcox- notified and aware    Hospital Course  HPI:  87M hx of dementia, remote prostate cancer, PAF on asa, chronic gait d/o with frequent falls. Hx obtained from chart, wife reportedly poor historian, Evan DEY does not have list of meds [phone]. Pt s/p yesterday night as witnessed by wife with no LOC or head injury. But apparently became more weak and confused today. Today while sitting, pt apparently had slid off the chair and fell. Pt with complaint of mostly some pain in the R buttock, otherwise he is conversant, pleasant. Denied any HA, chest pain, SOB, abd pain, dysuria. In ED afebrile P: 81 BP: 150/81 sat 94% on RA. WBC: 11.28 78%N UA neg. COVID/RVP neg.  CT head/chest/pelvis with LLL atelectasis vs pna with again demonstrated R superior rami nondisplaced fx and R healing inferior rami fx (28 Feb 2023 01:47) Pt treated with IVabx for pneumonia and eventually changed to oral ceftin and azithromycin to complete 3 more days.  Pt Lower ext sono positive for LLE DVT . Pt started on eliquis - Pt with history of PAF but not on home anticoagulation due to hx of frequent falls .  Now with positive DVT and being discharged to [facility] where he can be monitored, will start course of DOAC-  Will also DC asa.    Pt with CT of pelvis results below . CT of pelvis    CT Head   No acute fracture or dislocation of the pelvis or either hip.   Redemonstration of a comminuted, nondisplaced right superior pubic ramus   fracture and healing right inferior pubic ramus fracture.    Sono of LLE     Acute DVT left posterior tibial vein. No evidence for right lower   extremity DVT.    Chest CT  No acute fracture.    There are linear opacities in the left lower lobe which likely represent   atelectasis. There are additional patchy left lower lobe opacities which   are indeterminant question atelectasis versus aspiration versus pneumonia.        You were admitted for frequent falls   You were diagnosed with pt pneumonia and unsteady gait hx of pelvic fx s, new DVT in Left lower extremity   You were treated with antibiotics therapy,  orthopedic consult, lower ext sonogram and CT of pelvis/ hips   You were prescribed the following new medications: Ceftin and azithromycin antibiotic for pneumonia, and eliquis for new Left lower ext dvt     You will need to follow up with your primary care physician.    Source of Infection: Pneumonia   Antibiotic / Last Day:total of 4 days abx last day being 3/4     Discharging Provider:  Kalli Ayoub   Contact Info: Cell [phone] - Please call with any questions or concerns.    Outpatient Provider:Dr Silvestre     Signout given to  SNF Provider: Dr Wilcox- notified and aware

## 2023-03-02 ENCOUNTER — RX RENEWAL (OUTPATIENT)
Age: 88
End: 2023-03-02

## 2023-03-02 ENCOUNTER — TRANSCRIPTION ENCOUNTER (OUTPATIENT)
Age: 88
End: 2023-03-02

## 2023-03-02 RX ORDER — RIVASTIGMINE 4.6 MG/24H
4.6 PATCH, EXTENDED RELEASE TRANSDERMAL
Qty: 30 | Refills: 3 | Status: ACTIVE | COMMUNITY
Start: 2021-09-21 | End: 1900-01-01

## 2023-03-09 ENCOUNTER — TRANSCRIPTION ENCOUNTER (OUTPATIENT)
Age: 88
End: 2023-03-09

## 2023-03-15 ENCOUNTER — APPOINTMENT (OUTPATIENT)
Dept: GERIATRICS | Facility: CLINIC | Age: 88
End: 2023-03-15

## 2023-03-16 ENCOUNTER — TRANSCRIPTION ENCOUNTER (OUTPATIENT)
Age: 88
End: 2023-03-16

## 2023-03-22 ENCOUNTER — APPOINTMENT (OUTPATIENT)
Dept: UROLOGY | Facility: CLINIC | Age: 88
End: 2023-03-22

## 2023-03-27 ENCOUNTER — NON-APPOINTMENT (OUTPATIENT)
Age: 88
End: 2023-03-27

## 2023-03-30 ENCOUNTER — TRANSCRIPTION ENCOUNTER (OUTPATIENT)
Age: 88
End: 2023-03-30

## 2023-04-06 ENCOUNTER — TRANSCRIPTION ENCOUNTER (OUTPATIENT)
Age: 88
End: 2023-04-06

## 2023-04-10 ENCOUNTER — APPOINTMENT (OUTPATIENT)
Dept: GERIATRICS | Facility: CLINIC | Age: 88
End: 2023-04-10

## 2023-04-19 ENCOUNTER — TRANSCRIPTION ENCOUNTER (OUTPATIENT)
Age: 88
End: 2023-04-19

## 2023-04-20 ENCOUNTER — TRANSCRIPTION ENCOUNTER (OUTPATIENT)
Age: 88
End: 2023-04-20

## 2023-04-28 ENCOUNTER — TRANSCRIPTION ENCOUNTER (OUTPATIENT)
Age: 88
End: 2023-04-28

## 2023-05-05 ENCOUNTER — TRANSCRIPTION ENCOUNTER (OUTPATIENT)
Age: 88
End: 2023-05-05

## 2023-05-11 ENCOUNTER — APPOINTMENT (OUTPATIENT)
Dept: GERIATRICS | Facility: CLINIC | Age: 88
End: 2023-05-11
Payer: MEDICARE

## 2023-05-11 DIAGNOSIS — F03.90 UNSPECIFIED DEMENTIA W/OUT BEHAVIORAL DISTURBANCE: ICD-10-CM

## 2023-05-11 DIAGNOSIS — Z74.09 OTHER REDUCED MOBILITY: ICD-10-CM

## 2023-05-11 DIAGNOSIS — Z78.9 OTHER REDUCED MOBILITY: ICD-10-CM

## 2023-05-11 PROCEDURE — 99443: CPT | Mod: 95

## 2023-05-19 ENCOUNTER — TRANSCRIPTION ENCOUNTER (OUTPATIENT)
Age: 88
End: 2023-05-19

## 2023-06-12 ENCOUNTER — INPATIENT (INPATIENT)
Facility: HOSPITAL | Age: 88
LOS: 2 days | Discharge: NURSING, REHAB OR LONG-TERM CARE | DRG: 884 | End: 2023-06-15
Attending: HOSPITALIST | Admitting: HOSPITALIST
Payer: MEDICARE

## 2023-06-12 VITALS
TEMPERATURE: 98 F | HEART RATE: 88 BPM | SYSTOLIC BLOOD PRESSURE: 126 MMHG | OXYGEN SATURATION: 97 % | DIASTOLIC BLOOD PRESSURE: 69 MMHG | RESPIRATION RATE: 16 BRPM | WEIGHT: 149.03 LBS

## 2023-06-12 DIAGNOSIS — Z98.89 OTHER SPECIFIED POSTPROCEDURAL STATES: Chronic | ICD-10-CM

## 2023-06-12 DIAGNOSIS — Z98.890 OTHER SPECIFIED POSTPROCEDURAL STATES: Chronic | ICD-10-CM

## 2023-06-12 DIAGNOSIS — R55 SYNCOPE AND COLLAPSE: ICD-10-CM

## 2023-06-12 LAB
ALBUMIN SERPL ELPH-MCNC: 2.7 G/DL — LOW (ref 3.3–5)
ALP SERPL-CCNC: 93 U/L — SIGNIFICANT CHANGE UP (ref 40–120)
ALT FLD-CCNC: 20 U/L — SIGNIFICANT CHANGE UP (ref 10–45)
ANION GAP SERPL CALC-SCNC: 7 MMOL/L — SIGNIFICANT CHANGE UP (ref 5–17)
APPEARANCE UR: ABNORMAL
AST SERPL-CCNC: 27 U/L — SIGNIFICANT CHANGE UP (ref 10–40)
BACTERIA # UR AUTO: NEGATIVE /HPF — SIGNIFICANT CHANGE UP
BASOPHILS # BLD AUTO: 0.04 K/UL — SIGNIFICANT CHANGE UP (ref 0–0.2)
BASOPHILS NFR BLD AUTO: 0.5 % — SIGNIFICANT CHANGE UP (ref 0–2)
BILIRUB SERPL-MCNC: 0.4 MG/DL — SIGNIFICANT CHANGE UP (ref 0.2–1.2)
BILIRUB UR-MCNC: NEGATIVE — SIGNIFICANT CHANGE UP
BUN SERPL-MCNC: 16 MG/DL — SIGNIFICANT CHANGE UP (ref 7–23)
CALCIUM SERPL-MCNC: 8.9 MG/DL — SIGNIFICANT CHANGE UP (ref 8.4–10.5)
CHLORIDE SERPL-SCNC: 105 MMOL/L — SIGNIFICANT CHANGE UP (ref 96–108)
CO2 SERPL-SCNC: 29 MMOL/L — SIGNIFICANT CHANGE UP (ref 22–31)
COLOR SPEC: YELLOW — SIGNIFICANT CHANGE UP
CREAT SERPL-MCNC: 0.96 MG/DL — SIGNIFICANT CHANGE UP (ref 0.5–1.3)
DIFF PNL FLD: NEGATIVE — SIGNIFICANT CHANGE UP
EGFR: 77 ML/MIN/1.73M2 — SIGNIFICANT CHANGE UP
EOSINOPHIL # BLD AUTO: 0.2 K/UL — SIGNIFICANT CHANGE UP (ref 0–0.5)
EOSINOPHIL NFR BLD AUTO: 2.7 % — SIGNIFICANT CHANGE UP (ref 0–6)
EPI CELLS # UR: SIGNIFICANT CHANGE UP
GLUCOSE SERPL-MCNC: 95 MG/DL — SIGNIFICANT CHANGE UP (ref 70–99)
GLUCOSE UR QL: NEGATIVE MG/DL — SIGNIFICANT CHANGE UP
HCT VFR BLD CALC: 35 % — LOW (ref 39–50)
HGB BLD-MCNC: 11.1 G/DL — LOW (ref 13–17)
IMM GRANULOCYTES NFR BLD AUTO: 0.5 % — SIGNIFICANT CHANGE UP (ref 0–0.9)
KETONES UR-MCNC: NEGATIVE MG/DL — SIGNIFICANT CHANGE UP
LEUKOCYTE ESTERASE UR-ACNC: ABNORMAL
LYMPHOCYTES # BLD AUTO: 1.09 K/UL — SIGNIFICANT CHANGE UP (ref 1–3.3)
LYMPHOCYTES # BLD AUTO: 14.5 % — SIGNIFICANT CHANGE UP (ref 13–44)
MCHC RBC-ENTMCNC: 30.9 PG — SIGNIFICANT CHANGE UP (ref 27–34)
MCHC RBC-ENTMCNC: 31.7 GM/DL — LOW (ref 32–36)
MCV RBC AUTO: 97.5 FL — SIGNIFICANT CHANGE UP (ref 80–100)
MONOCYTES # BLD AUTO: 0.93 K/UL — HIGH (ref 0–0.9)
MONOCYTES NFR BLD AUTO: 12.4 % — SIGNIFICANT CHANGE UP (ref 2–14)
NEUTROPHILS # BLD AUTO: 5.23 K/UL — SIGNIFICANT CHANGE UP (ref 1.8–7.4)
NEUTROPHILS NFR BLD AUTO: 69.4 % — SIGNIFICANT CHANGE UP (ref 43–77)
NITRITE UR-MCNC: NEGATIVE — SIGNIFICANT CHANGE UP
NRBC # BLD: 0 /100 WBCS — SIGNIFICANT CHANGE UP (ref 0–0)
PH UR: 7 — SIGNIFICANT CHANGE UP (ref 5–8)
PLATELET # BLD AUTO: 238 K/UL — SIGNIFICANT CHANGE UP (ref 150–400)
POTASSIUM SERPL-MCNC: 4 MMOL/L — SIGNIFICANT CHANGE UP (ref 3.5–5.3)
POTASSIUM SERPL-SCNC: 4 MMOL/L — SIGNIFICANT CHANGE UP (ref 3.5–5.3)
PROT SERPL-MCNC: 6.3 G/DL — SIGNIFICANT CHANGE UP (ref 6–8.3)
PROT UR-MCNC: SIGNIFICANT CHANGE UP MG/DL
RAPID RVP RESULT: SIGNIFICANT CHANGE UP
RBC # BLD: 3.59 M/UL — LOW (ref 4.2–5.8)
RBC # FLD: 13 % — SIGNIFICANT CHANGE UP (ref 10.3–14.5)
RBC CASTS # UR COMP ASSIST: 0 /HPF — SIGNIFICANT CHANGE UP (ref 0–4)
SARS-COV-2 RNA SPEC QL NAA+PROBE: SIGNIFICANT CHANGE UP
SODIUM SERPL-SCNC: 141 MMOL/L — SIGNIFICANT CHANGE UP (ref 135–145)
SP GR SPEC: 1.02 — SIGNIFICANT CHANGE UP (ref 1–1.03)
TROPONIN I, HIGH SENSITIVITY RESULT: 18 NG/L — SIGNIFICANT CHANGE UP
UROBILINOGEN FLD QL: 1 MG/DL — SIGNIFICANT CHANGE UP (ref 0.2–1)
WBC # BLD: 7.53 K/UL — SIGNIFICANT CHANGE UP (ref 3.8–10.5)
WBC # FLD AUTO: 7.53 K/UL — SIGNIFICANT CHANGE UP (ref 3.8–10.5)
WBC UR QL: 1 /HPF — SIGNIFICANT CHANGE UP (ref 0–5)

## 2023-06-12 PROCEDURE — 93010 ELECTROCARDIOGRAM REPORT: CPT

## 2023-06-12 PROCEDURE — 70450 CT HEAD/BRAIN W/O DYE: CPT | Mod: 26,MA

## 2023-06-12 PROCEDURE — 93970 EXTREMITY STUDY: CPT | Mod: 26

## 2023-06-12 PROCEDURE — 99285 EMERGENCY DEPT VISIT HI MDM: CPT | Mod: FS

## 2023-06-12 PROCEDURE — 99223 1ST HOSP IP/OBS HIGH 75: CPT

## 2023-06-12 PROCEDURE — 93880 EXTRACRANIAL BILAT STUDY: CPT | Mod: 26

## 2023-06-12 PROCEDURE — 71045 X-RAY EXAM CHEST 1 VIEW: CPT | Mod: 26

## 2023-06-12 RX ORDER — ATORVASTATIN CALCIUM 80 MG/1
40 TABLET, FILM COATED ORAL AT BEDTIME
Refills: 0 | Status: DISCONTINUED | OUTPATIENT
Start: 2023-06-12 | End: 2023-06-15

## 2023-06-12 RX ORDER — TAMSULOSIN HYDROCHLORIDE 0.4 MG/1
0.4 CAPSULE ORAL AT BEDTIME
Refills: 0 | Status: DISCONTINUED | OUTPATIENT
Start: 2023-06-12 | End: 2023-06-15

## 2023-06-12 RX ORDER — SENNA PLUS 8.6 MG/1
2 TABLET ORAL AT BEDTIME
Refills: 0 | Status: DISCONTINUED | OUTPATIENT
Start: 2023-06-12 | End: 2023-06-15

## 2023-06-12 RX ORDER — ENOXAPARIN SODIUM 100 MG/ML
40 INJECTION SUBCUTANEOUS EVERY 24 HOURS
Refills: 0 | Status: DISCONTINUED | OUTPATIENT
Start: 2023-06-12 | End: 2023-06-15

## 2023-06-12 RX ORDER — ACETAMINOPHEN 500 MG
650 TABLET ORAL EVERY 6 HOURS
Refills: 0 | Status: DISCONTINUED | OUTPATIENT
Start: 2023-06-12 | End: 2023-06-15

## 2023-06-12 RX ORDER — METOPROLOL TARTRATE 50 MG
12.5 TABLET ORAL
Refills: 0 | Status: DISCONTINUED | OUTPATIENT
Start: 2023-06-12 | End: 2023-06-15

## 2023-06-12 RX ORDER — RIVASTIGMINE 4.6 MG/24H
1 PATCH, EXTENDED RELEASE TRANSDERMAL EVERY 24 HOURS
Refills: 0 | Status: DISCONTINUED | OUTPATIENT
Start: 2023-06-12 | End: 2023-06-15

## 2023-06-12 RX ORDER — ASPIRIN/CALCIUM CARB/MAGNESIUM 324 MG
81 TABLET ORAL DAILY
Refills: 0 | Status: DISCONTINUED | OUTPATIENT
Start: 2023-06-12 | End: 2023-06-15

## 2023-06-12 RX ADMIN — TAMSULOSIN HYDROCHLORIDE 0.4 MILLIGRAM(S): 0.4 CAPSULE ORAL at 22:36

## 2023-06-12 RX ADMIN — SENNA PLUS 2 TABLET(S): 8.6 TABLET ORAL at 22:35

## 2023-06-12 RX ADMIN — ATORVASTATIN CALCIUM 40 MILLIGRAM(S): 80 TABLET, FILM COATED ORAL at 22:36

## 2023-06-12 RX ADMIN — ENOXAPARIN SODIUM 40 MILLIGRAM(S): 100 INJECTION SUBCUTANEOUS at 22:35

## 2023-06-12 NOTE — ED ADULT NURSE NOTE - OBJECTIVE STATEMENT
87 yr old male to ED for complaints of AMS. Patient BIB EMS from Nursing Home for complaints of alter mental status this morning at 7AM today. Per staff patient was pale and mumbling. Patient awake alert and at baseline. Patient A&Ox2, denies chest pain, shortness of breath, fevers, chills, nausea, vomiting. Patient has an ulcer ro right buttocks, right upper leg and left lower leg. EKG obtained, labs obtained. CT done. Safety maintained.

## 2023-06-12 NOTE — ED PROVIDER NOTE - PHYSICAL EXAMINATION
Pt is in no distress and non-toxic appearing. Oriented to self and place.  SKIN: Per RN note  EYES: normal conjunctival, sclerae white, pupils equal and reactive, EOM intact, No drainage   OP: clear  NECK:  supple, No Posterior TTP  LUNGS: Chest clear to ascultation bilaterally no W/R/R  HEART: Regular rate and rhythm, no murmur  ABDOMEN: Soft, ND, non-tender, No HSM.  BACK: no TTP on spine  MSK: no gross deformity, no swelling  NEURO: Unable to fully access due to pt. not fully corperative with exam, but noted to move all extremities.

## 2023-06-12 NOTE — H&P ADULT - NSHPLABSRESULTS_GEN_ALL_CORE
WBC Count: 7.53 K/uL ( 11:45)  RBC Count: 3.59 M/uL *L* ( 11:45)  Hemoglobin: 11.1 g/dL *L* ( 11:45)  Hematocrit: 35.0 % *L* ( 11:45)  Platelet Count - Automated: 238 K/uL ( 11:45)          141  |  105  |  16  ----------------------------<  95  4.0   |  29  |  0.96    Ca    8.9      2023 11:45    TPro  6.3  /  Alb  2.7<L>  /  TBili  0.4  /  DBili  x   /  AST  27  /  ALT  20  /  AlkPhos  93                      Urinalysis Basic - ( 2023 12:00 )    Color: Yellow / Appearance: Cloudy / S.020 / pH: x  Gluc: x / Ketone: Negative mg/dL  / Bili: Negative / Urobili: 1.0 mg/dL   Blood: x / Protein: Trace mg/dL / Nitrite: Negative   Leuk Esterase: Trace / RBC: 0 /HPF / WBC 1 /HPF   Sq Epi: x / Non Sq Epi: x / Bacteria: Negative /HPF        Albumin, Serum: 2.7 g/dL *L* ( 11:45)  Bilirubin Total, Serum: 0.4 mg/dL ( 11:45)  Aspartate Aminotransferase (AST/SGOT): 27 U/L ( 11:45)  Alanine Aminotransferase (ALT/SGPT): 20 U/L ( 11:45)  Alkaline Phosphatase, Serum: 93 U/L ( 11:45)          WBC Count: 7.53 K/uL ( 11:45)  RBC Count: 3.59 M/uL *L* ( 11:45)  Hemoglobin: 11.1 g/dL *L* ( 11:45)  Hematocrit: 35.0 % *L* ( 11:45)  Platelet Count - Automated: 238 K/uL ( 11:45)        EKG sinus bradycardia    CT head:  There is periventricular and   subcortical white matter hypoattenuation,  most compatible with chronic   microvascular ischemic changes.  The ventricles are dilated, out of proportion to degree of sulcal   dilatation, suggestive of NPH, not significantly changed since prior   exams. No evidence of hemorrhage.  Chronic changes as above.

## 2023-06-12 NOTE — H&P ADULT - HISTORY OF PRESENT ILLNESS
87 year-old male PMHx dementia, ASHD, IBS, BPH, HTN, constipation, DVT previously on AC admitted from [facility] after episode of decreased responsiveness and diaphoresis earlier this morning, which had resolved by the time of ER arrival. ER team admitted patient for suspected ?syncope though unclear 87 year-old male PMHx dementia, ASHD, IBS, BPH, HTN, constipation, DVT previously on AC admitted from [facility] after episode of decreased responsiveness and diaphoresis earlier this morning, which had resolved by the time of ER arrival. ER team admitted patient for suspected ?syncope; chart suggestive of decreased responsiveness.  No witnessed syncope.  Recent rx for UTI.  No acute infection found on ER work up.  CT head suggestive of NPH.  Neurology consulted; case d/w Dr. Herrera.  HHA and wife at bedside.  Daughter Brenda reports that in recent days patient tends to slide from his wheelchair.  Wife reports that the patient tends to 'sleep a lot', and is concerned that he is on too many meds.  Dr. Vilalr reports recent rx for UTI.    Patient is awake, responsive but is unreliable historian due to dementia.

## 2023-06-12 NOTE — ED PROVIDER NOTE - OBJECTIVE STATEMENT
Patient from THE [facility] home for dementia sent in for evaluation of episode of altered mental status.  Patient is a poor historian and at this time offers no complaints.  Denies any pain.  A&O to self and place only (this is his baseline).  Chart review from previous visit document paper patient with history of dementia, prostate CA, Patient PAF on ASA.    I spoke to  Mariela at the assisted living facility who reports that patient was found in the chair in the common area this morning noticed  to be pale and sweating and not as responsive as he usually does unclear of exactly how long the episode lasts as patient transfer prior to episode end.   Presently pt. resting comfortably and offered no complaint.

## 2023-06-12 NOTE — ED ADULT TRIAGE NOTE - CHIEF COMPLAINT QUOTE
BIBA from facility, AMS noted at 7am today was pale and mumbling, now awake alert and baseline as per EMS

## 2023-06-12 NOTE — H&P ADULT - ASSESSMENT
87 year-old male with    #Episode of Acute encephalopathy, since resolved  Baseline dementia  NPH  r/o syncope  r/o TIA  -telemonitor  -f/up TTE and carotid cuplex  -f/up neuro recs  -fall precautions  -continue home meds   87 year-old male with    #Episode of Acute encephalopathy, since resolved  Baseline dementia  NPH  r/o syncope  r/o TIA  -telemonitor  -f/up TTE and carotid duplex  -f/up neuro recs  -fall precautions  -continue home meds  -avoid sedatives/hypnotics  -aspirin for CVA prevention  -check lipid profile and consider statin    #Recent DVT  -f/up LE duplex    #BPH  -Flomax  -Is/Os    #Sinus bradycardia  -lower dose metoprolol    #IBS  -Dicyclomine    #Lovenox for dvt ppx    Dispo: pending medical work up 87 year-old male with    #Episode of Acute encephalopathy, since resolved  Baseline dementia  NPH  r/o syncope  r/o TIA  -telemonitor  -f/up TTE and carotid duplex  -f/up neuro recs  -fall precautions  -continue home meds  -avoid sedatives/hypnotics  -avoid polypharmacy  -aspirin for CVA prevention  -check lipid profile and consider statin    #Recent DVT  -f/up LE duplex    #BPH  -Flomax  -Is/Os    #Sinus bradycardia  -lower dose metoprolol    #IBS  -Dicyclomine    #Lovenox for dvt ppx    Full code    Dispo: pending medical work up

## 2023-06-12 NOTE — H&P ADULT - NSHPPHYSICALEXAM_GEN_ALL_CORE
NAD  awake oriented to self, knows , recognizes wife, knows [facility]  does not know why he is here  lungs clear  S1S2, bradycardic  abd soft no focal ttp  no leg edema  abrasion/rash LLE  abrasion left hand

## 2023-06-12 NOTE — ED ADULT NURSE NOTE - NSFALLHARMRISKINTERV_ED_ALL_ED
Assistance OOB with selected safe patient handling equipment if applicable/Communicate risk of Fall with Harm to all staff, patient, and family/Monitor gait and stability/Provide patient with walking aids/Provide visual cue: red socks, yellow wristband, yellow gown, etc/Reinforce activity limits and safety measures with patient and family/Toileting schedule using arm’s reach rule for commode and bathroom/Bed in lowest position, wheels locked, appropriate side rails in place/Call bell, personal items and telephone in reach/Instruct patient to call for assistance before getting out of bed/chair/stretcher/Non-slip footwear applied when patient is off stretcher/[place] to call system/Physically safe environment - no spills, clutter or unnecessary equipment/Purposeful Proactive Rounding/Room/bathroom lighting operational, light cord in reach

## 2023-06-12 NOTE — ED PROVIDER NOTE - CLINICAL SUMMARY MEDICAL DECISION MAKING FREE TEXT BOX
Patient from THE [facility] living [facility] home for dementia sent in for evaluation of episode of altered mental status.  Patient is a poor historian and at this time offers no complaints.  Denies any pain.  A&O to self and place only (this is his baseline).  Chart review from previous visit document paper patient with history of dementia, prostate CA, Patient PAF on ASA.    I spoke to  Mariela at the assisted living facility who reports that patient was found in the chair in the common area this morning noticed  to be pale and sweating and not as responsive as he usually does unclear of exactly how long the episode lasts as patient transfer prior to episode end.   Tachycardia with the ER with a Sick patient has no document for recent illness. Pt.  baseline he ambulated with assistance with 2 people  Presently pt. resting comfortably and offered no complaint.     Will obtain labs, trop, EKG  imagings including CT and CXR.

## 2023-06-13 LAB
ALBUMIN SERPL ELPH-MCNC: 2.5 G/DL — LOW (ref 3.3–5)
ALP SERPL-CCNC: 86 U/L — SIGNIFICANT CHANGE UP (ref 40–120)
ALT FLD-CCNC: 18 U/L — SIGNIFICANT CHANGE UP (ref 10–45)
ANION GAP SERPL CALC-SCNC: 6 MMOL/L — SIGNIFICANT CHANGE UP (ref 5–17)
AST SERPL-CCNC: 23 U/L — SIGNIFICANT CHANGE UP (ref 10–40)
BASOPHILS # BLD AUTO: 0.02 K/UL — SIGNIFICANT CHANGE UP (ref 0–0.2)
BASOPHILS NFR BLD AUTO: 0.4 % — SIGNIFICANT CHANGE UP (ref 0–2)
BILIRUB SERPL-MCNC: 0.4 MG/DL — SIGNIFICANT CHANGE UP (ref 0.2–1.2)
BUN SERPL-MCNC: 12 MG/DL — SIGNIFICANT CHANGE UP (ref 7–23)
CALCIUM SERPL-MCNC: 8.7 MG/DL — SIGNIFICANT CHANGE UP (ref 8.4–10.5)
CHLORIDE SERPL-SCNC: 106 MMOL/L — SIGNIFICANT CHANGE UP (ref 96–108)
CHOLEST SERPL-MCNC: 161 MG/DL — SIGNIFICANT CHANGE UP
CO2 SERPL-SCNC: 29 MMOL/L — SIGNIFICANT CHANGE UP (ref 22–31)
CREAT SERPL-MCNC: 0.99 MG/DL — SIGNIFICANT CHANGE UP (ref 0.5–1.3)
EGFR: 74 ML/MIN/1.73M2 — SIGNIFICANT CHANGE UP
EOSINOPHIL # BLD AUTO: 0.26 K/UL — SIGNIFICANT CHANGE UP (ref 0–0.5)
EOSINOPHIL NFR BLD AUTO: 4.6 % — SIGNIFICANT CHANGE UP (ref 0–6)
GLUCOSE SERPL-MCNC: 87 MG/DL — SIGNIFICANT CHANGE UP (ref 70–99)
HCT VFR BLD CALC: 33.4 % — LOW (ref 39–50)
HDLC SERPL-MCNC: 41 MG/DL — SIGNIFICANT CHANGE UP
HGB BLD-MCNC: 10.4 G/DL — LOW (ref 13–17)
IMM GRANULOCYTES NFR BLD AUTO: 0.4 % — SIGNIFICANT CHANGE UP (ref 0–0.9)
LIPID PNL WITH DIRECT LDL SERPL: 100 MG/DL — HIGH
LYMPHOCYTES # BLD AUTO: 1.05 K/UL — SIGNIFICANT CHANGE UP (ref 1–3.3)
LYMPHOCYTES # BLD AUTO: 18.7 % — SIGNIFICANT CHANGE UP (ref 13–44)
MCHC RBC-ENTMCNC: 30.3 PG — SIGNIFICANT CHANGE UP (ref 27–34)
MCHC RBC-ENTMCNC: 31.1 GM/DL — LOW (ref 32–36)
MCV RBC AUTO: 97.4 FL — SIGNIFICANT CHANGE UP (ref 80–100)
MONOCYTES # BLD AUTO: 0.55 K/UL — SIGNIFICANT CHANGE UP (ref 0–0.9)
MONOCYTES NFR BLD AUTO: 9.8 % — SIGNIFICANT CHANGE UP (ref 2–14)
NEUTROPHILS # BLD AUTO: 3.71 K/UL — SIGNIFICANT CHANGE UP (ref 1.8–7.4)
NEUTROPHILS NFR BLD AUTO: 66.1 % — SIGNIFICANT CHANGE UP (ref 43–77)
NON HDL CHOLESTEROL: 120 MG/DL — SIGNIFICANT CHANGE UP
NRBC # BLD: 0 /100 WBCS — SIGNIFICANT CHANGE UP (ref 0–0)
PLATELET # BLD AUTO: 208 K/UL — SIGNIFICANT CHANGE UP (ref 150–400)
POTASSIUM SERPL-MCNC: 4.2 MMOL/L — SIGNIFICANT CHANGE UP (ref 3.5–5.3)
POTASSIUM SERPL-SCNC: 4.2 MMOL/L — SIGNIFICANT CHANGE UP (ref 3.5–5.3)
PROT SERPL-MCNC: 5.9 G/DL — LOW (ref 6–8.3)
RBC # BLD: 3.43 M/UL — LOW (ref 4.2–5.8)
RBC # FLD: 13.1 % — SIGNIFICANT CHANGE UP (ref 10.3–14.5)
SODIUM SERPL-SCNC: 141 MMOL/L — SIGNIFICANT CHANGE UP (ref 135–145)
TRIGL SERPL-MCNC: 101 MG/DL — SIGNIFICANT CHANGE UP
WBC # BLD: 5.61 K/UL — SIGNIFICANT CHANGE UP (ref 3.8–10.5)
WBC # FLD AUTO: 5.61 K/UL — SIGNIFICANT CHANGE UP (ref 3.8–10.5)

## 2023-06-13 PROCEDURE — 99233 SBSQ HOSP IP/OBS HIGH 50: CPT

## 2023-06-13 PROCEDURE — 99232 SBSQ HOSP IP/OBS MODERATE 35: CPT

## 2023-06-13 RX ADMIN — Medication 10 MILLIGRAM(S): at 16:30

## 2023-06-13 RX ADMIN — Medication 81 MILLIGRAM(S): at 12:00

## 2023-06-13 RX ADMIN — Medication 12.5 MILLIGRAM(S): at 07:22

## 2023-06-13 RX ADMIN — Medication 10 MILLIGRAM(S): at 07:22

## 2023-06-13 RX ADMIN — TAMSULOSIN HYDROCHLORIDE 0.4 MILLIGRAM(S): 0.4 CAPSULE ORAL at 21:19

## 2023-06-13 RX ADMIN — RIVASTIGMINE 1 PATCH: 4.6 PATCH, EXTENDED RELEASE TRANSDERMAL at 11:59

## 2023-06-13 RX ADMIN — ATORVASTATIN CALCIUM 40 MILLIGRAM(S): 80 TABLET, FILM COATED ORAL at 21:19

## 2023-06-13 RX ADMIN — Medication 12.5 MILLIGRAM(S): at 17:43

## 2023-06-13 RX ADMIN — SENNA PLUS 2 TABLET(S): 8.6 TABLET ORAL at 21:19

## 2023-06-13 RX ADMIN — RIVASTIGMINE 1 PATCH: 4.6 PATCH, EXTENDED RELEASE TRANSDERMAL at 19:00

## 2023-06-13 RX ADMIN — ENOXAPARIN SODIUM 40 MILLIGRAM(S): 100 INJECTION SUBCUTANEOUS at 22:17

## 2023-06-13 NOTE — PHYSICAL THERAPY INITIAL EVALUATION ADULT - PASSIVE RANGE OF MOTION EXAMINATION, REHAB EVAL
right knee limited appears uncomfortable/Left LE Passive ROM was WFL (within functional limits)/Right LE Passive ROM was WFL (within functional limits)

## 2023-06-13 NOTE — PATIENT PROFILE ADULT - FALL HARM RISK - HARM RISK INTERVENTIONS

## 2023-06-13 NOTE — ED ADULT NURSE REASSESSMENT NOTE - NS ED NURSE REASSESS COMMENT FT1
Patient denies pain/complaints. Called Tele for report , bed ready per Vishnu Bowers and Supervisor Krissy. Bed no ready, spoke to Shakila,  from Tele. Safety maintained. Will continue to monitor.

## 2023-06-13 NOTE — ADVANCED PRACTICE NURSE CONSULT - ASSESSMENT
Elderly male admitted from Nursing Home for AMS, presents with multiple wounds.  Patient alert & pleasantly confused, wife is at bedside along with private HHA.  On the Right Hip there is an Unstageable Pressure Injury, measuring 6 x 11 cm, with centrally located soft, black eschar, surrounded by dark red to purple discoloration.   The Sacrum has a DTI; dark red discoloration of the skin, 5 x 11 cm, with a purple discoloration (1 x 1cm) at the superior edge.  The Right Buttock/Ischium has a Stage 2 pressure injury, partial thickness tissue loss of 6 x 5 x 0.1 cm, wound bed is pale pink, with irregular border & periwound with blanchable erythema.  Right heel DTI, with dark red skin discoloration, 8 x 5 cm with dark purple area 1 x 1 cm posteriorly.  Noted is a large, 15 x 5 cm abrasion to the anterior Left Shin, partially scabbed, with periwound erythema, no drainage.  There are also multiple, diffuse small dry scabs to the left thigh. (Wife states patient has been scratching at thigh & lower leg, causing abrasion) Elderly male admitted from Nursing Home for AMS, presents with multiple wounds.  Patient alert & pleasantly confused, wife is at bedside along with private HHA.  On the Right Hip there is an Unstageable Pressure Injury, measuring 6 x 11 cm, with centrally located soft, black eschar, surrounded by dark red to purple discoloration.   The Sacrum has a DTI; dark red discoloration of the skin, 5 x 11 cm, with a purple discoloration (1 x 1cm) at the superior edge, without any opening in skin.  The Right Buttock/Ischium has a Stage 2 pressure injury, partial thickness tissue loss of 6 x 5 x 0.1 cm, wound bed is pale pink, with irregular border & periwound with blanchable erythema.  Right heel DTI, with dark red skin discoloration, 8 x 5 cm with dark purple area 1 x 1 cm posteriorly, no opening in skin.  Noted is a large, 15 x 5 cm abrasion to the anterior Left Shin, partially scabbed, with periwound erythema, no drainage.  There are also multiple, diffuse small dry scabs to the left thigh. (Wife states patient has been scratching at thigh & lower leg, causing abrasion)

## 2023-06-13 NOTE — ED ADULT NURSE REASSESSMENT NOTE - NS ED NURSE REASSESS COMMENT FT1
Received patient from nurse Naresh Cruz. Patient sleeping, VSS. Patient admitted to Telemetry, no beds at this time. Patient EDHOLD. Continuous monitoring.

## 2023-06-13 NOTE — PATIENT PROFILE ADULT - ARE SIGNIFICANT INDICATORS COMPLETE.
Please contact patient let her know that the medication was refilled  Please make her aware of the changes to the script specifically that she should begin taking this every 6 hours only for severe pain and we only prescribe 20 pills this time we will try to wean her off medication slowly    Please remind her about Tylenol and NSAIDs if she is able take NSAIDs as primary source of pain relief for mild-to-moderate pain Yes

## 2023-06-13 NOTE — CONSULT NOTE ADULT - SUBJECTIVE AND OBJECTIVE BOX
Hx from chart:    87 year-old male PMHx dementia, ASHD, IBS, BPH, HTN, constipation, DVT previously on AC admitted from [facility] after episode of decreased responsiveness and diaphoresis earlier this morning, which had resolved by the time of ER arrival. ER team admitted patient for suspected ?syncope; chart suggestive of decreased responsiveness.  No witnessed syncope.  Recent rx for UTI.  No acute infection found on ER work up.  CT head suggestive of NPH.  Neurology consulted; case d/w Dr. Herrera.  HHA and wife at bedside.  Daughter Brenda reports that in recent days patient tends to slide from his wheelchair.  Wife reports that the patient tends to 'sleep a lot', and is concerned that he is on too many meds.  Dr. Villar reports recent rx for UTI.    Patient is awake, responsive but is unreliable historian due to dementia.    --end of exam--    ICU Vital Signs Last 24 Hrs  T(C): 36.7 (13 Jun 2023 06:06), Max: 37.1 (12 Jun 2023 16:28)  T(F): 98 (13 Jun 2023 06:06), Max: 98.8 (12 Jun 2023 21:25)  HR: 76 (13 Jun 2023 06:06) (68 - 88)  BP: 128/71 (13 Jun 2023 06:06) (126/69 - 152/80)  BP(mean): --  ABP: --  ABP(mean): --  RR: 19 (13 Jun 2023 06:06) (16 - 19)  SpO2: 99% (13 Jun 2023 06:06) (96% - 100%)    O2 Parameters below as of 13 Jun 2023 05:14  Patient On (Oxygen Delivery Method): room air    somnolent. follows commands  PERRL, eomi, face sym, v1-3 intact, tup midline  moves all but left leg seem to be medially rotated with scratch marks.  sens unable to test    MEDICATIONS  (STANDING):  aspirin  chewable 81 milliGRAM(s) Oral daily  atorvastatin 40 milliGRAM(s) Oral at bedtime  dicyclomine 10 milliGRAM(s) Oral two times a day before meals  enoxaparin Injectable 40 milliGRAM(s) SubCutaneous every 24 hours  metoprolol tartrate 12.5 milliGRAM(s) Oral two times a day  rivastigmine patch  4.6 mG/24 Hr(s). 1 Patch Transdermal every 24 hours  senna 2 Tablet(s) Oral at bedtime  tamsulosin 0.4 milliGRAM(s) Oral at bedtime    MEDICATIONS  (PRN):  acetaminophen     Tablet .. 650 milliGRAM(s) Oral every 6 hours PRN Temp greater or equal to 38C (100.4F), Moderate Pain (4 - 6)    87 M hx of dementia, ASDD, HTN, hx of dvt who was consulted because of decreased level of responsiveness. HCT shows enlarged ventricles that are likely due to generalized atrophy. The findings are similar to previous scans. No clinical suspicion of NPH as per Dr. Burt's office notes. No wbc elevation. Trace leuk esterase in UA. cxr: LEFT lower zone linear airspace opacity/atelectasis. lower leg dvt and carotid dvt were unremarkable. Will need more history as whether patient was given any prn medications that may be causing his somnolence. Will avoid any sedating medications going forth. With the left leg medial rotation, please check xray of left hip.

## 2023-06-13 NOTE — ADVANCED PRACTICE NURSE CONSULT - RECOMMEDATIONS
Suggest daily Normal Saline Cleanse of Right hip wound, pat dry. Apply Cavillon film barrier daily to periwound, allow to dry. Apply Santyl daily to wound bed (nickel thickness), cover with Allevyn foam dressing.  Please cleanse Sacral & Right Buttock/Ischial wounds with NS, Pat dry, paint amanda-wound with Cavilon, cover with TRIAD Cream twice daily & PRN, (it is ok to leave a thin layer of cream after cleansing this will not impede wound healing).   Apply Cavillon film barrier daily to Right Heel (& Left heel prophylactically), cover with foam dressing.   Suggest daily Normal Saline Cleanse of Left shin abrasion, pat thoroughly dry paint amanda-wound with Cavilon. Apply Mupirocin ointment to wound bed, cover with 4x4, wrap with maxime.  -Continue turning/positioning patient from side-to-side every 2 hours while in bed, every hour when/if OOB chair, or in accordance w/ pt's plan of care. Utilize pillows to assist w/ turning/positioning. When/if OOB chair, utilize pillows or chair cushion to offload pressure.   -Continue to offload heels from bed surface with soft pillow under calfs or by applying offloading boots to BLEs.     -Continue utilizing one underpad underneath patient to contain incontinence episodes; change pad when saturated/soiled.    -Continue nutrition consult for optimal wound healing & nutritional status.   -Assess skin/wound Every shift, report changes to primary provider.     Primary RN made aware of above recs. Staff RN to perform routine skin/wound assessment and manage wound care. Questions or concerns or if wound worsens and reconsult needed, please contact Wound Care RN  Suggest daily Normal Saline Cleanse of Right hip wound, pat dry. Apply Cavillon film barrier daily to periwound, allow to dry. Apply Santyl daily to wound bed (nickel thickness), cover with Allevyn foam dressing.  Please cleanse Sacral & Right Buttock/Ischial wounds with NS, Pat dry, paint amanda-wound with Cavilon, cover with TRIAD Cream twice daily & PRN, (it is ok to leave a thin layer of cream after cleansing this will not impede wound healing).   Apply Cavillon film barrier daily to Right Heel (& Left heel prophylactically), cover with foam dressing.   Suggest daily Normal Saline Cleanse of Left shin abrasion, pat thoroughly dry paint amanda-wound with Cavilon. Apply Mupirocin ointment to wound bed, cover with 4x4, wrap with maxime.    Consider Plastics Consult for possible debridement of Right Hip wound.    -Continue turning/positioning patient from side-to-side every 2 hours while in bed, every hour when/if OOB chair, or in accordance w/ pt's plan of care. Utilize pillows to assist w/ turning/positioning. When/if OOB chair, utilize pillows or chair cushion to offload pressure.   -Continue to offload heels from bed surface with soft pillow under calfs or by applying offloading boots to BLEs.     -Continue utilizing one underpad underneath patient to contain incontinence episodes; change pad when saturated/soiled.    -Continue nutrition consult for optimal wound healing & nutritional status.   -Assess skin/wound Every shift, report changes to primary provider.     Primary RN made aware of above recs. Staff RN to perform routine skin/wound assessment and manage wound care. Questions or concerns or if wound worsens and reconsult needed, please contact Wound Care RN

## 2023-06-13 NOTE — GOALS OF CARE CONVERSATION - ADVANCED CARE PLANNING - CONVERSATION DETAILS
Pt. from [facility] with episode of syncope. His wife Sonya and private aide Evan are at bedside. Pt. has dementia, able to follow simple commands. Wife stated that their daughter Lola is the pts. HCP. I called Lola, who lives in [place]. She stated her mom is mildly forgetful. Her mom is hoping that the patient will be able to get stronger and be able to walk again. I discussed short and long term options with Lola. She knows her mo. wants pt. to be Full Code.   Daughter Lola was given information about risks of CPR in the elderly, she expressed understanding.  She agreed to view an ACP video about CPR which I sent, so that she can better have GOC discussion with her mom about the pt.

## 2023-06-13 NOTE — PROGRESS NOTE ADULT - ASSESSMENT
"  2/17/2020      RE: Yasmeen Jeffers  [address]  [place] [address]            Pediatric Integrative Medicine Initial Consultation    Primary Care provider: Maranda Young  Consulting Provider: Esther Lawson MD    Reason for consultation: I was asked to see this patient for   Encounter Diagnoses   Name Primary?     Chronic pain syndrome Yes     Myalgia      Chronic migraine without aura without status migrainosus, not intractable      Non-pharmacologic suggestions and coping        Assessment:  Yasmeen is a 19 year old female patient with  Encounter Diagnoses   Name Primary?     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    Magnesium citrate 400 mg at night    No oral CBD due to [id number] interactions with citalopram    Epsom salts baths    Weighted, heated neck wrap    Self-massage with Ache-Ease at night before bed with LI-4 and ST-25 acupressure points.    Continue drinking increased amounts of water    May need to start Miralax again    RTC in 6-8 weeks after Gynecology appointment and incorporating above recommendations, call earlier prn.     -----------------------------------------------------------------    History of Present Illness: Yasmeen Jeffers is a 19  year old 4  month old female with a 6-month history of chronic pain associated with fatigue.  She is accompanied today by her mother, Nida. In July and August to August of 2019, Yasmeen began to notice perimenstrual pain, having her menses approximately every 2 weeks during that time. She would have symptoms of \"body migraine,\" feeling somewhat nauseous, it hurt " "to move her body, had aches in her muscles, no fevers, symptoms lasting approximately 3 days wolf time. It is worse in the beginning and improving over the subsequent few days. The first day is the worst as it hurts to touch her with even gentle pressure on her muscles. She becomes irritable with this pain and it interferes with her life as she may miss work, dance, and social activities.    At around the same time that this started,Yasmeen started at a new school. 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 She competed on a dance team last year, but this year is doing only ballet with solo competition 8 hours/week. For fun, she watches [employer] and does some socializing with friends but now they are on different schedules, so this is a bit harder. She does see her friends from dance almost every night.    Yasmeen's menstrual cycles used to be every 2 weeks. She is on an extended oral contraceptive now called Apri and although she is no longer having her period twice a month, she feels that around the time she would have gotten it  is when her symptoms recur. She does have a history of psoas muscle irritability and has seen a physical therapist for this in the past. Her massage therapist also helps with some of the deeper symptoms. She sees a chiropractor approximately every 6 weeks. She has used heating pads, Aleve, Epsom salts, and most importantly has acquired a new \"sleep number\" bed and no longer wakes up sore every morning. She feels that this has enabled her to sleep through the night without awakening in the mid morning hours as she did before. It takes her approximately 15 to 20 minutes to fall asleep and she usually has fairly good energy in the morning once she is " 87 year-old male with    #Episode of Acute encephalopathy, since resolved  #Dementia  -CT head resulted noted  -Neurology recommendations appreciated  -Organic causes ruled out such as acute infection, electrolyte abnormalities   -Carotid duplex results noted  -avoid sedatives/hypnotics  -avoid polypharmacy  -aspirin for CVA prevention    #Recent DVT  -Doppler negative    #BPH  -Flomax    #Sinus bradycardia  -lower dose metoprolol    #IBS  -Dicyclomine    #Multiple Wounds  -Wound care RN consulted for management     #Lovenox for dvt ppx    Full code    Updated daughter Salina awake.    She is cold most of the time but when she does get warm her hands gets red and sweaty.    Yasmeen was diagnosed with migraines by her primary care provider and also saw the team at [facility]. She now takes Relpax and Aleve 1-2 times per every 3-day episode; she has no visual changes and no aura, although she is somewhat more light sensitive during the time she has these episodes. She has some associated nausea with reflux symptoms and occasional vomiting and she does not have these symptoms at any other time.    They also had some questions about the use of oral CBD and topical CBD. We discussed interactions of medications which are metabolized by cytochrome P450 specifically as this is the way citalopram is metabolized.    Yasmeen has anxiety and has seen a counselor in the past. Coping skills which she still uses include breathing techniques and mindfulness meditation. She does not currently see a counselor.    Note: celiac disease testing, with normal IgA, was normal.       Review of systems: The Comprehensive ROS was performed and is negative except as noted below and in the HPI.        Yasmeen has had kidney stones in the past. They are uncertain what type and does not recall analysis of the stones.     Mobility: Nancy reports being hyperextensible at the knees but no other signs of hypermobility; she did have a left shoulder injury after playing at a trampoline park,however, required only a sling for several weeks with no surgical intervention.    Anxiety:longstanding; tests are stressful.  Drugs/smoking/alcohol:denies  Nutrition: Reports minor weight fluctuations when she is not feeling well, but overall the same weight for the last 6 months; she does remark that she is chronically constipated, although has bowel movements every day, they are small and hard. Dairy is limited as above; she has caffeine 1-2 times per day; she follows no specific diet, although she is trying to add more protein,  fruits, and vegetables. She rarely has fast food; she is attempting to drink less soda.      ALLERGIES:  She has no documented allergies although there is a question a year ago when she had a sinus infection of using simultaneously Afrin and amoxicillin and getting hives over most of her body.    With respect to foods Yasmeen has some dairy intolerance mostly to whole milk with respect to allergies and she has some seasonal asthma symptoms usually associated with the colder weather.    IMMUNIZATIONS:  Immunization History   Administered Date(s) Administered     DTAP (<7y) 2002, 02/14/2003, 04/23/2003, 03/02/2004, 08/22/2008     HEPA 08/02/2013     HepB 2002, 2002, 04/25/2003     Hib (PRP-T) 02/14/2003, 04/25/2003, 12/17/2003, 03/02/2004     Influenza (IIV3) PF 09/22/2010     MMR 10/14/2003, 08/22/2008     OPV, monovalent, unspecified 2002, 02/14/2003, 03/02/2004, 08/22/2008     Pneumococcal (PCV 7) 2002, 02/14/2003, 04/25/2003, 10/14/2003     Varicella 02/15/2005, 08/22/2008       CURRENT MEDICATIONS:  Current Outpatient Medications   Medication     ACETAMINOPHEN PO     albuterol (VENTOLIN HFA) 108 (90 BASE) MCG/ACT inhaler     calcium carbonate (TUMS CALCIUM FOR LIFE BONE) 750 MG CHEW     citalopram (CELEXA) 20 MG tablet     desogestrel-ethinyl estradiol (APRI) 0.15-30 MG-MCG tablet     eletriptan (RELPAX) 20 MG tablet     IBUPROFEN PO     salicylic acid (MEDIPLAST) 40 % MISC   Non-prescription medications:  Vitamin C- takes sometimes 250 mg   Vitamin D 250 mcg- 4000 international unit(s)  MVI- women's One a Day    Topical CBD; has used oral but has questions about possible interactions with medications    No current facility-administered medications for this visit.        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  Her father has mild asthma symptoms (used albuterol once).  Her sister and paternal grandmother have        h/o recurrent sinusitis.  Her sister improved after nasal septal deviation correction.  father lung cancer maternal grandfather breast cancer mother thyroid disease   maternal grandmother melanoma   paternal grandfather lymphoma   thyroid disease maternal grandmother   fibromyalgia mother  kidney stones father paternal grandfather patient    SOCIAL HISTORY:  Social History     Social History Narrative     Not on file     Family Structure: At home, she lives with her mother, father, and [place] dog. Her sister is away at college at [employer].    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    Physical Exam:   Temp:  [98.4  F (36.9  C)] 98.4  F (36.9  C)  Pulse:  [93] 93  Resp:  [18] 18  BP: (124)/(94) 124/94  SpO2:  [98 %] 98 %  BP (!) 124/94 (BP Location: Right arm, Patient Position: Fowlers, Cuff Size: Adult Regular)   Pulse 93   Temp 98.4  F (36.9  C) (Oral)   Resp 18   Ht 1.627 m (5' 4.06\")   Wt 54.5 kg (120 lb 2.4 oz)   SpO2 98%   BMI 20.59 kg/m     Vitals:    02/17/20 1521   Weight: 54.5 kg (120 lb 2.4 oz)        Wt Readings from Last 3 Encounters:   02/17/20 54.5 kg (120 lb 2.4 oz) (45 %)*   12/09/19 54.9 kg (121 lb 0.5 oz) (48 %)*   10/30/15 40.1 kg (88 lb 6.5 oz) (23 %)*     * Growth percentiles are based on [facility] (Girls, 2-20 Years) data.     Ht Readings from Last 2 Encounters:   02/17/20 1.627 m (5' 4.06\") (48 %)*   12/09/19 1.634 m " "(5' 4.33\") (53 %)*     * Growth percentiles are based on [facility] (Girls, 2-20 Years) data.     44 %ile based on CDC (Girls, 2-20 Years) BMI-for-age based on body measurements available as of 2/17/2020.    TCM Pulses: prominent LR pulse.    TCM Tongue: thin, long; scalloped edges; prominent sublingual veins.     Constitutional: no apparent distress, comfortable, pleasant   Eyes: anicteric, normal extra-ocular movements; ears glasses   Ears, Nose and Throat: nose clear and free of lesions, throat clear, neck supple with full range of motion, no thyromegaly.   Cardiovascular: regular rate and rhythm, normal S1 and S2, no murmurs, rubs or gallops, peripheral pulses full and symmetric   Respiratory: clear to auscultation, no wheezes or crackles, normal breath sounds   Gastrointestinal: positive bowel sounds, nontender, no hepatosplenomegaly, tubular masses LLQ c/w stool.  Musculoskeletal: full range of motion, no edema   Skin: no concerning lesions, no jaundice; tops of hands reddened  Neurological: cranial nerves 2-12 intact, normal strength and sensation, normal gait, no tremor   Psychological: appropriate mood     15 min spent introducing Yasmeen to biofeedback which she enjoyed and was interested in continuing at home. Self-massage with Ache-ease as well as acupressure was taught with camilla valdes. This will assist with constipation and comfort.    Labs and Tests:  No results found for this or any previous visit (from the past 24 hour(s)).    Thank you for this consultation. Please do not hesitate to contact me with any questions or concerns.      Time Spent on this Encounter   I, Belkis Coker, spent a total of 75 minutes face to face with Yasmeen at today's visit, which excludes an additional  15 minutes spent on therapeutic services. Over 50% of this time was spent counseling the patient-family and /or coordinating care. See note for details.    Belkis Coker MD, CM  Medical Director Pediatric Integrative Health " and Wellbeing, [facility]    CC  Maranda Young MD as PCP - General (Pediatrics)  [facility]

## 2023-06-13 NOTE — PHYSICAL THERAPY INITIAL EVALUATION ADULT - ADDITIONAL COMMENTS
history from wife and private aide, pt is at [facility], has private aide 4 days x 4-5 hours, pt needs assistance with all ADLs, has not been ambulatory in recent weeks however aide reporting prior to that he was able to walk a short distance with RW and 2 assist, aide reports pt transfers from bed to w/c with 1-2 assist depending on pts mood, aide reports a PT was working with patient twice a week

## 2023-06-14 LAB
ANION GAP SERPL CALC-SCNC: 6 MMOL/L — SIGNIFICANT CHANGE UP (ref 5–17)
BUN SERPL-MCNC: 15 MG/DL — SIGNIFICANT CHANGE UP (ref 7–23)
CALCIUM SERPL-MCNC: 9.3 MG/DL — SIGNIFICANT CHANGE UP (ref 8.4–10.5)
CHLORIDE SERPL-SCNC: 105 MMOL/L — SIGNIFICANT CHANGE UP (ref 96–108)
CO2 SERPL-SCNC: 30 MMOL/L — SIGNIFICANT CHANGE UP (ref 22–31)
CREAT SERPL-MCNC: 0.94 MG/DL — SIGNIFICANT CHANGE UP (ref 0.5–1.3)
EGFR: 79 ML/MIN/1.73M2 — SIGNIFICANT CHANGE UP
GLUCOSE SERPL-MCNC: 94 MG/DL — SIGNIFICANT CHANGE UP (ref 70–99)
HCT VFR BLD CALC: 33.9 % — LOW (ref 39–50)
HGB BLD-MCNC: 11 G/DL — LOW (ref 13–17)
MCHC RBC-ENTMCNC: 31.3 PG — SIGNIFICANT CHANGE UP (ref 27–34)
MCHC RBC-ENTMCNC: 32.4 GM/DL — SIGNIFICANT CHANGE UP (ref 32–36)
MCV RBC AUTO: 96.3 FL — SIGNIFICANT CHANGE UP (ref 80–100)
NRBC # BLD: 0 /100 WBCS — SIGNIFICANT CHANGE UP (ref 0–0)
PLATELET # BLD AUTO: 207 K/UL — SIGNIFICANT CHANGE UP (ref 150–400)
POTASSIUM SERPL-MCNC: 4.4 MMOL/L — SIGNIFICANT CHANGE UP (ref 3.5–5.3)
POTASSIUM SERPL-SCNC: 4.4 MMOL/L — SIGNIFICANT CHANGE UP (ref 3.5–5.3)
RBC # BLD: 3.52 M/UL — LOW (ref 4.2–5.8)
RBC # FLD: 12.9 % — SIGNIFICANT CHANGE UP (ref 10.3–14.5)
SODIUM SERPL-SCNC: 141 MMOL/L — SIGNIFICANT CHANGE UP (ref 135–145)
WBC # BLD: 5.46 K/UL — SIGNIFICANT CHANGE UP (ref 3.8–10.5)
WBC # FLD AUTO: 5.46 K/UL — SIGNIFICANT CHANGE UP (ref 3.8–10.5)

## 2023-06-14 PROCEDURE — 99232 SBSQ HOSP IP/OBS MODERATE 35: CPT

## 2023-06-14 RX ORDER — ASCORBIC ACID 60 MG
500 TABLET,CHEWABLE ORAL DAILY
Refills: 0 | Status: DISCONTINUED | OUTPATIENT
Start: 2023-06-14 | End: 2023-06-15

## 2023-06-14 RX ORDER — MUPIROCIN 20 MG/G
1 OINTMENT TOPICAL ONCE
Refills: 0 | Status: COMPLETED | OUTPATIENT
Start: 2023-06-14 | End: 2023-06-15

## 2023-06-14 RX ORDER — COLLAGENASE CLOSTRIDIUM HIST. 250 UNIT/G
1 OINTMENT (GRAM) TOPICAL DAILY
Refills: 0 | Status: DISCONTINUED | OUTPATIENT
Start: 2023-06-14 | End: 2023-06-15

## 2023-06-14 RX ORDER — ZINC SULFATE TAB 220 MG (50 MG ZINC EQUIVALENT) 220 (50 ZN) MG
220 TAB ORAL DAILY
Refills: 0 | Status: DISCONTINUED | OUTPATIENT
Start: 2023-06-14 | End: 2023-06-15

## 2023-06-14 RX ADMIN — TAMSULOSIN HYDROCHLORIDE 0.4 MILLIGRAM(S): 0.4 CAPSULE ORAL at 21:07

## 2023-06-14 RX ADMIN — Medication 81 MILLIGRAM(S): at 11:52

## 2023-06-14 RX ADMIN — RIVASTIGMINE 1 PATCH: 4.6 PATCH, EXTENDED RELEASE TRANSDERMAL at 12:00

## 2023-06-14 RX ADMIN — Medication 500 MILLIGRAM(S): at 16:11

## 2023-06-14 RX ADMIN — Medication 1 TABLET(S): at 16:10

## 2023-06-14 RX ADMIN — ZINC SULFATE TAB 220 MG (50 MG ZINC EQUIVALENT) 220 MILLIGRAM(S): 220 (50 ZN) TAB at 16:10

## 2023-06-14 RX ADMIN — RIVASTIGMINE 1 PATCH: 4.6 PATCH, EXTENDED RELEASE TRANSDERMAL at 20:29

## 2023-06-14 RX ADMIN — Medication 12.5 MILLIGRAM(S): at 17:24

## 2023-06-14 RX ADMIN — ATORVASTATIN CALCIUM 40 MILLIGRAM(S): 80 TABLET, FILM COATED ORAL at 21:08

## 2023-06-14 RX ADMIN — RIVASTIGMINE 1 PATCH: 4.6 PATCH, EXTENDED RELEASE TRANSDERMAL at 06:18

## 2023-06-14 RX ADMIN — Medication 12.5 MILLIGRAM(S): at 06:02

## 2023-06-14 RX ADMIN — RIVASTIGMINE 1 PATCH: 4.6 PATCH, EXTENDED RELEASE TRANSDERMAL at 11:52

## 2023-06-14 RX ADMIN — SENNA PLUS 2 TABLET(S): 8.6 TABLET ORAL at 21:07

## 2023-06-14 RX ADMIN — Medication 10 MILLIGRAM(S): at 06:02

## 2023-06-14 RX ADMIN — Medication 10 MILLIGRAM(S): at 16:06

## 2023-06-14 NOTE — DIETITIAN INITIAL EVALUATION ADULT - OTHER INFO
Detail Level: Simple
87 year-old male PMHx dementia, ASHD, IBS, BPH, HTN, constipation, DVT previously on AC admitted from [facility] after episode of decreased responsiveness and diaphoresis admitted for AMS . Appetite reported good consumed 100% of meals . no GI issues reported, NFPE , evidence of moderate protein calorie malnutrition , (+) muscle wasting & loss of body fat , patient with hx of dementia , spoke with spouse regarding increased nutrient needs due to same also due to (+) pressure injuries , suggest addition of MVI , Vit C & Zinc to promote wound healing .

## 2023-06-14 NOTE — DIETITIAN INITIAL EVALUATION ADULT - NSFNSPHYEXAMSKINFT_GEN_A_CORE
Right:, heel, Suspected deep tissue injury   Right:, buttocks, Stage II  sacrum, Suspected deep tissue injury

## 2023-06-14 NOTE — DIETITIAN INITIAL EVALUATION ADULT - RD TO REMAIN AVAILABLE
Pt states her pain is worse with exertion or breathing. She describes it again as feeling like a \"pulled muscle\". She states it is constant since it woke her at 0300.   She does state that she has some sob also     Mike Rollins RN  06/30/22 [id number] yes

## 2023-06-14 NOTE — DIETITIAN INITIAL EVALUATION ADULT - PERTINENT MEDS FT
MEDICATIONS  (STANDING):  aspirin  chewable 81 milliGRAM(s) Oral daily  atorvastatin 40 milliGRAM(s) Oral at bedtime  dicyclomine 10 milliGRAM(s) Oral two times a day before meals  enoxaparin Injectable 40 milliGRAM(s) SubCutaneous every 24 hours  metoprolol tartrate 12.5 milliGRAM(s) Oral two times a day  rivastigmine patch  4.6 mG/24 Hr(s). 1 Patch Transdermal every 24 hours  senna 2 Tablet(s) Oral at bedtime  tamsulosin 0.4 milliGRAM(s) Oral at bedtime    MEDICATIONS  (PRN):  acetaminophen     Tablet .. 650 milliGRAM(s) Oral every 6 hours PRN Temp greater or equal to 38C (100.4F), Moderate Pain (4 - 6)

## 2023-06-14 NOTE — DIETITIAN INITIAL EVALUATION ADULT - SIGNS/SYMPTOMS
as evidence by muscle wasting & loss of body fat observed  as evidence by multiple pressure injuries

## 2023-06-14 NOTE — DIETITIAN INITIAL EVALUATION ADULT - PERTINENT LABORATORY DATA
06-14    141  |  105  |  15  ----------------------------<  94  4.4   |  30  |  0.94    Ca    9.3      14 Jun 2023 06:08    TPro  5.9<L>  /  Alb  2.5<L>  /  TBili  0.4  /  DBili  x   /  AST  23  /  ALT  18  /  AlkPhos  86  06-13

## 2023-06-14 NOTE — PROGRESS NOTE ADULT - ASSESSMENT
87 year-old male with    #Episode of Acute encephalopathy  #Dementia  -This is likely progression of pt's dementia as no organic etiology identified  -CT head resulted noted  -Neurology recommendations appreciated  -Organic causes ruled out such as acute infection, electrolyte abnormalities   -Carotid duplex results noted  -avoid sedatives/hypnotics  -avoid polypharmacy  -aspirin for CVA prevention    #Recent DVT  -Doppler negative    #BPH  -Flomax    #Sinus bradycardia  -lower dose metoprolol    #IBS  -Dicyclomine    #Multiple Wounds  -Wound care RN recommendations appreciated     #Lovenox for dvt ppx    Full code    Updated daughter Salina Trevino BAKARI 87 year-old male with    #Episode of Acute encephalopathy  #Dementia  -This is likely progression of pt's dementia as no organic etiology identified  -CT head resulted noted  -Neurology recommendations appreciated  -Organic causes ruled out such as acute infection, electrolyte abnormalities   -Carotid duplex results noted  -avoid sedatives/hypnotics  -avoid polypharmacy  -aspirin for CVA prevention    #Recent DVT  -Doppler negative    #BPH  -Flomax    #Sinus bradycardia  -lower dose metoprolol    #IBS  -Dicyclomine    #Multiple Wounds  -Wound care RN recommendations appreciated   Suggest daily Normal Saline Cleanse of Right hip wound, pat dry. Apply Cavillon film barrier daily to periwound, allow to dry. Apply Santyl daily to wound bed (nickel thickness), cover with Allevyn foam dressing.  Please cleanse Sacral & Right Buttock/Ischial wounds with NS, Pat dry, paint amanda-wound with Cavilon, cover with TRIAD Cream twice daily & PRN, (it is ok to leave a thin layer of cream after cleansing this will not impede wound healing).   Apply Cavillon film barrier daily to Right Heel (& Left heel prophylactically), cover with foam dressing.   Suggest daily Normal Saline Cleanse of Left shin abrasion, pat thoroughly dry paint amanda-wound with Cavilon. Apply Mupirocin ointment to wound bed, cover with 4x4, wrap with maxime    #Lovenox for dvt ppx    Full code    Updated daughter Salina Trevino BAKARI

## 2023-06-14 NOTE — DIETITIAN NUTRITION RISK NOTIFICATION - TREATMENT: THE FOLLOWING DIET HAS BEEN RECOMMENDED
Diet, DASH/TLC:   Sodium & Cholesterol Restricted  Supplement Feeding Modality:  Oral  Ensure Plus High Protein Cans or Servings Per Day:  1       Frequency:  Two Times a day (06-14-23 @ 14:05) [Pending Verification By Attending]  Diet, DASH/TLC:   Sodium & Cholesterol Restricted (06-12-23 @ 13:19) [Active]

## 2023-06-15 ENCOUNTER — TRANSCRIPTION ENCOUNTER (OUTPATIENT)
Age: 88
End: 2023-06-15

## 2023-06-15 VITALS
RESPIRATION RATE: 18 BRPM | OXYGEN SATURATION: 94 % | SYSTOLIC BLOOD PRESSURE: 116 MMHG | HEART RATE: 80 BPM | TEMPERATURE: 98 F | DIASTOLIC BLOOD PRESSURE: 61 MMHG

## 2023-06-15 LAB
ANION GAP SERPL CALC-SCNC: 5 MMOL/L — SIGNIFICANT CHANGE UP (ref 5–17)
BUN SERPL-MCNC: 14 MG/DL — SIGNIFICANT CHANGE UP (ref 7–23)
CALCIUM SERPL-MCNC: 8.9 MG/DL — SIGNIFICANT CHANGE UP (ref 8.4–10.5)
CHLORIDE SERPL-SCNC: 104 MMOL/L — SIGNIFICANT CHANGE UP (ref 96–108)
CO2 SERPL-SCNC: 29 MMOL/L — SIGNIFICANT CHANGE UP (ref 22–31)
CREAT SERPL-MCNC: 0.91 MG/DL — SIGNIFICANT CHANGE UP (ref 0.5–1.3)
EGFR: 82 ML/MIN/1.73M2 — SIGNIFICANT CHANGE UP
FERRITIN SERPL-MCNC: 432 NG/ML — HIGH (ref 30–400)
FOLATE SERPL-MCNC: 10.6 NG/ML — SIGNIFICANT CHANGE UP
GLUCOSE SERPL-MCNC: 95 MG/DL — SIGNIFICANT CHANGE UP (ref 70–99)
HCT VFR BLD CALC: 34.9 % — LOW (ref 39–50)
HGB BLD-MCNC: 11.2 G/DL — LOW (ref 13–17)
IRON SATN MFR SERPL: 23 % — SIGNIFICANT CHANGE UP (ref 16–55)
IRON SATN MFR SERPL: 39 UG/DL — LOW (ref 45–165)
MCHC RBC-ENTMCNC: 30.6 PG — SIGNIFICANT CHANGE UP (ref 27–34)
MCHC RBC-ENTMCNC: 32.1 GM/DL — SIGNIFICANT CHANGE UP (ref 32–36)
MCV RBC AUTO: 95.4 FL — SIGNIFICANT CHANGE UP (ref 80–100)
NRBC # BLD: 0 /100 WBCS — SIGNIFICANT CHANGE UP (ref 0–0)
PLATELET # BLD AUTO: 218 K/UL — SIGNIFICANT CHANGE UP (ref 150–400)
POTASSIUM SERPL-MCNC: 3.9 MMOL/L — SIGNIFICANT CHANGE UP (ref 3.5–5.3)
POTASSIUM SERPL-SCNC: 3.9 MMOL/L — SIGNIFICANT CHANGE UP (ref 3.5–5.3)
RBC # BLD: 3.66 M/UL — LOW (ref 4.2–5.8)
RBC # FLD: 12.6 % — SIGNIFICANT CHANGE UP (ref 10.3–14.5)
SODIUM SERPL-SCNC: 138 MMOL/L — SIGNIFICANT CHANGE UP (ref 135–145)
TIBC SERPL-MCNC: 170 UG/DL — LOW (ref 220–430)
UIBC SERPL-MCNC: 131 UG/DL — SIGNIFICANT CHANGE UP (ref 110–370)
VIT B12 SERPL-MCNC: 604 PG/ML — SIGNIFICANT CHANGE UP (ref 232–1245)
WBC # BLD: 6.53 K/UL — SIGNIFICANT CHANGE UP (ref 3.8–10.5)
WBC # FLD AUTO: 6.53 K/UL — SIGNIFICANT CHANGE UP (ref 3.8–10.5)

## 2023-06-15 PROCEDURE — 81001 URINALYSIS AUTO W/SCOPE: CPT

## 2023-06-15 PROCEDURE — 93005 ELECTROCARDIOGRAM TRACING: CPT

## 2023-06-15 PROCEDURE — 99233 SBSQ HOSP IP/OBS HIGH 50: CPT

## 2023-06-15 PROCEDURE — 80048 BASIC METABOLIC PNL TOTAL CA: CPT

## 2023-06-15 PROCEDURE — 99285 EMERGENCY DEPT VISIT HI MDM: CPT

## 2023-06-15 PROCEDURE — 83550 IRON BINDING TEST: CPT

## 2023-06-15 PROCEDURE — 83540 ASSAY OF IRON: CPT

## 2023-06-15 PROCEDURE — 82746 ASSAY OF FOLIC ACID SERUM: CPT

## 2023-06-15 PROCEDURE — 85025 COMPLETE CBC W/AUTO DIFF WBC: CPT

## 2023-06-15 PROCEDURE — 84484 ASSAY OF TROPONIN QUANT: CPT

## 2023-06-15 PROCEDURE — 36415 COLL VENOUS BLD VENIPUNCTURE: CPT

## 2023-06-15 PROCEDURE — 97162 PT EVAL MOD COMPLEX 30 MIN: CPT

## 2023-06-15 PROCEDURE — 71045 X-RAY EXAM CHEST 1 VIEW: CPT

## 2023-06-15 PROCEDURE — 80061 LIPID PANEL: CPT

## 2023-06-15 PROCEDURE — 93880 EXTRACRANIAL BILAT STUDY: CPT

## 2023-06-15 PROCEDURE — 82607 VITAMIN B-12: CPT

## 2023-06-15 PROCEDURE — 80053 COMPREHEN METABOLIC PANEL: CPT

## 2023-06-15 PROCEDURE — 70450 CT HEAD/BRAIN W/O DYE: CPT | Mod: MA

## 2023-06-15 PROCEDURE — 82728 ASSAY OF FERRITIN: CPT

## 2023-06-15 PROCEDURE — 0225U NFCT DS DNA&RNA 21 SARSCOV2: CPT

## 2023-06-15 PROCEDURE — 85027 COMPLETE CBC AUTOMATED: CPT

## 2023-06-15 PROCEDURE — 93970 EXTREMITY STUDY: CPT

## 2023-06-15 RX ORDER — ATORVASTATIN CALCIUM 80 MG/1
1 TABLET, FILM COATED ORAL
Qty: 0 | Refills: 0 | DISCHARGE
Start: 2023-06-15

## 2023-06-15 RX ORDER — METOPROLOL TARTRATE 50 MG
12.5 TABLET ORAL
Qty: 0 | Refills: 0 | DISCHARGE
Start: 2023-06-15

## 2023-06-15 RX ORDER — ACETAMINOPHEN 500 MG
2 TABLET ORAL
Qty: 0 | Refills: 0 | DISCHARGE
Start: 2023-06-15

## 2023-06-15 RX ORDER — ZINC SULFATE TAB 220 MG (50 MG ZINC EQUIVALENT) 220 (50 ZN) MG
1 TAB ORAL
Qty: 0 | Refills: 0 | DISCHARGE
Start: 2023-06-15

## 2023-06-15 RX ORDER — ASCORBIC ACID 60 MG
1 TABLET,CHEWABLE ORAL
Qty: 0 | Refills: 0 | DISCHARGE
Start: 2023-06-15

## 2023-06-15 RX ADMIN — RIVASTIGMINE 1 PATCH: 4.6 PATCH, EXTENDED RELEASE TRANSDERMAL at 06:19

## 2023-06-15 RX ADMIN — Medication 10 MILLIGRAM(S): at 05:59

## 2023-06-15 RX ADMIN — Medication 1 APPLICATION(S): at 12:53

## 2023-06-15 RX ADMIN — ENOXAPARIN SODIUM 40 MILLIGRAM(S): 100 INJECTION SUBCUTANEOUS at 03:10

## 2023-06-15 RX ADMIN — ZINC SULFATE TAB 220 MG (50 MG ZINC EQUIVALENT) 220 MILLIGRAM(S): 220 (50 ZN) TAB at 12:52

## 2023-06-15 RX ADMIN — Medication 1 TABLET(S): at 12:52

## 2023-06-15 RX ADMIN — MUPIROCIN 1 APPLICATION(S): 20 OINTMENT TOPICAL at 01:00

## 2023-06-15 RX ADMIN — Medication 81 MILLIGRAM(S): at 12:52

## 2023-06-15 RX ADMIN — RIVASTIGMINE 1 PATCH: 4.6 PATCH, EXTENDED RELEASE TRANSDERMAL at 12:59

## 2023-06-15 RX ADMIN — Medication 12.5 MILLIGRAM(S): at 05:59

## 2023-06-15 RX ADMIN — Medication 500 MILLIGRAM(S): at 12:51

## 2023-06-15 NOTE — DISCHARGE NOTE PROVIDER - DETAILS OF MALNUTRITION DIAGNOSIS/DIAGNOSES
This patient has been assessed with a concern for Malnutrition and was treated during this hospitalization for the following Nutrition diagnosis/diagnoses:     -  06/14/2023: Moderate protein-calorie malnutrition   -  06/14/2023: Underweight (BMI < 19)   lateral

## 2023-06-15 NOTE — PROGRESS NOTE ADULT - SUBJECTIVE AND OBJECTIVE BOX
Patient is a 87y old  Male who presents with a chief complaint of episode of AMS (13 Jun 2023 11:18)      SUBJECTIVE / OVERNIGHT EVENTS:  Pt seen and examined at bedside. No acute events overnight.    Allergies    shellfish (Other)  No Known Drug Allergies    Intolerances    lactose (Diarrhea)      MEDICATIONS  (STANDING):  aspirin  chewable 81 milliGRAM(s) Oral daily  atorvastatin 40 milliGRAM(s) Oral at bedtime  dicyclomine 10 milliGRAM(s) Oral two times a day before meals  enoxaparin Injectable 40 milliGRAM(s) SubCutaneous every 24 hours  metoprolol tartrate 12.5 milliGRAM(s) Oral two times a day  rivastigmine patch  4.6 mG/24 Hr(s). 1 Patch Transdermal every 24 hours  senna 2 Tablet(s) Oral at bedtime  tamsulosin 0.4 milliGRAM(s) Oral at bedtime    MEDICATIONS  (PRN):  acetaminophen     Tablet .. 650 milliGRAM(s) Oral every 6 hours PRN Temp greater or equal to 38C (100.4F), Moderate Pain (4 - 6)      Vital Signs Last 24 Hrs  T(C): 36.9 (14 Jun 2023 05:21), Max: 36.9 (14 Jun 2023 05:21)  T(F): 98.4 (14 Jun 2023 05:21), Max: 98.4 (14 Jun 2023 05:21)  HR: 70 (14 Jun 2023 05:21) (61 - 71)  BP: 143/93 (14 Jun 2023 05:21) (129/55 - 143/93)  BP(mean): --  RR: 13 (14 Jun 2023 05:21) (13 - 20)  SpO2: 95% (14 Jun 2023 05:21) (95% - 98%)    Parameters below as of 14 Jun 2023 05:21  Patient On (Oxygen Delivery Method): room air      CAPILLARY BLOOD GLUCOSE        I&O's Summary    13 Jun 2023 07:01  -  14 Jun 2023 07:00  --------------------------------------------------------  IN: 0 mL / OUT: 800 mL / NET: -800 mL        PHYSICAL EXAM:  GENERAL: NAD, thin elderly male  HEAD:  Atraumatic, Normocephalic  NECK: Supple, No JVD  CHEST/LUNG: Clear to auscultation bilaterally; No wheeze, nonlabored breathing  HEART: Regular rate and rhythm; No murmurs, rubs, or gallops  ABDOMEN: Soft, Nontender, Nondistended; Bowel sounds present  EXTREMITIES: No clubbing, cyanosis, or edema  PSYCH: calm, appropriate mood    LABS:                        11.0   5.46  )-----------( 207      ( 14 Jun 2023 06:08 )             33.9     06-14    141  |  105  |  15  ----------------------------<  94  4.4   |  30  |  0.94    Ca    9.3      14 Jun 2023 06:08    TPro  5.9<L>  /  Alb  2.5<L>  /  TBili  0.4  /  DBili  x   /  AST  23  /  ALT  18  /  AlkPhos  86  06-13              RADIOLOGY & ADDITIONAL TESTS:  Results Reviewed:   Imaging Personally Reviewed:  Electrocardiogram Personally Reviewed:    COORDINATION OF CARE:  Care Discussed with Consultants/Other Providers [Y/N]:  Prior or Outpatient Records Reviewed [Y/N]:
s: no acute events    Vital Signs Last 24 Hrs  T(C): 36.8 (15 Gabriel 2023 05:57), Max: 36.8 (15 Gabriel 2023 05:57)  T(F): 98.3 (15 Gabriel 2023 05:57), Max: 98.3 (15 Gabriel 2023 05:57)  HR: 87 (15 Gabriel 2023 05:57) (69 - 87)  BP: 134/71 (15 Gabriel 2023 05:57) (134/71 - 154/64)  BP(mean): --  RR: 17 (15 Gabriel 2023 05:57) (17 - 17)  SpO2: 98% (15 Gabriel 2023 05:57) (97% - 98%)    Parameters below as of 15 Gabriel 2023 05:57  Patient On (Oxygen Delivery Method): room air    Awake and alert. follows requests  PERRL, eomi, face sym tup midline  moves all 4 ext against gravity    87 M hx of dementia, ASDD, HTN, hx of dvt who was consulted because of decreased level of responsiveness. HCT shows enlarged ventricles that are likely due to generalized atrophy. The findings are similar to previous scans. No clinical suspicion of NPH as per Dr. Burt's office notes. No wbc elevation. Trace leuk esterase in UA. cxr: LEFT lower zone linear airspace opacity/atelectasis. lower leg dvt and carotid dvt were unremarkable. Will avoid any sedating medications going forth. please call back with any other questions
Patient is a 87y old  Male who presents with a chief complaint of episode of AMS (2023 07:16)      SUBJECTIVE / OVERNIGHT EVENTS:  Pt seen and examined at bedside. No acute events overnight.      Allergies    shellfish (Other)  No Known Drug Allergies    Intolerances    lactose (Diarrhea)      MEDICATIONS  (STANDING):  aspirin  chewable 81 milliGRAM(s) Oral daily  atorvastatin 40 milliGRAM(s) Oral at bedtime  dicyclomine 10 milliGRAM(s) Oral two times a day before meals  enoxaparin Injectable 40 milliGRAM(s) SubCutaneous every 24 hours  metoprolol tartrate 12.5 milliGRAM(s) Oral two times a day  rivastigmine patch  4.6 mG/24 Hr(s). 1 Patch Transdermal every 24 hours  senna 2 Tablet(s) Oral at bedtime  tamsulosin 0.4 milliGRAM(s) Oral at bedtime    MEDICATIONS  (PRN):  acetaminophen     Tablet .. 650 milliGRAM(s) Oral every 6 hours PRN Temp greater or equal to 38C (100.4F), Moderate Pain (4 - 6)      Vital Signs Last 24 Hrs  T(C): 36.6 (2023 09:10), Max: 37.1 (2023 16:28)  T(F): 97.8 (2023 09:10), Max: 98.8 (2023 21:25)  HR: 61 (2023 09:10) (61 - 80)  BP: 123/57 (2023 09:10) (123/57 - 152/80)  BP(mean): --  RR: 17 (2023 09:10) (16 - 19)  SpO2: 96% (2023 09:10) (95% - 100%)    Parameters below as of 2023 09:10  Patient On (Oxygen Delivery Method): room air      CAPILLARY BLOOD GLUCOSE        I&O's Summary      PHYSICAL EXAM:  GENERAL: NAD, thin elderly male  HEAD:  Atraumatic, Normocephalic  NECK: Supple, No JVD  CHEST/LUNG: Clear to auscultation bilaterally; No wheeze, nonlabored breathing  HEART: Regular rate and rhythm; No murmurs, rubs, or gallops  ABDOMEN: Soft, Nontender, Nondistended; Bowel sounds present  EXTREMITIES: No clubbing, cyanosis, or edema  PSYCH: calm, appropriate mood    LABS:                        10.4   5.61  )-----------( 208      ( 2023 06:35 )             33.4     06-13    141  |  106  |  12  ----------------------------<  87  4.2   |  29  |  0.99    Ca    8.7      2023 06:35    TPro  5.9<L>  /  Alb  2.5<L>  /  TBili  0.4  /  DBili  x   /  AST  23  /  ALT  18  /  AlkPhos  86  06-13          Urinalysis Basic - ( 2023 12:00 )    Color: Yellow / Appearance: Cloudy / S.020 / pH: x  Gluc: x / Ketone: Negative mg/dL  / Bili: Negative / Urobili: 1.0 mg/dL   Blood: x / Protein: Trace mg/dL / Nitrite: Negative   Leuk Esterase: Trace / RBC: 0 /HPF / WBC 1 /HPF   Sq Epi: x / Non Sq Epi: x / Bacteria: Negative /HPF        RADIOLOGY & ADDITIONAL TESTS:  Results Reviewed:   Imaging Personally Reviewed:  Electrocardiogram Personally Reviewed:    COORDINATION OF CARE:  Care Discussed with Consultants/Other Providers [Y/N]:  Prior or Outpatient Records Reviewed [Y/N]:
Patient is a 87y old  Male who presents with a chief complaint of episode of AMS (15 Gabriel 2023 11:00)  No acute issues overnight     Patient seen and examined at bedside.    ALLERGIES:  shellfish (Other)  lactose (Diarrhea)  No Known Drug Allergies    MEDICATIONS  (STANDING):  ascorbic acid 500 milliGRAM(s) Oral daily  aspirin  chewable 81 milliGRAM(s) Oral daily  atorvastatin 40 milliGRAM(s) Oral at bedtime  collagenase Ointment 1 Application(s) Topical daily  dicyclomine 10 milliGRAM(s) Oral two times a day before meals  enoxaparin Injectable 40 milliGRAM(s) SubCutaneous every 24 hours  metoprolol tartrate 12.5 milliGRAM(s) Oral two times a day  multivitamin 1 Tablet(s) Oral daily  rivastigmine patch  4.6 mG/24 Hr(s). 1 Patch Transdermal every 24 hours  senna 2 Tablet(s) Oral at bedtime  tamsulosin 0.4 milliGRAM(s) Oral at bedtime  zinc sulfate 220 milliGRAM(s) Oral daily    MEDICATIONS  (PRN):  acetaminophen     Tablet .. 650 milliGRAM(s) Oral every 6 hours PRN Temp greater or equal to 38C (100.4F), Moderate Pain (4 - 6)    Vital Signs Last 24 Hrs  T(F): 98.3 (15 Gabriel 2023 05:57), Max: 98.3 (15 Gabriel 2023 05:57)  HR: 87 (15 Gabriel 2023 05:57) (69 - 87)  BP: 134/71 (15 Gabriel 2023 05:57) (134/71 - 154/64)  RR: 17 (15 Gabriel 2023 05:57) (17 - 17)  SpO2: 98% (15 Gabriel 2023 05:57) (97% - 98%)  I&O's Summary    2023 07:01  -  15 Gabriel 2023 07:00  --------------------------------------------------------  IN: 0 mL / OUT: 580 mL / NET: -580 mL        PHYSICAL EXAM:  General: NAD, A/O x 1  ENT: MMM, no thrush  Neck: Supple, No JVD  Lungs: Non labored breathing,  Clear to auscultation bilaterally,   Cardio: RRR, S1/S2, no pitting edema bilaterally  Abdomen: Soft, Nontender, Nondistended; Bowel sounds present  Extremities: No calf tenderness, moves all extremities    LABS:                        11.2   6.53  )-----------( 218      ( 15 Gabriel 2023 05:39 )             34.9       06-15    138  |  104  |  14  ----------------------------<  95  3.9   |  29  |  0.91    Ca    8.9      15 Gabriel 2023 05:39    TPro  5.9  /  Alb  2.5  /  TBili  0.4  /  DBili  x   /  AST  23  /  ALT  18  /  AlkPhos  86  06-13            CARDIAC MARKERS ( 2023 11:45 )  x     / 18.0 ng/L / x     / x     / x          06-13 Chol 161 mg/dL LDL -- HDL 41 mg/dL Trig 101 mg/dL                  Urinalysis Basic - ( 2023 12:00 )    Color: Yellow / Appearance: Cloudy / S.020 / pH: x  Gluc: x / Ketone: Negative mg/dL  / Bili: Negative / Urobili: 1.0 mg/dL   Blood: x / Protein: Trace mg/dL / Nitrite: Negative   Leuk Esterase: Trace / RBC: 0 /HPF / WBC 1 /HPF   Sq Epi: x / Non Sq Epi: x / Bacteria: Negative /HPF            RADIOLOGY & ADDITIONAL TESTS:    Care Discussed with Consultants/Other Providers:

## 2023-06-15 NOTE — PROGRESS NOTE ADULT - ASSESSMENT
87 year-old male with PMHx dementia, BPH, DVT. IBS presents with acute ence    #Episode of Acute encephalopathy  #Dementia  -This is likely progression of pt's dementia as no organic etiology identified  -CT head resulted noted  -Neurology recommendations appreciated--suspect progressive dementia   -Organic causes ruled out such as acute infection, electrolyte abnormalities   -Carotid duplex results noted  -avoid sedatives/hypnotics and avoid polypharmacy  -ASAfor CVA prevention    #Recent DVT  -Doppler negative    #BPH  -Flomax    #Sinus bradycardia  -lower dose metoprolol    #IBS  -Dicyclomine    #Multiple Wounds  -Wound care RN recommendations appreciated   Suggest daily Normal Saline Cleanse of Right hip wound, pat dry. Apply Cavillon film barrier daily to periwound, allow to dry. Apply Santyl daily to wound bed (nickel thickness), cover with Allevyn foam dressing.  Please cleanse Sacral & Right Buttock/Ischial wounds with NS, Pat dry, paint amanda-wound with Cavilon, cover with TRIAD Cream twice daily & PRN, (it is ok to leave a thin layer of cream after cleansing this will not impede wound healing).   Apply Cavillon film barrier daily to Right Heel (& Left heel prophylactically), cover with foam dressing.   Suggest daily Normal Saline Cleanse of Left shin abrasion, pat thoroughly dry paint amanda-wound with Cavilon. Apply Mupirocin ointment to wound bed, cover with 4x4, wrap with maxime    #Lovenox for dvt ppx    Full code    Updated daughter Salina at . She would like to speak w Dr Herrera prior to dc     Dispo [facility]- [facility]. Bed available today     *Case dw DR Herrera

## 2023-06-15 NOTE — DISCHARGE NOTE PROVIDER - NSDCFUADDINST_GEN_ALL_CORE_FT
no -Wound care   Suggest daily Normal Saline Cleanse of Right hip wound, pat dry. Apply Cavillon film barrier daily to periwound, allow to dry. Apply Santyl daily to wound bed (nickel thickness), cover with Allevyn foam dressing.  Please cleanse Sacral & Right Buttock/Ischial wounds with NS, Pat dry, paint amanda-wound with Cavilon, cover with TRIAD Cream twice daily & PRN, (it is ok to leave a thin layer of cream after cleansing this will not impede wound healing).   Apply Cavillon film barrier daily to Right Heel (& Left heel prophylactically), cover with foam dressing.   Suggest daily Normal Saline Cleanse of Left shin abrasion, pat thoroughly dry paint amanda-wound with Cavilon. Apply Mupirocin ointment to wound bed, cover with 4x4, wrap with maxime

## 2023-06-15 NOTE — DISCHARGE NOTE NURSING/CASE MANAGEMENT/SOCIAL WORK - PATIENT PORTAL LINK FT
You can access the FollowMyHealth Patient Portal offered by [facility] by registering at the following website: [email or web address]. By joining [facility]’s FollowMyHealth portal, you will also be able to view your health information using other applications (apps) compatible with our system.

## 2023-06-15 NOTE — DISCHARGE NOTE NURSING/CASE MANAGEMENT/SOCIAL WORK - NSDCPEFALRISK_GEN_ALL_CORE
For information on Fall & Injury Prevention, visit: [email or web address] OR  [email or web address] OR  https://www.cdc.gov/steadi/patient.html

## 2023-06-15 NOTE — DISCHARGE NOTE PROVIDER - NSDCCPCAREPLAN_GEN_ALL_CORE_FT
PRINCIPAL DISCHARGE DIAGNOSIS  Diagnosis: Syncope  Assessment and Plan of Treatment: You were ruled out for infection/ Neurology consulted and feels this is progressive dementia . You were evaluated by PT and recommended for subacute rehab

## 2023-06-15 NOTE — DISCHARGE NOTE PROVIDER - HOSPITAL COURSE
87 year-old male PMHx dementia, ASHD, IBS, BPH, HTN, constipation, DVT previously on AC admitted from care home after episode of decreased responsiveness and diaphoresis earlier this morning, which had resolved by the time of ER arrival. ER team admitted patient for suspected ?syncope; chart suggestive of decreased responsiveness.  No witnessed syncope. Recent rx for UTI. No acute infection found on ER work up. CT head suggestive of NPH. Neurology consulted; case d/w Dr. Herrera.  Neuro feels this is progressive dementia.    PT consulted and rec to [facility]   Discharged to [facility]    Outpatient provider: Dr Thakur-aware of dc plan

## 2023-06-15 NOTE — PROGRESS NOTE ADULT - NUTRITIONAL ASSESSMENT
This patient has been assessed with a concern for Malnutrition and has been determined to have a diagnosis/diagnoses of Moderate protein-calorie malnutrition and Underweight (BMI < 19).    This patient is being managed with:   Diet DASH/TLC-  Sodium & Cholesterol Restricted  Supplement Feeding Modality:  Oral  Ensure Plus High Protein Cans or Servings Per Day:  1       Frequency:  Two Times a day  Entered: Jun 14 2023  2:05PM

## 2023-06-15 NOTE — DISCHARGE NOTE PROVIDER - NSDCMRMEDTOKEN_GEN_ALL_CORE_FT
acetaminophen 325 mg oral tablet: 2 tab(s) orally every 6 hours As needed Temp greater or equal to 38C (100.4F), Moderate Pain (4 - 6)  ascorbic acid 500 mg oral tablet: 1 tab(s) orally once a day  aspirin 81 mg oral tablet: 1 orally once a day  atorvastatin 40 mg oral tablet: 1 tab(s) orally once a day (at bedtime)  Colace 100 mg oral capsule: 1 orally 2 times a day  dicyclomine 10 mg oral capsule: 1 tab(s) orally 2 times a day  metoprolol: 12.5 milligram(s) orally 2 times a day  Multiple Vitamins oral tablet: 1 tab(s) orally once a day  rivastigmine 4.6 mg/24 hr transdermal film, extended release: 1 patch transdermal once a day  senna (sennosides) 8.6 mg oral tablet: 2 tab(s) orally once a day (at bedtime) as needed for  constipation  tamsulosin 0.4 mg oral capsule: 1 cap(s) orally once a day (at bedtime)  zinc sulfate 220 mg oral capsule: 1 cap(s) orally once a day

## 2023-06-15 NOTE — DISCHARGE NOTE PROVIDER - NSDCFUSCHEDAPPT_GEN_ALL_CORE_FT
[facility]  CARDIOLOGY [address]  Scheduled Appointment: 06/19/2023    Allan Zarate  [facility]  CARDIOLOGY [address]  Scheduled Appointment: 06/19/2023    Tanner Burt  [facility]  NEUROLOGY [address]  Scheduled Appointment: 06/21/2023

## 2023-06-16 ENCOUNTER — NON-APPOINTMENT (OUTPATIENT)
Age: 88
End: 2023-06-16

## 2023-06-19 ENCOUNTER — APPOINTMENT (OUTPATIENT)
Dept: CARDIOLOGY | Facility: CLINIC | Age: 88
End: 2023-06-19

## 2023-06-21 ENCOUNTER — APPOINTMENT (OUTPATIENT)
Dept: NEUROLOGY | Facility: CLINIC | Age: 88
End: 2023-06-21

## 2023-06-22 ENCOUNTER — TRANSCRIPTION ENCOUNTER (OUTPATIENT)
Age: 88
End: 2023-06-22

## 2023-06-22 NOTE — PATIENT PROFILE ADULT - NSPROPOAURINARYCATHETER_GEN_A_NUR
Patient ID: Nevaeh Levin 1968    Chief Complaint   Patient presents with    Hypertension         Hypertension  This is a chronic problem. The problem is unchanged. Pertinent negatives include no chest pain, palpitations or shortness of breath. Risk factors for coronary artery disease include male gender. Past treatments include calcium channel blockers and ACE inhibitors. Compliance problems include diet. Hypertensive end-organ damage includes kidney disease. Patient Active Problem List   Diagnosis    Tobacco abuse    Snoring    Hypersomnolence    Essential hypertension    Microalbuminuria       No past surgical history on file. Family History   Problem Relation Age of Onset    Coronary Art Dis Mother     High Cholesterol Mother     High Blood Pressure Father     Heart Failure Maternal Grandmother     Rheum Arthritis Paternal Grandmother        Current Outpatient Medications on File Prior to Visit   Medication Sig Dispense Refill    amLODIPine-benazepril (LOTREL) 10-40 MG per capsule Take 1 capsule by mouth daily 90 capsule 0    Blood Pressure KIT Use as directed (Patient not taking: Reported on 6/22/2023) 1 kit 0     No current facility-administered medications on file prior to visit. Objective:           Physical Exam  Vitals and nursing note reviewed. Constitutional:       Appearance: He is well-developed. He is obese. HENT:      Head: Normocephalic and atraumatic. Cardiovascular:      Rate and Rhythm: Normal rate and regular rhythm. Heart sounds: Normal heart sounds, S1 normal and S2 normal.   Pulmonary:      Effort: No respiratory distress. Breath sounds: Normal breath sounds. No wheezing or rales. Musculoskeletal:      Right lower leg: No edema. Left lower leg: No edema. Skin:     General: Skin is warm and dry. Neurological:      Mental Status: He is alert.    Psychiatric:         Speech: Speech normal.         Behavior: Behavior normal.     Vitals:
no

## 2023-06-29 ENCOUNTER — TRANSCRIPTION ENCOUNTER (OUTPATIENT)
Age: 88
End: 2023-06-29

## 2023-07-06 ENCOUNTER — TRANSCRIPTION ENCOUNTER (OUTPATIENT)
Age: 88
End: 2023-07-06

## 2023-07-10 ENCOUNTER — TRANSCRIPTION ENCOUNTER (OUTPATIENT)
Age: 88
End: 2023-07-10

## 2023-07-11 ENCOUNTER — TRANSCRIPTION ENCOUNTER (OUTPATIENT)
Age: 88
End: 2023-07-11

## 2023-07-13 ENCOUNTER — TRANSCRIPTION ENCOUNTER (OUTPATIENT)
Age: 88
End: 2023-07-13

## 2023-07-17 ENCOUNTER — TRANSCRIPTION ENCOUNTER (OUTPATIENT)
Age: 88
End: 2023-07-17

## 2023-07-20 ENCOUNTER — TRANSCRIPTION ENCOUNTER (OUTPATIENT)
Age: 88
End: 2023-07-20

## 2023-07-24 ENCOUNTER — TRANSCRIPTION ENCOUNTER (OUTPATIENT)
Age: 88
End: 2023-07-24

## 2023-07-30 ENCOUNTER — INPATIENT (INPATIENT)
Facility: HOSPITAL | Age: 88
LOS: 16 days | Discharge: NURSING, REHAB OR LONG-TERM CARE | DRG: 463 | End: 2023-08-16
Attending: INTERNAL MEDICINE | Admitting: STUDENT IN AN ORGANIZED HEALTH CARE EDUCATION/TRAINING PROGRAM
Payer: MEDICARE

## 2023-07-30 VITALS
OXYGEN SATURATION: 98 % | TEMPERATURE: 97 F | DIASTOLIC BLOOD PRESSURE: 62 MMHG | RESPIRATION RATE: 18 BRPM | WEIGHT: 119.93 LBS | SYSTOLIC BLOOD PRESSURE: 123 MMHG | HEART RATE: 96 BPM

## 2023-07-30 DIAGNOSIS — M86.9 OSTEOMYELITIS, UNSPECIFIED: ICD-10-CM

## 2023-07-30 DIAGNOSIS — Z98.890 OTHER SPECIFIED POSTPROCEDURAL STATES: Chronic | ICD-10-CM

## 2023-07-30 DIAGNOSIS — Z98.89 OTHER SPECIFIED POSTPROCEDURAL STATES: Chronic | ICD-10-CM

## 2023-07-30 LAB
ALBUMIN SERPL ELPH-MCNC: 2.3 G/DL — LOW (ref 3.3–5)
ALP SERPL-CCNC: 108 U/L — SIGNIFICANT CHANGE UP (ref 40–120)
ALT FLD-CCNC: 44 U/L — SIGNIFICANT CHANGE UP (ref 10–45)
ANION GAP SERPL CALC-SCNC: 9 MMOL/L — SIGNIFICANT CHANGE UP (ref 5–17)
AST SERPL-CCNC: 81 U/L — HIGH (ref 10–40)
BASOPHILS # BLD AUTO: 0.03 K/UL — SIGNIFICANT CHANGE UP (ref 0–0.2)
BASOPHILS NFR BLD AUTO: 0.2 % — SIGNIFICANT CHANGE UP (ref 0–2)
BILIRUB SERPL-MCNC: 0.4 MG/DL — SIGNIFICANT CHANGE UP (ref 0.2–1.2)
BUN SERPL-MCNC: 23 MG/DL — SIGNIFICANT CHANGE UP (ref 7–23)
CALCIUM SERPL-MCNC: 9.7 MG/DL — SIGNIFICANT CHANGE UP (ref 8.4–10.5)
CHLORIDE SERPL-SCNC: 107 MMOL/L — SIGNIFICANT CHANGE UP (ref 96–108)
CO2 SERPL-SCNC: 28 MMOL/L — SIGNIFICANT CHANGE UP (ref 22–31)
CREAT SERPL-MCNC: 1.08 MG/DL — SIGNIFICANT CHANGE UP (ref 0.5–1.3)
EGFR: 67 ML/MIN/1.73M2 — SIGNIFICANT CHANGE UP
EOSINOPHIL # BLD AUTO: 0.07 K/UL — SIGNIFICANT CHANGE UP (ref 0–0.5)
EOSINOPHIL NFR BLD AUTO: 0.6 % — SIGNIFICANT CHANGE UP (ref 0–6)
GLUCOSE SERPL-MCNC: 112 MG/DL — HIGH (ref 70–99)
HCT VFR BLD CALC: 35.6 % — LOW (ref 39–50)
HGB BLD-MCNC: 11.4 G/DL — LOW (ref 13–17)
IMM GRANULOCYTES NFR BLD AUTO: 0.6 % — SIGNIFICANT CHANGE UP (ref 0–0.9)
LIDOCAIN IGE QN: 100 U/L — SIGNIFICANT CHANGE UP (ref 73–393)
LYMPHOCYTES # BLD AUTO: 0.95 K/UL — LOW (ref 1–3.3)
LYMPHOCYTES # BLD AUTO: 7.6 % — LOW (ref 13–44)
MCHC RBC-ENTMCNC: 30 PG — SIGNIFICANT CHANGE UP (ref 27–34)
MCHC RBC-ENTMCNC: 32 GM/DL — SIGNIFICANT CHANGE UP (ref 32–36)
MCV RBC AUTO: 93.7 FL — SIGNIFICANT CHANGE UP (ref 80–100)
MONOCYTES # BLD AUTO: 0.82 K/UL — SIGNIFICANT CHANGE UP (ref 0–0.9)
MONOCYTES NFR BLD AUTO: 6.5 % — SIGNIFICANT CHANGE UP (ref 2–14)
NEUTROPHILS # BLD AUTO: 10.58 K/UL — HIGH (ref 1.8–7.4)
NEUTROPHILS NFR BLD AUTO: 84.5 % — HIGH (ref 43–77)
NRBC # BLD: 0 /100 WBCS — SIGNIFICANT CHANGE UP (ref 0–0)
PLATELET # BLD AUTO: 299 K/UL — SIGNIFICANT CHANGE UP (ref 150–400)
POTASSIUM SERPL-MCNC: 3.8 MMOL/L — SIGNIFICANT CHANGE UP (ref 3.5–5.3)
POTASSIUM SERPL-SCNC: 3.8 MMOL/L — SIGNIFICANT CHANGE UP (ref 3.5–5.3)
PROT SERPL-MCNC: 6.8 G/DL — SIGNIFICANT CHANGE UP (ref 6–8.3)
RBC # BLD: 3.8 M/UL — LOW (ref 4.2–5.8)
RBC # FLD: 13.6 % — SIGNIFICANT CHANGE UP (ref 10.3–14.5)
SODIUM SERPL-SCNC: 144 MMOL/L — SIGNIFICANT CHANGE UP (ref 135–145)
WBC # BLD: 12.53 K/UL — HIGH (ref 3.8–10.5)
WBC # FLD AUTO: 12.53 K/UL — HIGH (ref 3.8–10.5)

## 2023-07-30 PROCEDURE — 72193 CT PELVIS W/DYE: CPT | Mod: 26,MA

## 2023-07-30 PROCEDURE — 93010 ELECTROCARDIOGRAM REPORT: CPT

## 2023-07-30 PROCEDURE — 99285 EMERGENCY DEPT VISIT HI MDM: CPT | Mod: FS

## 2023-07-30 PROCEDURE — 99223 1ST HOSP IP/OBS HIGH 75: CPT

## 2023-07-30 RX ORDER — TAMSULOSIN HYDROCHLORIDE 0.4 MG/1
0.4 CAPSULE ORAL AT BEDTIME
Refills: 0 | Status: DISCONTINUED | OUTPATIENT
Start: 2023-07-30 | End: 2023-08-07

## 2023-07-30 RX ORDER — LANOLIN ALCOHOL/MO/W.PET/CERES
3 CREAM (GRAM) TOPICAL AT BEDTIME
Refills: 0 | Status: DISCONTINUED | OUTPATIENT
Start: 2023-07-30 | End: 2023-08-07

## 2023-07-30 RX ORDER — PIPERACILLIN AND TAZOBACTAM 4; .5 G/20ML; G/20ML
3.38 INJECTION, POWDER, LYOPHILIZED, FOR SOLUTION INTRAVENOUS EVERY 8 HOURS
Refills: 0 | Status: DISCONTINUED | OUTPATIENT
Start: 2023-07-30 | End: 2023-08-02

## 2023-07-30 RX ORDER — DOCUSATE SODIUM 100 MG
1 CAPSULE ORAL
Refills: 0 | DISCHARGE

## 2023-07-30 RX ORDER — ENOXAPARIN SODIUM 100 MG/ML
40 INJECTION SUBCUTANEOUS EVERY 24 HOURS
Refills: 0 | Status: DISCONTINUED | OUTPATIENT
Start: 2023-07-30 | End: 2023-08-04

## 2023-07-30 RX ORDER — ASPIRIN/CALCIUM CARB/MAGNESIUM 324 MG
81 TABLET ORAL DAILY
Refills: 0 | Status: DISCONTINUED | OUTPATIENT
Start: 2023-07-30 | End: 2023-08-05

## 2023-07-30 RX ORDER — VANCOMYCIN HCL 1 G
1000 VIAL (EA) INTRAVENOUS ONCE
Refills: 0 | Status: COMPLETED | OUTPATIENT
Start: 2023-07-30 | End: 2023-07-30

## 2023-07-30 RX ORDER — SODIUM CHLORIDE 9 MG/ML
500 INJECTION INTRAMUSCULAR; INTRAVENOUS; SUBCUTANEOUS ONCE
Refills: 0 | Status: COMPLETED | OUTPATIENT
Start: 2023-07-30 | End: 2023-07-30

## 2023-07-30 RX ORDER — METOPROLOL TARTRATE 50 MG
25 TABLET ORAL
Refills: 0 | Status: DISCONTINUED | OUTPATIENT
Start: 2023-07-30 | End: 2023-08-07

## 2023-07-30 RX ORDER — ASPIRIN/CALCIUM CARB/MAGNESIUM 324 MG
1 TABLET ORAL
Refills: 0 | DISCHARGE

## 2023-07-30 RX ORDER — SENNA PLUS 8.6 MG/1
2 TABLET ORAL
Refills: 0 | DISCHARGE

## 2023-07-30 RX ORDER — ACETAMINOPHEN 500 MG
650 TABLET ORAL EVERY 6 HOURS
Refills: 0 | Status: DISCONTINUED | OUTPATIENT
Start: 2023-07-30 | End: 2023-08-07

## 2023-07-30 RX ORDER — ONDANSETRON 8 MG/1
4 TABLET, FILM COATED ORAL EVERY 8 HOURS
Refills: 0 | Status: DISCONTINUED | OUTPATIENT
Start: 2023-07-30 | End: 2023-08-07

## 2023-07-30 RX ORDER — VANCOMYCIN HCL 1 G
750 VIAL (EA) INTRAVENOUS EVERY 12 HOURS
Refills: 0 | Status: DISCONTINUED | OUTPATIENT
Start: 2023-07-30 | End: 2023-08-01

## 2023-07-30 RX ORDER — SENNA PLUS 8.6 MG/1
2 TABLET ORAL AT BEDTIME
Refills: 0 | Status: DISCONTINUED | OUTPATIENT
Start: 2023-07-30 | End: 2023-08-07

## 2023-07-30 RX ORDER — PIPERACILLIN AND TAZOBACTAM 4; .5 G/20ML; G/20ML
3.38 INJECTION, POWDER, LYOPHILIZED, FOR SOLUTION INTRAVENOUS ONCE
Refills: 0 | Status: COMPLETED | OUTPATIENT
Start: 2023-07-30 | End: 2023-07-30

## 2023-07-30 RX ORDER — RIVASTIGMINE 4.6 MG/24H
1 PATCH, EXTENDED RELEASE TRANSDERMAL EVERY 24 HOURS
Refills: 0 | Status: DISCONTINUED | OUTPATIENT
Start: 2023-07-30 | End: 2023-08-07

## 2023-07-30 RX ORDER — RIVASTIGMINE 4.6 MG/24H
1 PATCH, EXTENDED RELEASE TRANSDERMAL
Refills: 0 | DISCHARGE

## 2023-07-30 RX ORDER — ATORVASTATIN CALCIUM 80 MG/1
40 TABLET, FILM COATED ORAL AT BEDTIME
Refills: 0 | Status: DISCONTINUED | OUTPATIENT
Start: 2023-07-30 | End: 2023-08-07

## 2023-07-30 RX ORDER — RIVASTIGMINE 4.6 MG/24H
1 PATCH, EXTENDED RELEASE TRANSDERMAL
Qty: 0 | Refills: 0 | DISCHARGE

## 2023-07-30 RX ORDER — METOPROLOL TARTRATE 50 MG
1 TABLET ORAL
Refills: 0 | DISCHARGE

## 2023-07-30 RX ADMIN — PIPERACILLIN AND TAZOBACTAM 25 GRAM(S): 4; .5 INJECTION, POWDER, LYOPHILIZED, FOR SOLUTION INTRAVENOUS at 22:00

## 2023-07-30 RX ADMIN — RIVASTIGMINE 1 PATCH: 4.6 PATCH, EXTENDED RELEASE TRANSDERMAL at 22:05

## 2023-07-30 RX ADMIN — Medication 250 MILLIGRAM(S): at 17:43

## 2023-07-30 RX ADMIN — SODIUM CHLORIDE 500 MILLILITER(S): 9 INJECTION INTRAMUSCULAR; INTRAVENOUS; SUBCUTANEOUS at 13:00

## 2023-07-30 RX ADMIN — Medication 250 MILLIGRAM(S): at 20:14

## 2023-07-30 RX ADMIN — PIPERACILLIN AND TAZOBACTAM 200 GRAM(S): 4; .5 INJECTION, POWDER, LYOPHILIZED, FOR SOLUTION INTRAVENOUS at 15:23

## 2023-07-30 RX ADMIN — ATORVASTATIN CALCIUM 40 MILLIGRAM(S): 80 TABLET, FILM COATED ORAL at 21:58

## 2023-07-30 RX ADMIN — SENNA PLUS 2 TABLET(S): 8.6 TABLET ORAL at 21:58

## 2023-07-30 RX ADMIN — ENOXAPARIN SODIUM 40 MILLIGRAM(S): 100 INJECTION SUBCUTANEOUS at 22:09

## 2023-07-30 RX ADMIN — TAMSULOSIN HYDROCHLORIDE 0.4 MILLIGRAM(S): 0.4 CAPSULE ORAL at 21:58

## 2023-07-30 RX ADMIN — SODIUM CHLORIDE 500 MILLILITER(S): 9 INJECTION INTRAMUSCULAR; INTRAVENOUS; SUBCUTANEOUS at 14:00

## 2023-07-30 RX ADMIN — SODIUM CHLORIDE 1000 MILLILITER(S): 9 INJECTION INTRAMUSCULAR; INTRAVENOUS; SUBCUTANEOUS at 15:23

## 2023-07-30 NOTE — H&P ADULT - HISTORY OF PRESENT ILLNESS
87 year old M PMH dementia, BPH, DVT not on AC. IBS, OA with previous pelvic fracture brought in from [facility] for nonhealing decubitus ulcer. Patient was taking bactrim but did not appear to help. Patient is poor historian, called [facility] to provide some information but without help. Also left message for patient's nurse at [facility] of [phone]. Apparently patient is on hospice but there were worries that R hip wound was getting worse and infected. Discussed with daughter Salina Quinones who states that patient is on hospice but that it is ok to send to hospital for abx if needed. As per daughter, patient has been in and out of hospitals, was recently at [facility] and believe that's where wound developed. Was brought to [facility] and started on bactrim for 10 days with wound care team caring for patient but today did not seem to be doing better and worried for sepsis therefore family decided to send patient in for further evaluation. At this time, patient appears comfortable.    In the ED, T 97.3F, HR 96, /62, RR 18, SpO2 98% on RA. Labs showed WBC 12.53, otherwise normal labs. Patient received zosyn x1, NS bolus [id number] total in the ED.    EKG: PENDING  CT pelvis with contrast: Large soft tissue wound overlies the right greater trochanter with mild cortical erosive change of the greater trochanter consistent with osteomyelitis. Fluid collection extends into the adjacent right gluteus angel muscle measuring approximately 5.3 x 6.7 x 2.7 cm concerning for abscess. Right ischial decubitus change appears to be present with 4.5 x 1.5 x 1.9 cm fluid collection adjacent to the ischial tuberosity. Underlying osteomyelitis cannot be excluded. Subcutaneous induration along the left ischial tuberosity suggests additional decubitus change. New fluid collection within the right adductor brevis muscle measures approximately 15.0 x 2.2 x 2.1 cm and extends towards the ununited right superior pubic ramus fracture. This may be related to the fracture and reflect ganglion cyst formation or resolving hematoma. Infection cannot be absolutely excluded although there is no peripheral enhancement. Consider follow-up imaging to assess stability or aspiration as warranted.

## 2023-07-30 NOTE — ED ADULT NURSE NOTE - CHIEF COMPLAINT QUOTE
BIB EMS from [facility] for wound check. Per EMS, pt has a pressure wound to right hip that may becoming infected.

## 2023-07-30 NOTE — ED PROVIDER NOTE - SKIN WOUND DESCRIPTION
Approximately 3 cm x 4 cm area of erosion with scabrous unstageable decubitus ulceration with pustular discharge and a foul odor noted on the right lateral hip.  Stage II decubitus ulceration some erosion in the posterior right inferior buttock/upper thigh, 2 areas of erythema, possible stage I decubiti noted on the right Lateral foot./clean

## 2023-07-30 NOTE — ED ADULT TRIAGE NOTE - CHIEF COMPLAINT QUOTE
BIB EMS from [facility] for wound check. Per EMS, pt has a pressure wound to right hip that may becoming infected.

## 2023-07-30 NOTE — ED ADULT NURSE NOTE - NSFALLUNIVINTERV_ED_ALL_ED
Bed/Stretcher in lowest position, wheels locked, appropriate side rails in place/Call bell, personal items and telephone in reach/Instruct patient to call for assistance before getting out of bed/chair/stretcher/Non-slip footwear applied when patient is off stretcher/[place] to call system/Physically safe environment - no spills, clutter or unnecessary equipment/Purposeful proactive rounding/Room/bathroom lighting operational, light cord in reach

## 2023-07-30 NOTE — ED PROVIDER NOTE - CARE PLAN
1 Principal Discharge DX:	Osteomyelitis of right femur  Secondary Diagnosis:	Non-healing wound of right lower extremity

## 2023-07-30 NOTE — H&P ADULT - ASSESSMENT
87 year old M PMH dementia, BPH, DVT not on AC. IBS, OA with previous pelvic fracture brought in from [facility] for nonhealing decubitus ulcer admitted for possible osteomyelitis of R hip, failed outpatient abx    #R hip wound  - admit to [facility]  - patient with nonhealing wound of R femur, was placed on bactrim but continues to get worse  - CT with possible signs of osteo and abscess formation  - s/p vanco and zosyn in the ED, will continue  - discussed with daughter Salina Quinones at length regarding GOC for patient since he is on hospice care- she is ok with IV abx and if needed a debridement of the area. If further surgical intervention is needed, she would need to think about it. Explained that abscess would likely need to be drained otherwise could cause reinfection and if patient has osteo would need surgical intervention vs long term abx and she would like to think about it  - wound care nurse consulted  - plastics, Dr. Posada consulted    #CAD  - continue asa 81mg, atorvastatin 40mg, metoprolol tartrate 25mg BID     #dementia  - on rivastigmine 4.5mg capsule at home, will continue patch while in hospital    #BPH  - continue Flomax    #IBS  - continue Dicyclomine    #DVT ppx  - lovenox    discussed with daughter Salina Quinones, answered all questions and concerns  spoke to nurse supervisor at [facility]  message left for nurse at [facility] [phone] 87 year old M PMH dementia, BPH, DVT not on AC. IBS, OA with previous pelvic fracture brought in from [facility] for nonhealing decubitus ulcer admitted for possible osteomyelitis of R hip, failed outpatient abx    #R hip wound  - admit to [facility]  - patient with nonhealing wound of R femur, was placed on bactrim but continues to get worse  - CT with possible signs of osteo and abscess formation  - s/p vanco and zosyn in the ED, will continue  - discussed with daughter Salina Quinones at length regarding GOC for patient since he is on hospice care- she is ok with IV abx and if needed a debridement of the area. If further surgical intervention is needed, she would need to think about it. Explained that abscess would likely need to be drained otherwise could cause reinfection and if patient has osteo would need surgical intervention vs long term abx and she would like to think about it  - wound care nurse consulted  - plastics, Dr. Posada consulted    #CAD  - continue asa 81mg, atorvastatin 40mg, metoprolol tartrate 25mg BID     #dementia  - on rivastigmine 4.5mg capsule at home, will continue patch while in hospital    #BPH  - continue Flomax    #IBS  - continue Dicyclomine    #DVT ppx  - lovenox    discussed with daughter Salina Quinones, answered all questions and concerns  spoke to nurse supervisor at [facility]  spoke to Luis, nurse from [facility] [phone] 87 year old M PMH dementia, BPH, DVT not on AC. IBS, OA with previous pelvic fracture brought in from [facility] for nonhealing decubitus ulcer admitted for possible osteomyelitis of R hip, failed outpatient abx    #R hip wound  - admit to [facility]  - patient with nonhealing wound of R femur, was placed on bactrim but continues to get worse  - CT with possible signs of osteo and abscess formation  - s/p vanco and zosyn in the ED, will continue  - discussed with daughter Salina Quinones at length regarding GOC for patient since he is on hospice care- she is ok with IV abx and if needed a debridement of the area. If further surgical intervention is needed, she would need to think about it. Explained that abscess would likely need to be drained otherwise could cause reinfection and if patient has osteo would need surgical intervention vs long term abx and she would like to think about it  - follow up wound culture  - wound care nurse consulted  - plastics, Dr. Posada consulted    #CAD  - continue asa 81mg, atorvastatin 40mg, metoprolol tartrate 25mg BID     #dementia  - on rivastigmine 4.5mg capsule at home, will continue patch while in hospital    #BPH  - continue Flomax    #IBS  - continue Dicyclomine    #DVT ppx  - lovenox    discussed with daughter Salina Quinones, answered all questions and concerns  spoke to nurse supervisor at [facility]  spoke to Luis, nurse from [facility] [phone] 87 year old M PMH dementia, BPH, DVT not on AC. IBS, OA with previous pelvic fracture brought in from [facility] for nonhealing decubitus ulcer admitted for possible osteomyelitis of R hip, failed outpatient abx    #R hip wound  - admit to [facility]  - does not meet sepsis criteria on admission  - patient with nonhealing wound of R femur, was placed on bactrim but continues to get worse  - CT with possible signs of osteo and abscess formation  - s/p vanco and zosyn in the ED, will continue  - discussed with daughter Salina Quinones at length regarding GOC for patient since he is on hospice care- she is ok with IV abx and if needed a debridement of the area. If further surgical intervention is needed, she would need to think about it. Explained that abscess would likely need to be drained otherwise could cause reinfection and if patient has osteo would need surgical intervention vs long term abx and she would like to think about it  - follow up wound culture  - wound care nurse consulted  - plastics, Dr. Posada consulted    #CAD  - continue asa 81mg, atorvastatin 40mg, metoprolol tartrate 25mg BID     #dementia  - on rivastigmine 4.5mg capsule at home, will continue patch while in hospital    #BPH  - continue Flomax    #IBS  - continue Dicyclomine    #DVT ppx  - lovenox    discussed with daughter Salina Quinones, answered all questions and concerns  spoke to nurse supervisor at [facility]  spoke to Luis nurse from [facility] [phone]

## 2023-07-30 NOTE — ED PROVIDER NOTE - ATTENDING APP SHARED VISIT CONTRIBUTION OF CARE
87-year-old male with a medical history including dementia osteoarthritis prior pelvic fracture sent from assisted living for concerns of a nonhealing decubitus ulceration on the right upper leg.      On physical examination patient with an unstageable decubiti on the right lateral hip pustular discharge on firm palpation, wound culture sent.  With some tenderness noted to the area.    Stage II decubitus ulceration some erosion in the posterior right inferior buttock/upper thigh, 2 areas of erythema, possible stage I decubiti noted on the right Lateral foot.    CBC, CMP, IV fluids, will cover with antibiotics, CT hip to evaluate for osteomyelitis.  Positive osteomyelitis concern, will discuss for admission.  ct CONSISTENT WITH OSTEOMYELITIS  Dr. Bailey:  I have reviewed and discussed with the PA/ resident the case specifics, including the history, physical assessment, evaluation, conclusion, laboratory results, and medical plan. I agree with the contents, and conclusions. I have personally examined, and interviewed the patient.

## 2023-07-30 NOTE — H&P ADULT - NSHPPHYSICALEXAM_GEN_ALL_CORE
T(C): 36.3 (07-30-23 @ 12:21), Max: 36.3 (07-30-23 @ 12:21)  HR: 96 (07-30-23 @ 12:21) (96 - 96)  BP: 123/62 (07-30-23 @ 12:21) (123/62 - 123/62)  RR: 18 (07-30-23 @ 12:21) (18 - 18)  SpO2: 98% (07-30-23 @ 12:21) (98% - 98%)    GENERAL: patient appears well, no acute distress, laying in bed  EYES: sclera clear, no exudates  ENMT: oropharynx clear without erythema, no exudates, supple, soft, no thyromegaly noted  LUNGS: good air entry bilaterally, clear to auscultation, symmetric breath sounds, no wheezing or rhonchi appreciated  HEART: soft S1/S2, regular rate and rhythm, no murmurs noted, no lower extremity edema  GASTROINTESTINAL: abdomen is soft, nontender, nondistended, normoactive bowel sounds, no palpable masses  INTEGUMENT: warm and perfused  MUSCULOSKELETAL: no clubbing or cyanosis, no obvious deformity  NEUROLOGIC: awake, alert, oriented x1 (name only), able to move all extremities

## 2023-07-30 NOTE — H&P ADULT - HIV OFFER
Opt out SSKI Counseling:  I discussed with the patient the risks of SSKI including but not limited to thyroid abnormalities, metallic taste, GI upset, fever, headache, acne, arthralgias, paraesthesias, lymphadenopathy, easy bleeding, arrhythmias, and allergic reaction.

## 2023-07-30 NOTE — ED PROVIDER NOTE - OBJECTIVE STATEMENT
87-year-old male with a medical history including dementia osteoarthritis prior pelvic fracture sent from assisted living for concerns of a nonhealing decubitus ulceration on the right upper leg.

## 2023-07-30 NOTE — ED PROVIDER NOTE - CLINICAL SUMMARY MEDICAL DECISION MAKING FREE TEXT BOX
87-year-old male with a medical history including dementia osteoarthritis prior pelvic fracture sent from assisted living for concerns of a nonhealing decubitus ulceration on the right upper leg.      On physical examination patient with an unstageable decubiti on the right lateral hip pustular discharge on firm palpation, wound culture sent.  With some tenderness noted to the area.    Stage II decubitus ulceration some erosion in the posterior right inferior buttock/upper thigh, 2 areas of erythema, possible stage I decubiti noted on the right Lateral foot.    CBC, CMP, IV fluids, will cover with antibiotics, CT hip to evaluate for osteomyelitis.  Positive osteomyelitis concern, will discuss for admission.

## 2023-07-31 LAB
ALBUMIN SERPL ELPH-MCNC: 2 G/DL — LOW (ref 3.3–5)
ALP SERPL-CCNC: 111 U/L — SIGNIFICANT CHANGE UP (ref 40–120)
ALT FLD-CCNC: 42 U/L — SIGNIFICANT CHANGE UP (ref 10–45)
ANION GAP SERPL CALC-SCNC: 8 MMOL/L — SIGNIFICANT CHANGE UP (ref 5–17)
AST SERPL-CCNC: 74 U/L — HIGH (ref 10–40)
BASOPHILS # BLD AUTO: 0.02 K/UL — SIGNIFICANT CHANGE UP (ref 0–0.2)
BASOPHILS NFR BLD AUTO: 0.2 % — SIGNIFICANT CHANGE UP (ref 0–2)
BILIRUB SERPL-MCNC: 0.5 MG/DL — SIGNIFICANT CHANGE UP (ref 0.2–1.2)
BUN SERPL-MCNC: 18 MG/DL — SIGNIFICANT CHANGE UP (ref 7–23)
CALCIUM SERPL-MCNC: 9.3 MG/DL — SIGNIFICANT CHANGE UP (ref 8.4–10.5)
CHLORIDE SERPL-SCNC: 109 MMOL/L — HIGH (ref 96–108)
CO2 SERPL-SCNC: 26 MMOL/L — SIGNIFICANT CHANGE UP (ref 22–31)
CREAT SERPL-MCNC: 1.02 MG/DL — SIGNIFICANT CHANGE UP (ref 0.5–1.3)
EGFR: 72 ML/MIN/1.73M2 — SIGNIFICANT CHANGE UP
EOSINOPHIL # BLD AUTO: 0.08 K/UL — SIGNIFICANT CHANGE UP (ref 0–0.5)
EOSINOPHIL NFR BLD AUTO: 0.8 % — SIGNIFICANT CHANGE UP (ref 0–6)
GLUCOSE SERPL-MCNC: 123 MG/DL — HIGH (ref 70–99)
HCT VFR BLD CALC: 33.3 % — LOW (ref 39–50)
HGB BLD-MCNC: 10.8 G/DL — LOW (ref 13–17)
IMM GRANULOCYTES NFR BLD AUTO: 0.8 % — SIGNIFICANT CHANGE UP (ref 0–0.9)
LYMPHOCYTES # BLD AUTO: 0.75 K/UL — LOW (ref 1–3.3)
LYMPHOCYTES # BLD AUTO: 7.6 % — LOW (ref 13–44)
MCHC RBC-ENTMCNC: 30.2 PG — SIGNIFICANT CHANGE UP (ref 27–34)
MCHC RBC-ENTMCNC: 32.4 GM/DL — SIGNIFICANT CHANGE UP (ref 32–36)
MCV RBC AUTO: 93 FL — SIGNIFICANT CHANGE UP (ref 80–100)
MONOCYTES # BLD AUTO: 0.47 K/UL — SIGNIFICANT CHANGE UP (ref 0–0.9)
MONOCYTES NFR BLD AUTO: 4.8 % — SIGNIFICANT CHANGE UP (ref 2–14)
NEUTROPHILS # BLD AUTO: 8.43 K/UL — HIGH (ref 1.8–7.4)
NEUTROPHILS NFR BLD AUTO: 85.8 % — HIGH (ref 43–77)
NRBC # BLD: 0 /100 WBCS — SIGNIFICANT CHANGE UP (ref 0–0)
PLATELET # BLD AUTO: 263 K/UL — SIGNIFICANT CHANGE UP (ref 150–400)
POTASSIUM SERPL-MCNC: 4.1 MMOL/L — SIGNIFICANT CHANGE UP (ref 3.5–5.3)
POTASSIUM SERPL-SCNC: 4.1 MMOL/L — SIGNIFICANT CHANGE UP (ref 3.5–5.3)
PROT SERPL-MCNC: 6.2 G/DL — SIGNIFICANT CHANGE UP (ref 6–8.3)
RBC # BLD: 3.58 M/UL — LOW (ref 4.2–5.8)
RBC # FLD: 13.8 % — SIGNIFICANT CHANGE UP (ref 10.3–14.5)
SODIUM SERPL-SCNC: 143 MMOL/L — SIGNIFICANT CHANGE UP (ref 135–145)
VANCOMYCIN TROUGH SERPL-MCNC: 11.1 UG/ML — SIGNIFICANT CHANGE UP (ref 10–20)
WBC # BLD: 9.83 K/UL — SIGNIFICANT CHANGE UP (ref 3.8–10.5)
WBC # FLD AUTO: 9.83 K/UL — SIGNIFICANT CHANGE UP (ref 3.8–10.5)

## 2023-07-31 PROCEDURE — 99232 SBSQ HOSP IP/OBS MODERATE 35: CPT | Mod: FS

## 2023-07-31 PROCEDURE — 99223 1ST HOSP IP/OBS HIGH 75: CPT

## 2023-07-31 PROCEDURE — 99497 ADVNCD CARE PLAN 30 MIN: CPT | Mod: 25

## 2023-07-31 RX ORDER — NYSTATIN CREAM 100000 [USP'U]/G
1 CREAM TOPICAL
Refills: 0 | Status: DISCONTINUED | OUTPATIENT
Start: 2023-07-31 | End: 2023-08-07

## 2023-07-31 RX ORDER — ZINC SULFATE TAB 220 MG (50 MG ZINC EQUIVALENT) 220 (50 ZN) MG
220 TAB ORAL DAILY
Refills: 0 | Status: DISCONTINUED | OUTPATIENT
Start: 2023-07-31 | End: 2023-08-07

## 2023-07-31 RX ORDER — ZINC OXIDE 200 MG/G
1 OINTMENT TOPICAL
Refills: 0 | Status: DISCONTINUED | OUTPATIENT
Start: 2023-07-31 | End: 2023-08-07

## 2023-07-31 RX ORDER — SODIUM HYPOCHLORITE 0.125 %
1 SOLUTION, NON-ORAL MISCELLANEOUS
Refills: 0 | Status: DISCONTINUED | OUTPATIENT
Start: 2023-07-31 | End: 2023-08-04

## 2023-07-31 RX ORDER — ASCORBIC ACID 60 MG
500 TABLET,CHEWABLE ORAL DAILY
Refills: 0 | Status: DISCONTINUED | OUTPATIENT
Start: 2023-07-31 | End: 2023-08-07

## 2023-07-31 RX ADMIN — RIVASTIGMINE 1 PATCH: 4.6 PATCH, EXTENDED RELEASE TRANSDERMAL at 22:00

## 2023-07-31 RX ADMIN — PIPERACILLIN AND TAZOBACTAM 25 GRAM(S): 4; .5 INJECTION, POWDER, LYOPHILIZED, FOR SOLUTION INTRAVENOUS at 05:06

## 2023-07-31 RX ADMIN — Medication 250 MILLIGRAM(S): at 05:07

## 2023-07-31 RX ADMIN — TAMSULOSIN HYDROCHLORIDE 0.4 MILLIGRAM(S): 0.4 CAPSULE ORAL at 22:11

## 2023-07-31 RX ADMIN — Medication 10 MILLIGRAM(S): at 05:06

## 2023-07-31 RX ADMIN — Medication 25 MILLIGRAM(S): at 05:06

## 2023-07-31 RX ADMIN — ATORVASTATIN CALCIUM 40 MILLIGRAM(S): 80 TABLET, FILM COATED ORAL at 22:11

## 2023-07-31 RX ADMIN — ENOXAPARIN SODIUM 40 MILLIGRAM(S): 100 INJECTION SUBCUTANEOUS at 22:12

## 2023-07-31 RX ADMIN — PIPERACILLIN AND TAZOBACTAM 25 GRAM(S): 4; .5 INJECTION, POWDER, LYOPHILIZED, FOR SOLUTION INTRAVENOUS at 22:13

## 2023-07-31 RX ADMIN — RIVASTIGMINE 1 PATCH: 4.6 PATCH, EXTENDED RELEASE TRANSDERMAL at 06:26

## 2023-07-31 RX ADMIN — Medication 10 MILLIGRAM(S): at 17:39

## 2023-07-31 RX ADMIN — Medication 250 MILLIGRAM(S): at 17:30

## 2023-07-31 RX ADMIN — PIPERACILLIN AND TAZOBACTAM 25 GRAM(S): 4; .5 INJECTION, POWDER, LYOPHILIZED, FOR SOLUTION INTRAVENOUS at 13:14

## 2023-07-31 RX ADMIN — RIVASTIGMINE 1 PATCH: 4.6 PATCH, EXTENDED RELEASE TRANSDERMAL at 19:03

## 2023-07-31 RX ADMIN — SENNA PLUS 2 TABLET(S): 8.6 TABLET ORAL at 22:12

## 2023-07-31 RX ADMIN — Medication 81 MILLIGRAM(S): at 11:27

## 2023-07-31 RX ADMIN — Medication 25 MILLIGRAM(S): at 17:33

## 2023-07-31 NOTE — CONSULT NOTE ADULT - CONVERSATION DETAILS
Spoke w/ daughter Lauryn, she is unable to be here at this time. We discussed why her Dad is here, and how he is doing today . daughter says he was in ltc at [facility], and his wounds were being treated w/ oral  antibiotics, but were not getting better and seemed to be getting worse. Daughter confirmed he is on home hospice services w/ [facility],  at [facility] , and that she is her dads hcp. These form are on pt chart. They brought him here, to get tx w/ iv antbx, and will consider debridement.    I discussed w/ daughter we have 2 molst forms on pt chart, w/ conflicting orders.  Daughter, Lauryn  clarified today that pt is to be DNR/DNI , no feed tube , and that the any other molst should be voided.  Family ultimately wishes for pt to return to [facility] w/ home hospice services, after infection treated.

## 2023-07-31 NOTE — PATIENT PROFILE ADULT - FALL HARM RISK - HARM RISK INTERVENTIONS
Assistance with ambulation/Assistance OOB with selected safe patient handling equipment/Communicate Risk of Fall with Harm to all staff/Discuss with provider need for PT consult/Monitor gait and stability/Reinforce activity limits and safety measures with patient and family/Tailored Fall Risk Interventions/Visual Cue: Yellow wristband and red socks/Bed in lowest position, wheels locked, appropriate side rails in place/Call bell, personal items and telephone in reach/Instruct patient to call for assistance before getting out of bed or chair/Non-slip footwear when patient is out of bed/[place] to call system/Physically safe environment - no spills, clutter or unnecessary equipment/Purposeful Proactive Rounding/Room/bathroom lighting operational, light cord in reach

## 2023-07-31 NOTE — DIETITIAN INITIAL EVALUATION ADULT - ORAL INTAKE PTA/DIET HISTORY
Unable to obtain diet Hx from patient, no family at bedside. Will obtain subjective weight/diet Hx from patient/family PRN.

## 2023-07-31 NOTE — CONSULT NOTE ADULT - ASSESSMENT
large necrotic stage 4 right hip wound with possible OM on right greater trochanter on CT.  no clinical correlation to CT findings of right ischium  -local wound care with dakins  -will discuss with family  -off load  -nutrtition support large necrotic stage 4 right hip wound with possible OM on right greater trochanter on CT.  no clinical correlation to CT findings of right ischium  -local wound care with dakins  -discussed with daughter Brenda (HCP)  -off load  -nutrtition support  -suggest MRI to further evaluate CT findings  -pt may need OR debridement, suggest medical and cardiology clearance   large necrotic stage 4 right hip wound with possible OM on right greater trochanter on CT.  no clinical correlation to CT findings of right ischium  -local wound care with dakins  -discussed with daughter Brenda (HCP)  -off load  -nutrtition support  -suggest MRI to further evaluate CT findings  -pt may need OR debridement, suggest medical and cardiology clearance  -also suggest ID consult.   large necrotic stage 4 right hip wound with possible OM on right greater trochanter on CT(with IV contrast).  no clinical correlation to CT findings of right ischium  -local wound care with dakins  -discussed with daughter Brenda (HCP)  -off load  -nutrtition support  -will review CT findings with radiologis, MRI (if possible) may be needed to further evaluate CT findings  -pt may need OR debridement, suggest medical and cardiology clearance  -also suggest ID consult.

## 2023-07-31 NOTE — ADVANCED PRACTICE NURSE CONSULT - ASSESSMENT
Elderly man, lethargic but arousable to voice, follows some commands, cachectic.  Known to writer from previous admission, admitted for worsening Right hip Pressure Injury.    Patient appears more debilitated than previous admission, somewhat contracted in both upper & lower extremities.  Presents with crater-like necrotic wound to right hip, foul smelling, with moderate, somewhat purulent drainage.  This Unstageable wound is 3.5 x 3 x 2.5 cm, covered 100% with soft, moist, tan to black fibrinous slough.  Periwound with some dark erythema to about 1 cm around.  There is an area of dark purple skin discoloration without opening in skin, (DTI); 3 x 3 cm, just superior to Unstageable wound.

## 2023-07-31 NOTE — DIETITIAN INITIAL EVALUATION ADULT - PERTINENT MEDS FT
MEDICATIONS  (STANDING):  aspirin  chewable 81 milliGRAM(s) Oral daily  atorvastatin 40 milliGRAM(s) Oral at bedtime  dicyclomine 10 milliGRAM(s) Oral two times a day before meals  enoxaparin Injectable 40 milliGRAM(s) SubCutaneous every 24 hours  metoprolol tartrate 25 milliGRAM(s) Oral two times a day  piperacillin/tazobactam IVPB.. 3.375 Gram(s) IV Intermittent every 8 hours  rivastigmine patch  4.6 mG/24 Hr(s). 1 Patch Transdermal every 24 hours  senna 2 Tablet(s) Oral at bedtime  tamsulosin 0.4 milliGRAM(s) Oral at bedtime  vancomycin  IVPB 750 milliGRAM(s) IV Intermittent every 12 hours    MEDICATIONS  (PRN):  acetaminophen     Tablet .. 650 milliGRAM(s) Oral every 6 hours PRN Temp greater or equal to 38C (100.4F), Mild Pain (1 - 3)  aluminum hydroxide/magnesium hydroxide/simethicone Suspension 30 milliLiter(s) Oral every 4 hours PRN Dyspepsia  melatonin 3 milliGRAM(s) Oral at bedtime PRN Insomnia  ondansetron Injectable 4 milliGRAM(s) IV Push every 8 hours PRN Nausea and/or Vomiting

## 2023-07-31 NOTE — ADVANCED PRACTICE NURSE CONSULT - RECOMMEDATIONS
Suggest twice daily Dakin's cleanse, pat dry.  Apply Cavillon film barrier to periwound daily.  Pack wound with Dakin's moistened gauze twice daily, cover with dry gauze then foam dressing.    Offload all bony prominences with strict Turn & Position at least every 2 hours.  Offload heels by floating on pillow or with offloading booties at all times while in bed.   Nutritional support per Dietician's recommendations.     Consider Goals of Care, will likely need enzymatic and/or surgical debridement.

## 2023-07-31 NOTE — CONSULT NOTE ADULT - SUBJECTIVE AND OBJECTIVE BOX
CC:  Patient is a 87y old  Male who presents with a chief complaint of Osteomyelitis     (31 Jul 2023 13:03)      HPI:  87 year old M PMH dementia, BPH, DVT not on AC. IBS, OA with previous pelvic fracture brought in from [facility] for nonhealing decubitus ulcer. Patient was taking bactrim but did not appear to help. Patient is poor historian, called [facility] to provide some information but without help. Also left message for patient's nurse at [facility] of [phone]. Apparently patient is on hospice but there were worries that R hip wound was getting worse and infected. Discussed with daughter Salina Quinones who states that patient is on hospice but that it is ok to send to hospital for abx if needed. As per daughter, patient has been in and out of hospitals, was recently at [facility] and believe that's where wound developed. Was brought to [facility] and started on bactrim for 10 days with wound care team caring for patient but today did not seem to be doing better and worried for sepsis therefore family decided to send patient in for further evaluation. At this time, patient appears comfortable.    In the ED, T 97.3F, HR 96, /62, RR 18, SpO2 98% on RA. Labs showed WBC 12.53, otherwise normal labs. Patient received zosyn x1, NS bolus [id number] total in the ED.    EKG: PENDING  CT pelvis with contrast: Large soft tissue wound overlies the right greater trochanter with mild cortical erosive change of the greater trochanter consistent with osteomyelitis. Fluid collection extends into the adjacent right gluteus angel muscle measuring approximately 5.3 x 6.7 x 2.7 cm concerning for abscess. Right ischial decubitus change appears to be present with 4.5 x 1.5 x 1.9 cm fluid collection adjacent to the ischial tuberosity. Underlying osteomyelitis cannot be excluded. Subcutaneous induration along the left ischial tuberosity suggests additional decubitus change. New fluid collection within the right adductor brevis muscle measures approximately 15.0 x 2.2 x 2.1 cm and extends towards the ununited right superior pubic ramus fracture. This may be related to the fracture and reflect ganglion cyst formation or resolving hematoma. Infection cannot be absolutely excluded although there is no peripheral enhancement. Consider follow-up imaging to assess stability or aspiration as warranted. (30 Jul 2023 17:15)      PAST MEDICAL & SURGICAL HISTORY:  Lactose intolerance in adult      Shellfish allergy      Osteoarthritis      Scoliosis      Vitamin D deficiency      Dementia      Dementia      History of tonsillectomy  in childhood      S/P colonoscopy      History of hip surgery  L.          Allergies    No Known Drug Allergies  shellfish (Other)    Intolerances    lactose (Diarrhea)      SOCIAL HISTORY          Smoking: Yes [ ]  No [ ]   ______pk yrs          ETOH  Yes [ ]  No [ ]  Social [ ]          DRUGS:  Yes [ ]  No [ ]  if so what______________    FAMILY HISTORY:  No pertinent family history in first degree relatives        MEDICATIONS  (STANDING):  ascorbic acid 500 milliGRAM(s) Oral daily  aspirin  chewable 81 milliGRAM(s) Oral daily  atorvastatin 40 milliGRAM(s) Oral at bedtime  Dakins Solution - 1/2 Strength 1 Application(s) Topical two times a day  dicyclomine 10 milliGRAM(s) Oral two times a day before meals  enoxaparin Injectable 40 milliGRAM(s) SubCutaneous every 24 hours  metoprolol tartrate 25 milliGRAM(s) Oral two times a day  multivitamin 1 Tablet(s) Oral daily  nystatin Cream 1 Application(s) Topical two times a day  piperacillin/tazobactam IVPB.. 3.375 Gram(s) IV Intermittent every 8 hours  rivastigmine patch  4.6 mG/24 Hr(s). 1 Patch Transdermal every 24 hours  senna 2 Tablet(s) Oral at bedtime  tamsulosin 0.4 milliGRAM(s) Oral at bedtime  vancomycin  IVPB 750 milliGRAM(s) IV Intermittent every 12 hours  zinc oxide 40% Paste 1 Application(s) Topical two times a day  zinc sulfate 220 milliGRAM(s) Oral daily    MEDICATIONS  (PRN):  acetaminophen     Tablet .. 650 milliGRAM(s) Oral every 6 hours PRN Temp greater or equal to 38C (100.4F), Mild Pain (1 - 3)  aluminum hydroxide/magnesium hydroxide/simethicone Suspension 30 milliLiter(s) Oral every 4 hours PRN Dyspepsia  melatonin 3 milliGRAM(s) Oral at bedtime PRN Insomnia  ondansetron Injectable 4 milliGRAM(s) IV Push every 8 hours PRN Nausea and/or Vomiting         Review of systems:  General:  no fever or chills  Pulmonary:  no SOB  Cardiac:  denies chest pain, palpitations  GI:  no nausea, vomitting, or abddominal pain  :  no increase frequency, or burning  Heme:  no easy bruising with minor trauma  Musculoskeletal:  No history of back pain or trauma  Skin:  no history of rashes, injury, trauma  Neuro:  no weakness         Vital Signs Last 24 Hrs  T(C): 36.7 (31 Jul 2023 15:30), Max: 36.8 (31 Jul 2023 05:00)  T(F): 98 (31 Jul 2023 15:30), Max: 98.2 (31 Jul 2023 05:00)  HR: 83 (31 Jul 2023 17:45) (76 - 100)  BP: 121/52 (31 Jul 2023 17:45) (121/52 - 145/62)  BP(mean): --  RR: 19 (31 Jul 2023 15:30) (18 - 19)  SpO2: 95% (31 Jul 2023 15:30) (94% - 95%)    Parameters below as of 31 Jul 2023 15:30  Patient On (Oxygen Delivery Method): room air        Physical Exam:    General:  Appears stated age, well-groomed, well-nourished, no distress  Extremities:    Skin:     other:  Musculoskeletal:    Neuro/Psych:  Alert, oriented tp time, place and person       LABS:                        10.8   9.83  )-----------( 263      ( 31 Jul 2023 07:19 )             33.3     07-31    143  |  109<H>  |  18  ----------------------------<  123<H>  4.1   |  26  |  1.02    Ca    9.3      31 Jul 2023 07:19    TPro  6.2  /  Alb  2.0<L>  /  TBili  0.5  /  DBili  x   /  AST  74<H>  /  ALT  42  /  AlkPhos  111  07-31      Urinalysis Basic - ( 31 Jul 2023 07:19 )    Color: x / Appearance: x / SG: x / pH: x  Gluc: 123 mg/dL / Ketone: x  / Bili: x / Urobili: x   Blood: x / Protein: x / Nitrite: x   Leuk Esterase: x / RBC: x / WBC x   Sq Epi: x / Non Sq Epi: x / Bacteria: x        RADIOLOGY & ADDITIONAL STUDIES:    Risks, benefits, and alternatives to treatment discussed. All questions answered with understanding.    Procedure Performed:  (  )Yes     (  )No  Name of Procedure:      [  ]Debridement     [  ]I&D    [  ]Laceration Repair     [  ]Other:  (  )partial thickness     (  )full thickness     (  )subcutaneous     (  )muscle/tendon     (  )bone  (  )sharp     (  )surgical       CC:  Patient is a 87y old  Male who presents with a chief complaint of Osteomyelitis       HPI:  87 year old M PMH dementia, BPH, DVT not on AC. IBS, OA with previous pelvic fracture brought in from [facility] for nonhealing decubitus ulcer. Patient was taking bactrim but did not appear to help. Patient is poor historian, called [facility] to provide some information but without help. Also left message for patient's nurse at [facility] [place] of [phone]. Apparently patient is on hospice but there were worries that R hip wound was getting worse and infected. Discussed with daughter Salina Quinones who states that patient is on hospice but that it is ok to send to hospital for abx if needed. As per daughter, patient has been in and out of hospitals, was recently at [facility] [place] and believe that's where wound developed. Was brought to [facility] and started on bactrim for 10 days with wound care team caring for patient but today did not seem to be doing better and worried for sepsis therefore family decided to send patient in for further evaluation. At this time, patient appears comfortable.    In the ED, T 97.3F, HR 96, /62, RR 18, SpO2 98% on RA. Labs showed WBC 12.53, otherwise normal labs. Patient received zosyn x1, NS bolus [id number] total in the ED.    EKG: PENDING  CT pelvis with contrast: Large soft tissue wound overlies the right greater trochanter with mild cortical erosive change of the greater trochanter consistent with osteomyelitis. Fluid collection extends into the adjacent right gluteus angel muscle measuring approximately 5.3 x 6.7 x 2.7 cm concerning for abscess. Right ischial decubitus change appears to be present with 4.5 x 1.5 x 1.9 cm fluid collection adjacent to the ischial tuberosity. Underlying osteomyelitis cannot be excluded. Subcutaneous induration along the left ischial tuberosity suggests additional decubitus change. New fluid collection within the right adductor brevis muscle measures approximately 15.0 x 2.2 x 2.1 cm and extends towards the ununited right superior pubic ramus fracture. This may be related to the fracture and reflect ganglion cyst formation or resolving hematoma. Infection cannot be absolutely excluded although there is no peripheral enhancement. Consider follow-up imaging to assess stability or aspiration as warranted.        PAST MEDICAL & SURGICAL HISTORY:  Lactose intolerance in adult      Shellfish allergy      Osteoarthritis      Scoliosis      Vitamin D deficiency      Dementia      Dementia      History of tonsillectomy  in childhood      S/P colonoscopy      History of hip surgery  L.          Allergies    No Known Drug Allergies  shellfish (Other)    Intolerances    lactose (Diarrhea)      SOCIAL HISTORY          Smoking: Yes [ ]  No [ ]   ______pk yrs          ETOH  Yes [ ]  No [ ]  Social [ ]          DRUGS:  Yes [ ]  No [ ]  if so what______________    FAMILY HISTORY:  No pertinent family history in first degree relatives        MEDICATIONS  (STANDING):  ascorbic acid 500 milliGRAM(s) Oral daily  aspirin  chewable 81 milliGRAM(s) Oral daily  atorvastatin 40 milliGRAM(s) Oral at bedtime  Dakins Solution - 1/2 Strength 1 Application(s) Topical two times a day  dicyclomine 10 milliGRAM(s) Oral two times a day before meals  enoxaparin Injectable 40 milliGRAM(s) SubCutaneous every 24 hours  metoprolol tartrate 25 milliGRAM(s) Oral two times a day  multivitamin 1 Tablet(s) Oral daily  nystatin Cream 1 Application(s) Topical two times a day  piperacillin/tazobactam IVPB.. 3.375 Gram(s) IV Intermittent every 8 hours  rivastigmine patch  4.6 mG/24 Hr(s). 1 Patch Transdermal every 24 hours  senna 2 Tablet(s) Oral at bedtime  tamsulosin 0.4 milliGRAM(s) Oral at bedtime  vancomycin  IVPB 750 milliGRAM(s) IV Intermittent every 12 hours  zinc oxide 40% Paste 1 Application(s) Topical two times a day  zinc sulfate 220 milliGRAM(s) Oral daily    MEDICATIONS  (PRN):  acetaminophen     Tablet .. 650 milliGRAM(s) Oral every 6 hours PRN Temp greater or equal to 38C (100.4F), Mild Pain (1 - 3)  aluminum hydroxide/magnesium hydroxide/simethicone Suspension 30 milliLiter(s) Oral every 4 hours PRN Dyspepsia  melatonin 3 milliGRAM(s) Oral at bedtime PRN Insomnia  ondansetron Injectable 4 milliGRAM(s) IV Push every 8 hours PRN Nausea and/or Vomiting         Review of systems:  General:  no fever or chills  Pulmonary:  no SOB  Cardiac:  denies chest pain, palpitations  GI:  no nausea, vomitting, or abddominal pain  :  no increase frequency, or burning  Heme:  no easy bruising with minor trauma  Musculoskeletal:  No history of back pain or trauma  Skin:  no history of rashes, injury, trauma  Neuro:    weakness         Vital Signs Last 24 Hrs  T(C): 36.7 (31 Jul 2023 15:30), Max: 36.8 (31 Jul 2023 05:00)  T(F): 98 (31 Jul 2023 15:30), Max: 98.2 (31 Jul 2023 05:00)  HR: 83 (31 Jul 2023 17:45) (76 - 100)  BP: 121/52 (31 Jul 2023 17:45) (121/52 - 145/62)  BP(mean): --  RR: 19 (31 Jul 2023 15:30) (18 - 19)  SpO2: 95% (31 Jul 2023 15:30) (94% - 95%)    Parameters below as of 31 Jul 2023 15:30  Patient On (Oxygen Delivery Method): room air        Physical Exam:       Skin: 3.5 cm x 3 cm x 2.5 cm with undermining 9 to 3 o'clock, measuring 6 cm at 12 o'clock, wound is necrotic, some necrotic tissue was excised.  partial thickness injuries right lateral foot and heel          GENERAL: patient appears well, no acute distress, laying in bed  EYES: sclera clear, no exudates  ENMT: oropharynx clear without erythema, no exudates, supple, soft, no thyromegaly noted  LUNGS: good air entry bilaterally, clear to auscultation, symmetric breath sounds, no wheezing or rhonchi appreciated  HEART: soft S1/S2, regular rate and rhythm, no murmurs noted, no lower extremity edema  GASTROINTESTINAL: abdomen is soft, nontender, nondistended, normoactive bowel sounds, no palpable masses  INTEGUMENT: warm and perfused  MUSCULOSKELETAL: no clubbing or cyanosis, no obvious deformity  NEUROLOGIC: awake, alert, oriented x1 (name only), able to move all extremities    LABS:                        10.8   9.83  )-----------( 263                  33.3     07-31    143  |  109<H>  |  18  ----------------------------<  123<H>  4.1   |  26  |  1.02    Ca    9.3      31 Jul 2023 07:19    TPro  6.2  /  Alb  2.0<L>  /  TBili  0.5  /  DBili  x   /  AST  74<H>  /  ALT  42  /  AlkPhos  111  07-31      Urinalysis Basic - ( 31 Jul 2023 07:19 )    Color: x / Appearance: x / SG: x / pH: x  Gluc: 123 mg/dL / Ketone: x  / Bili: x / Urobili: x   Blood: x / Protein: x / Nitrite: x   Leuk Esterase: x / RBC: x / WBC x   Sq Epi: x / Non Sq Epi: x / Bacteria: x        RADIOLOGY & ADDITIONAL STUDIES:    Risks, benefits, and alternatives to treatment discussed. All questions answered with understanding.    Procedure Performed:  (  )Yes     (  )No  Name of Procedure:      [ x ]Debridement, excisional  see above  [  ]I&D    [  ]Laceration Repair     [  ]Other:  (  )partial thickness     (x  )full thickness     (  x)subcutaneous     (  )muscle/tendon     (  )bone  (  )sharp     (  )surgical       CC:  Patient is a 87y old  Male who presents with a chief complaint of Osteomyelitis       HPI:  87 year old M PMH dementia, BPH, DVT not on AC. IBS, OA with previous pelvic fracture brought in from [facility] for nonhealing decubitus ulcer. Patient was taking bactrim but did not appear to help. Patient is poor historian, called [facility] to provide some information but without help. Also left message for patient's nurse at [facility] [place] of [phone]. Apparently patient is on hospice but there were worries that R hip wound was getting worse and infected. Discussed with daughter Salina Quinones who states that patient is on hospice but that it is ok to send to hospital for abx if needed. As per daughter, patient has been in and out of hospitals, was recently at [facility] and believe that's where wound developed. Was brought to [facility] and started on bactrim for 10 days with wound care team caring for patient but today did not seem to be doing better and worried for sepsis therefore family decided to send patient in for further evaluation. At this time, patient appears comfortable.    In the ED, T 97.3F, HR 96, /62, RR 18, SpO2 98% on RA. Labs showed WBC 12.53, otherwise normal labs. Patient received zosyn x1, NS bolus [id number] total in the ED.    EKG: PENDING  CT pelvis with contrast: Large soft tissue wound overlies the right greater trochanter with mild cortical erosive change of the greater trochanter consistent with osteomyelitis. Fluid collection extends into the adjacent right gluteus angel muscle measuring approximately 5.3 x 6.7 x 2.7 cm concerning for abscess. Right ischial decubitus change appears to be present with 4.5 x 1.5 x 1.9 cm fluid collection adjacent to the ischial tuberosity. Underlying osteomyelitis cannot be excluded. Subcutaneous induration along the left ischial tuberosity suggests additional decubitus change. New fluid collection within the right adductor brevis muscle measures approximately 15.0 x 2.2 x 2.1 cm and extends towards the ununited right superior pubic ramus fracture. This may be related to the fracture and reflect ganglion cyst formation or resolving hematoma. Infection cannot be absolutely excluded although there is no peripheral enhancement. Consider follow-up imaging to assess stability or aspiration as warranted.        PAST MEDICAL & SURGICAL HISTORY:  Lactose intolerance in adult      Shellfish allergy      Osteoarthritis      Scoliosis      Vitamin D deficiency      Dementia      Dementia      History of tonsillectomy  in childhood      S/P colonoscopy      History of hip surgery  L.          Allergies    No Known Drug Allergies  shellfish (Other)    Intolerances    lactose (Diarrhea)      SOCIAL HISTORY          Smoking: Yes [ ]  No [ ]   ______pk yrs          ETOH  Yes [ ]  No [ ]  Social [ ]          DRUGS:  Yes [ ]  No [ ]  if so what______________    FAMILY HISTORY:  No pertinent family history in first degree relatives        MEDICATIONS  (STANDING):  ascorbic acid 500 milliGRAM(s) Oral daily  aspirin  chewable 81 milliGRAM(s) Oral daily  atorvastatin 40 milliGRAM(s) Oral at bedtime  Dakins Solution - 1/2 Strength 1 Application(s) Topical two times a day  dicyclomine 10 milliGRAM(s) Oral two times a day before meals  enoxaparin Injectable 40 milliGRAM(s) SubCutaneous every 24 hours  metoprolol tartrate 25 milliGRAM(s) Oral two times a day  multivitamin 1 Tablet(s) Oral daily  nystatin Cream 1 Application(s) Topical two times a day  piperacillin/tazobactam IVPB.. 3.375 Gram(s) IV Intermittent every 8 hours  rivastigmine patch  4.6 mG/24 Hr(s). 1 Patch Transdermal every 24 hours  senna 2 Tablet(s) Oral at bedtime  tamsulosin 0.4 milliGRAM(s) Oral at bedtime  vancomycin  IVPB 750 milliGRAM(s) IV Intermittent every 12 hours  zinc oxide 40% Paste 1 Application(s) Topical two times a day  zinc sulfate 220 milliGRAM(s) Oral daily    MEDICATIONS  (PRN):  acetaminophen     Tablet .. 650 milliGRAM(s) Oral every 6 hours PRN Temp greater or equal to 38C (100.4F), Mild Pain (1 - 3)  aluminum hydroxide/magnesium hydroxide/simethicone Suspension 30 milliLiter(s) Oral every 4 hours PRN Dyspepsia  melatonin 3 milliGRAM(s) Oral at bedtime PRN Insomnia  ondansetron Injectable 4 milliGRAM(s) IV Push every 8 hours PRN Nausea and/or Vomiting         Review of systems:  General:  no fever or chills  Pulmonary:  no SOB  Cardiac:  denies chest pain, palpitations  GI:  no nausea, vomitting, or abddominal pain  :  no increase frequency, or burning  Heme:  no easy bruising with minor trauma  Musculoskeletal:  No history of back pain or trauma  Skin:  no history of rashes, injury, trauma  Neuro:    weakness         Vital Signs Last 24 Hrs  T(C): 36.7 (31 Jul 2023 15:30), Max: 36.8 (31 Jul 2023 05:00)  T(F): 98 (31 Jul 2023 15:30), Max: 98.2 (31 Jul 2023 05:00)  HR: 83 (31 Jul 2023 17:45) (76 - 100)  BP: 121/52 (31 Jul 2023 17:45) (121/52 - 145/62)  BP(mean): --  RR: 19 (31 Jul 2023 15:30) (18 - 19)  SpO2: 95% (31 Jul 2023 15:30) (94% - 95%)    Parameters below as of 31 Jul 2023 15:30  Patient On (Oxygen Delivery Method): room air        Physical Exam:       Skin: 3.5 cm x 3 cm x 2.5 cm with undermining 9 to 3 o'clock, measuring 6 cm at 12 o'clock, wound is necrotic, some necrotic tissue was excised.  partial thickness injuries right lateral foot and heel          GENERAL: patient appears well, no acute distress, laying in bed  EYES: sclera clear, no exudates  ENMT: oropharynx clear without erythema, no exudates, supple, soft, no thyromegaly noted  LUNGS: good air entry bilaterally, clear to auscultation, symmetric breath sounds, no wheezing or rhonchi appreciated  HEART: soft S1/S2, regular rate and rhythm, no murmurs noted, no lower extremity edema  GASTROINTESTINAL: abdomen is soft, nontender, nondistended, normoactive bowel sounds, no palpable masses  INTEGUMENT: warm and perfused  MUSCULOSKELETAL: no clubbing or cyanosis, no obvious deformity  NEUROLOGIC: awake, alert, oriented x1 (name only), able to move all extremities    LABS:                        10.8   9.83  )-----------( 263                  33.3     07-31    143  |  109<H>  |  18  ----------------------------<  123<H>  4.1   |  26  |  1.02    Ca    9.3      31 Jul 2023 07:19    TPro  6.2  /  Alb  2.0<L>  /  TBili  0.5  /  DBili  x   /  AST  74<H>  /  ALT  42  /  AlkPhos  111  07-31      Urinalysis Basic - ( 31 Jul 2023 07:19 )    Color: x / Appearance: x / SG: x / pH: x  Gluc: 123 mg/dL / Ketone: x  / Bili: x / Urobili: x   Blood: x / Protein: x / Nitrite: x   Leuk Esterase: x / RBC: x / WBC x   Sq Epi: x / Non Sq Epi: x / Bacteria: x        RADIOLOGY & ADDITIONAL STUDIES:  ACC: [id number] EXAM:  CT PELVIS ONLY IC   ORDERED BY:  KATI MORALES     PROCEDURE DATE:  07/30/2023          INTERPRETATION:  CT PELVIS WITH IV CONTRAST    HISTORY: Right greater trochanteric wound. Evaluate for osteomyelitis.    TECHNIQUE: Contiguous axial imaging was performed through the pelvis with   90 cc administered Omnipaque 350 intravenous contrast.  Coronal and   sagittal reformatting was utilized.    COMPARISON: CT pelvis dated 2/27/2023.    FINDINGS:    OSSEOUS STRUCTURES    Fractures:  Right superior pubic ramus fracture is ununited with   surrounding sclerotic change. Right inferior pubic ramus fracture is now   healed.    Erosions:  There is mild cortical erosive change of the right greater   trochanter with overlying soft tissue wound consistent with osteomyelitis.    VISUALIZED SPINE  Mild-to-moderate lower lumbar degenerative disc disease is present with   disc calcifications.    PELVIC JOINTS    Sacroiliac Joints:  Mild arthrosis is present bilaterally.    Symphysis Pubis:  Mild arthrosis is present.    RIGHT HIP JOINT    Avascular Necrosis:  None.    Joint Space:  There is mild medial joint space narrowing with tiny   osteophytes and chondrocalcinosis.    Effusion/Synovitis:  Small effusion is present.    LEFT HIPJOINT    Postoperative Changes: Left hip hemiarthroplasty is present with   press-fit femoral component. No periprosthetic lucency is seen.    SOFT TISSUES    Neurovascular:  Severe atherosclerotic calcifications are present.    Pelvis/Abdomen:  Small right renal cyst is noted. Colonic diverticuli   are present without appreciated inflammatory change. Small amount of   fluid is now seen distending the right inguinal canal with resolution of   the previously noted fluid within the left inguinal canal. This likely   reflects retained peritoneal fluid.    Musculature:  There is new hyperdense change/fluid involving the right   adductor brevis muscle measuring up to 2.2 x 2.1 cm in diameter and   extending over at least 15.0 cm in length longitudinally. Moderate severe   bilateral gluteus minimus muscle atrophy is present. There is   mild-to-moderate generalized muscle atrophy. Soft tissue wound overlies   the right greater trochanter extending to involve the gluteus maximum is   with fluid collection of the gluteus angel measuring approximately 5.3   cm craniocaudally by 6.7 x 2.7 cm axially. There is faint peripheral   enhancement.    Subcutaneous Tissues:  Mild subcutaneous edema is present. Decubitus   change is present along the right ischial tuberosity with fluid along the   ischial tuberosity measuring approximately 4.5 cm craniocaudally by 1.5 x   1.9 cm axially with faint peripheral enhancement. Subcutaneous induration   along the left ischial tuberosity suggests additionaldecubitus change.    IMPRESSION:  1.  Large soft tissue wound overlies the right greater trochanter with   mild cortical erosive change of the greater trochanter consistent with   osteomyelitis.  2.  Fluid collection extends into the adjacent right gluteus angel   muscle measuring approximately 5.3 x 6.7 x 2.7 cm concerning for abscess.  3.  Right ischial decubitus change appears to be present with 4.5 x 1.5 x   1.9 cm fluid collection adjacent to the ischial tuberosity. Underlying   osteomyelitis cannot be excluded.  4.  Subcutaneous induration along the left ischial tuberosity suggests   additional decubitus change.  5.  New fluid collection within the right adductor brevis muscle measures   approximately 15.0 x 2.2 x 2.1 cm and extends towards the ununited right   superior pubic ramus fracture. This may be related to the fracture and   reflect ganglion cyst formation or resolving hematoma. Infection cannot   be absolutely excluded although there is no peripheral enhancement.   Consider follow-up imaging to assess stability or aspiration as warranted.    --- End of Report ---            ELODIA DIAMOND MD; Attending Radiologist  This document has been electronically signed. Jul 30 2023  4:00PM  ACC: [id number] EXAM:  US DPLX LWR EXT VEINS COMPL BI   ORDERED BY: MARITZA BARNETT     PROCEDURE DATE:  06/12/2023          INTERPRETATION:  CLINICAL INFORMATION: Evaluate for deep venous   thrombosis.    COMPARISON: 2/28/2023 report states Acute DVT left posterior tibial vein.    TECHNIQUE: Duplex sonography of the BILATERAL LOWER extremity veins with   color and spectral Doppler, with and without compression.    FINDINGS:    RIGHT:  Normal compressibility of the RIGHT common femoral, femoraland popliteal   veins.  Doppler examination shows normal spontaneous and phasic flow.  No RIGHT calf vein thrombosis is detected.    LEFT:  Normal compressibility of the LEFT common femoral, femoral and popliteal   veins.  Doppler examination shows normal spontaneous and phasic flow.  No LEFT calf vein thrombosis is detected.    IMPRESSION:  No evidence of deep venous thrombosis in either lower extremity.            --- End of Report ---            MOHINI STRICKLAND MD; Attending Radiologist  This document has been electronically signed. Jun 12 2023  4:19PM    Risks, benefits, and alternatives to treatment discussed. All questions answered with understanding.    Procedure Performed:  (  )Yes     (  )No  Name of Procedure:      [ x ]Debridement, excisional  see above  [  ]I&D    [  ]Laceration Repair     [  ]Other:  (  )partial thickness     (x  )full thickness     (  x)subcutaneous     (  )muscle/tendon     (  )bone  (  )sharp     (  )surgical

## 2023-07-31 NOTE — DIETITIAN INITIAL EVALUATION ADULT - ADD RECOMMEND
1. Recommend Ensure Plus High Protein 8oz PO BID (Provides 700kcal & 40grams of Protein)   2. Recommend MVI, ascorbic acid (500 mg), & zinc sulfate (220 mg) to promote wound healing   3. Monitor daily PO intakes, GI tolerance, labs, weights, skin integrity, & BM regularity

## 2023-07-31 NOTE — DIETITIAN NUTRITION RISK NOTIFICATION - TREATMENT: THE FOLLOWING DIET HAS BEEN RECOMMENDED
Diet, DASH/TLC:   Sodium & Cholesterol Restricted  Lactose Restricted (Milk Sugar Intoler.)  Supplement Feeding Modality:  Oral  Ensure Plus High Protein Cans or Servings Per Day:  1       Frequency:  Two Times a day (07-31-23 @ 13:27) [Pending Verification By Attending]  Diet, DASH/TLC:   Sodium & Cholesterol Restricted  Lactose Restricted (Milk Sugar Intoler.) (07-30-23 @ 18:19) [Active]

## 2023-07-31 NOTE — DIETITIAN NUTRITION RISK NOTIFICATION - MALNUTRITION EVALUATION AS DEMONSTRATED BY (ADULTS > 20 YEARS OF AGE)
Loss of subcutaneous fat.../Loss of muscle... Pre-Excision Curettage Text (Leave Blank If You Do Not Want): Prior to drawing the surgical margin the visible lesion was removed with electrodesiccation and curettage to clearly define the lesion size.

## 2023-07-31 NOTE — PROGRESS NOTE ADULT - ASSESSMENT
87 year old M PMH dementia, BPH, DVT not on AC, IBS, OA with previous pelvic fracture brought in from [facility] for nonhealing decubitus ulcer admitted for possible osteomyelitis of R hip, failed outpatient abx    #R hip wound  - Did not meet sepsis criteria on admission  - Patient with nonhealing wound of R femur, was placed on bactrim but continues to get worse  - CT with possible signs of osteo and abscess formation  - Continue vancomycin and zosyn   - Patient currently on hospice care at [facility] - daughter Salina is ok with IV abx and, if needed, a debridement of the area. If further surgical intervention is needed, she would need to think about it. Explained that abscess would likely need to be drained otherwise could cause reinfection and if patient has osteo would need surgical intervention vs long term abx and she would like to think about it  - Follow up wound culture  - Wound care nurse and plastic surgeon, Dr. Posada, consulted    #CAD  - Continue asa 81mg, atorvastatin 40mg, metoprolol tartrate 25mg BID     #Dementia  - On rivastigmine 4.5mg capsule at home, will continue patch while in hospital    #BPH  - Continue Flomax    #IBS  - Continue Dicyclomine    #DVT ppx  - Lovenox    Dispo: Pending plastics eval     daughter Salina Quinones [phone]    Luis, nurse from [facility]: [phone] 87 year old M PMH dementia, BPH, DVT not on AC, IBS, OA with previous pelvic fracture brought in from [facility] for nonhealing decubitus ulcer admitted for possible osteomyelitis of R hip, failed outpatient abx    #R hip wound  - Did not meet sepsis criteria on admission  - Patient with nonhealing wound of R femur, was placed on bactrim but continues to get worse  - CT with possible signs of osteo and abscess formation  - Continue vancomycin and zosyn   - Patient currently on hospice care at [facility] - daughter Salina is ok with IV abx and, if needed, a debridement of the area. If further surgical intervention is needed, she would need to think about it. Explained that abscess would likely need to be drained otherwise could cause reinfection and if patient has osteo would need surgical intervention vs long term abx and she would like to think about it  - Follow up wound culture  - Wound care nurse and plastic surgeon, Dr. Posada, consulted    #CAD  - Continue asa 81mg, atorvastatin 40mg, metoprolol tartrate 25mg BID     #Dementia  - On rivastigmine 4.5mg capsule at home, will continue patch while in hospital    #BPH  - Continue Flomax    #IBS  - Continue Dicyclomine    #DVT ppx  - Lovenox    Dispo: Pending plastics eval     7/31: Updated daughter Salina Quinones [phone] at bedside     Luis nurse from [facility]: [phone]

## 2023-07-31 NOTE — DIETITIAN INITIAL EVALUATION ADULT - OTHER INFO
Patient noted with fair appetite at this time, consumed 51-76% of lunch today as per meal tray observation. Patient requires total feeding assistance with meals. Shellfish allergy + lactose intolerance noted (per chart). Multiple pressure injuries noted: Stage IV @ right:, greater trochanter (hip), Stage II @ side of foot, Stage II @ right ankle, & Stage II @ buttocks. Recommend MVI, ascorbic acid (500 mg), & zinc sulfate (220 mg) to promote wound healing. Last BM noted on 7/30/23. No edema noted. NFPE indicated severe protein calorie malnutrition as evidenced by muscle depletion and subcutaneous fat loss. Recommend Ensure Plus High Protein 8oz PO BID (Provides 700kcal & 40grams of Protein) to enhance PO intakes and optimize nutritional status.

## 2023-07-31 NOTE — DIETITIAN INITIAL EVALUATION ADULT - NS FNS DIET ORDER
Diet, DASH/TLC:   Sodium & Cholesterol Restricted  Lactose Restricted (Milk Sugar Intoler.) (07-30-23 @ 18:19)

## 2023-07-31 NOTE — PROGRESS NOTE ADULT - SUBJECTIVE AND OBJECTIVE BOX
Patient is a 87y old  Male who presents with a chief complaint of osteo of R femur (31 Jul 2023 11:24)    Patient seen and examined at bedside. No overnight events reported.     ALLERGIES:  No Known Drug Allergies  shellfish (Other)  lactose (Diarrhea)    MEDICATIONS  (STANDING):  aspirin  chewable 81 milliGRAM(s) Oral daily  atorvastatin 40 milliGRAM(s) Oral at bedtime  dicyclomine 10 milliGRAM(s) Oral two times a day before meals  enoxaparin Injectable 40 milliGRAM(s) SubCutaneous every 24 hours  metoprolol tartrate 25 milliGRAM(s) Oral two times a day  piperacillin/tazobactam IVPB.. 3.375 Gram(s) IV Intermittent every 8 hours  rivastigmine patch  4.6 mG/24 Hr(s). 1 Patch Transdermal every 24 hours  senna 2 Tablet(s) Oral at bedtime  tamsulosin 0.4 milliGRAM(s) Oral at bedtime  vancomycin  IVPB 750 milliGRAM(s) IV Intermittent every 12 hours    MEDICATIONS  (PRN):  acetaminophen     Tablet .. 650 milliGRAM(s) Oral every 6 hours PRN Temp greater or equal to 38C (100.4F), Mild Pain (1 - 3)  aluminum hydroxide/magnesium hydroxide/simethicone Suspension 30 milliLiter(s) Oral every 4 hours PRN Dyspepsia  melatonin 3 milliGRAM(s) Oral at bedtime PRN Insomnia  ondansetron Injectable 4 milliGRAM(s) IV Push every 8 hours PRN Nausea and/or Vomiting    Vital Signs Last 24 Hrs  T(F): 98.2 (31 Jul 2023 05:00), Max: 98.3 (30 Jul 2023 20:06)  HR: 100 (31 Jul 2023 05:00) (84 - 100)  BP: 141/70 (31 Jul 2023 05:00) (107/59 - 141/70)  RR: 18 (31 Jul 2023 05:00) (17 - 18)  SpO2: 94% (31 Jul 2023 05:00) (94% - 95%)    I&O's Summary  30 Jul 2023 07:01  -  31 Jul 2023 07:00  --------------------------------------------------------  IN: 350 mL / OUT: 0 mL / NET: 350 mL    PHYSICAL EXAM:  General: NAD, alert  ENT: No gross hearing impairment, Moist mucous membranes, no thrush  Neck: Supple, No JVD  Lungs: Clear to auscultation bilaterally, good air entry, non-labored breathing  Cardio: RRR, S1/S2, No murmur  Abdomen: Soft, Nontender, Nondistended; Bowel sounds present  Extremities: No calf tenderness, No cyanosis, No pitting edema. Right hip and sacral dressing in place   Psych: Appropriate mood and affect    LABS:                        10.8   9.83  )-----------( 263      ( 31 Jul 2023 07:19 )             33.3     07-31    143  |  109  |  18  ----------------------------<  123  4.1   |  26  |  1.02    Ca    9.3      31 Jul 2023 07:19    TPro  6.2  /  Alb  2.0  /  TBili  0.5  /  DBili  x   /  AST  74  /  ALT  42  /  AlkPhos  111  07-31      Lipase: 100 U/L (07-30-23 @ 13:00)                06-13 Chol 161 mg/dL LDL -- HDL 41 mg/dL Trig 101 mg/dL                  Urinalysis Basic - ( 31 Jul 2023 07:19 )    Color: x / Appearance: x / SG: x / pH: x  Gluc: 123 mg/dL / Ketone: x  / Bili: x / Urobili: x   Blood: x / Protein: x / Nitrite: x   Leuk Esterase: x / RBC: x / WBC x   Sq Epi: x / Non Sq Epi: x / Bacteria: x          RADIOLOGY & ADDITIONAL TESTS:    Care Discussed with Consultants/Other Providers:

## 2023-07-31 NOTE — CONSULT NOTE ADULT - ASSESSMENT
A/P 87 year old M PMH dementia, BPH, DVT not on AC. IBS, OA with previous pelvic fracture brought in from [facility] for nonhealing decubitus ulcer admitted for possible osteomyelitis of R hip, failed outpatient abx           Assessment/Plans:      R hip wound patient with nonhealing wound of R femur, was placed on bactrim in    CT with possible signs of osteo and abscess formation   - s/p vanco and zosyn in the ED, will continue  -  wound care nurse consulted  - plastics, Dr. Posada consulted  - family ok with IV abx and if needed a debridement of the area. If further surgical intervention is needed, they will think about it.      CAD  - is on  asa 81mg, atorvastatin 40mg, metoprolol tartrate 25mg BID     Dementia  - on rivastigmine 4.5mg capsule       Palliative :   asked for Goc assist , case d/w NP today and I reviewed pt chart.  Pt from , w/ hx end stage dementia, ftt. and was on home hospice services. Here w/ likely OM of R hip, needs IV antbx and possible debridement.  Consults pending.  I spoke w/ pts daughter who is also pts HCP. Clarified pts wishes, pt to be Dnr/Dni.   Molst is on chart.   Family wishes to have pt back at  when able, w/ home hospice back in place.   Reviewed conversation w/ med team today.

## 2023-07-31 NOTE — DIETITIAN INITIAL EVALUATION ADULT - NSFNSPHYEXAMSKINFT_GEN_A_CORE
Pressure Injury 1: Right:, greater trochanter (hip), Stage IV  Pressure Injury 2: side of foot, Stage II  Pressure Injury 3: Right:, ankle, Stage II  Pressure Injury 4: Right:, buttocks, Stage II  Pressure Injury 5: none, none  Pressure Injury 6: none, none  Pressure Injury 7: none, none  Pressure Injury 8: none, none  Pressure Injury 9: none, none  Pressure Injury 10: none, none  Pressure Injury 11: none, none

## 2023-07-31 NOTE — CONSULT NOTE ADULT - SUBJECTIVE AND OBJECTIVE BOX
HPI: 87 year old M PMH dementia, BPH, DVT not on AC. IBS, OA with previous pelvic fracture brought in from [facility] for nonhealing decubitus ulcer. Patient was taking bactrim but did not appear to help. Patient is poor historian, ED dept.  left message for patient's nurse at [facility] of [phone]. Apparently patient is on hospice but there were worries that R hip wound was getting worse and infected. Discussed with daughter Salina Quinones who states that patient is on hospice but that it is ok to send to hospital for abx if needed. As per daughter, patient has been in and out of hospitals, was recently at [facility] and believe that's where wound developed. Was brought to [facility] and started on bactrim for 10 days with wound care team caring for patient but today did not seem to be doing better and worried for sepsis therefore family decided to send patient in for further evaluation. At this time, patient appears comfortable.    In the ED, T 97.3F, HR 96, /62, RR 18, SpO2 98% on RA. Labs showed WBC 12.53, otherwise normal labs. Patient received zosyn x1, NS bolus [id number] total in the ED.    EKG: PENDING  CT pelvis with contrast: Large soft tissue wound overlies the right greater trochanter with mild cortical erosive change of the greater trochanter consistent with osteomyelitis. Fluid collection extends into the adjacent right gluteus angel muscle measuring approximately 5.3 x 6.7 x 2.7 cm concerning for abscess. Right ischial decubitus change appears to be present with 4.5 x 1.5 x 1.9 cm fluid collection adjacent to the ischial tuberosity. Underlying osteomyelitis cannot be excluded. Subcutaneous induration along the left ischial tuberosity suggests additional decubitus change. New fluid collection within the right adductor brevis muscle measures approximately 15.0 x 2.2 x 2.1 cm and extends towards the ununited right superior pubic ramus fracture. This may be related to the fracture and reflect ganglion cyst formation or resolving hematoma. Infection cannot be absolutely excluded although there is no peripheral enhancement. Consider follow-up imaging to assess stability or aspiration as warranted.       PAST MEDICAL & SURGICAL HISTORY:  Lactose intolerance in adult      Shellfish allergy      Osteoarthritis      Scoliosis      Vitamin D deficiency      Dementia      Dementia      History of tonsillectomy  in childhood      S/P colonoscopy      History of hip surgery  L.          SOCIAL HISTORY:    Admitted from:  [facility]    Substance abuse history:              Tobacco hx:                  Alcohol hx:              Home Opioid hx:  Cheondoism:                                    Preferred Language:    Surrogate/HCP/:  daughter Gay Quinones           Phone#:  [phone]    FAMILY HISTORY:  No pertinent family history in first degree relatives      Baseline ADLs (prior to admission):    Allergies    No Known Drug Allergies  shellfish (Other)    Intolerances    lactose (Diarrhea)              Review of Systems: [ Unable to obtain due to poor mentation]    MEDICATIONS  (STANDING):  aspirin  chewable 81 milliGRAM(s) Oral daily  atorvastatin 40 milliGRAM(s) Oral at bedtime  dicyclomine 10 milliGRAM(s) Oral two times a day before meals  enoxaparin Injectable 40 milliGRAM(s) SubCutaneous every 24 hours  metoprolol tartrate 25 milliGRAM(s) Oral two times a day  piperacillin/tazobactam IVPB.. 3.375 Gram(s) IV Intermittent every 8 hours  rivastigmine patch  4.6 mG/24 Hr(s). 1 Patch Transdermal every 24 hours  senna 2 Tablet(s) Oral at bedtime  tamsulosin 0.4 milliGRAM(s) Oral at bedtime  vancomycin  IVPB 750 milliGRAM(s) IV Intermittent every 12 hours    MEDICATIONS  (PRN):  acetaminophen     Tablet .. 650 milliGRAM(s) Oral every 6 hours PRN Temp greater or equal to 38C (100.4F), Mild Pain (1 - 3)  aluminum hydroxide/magnesium hydroxide/simethicone Suspension 30 milliLiter(s) Oral every 4 hours PRN Dyspepsia  melatonin 3 milliGRAM(s) Oral at bedtime PRN Insomnia  ondansetron Injectable 4 milliGRAM(s) IV Push every 8 hours PRN Nausea and/or Vomiting      PHYSICAL EXAM:    Vital Signs Last 24 Hrs  T(C): 36.8 (31 Jul 2023 05:00), Max: 36.8 (30 Jul 2023 20:06)  T(F): 98.2 (31 Jul 2023 05:00), Max: 98.3 (30 Jul 2023 20:06)  HR: 100 (31 Jul 2023 05:00) (84 - 100)  BP: 141/70 (31 Jul 2023 05:00) (107/59 - 141/70)  BP(mean): --  RR: 18 (31 Jul 2023 05:00) (17 - 18)  SpO2: 94% (31 Jul 2023 05:00) (94% - 98%)    Parameters below as of 31 Jul 2023 05:00  Patient On (Oxygen Delivery Method): room air        General: alert  oriented x 1 ( name) + cachexia ,verbal 1-2 words, confused, sl agitated    Karnofsky Performance Score/Palliative Performance Status Version2: 20    %  PPSV: 20%  HEENT: n/c, a/t, + temp wasting ,  dry mouth    Lungs: ess cl , few coarse , dim to bases   CV: normal -tachy   GI: normal, abd flat , n/t    : normal  incontinent w/ wells  Musculoskeletal: normal, MCCURDY's, but w/ weakness  , stiffened joints , no edema   mainly  bedbound  Skin: normal  , w/d    Neuro: + deficits , awake, alert, confused, knows first name only  Oral intake ability: minimal   Diet: as anny /tbd     LABS:                        10.8   9.83  )-----------( 263      ( 31 Jul 2023 07:19 )             33.3     07-31    143  |  109<H>  |  18  ----------------------------<  123<H>  4.1   |  26  |  1.02    Ca    9.3      31 Jul 2023 07:19    TPro  6.2  /  Alb  2.0<L>  /  TBili  0.5  /  DBili  x   /  AST  74<H>  /  ALT  42  /  AlkPhos  111  07-31    Urinalysis Basic - ( 31 Jul 2023 07:19 )    Color: x / Appearance: x / SG: x / pH: x  Gluc: 123 mg/dL / Ketone: x  / Bili: x / Urobili: x   Blood: x / Protein: x / Nitrite: x   Leuk Esterase: x / RBC: x / WBC x   Sq Epi: x / Non Sq Epi: x / Bacteria: x        RADIOLOGY & ADDITIONAL STUDIES: < from: CT Pelvis w/ IV Cont (07.30.23 @ 15:22) >  PROCEDURE DATE:  07/30/2023          INTERPRETATION:  CT PELVIS WITH IV CONTRAST      < from: CT Pelvis w/ IV Cont (07.30.23 @ 15:22) >  IMPRESSION:  1.  Large soft tissue wound overlies the right greater trochanter with   mild cortical erosive change of the greater trochanter consistent with   osteomyelitis.  2.  Fluid collection extends into the adjacent right gluteus angel   muscle measuring approximately 5.3 x 6.7 x 2.7 cm concerning for abscess.  3.  Right ischial decubitus change appears to be present with 4.5 x 1.5 x   1.9 cm fluid collection adjacent to the ischial tuberosity. Underlying   osteomyelitis cannot be excluded.  4.  Subcutaneous induration along the left ischial tuberosity suggests   additional decubitus change.  5.  New fluid collection within the right adductor brevis muscle measures   approximately 15.0 x 2.2 x 2.1 cm and extends towards the ununited right   superior pubic ramus fracture. This may be related to the fracture and   reflect ganglion cyst formation or resolving hematoma. Infection cannot   be absolutely excluded although there is no peripheral enhancement.   Consider follow-up imaging to assess stability or aspiration as warranted.          ADVANCE DIRECTIVES: HCP  Living Will, POA, Molst dnr/dni   Advanced Care Planning discussion total time spent:

## 2023-07-31 NOTE — DIETITIAN INITIAL EVALUATION ADULT - PERTINENT LABORATORY DATA
07-31    143  |  109<H>  |  18  ----------------------------<  123<H>  4.1   |  26  |  1.02    Ca    9.3      31 Jul 2023 07:19    TPro  6.2  /  Alb  2.0<L>  /  TBili  0.5  /  DBili  x   /  AST  74<H>  /  ALT  42  /  AlkPhos  111  07-31

## 2023-07-31 NOTE — DIETITIAN INITIAL EVALUATION ADULT - SIGNS/SYMPTOMS
as evidenced by muscle depletion and subcutaneous fat loss as evidenced by multiple pressure injuries

## 2023-08-01 PROCEDURE — 93010 ELECTROCARDIOGRAM REPORT: CPT

## 2023-08-01 PROCEDURE — 99232 SBSQ HOSP IP/OBS MODERATE 35: CPT | Mod: FS

## 2023-08-01 PROCEDURE — 99497 ADVNCD CARE PLAN 30 MIN: CPT

## 2023-08-01 PROCEDURE — 99222 1ST HOSP IP/OBS MODERATE 55: CPT

## 2023-08-01 PROCEDURE — 99498 ADVNCD CARE PLAN ADDL 30 MIN: CPT

## 2023-08-01 RX ADMIN — Medication 250 MILLIGRAM(S): at 05:04

## 2023-08-01 RX ADMIN — ATORVASTATIN CALCIUM 40 MILLIGRAM(S): 80 TABLET, FILM COATED ORAL at 21:22

## 2023-08-01 RX ADMIN — Medication 1 APPLICATION(S): at 17:08

## 2023-08-01 RX ADMIN — ZINC OXIDE 1 APPLICATION(S): 200 OINTMENT TOPICAL at 05:04

## 2023-08-01 RX ADMIN — TAMSULOSIN HYDROCHLORIDE 0.4 MILLIGRAM(S): 0.4 CAPSULE ORAL at 21:21

## 2023-08-01 RX ADMIN — RIVASTIGMINE 1 PATCH: 4.6 PATCH, EXTENDED RELEASE TRANSDERMAL at 19:11

## 2023-08-01 RX ADMIN — Medication 500 MILLIGRAM(S): at 11:39

## 2023-08-01 RX ADMIN — PIPERACILLIN AND TAZOBACTAM 25 GRAM(S): 4; .5 INJECTION, POWDER, LYOPHILIZED, FOR SOLUTION INTRAVENOUS at 14:33

## 2023-08-01 RX ADMIN — ZINC SULFATE TAB 220 MG (50 MG ZINC EQUIVALENT) 220 MILLIGRAM(S): 220 (50 ZN) TAB at 11:38

## 2023-08-01 RX ADMIN — RIVASTIGMINE 1 PATCH: 4.6 PATCH, EXTENDED RELEASE TRANSDERMAL at 22:00

## 2023-08-01 RX ADMIN — Medication 10 MILLIGRAM(S): at 05:03

## 2023-08-01 RX ADMIN — Medication 1 TABLET(S): at 11:39

## 2023-08-01 RX ADMIN — PIPERACILLIN AND TAZOBACTAM 25 GRAM(S): 4; .5 INJECTION, POWDER, LYOPHILIZED, FOR SOLUTION INTRAVENOUS at 21:21

## 2023-08-01 RX ADMIN — Medication 81 MILLIGRAM(S): at 11:38

## 2023-08-01 RX ADMIN — NYSTATIN CREAM 1 APPLICATION(S): 100000 CREAM TOPICAL at 05:04

## 2023-08-01 RX ADMIN — ENOXAPARIN SODIUM 40 MILLIGRAM(S): 100 INJECTION SUBCUTANEOUS at 21:22

## 2023-08-01 RX ADMIN — Medication 25 MILLIGRAM(S): at 17:12

## 2023-08-01 RX ADMIN — Medication 650 MILLIGRAM(S): at 17:17

## 2023-08-01 RX ADMIN — SENNA PLUS 2 TABLET(S): 8.6 TABLET ORAL at 21:21

## 2023-08-01 RX ADMIN — Medication 10 MILLIGRAM(S): at 17:09

## 2023-08-01 RX ADMIN — Medication 25 MILLIGRAM(S): at 05:04

## 2023-08-01 RX ADMIN — Medication 650 MILLIGRAM(S): at 18:00

## 2023-08-01 RX ADMIN — Medication 1 APPLICATION(S): at 05:04

## 2023-08-01 RX ADMIN — ZINC OXIDE 1 APPLICATION(S): 200 OINTMENT TOPICAL at 17:08

## 2023-08-01 RX ADMIN — RIVASTIGMINE 1 PATCH: 4.6 PATCH, EXTENDED RELEASE TRANSDERMAL at 21:57

## 2023-08-01 RX ADMIN — PIPERACILLIN AND TAZOBACTAM 25 GRAM(S): 4; .5 INJECTION, POWDER, LYOPHILIZED, FOR SOLUTION INTRAVENOUS at 05:04

## 2023-08-01 RX ADMIN — RIVASTIGMINE 1 PATCH: 4.6 PATCH, EXTENDED RELEASE TRANSDERMAL at 06:29

## 2023-08-01 RX ADMIN — NYSTATIN CREAM 1 APPLICATION(S): 100000 CREAM TOPICAL at 17:08

## 2023-08-01 NOTE — PROGRESS NOTE ADULT - SUBJECTIVE AND OBJECTIVE BOX
Patient is a 87y old  Male who presents with a chief complaint of osteo of R femur (31 Jul 2023 20:18)    Patient seen and examined at bedside.  no acute overnight events    ALLERGIES:  No Known Drug Allergies  shellfish (Other)  lactose (Diarrhea)        Vital Signs Last 24 Hrs  T(F): 97.9 (01 Aug 2023 05:11), Max: 98 (31 Jul 2023 15:30)  HR: 91 (01 Aug 2023 05:11) (69 - 91)  BP: 150/64 (01 Aug 2023 05:11) (106/59 - 150/64)  RR: 17 (01 Aug 2023 05:11) (17 - 19)  SpO2: 95% (01 Aug 2023 05:11) (95% - 96%)  I&O's Summary    31 Jul 2023 07:01  -  01 Aug 2023 07:00  --------------------------------------------------------  IN: 250 mL / OUT: 0 mL / NET: 250 mL      MEDICATIONS:  acetaminophen     Tablet .. 650 milliGRAM(s) Oral every 6 hours PRN  aluminum hydroxide/magnesium hydroxide/simethicone Suspension 30 milliLiter(s) Oral every 4 hours PRN  ascorbic acid 500 milliGRAM(s) Oral daily  aspirin  chewable 81 milliGRAM(s) Oral daily  atorvastatin 40 milliGRAM(s) Oral at bedtime  Dakins Solution - 1/2 Strength 1 Application(s) Topical two times a day  dicyclomine 10 milliGRAM(s) Oral two times a day before meals  enoxaparin Injectable 40 milliGRAM(s) SubCutaneous every 24 hours  melatonin 3 milliGRAM(s) Oral at bedtime PRN  metoprolol tartrate 25 milliGRAM(s) Oral two times a day  multivitamin 1 Tablet(s) Oral daily  nystatin Cream 1 Application(s) Topical two times a day  ondansetron Injectable 4 milliGRAM(s) IV Push every 8 hours PRN  piperacillin/tazobactam IVPB.. 3.375 Gram(s) IV Intermittent every 8 hours  rivastigmine patch  4.6 mG/24 Hr(s). 1 Patch Transdermal every 24 hours  senna 2 Tablet(s) Oral at bedtime  tamsulosin 0.4 milliGRAM(s) Oral at bedtime  zinc oxide 40% Paste 1 Application(s) Topical two times a day  zinc sulfate 220 milliGRAM(s) Oral daily      PHYSICAL EXAM:  General: NAD, Alert  ENT: MMM, no thrush  Neck: Supple, No JVD  Lungs: Clear to auscultation bilaterally, good air entry, non labored breathing  Cardio: RRR, S1/S2, No murmurs  Abdomen: Soft, Nontender, Nondistended; Bowel sounds present  Extremities: No cyanosis, No edema, RT hip and sacral dressing in place    LABS:                        10.8   9.83  )-----------( 263      ( 31 Jul 2023 07:19 )             33.3     07-31    143  |  109  |  18  ----------------------------<  123  4.1   |  26  |  1.02    Ca    9.3      31 Jul 2023 07:19    TPro  6.2  /  Alb  2.0  /  TBili  0.5  /  DBili  x   /  AST  74  /  ALT  42  /  AlkPhos  111  07-31              06-13 Chol 161 mg/dL LDL -- HDL 41 mg/dL Trig 101 mg/dL                  Urinalysis Basic - ( 31 Jul 2023 07:19 )    Color: x / Appearance: x / SG: x / pH: x  Gluc: 123 mg/dL / Ketone: x  / Bili: x / Urobili: x   Blood: x / Protein: x / Nitrite: x   Leuk Esterase: x / RBC: x / WBC x   Sq Epi: x / Non Sq Epi: x / Bacteria: x        Culture - Other (collected 31 Jul 2023 05:05)  Source: Wound Sp. Instructions_Additional Info: Right hip ulceration  Preliminary Report (01 Aug 2023 07:34):    Numerous Gram Negative Rods          RADIOLOGY & ADDITIONAL TESTS:    Care Discussed with Consultants/Other Providers:

## 2023-08-01 NOTE — GOALS OF CARE CONVERSATION - ADVANCED CARE PLANNING - CONVERSATION DETAILS
Met with daughter in person today , and discussed pts condition and med hx.  daughter is pts HCP she is presently here visiting from [place]. We discussed how pt was on home hospice services w/ [facility] at [facility], and was transferred here to hospital for wound  infection, iv antbx.  Daughter  very undecided on how much intervention she should do at this time. Pt had wound consult and plastics consult done  and now daughter  is considering wound debridement in OR  based on these consults.,   I discussed with her how much of a decline she said she has seen in her father, he no longer recognizes her and he is mainly bed ridden at this point. His eating is erratic as well, and he has lost weight .  Daughter wants him to be comfortable, and does want hospice back on board. We discussed that pts wound will likely never heal even with debridement due to his poor nutritional status and poor functional status.   Talked about how his mental status has declined and his quality of life is very poor . daughter agrees, also discussed that local care /chemical debridement for wound vs OR debridement  could be an option. I asked her what she thinks her dad would want if he could tell her, and daughter said it was clear , he and my mother do not want any extensive measures taken sarina if they were at this point.    I suggested she speak further w/plastics team here to get more information regarding the OR debridement vs wound care  and no OR. Reviewed w/ med team today .

## 2023-08-01 NOTE — CONSULT NOTE ADULT - ASSESSMENT
Assessment:  Madi Marte is an 87 year old man, on hospice care, patient of Dr. Zarate with past medical history of Dementia, Paroxysmal atrial fibrillation (not on anticoagulation due to high bleeding risk), history of DVT and BPH was sent in to hospital from living facility for nonhealing decubitus ulcer.    Cardiology consulted for pre-operative cardiac risk stratification prior to  Assessment:  Madi Marte is an 87 year old man, on hospice care, patient of Dr. Zraate with past medical history of Dementia, Paroxysmal atrial fibrillation (not on anticoagulation due to high bleeding risk), history of DVT and BPH was sent in to hospital from living facility for nonhealing decubitus ulcer.    Cardiology consulted for pre-operative cardiac risk stratification prior to right hip wound ulcer debridement. The patient was evaluated at bedside and has dementia and so obtaining history is limited, appears comfortable at this time. Admission ECG with artifact. Prior echocardiogram consistent with normal LVEF 55%.    Recommendations:  [] Pre-operative cardiac risk stratification: The patient is at moderate cardiovascular risk prior to wound ulcer debridement, may proceed at this level of risk. Would repeat ECG and place patient on telemetry due to  history of paroxysmal atrial fibrillation.    Vladimir Chou MD  Cardiology

## 2023-08-01 NOTE — PROGRESS NOTE ADULT - ASSESSMENT
CT abd/pelvis reviewed with Dr. Reyes, radiologist, gluteal abscess appear to communicate with right trochanter/hip wound.  there is a 9 cm tunnel from hip wound towards left gluteal muscles.  Dr. Reyes suggests further evaluation of other collections with repeat CT or mri.  Its unclear how aggressive treatment should be for these collections (manage right gluteal possible abscess and leave others or no surgical intervention for all collections)  -suggest gen surgery consult to see if right gluteal abscess can be drained bedside via existing wound  -continue local wound care CT abd/pelvis reviewed with Dr. Reyes, radiologist, gluteal abscess appear to communicate with right trochanter/hip wound.  there is a 9 cm tunnel from hip wound towards right gluteal muscles.  Dr. Reyes suggests further evaluation of other collections with repeat CT or mri.  Its unclear how aggressive treatment should be for these collections (manage right gluteal possible abscess and leave others or no surgical intervention for all collections in setting family/hcp opting for hospice care recently)  -suggest gen surgery consult to see if right gluteal abscess can be drained bedside via existing wound  -continue local wound care

## 2023-08-01 NOTE — PROGRESS NOTE ADULT - SUBJECTIVE AND OBJECTIVE BOX
( x ) No complaints (  ) Complains of:     T(F): 101.4 (08-01-23 @ 18:07), Max: 101.4 (08-01-23 @ 18:07)  HR: 128 (08-01-23 @ 18:07) (69 - 128)  BP: 139/84 (08-01-23 @ 18:07) (106/59 - 150/64)  RR: 19 (08-01-23 @ 18:07) (17 - 19)  SpO2: 94% (08-01-23 @ 18:07) (94% - 96%)        Wound Location 1:  right hip 5 cm x 4.5 cm x 2 cm with 9 cm undermining 9 o'clock toward left gluteal muscles, cavity necrotic, excisional debridement attempted but not able to excise any significant amount of necrotic tissue, wound irrigated and cavity packed.  No wound right ischium only minor epidermal slough                     10.8   9.83  )-----------( 263      ( 31 Jul 2023 07:19 )             33.3     CBC Full  -  ( 31 Jul 2023 07:19 )  WBC Count : 9.83 K/uL  RBC Count : 3.58 M/uL  Hemoglobin : 10.8 g/dL  Hematocrit : 33.3 %  Platelet Count - Automated : 263 K/uL  Mean Cell Volume : 93.0 fl  Mean Cell Hemoglobin : 30.2 pg  Mean Cell Hemoglobin Concentration : 32.4 gm/dL  Auto Neutrophil # : 8.43 K/uL  Auto Lymphocyte # : 0.75 K/uL  Auto Monocyte # : 0.47 K/uL  Auto Eosinophil # : 0.08 K/uL  Auto Basophil # : 0.02 K/uL  Auto Neutrophil % : 85.8 %  Auto Lymphocyte % : 7.6 %  Auto Monocyte % : 4.8 %  Auto Eosinophil % : 0.8 %  Auto Basophil % : 0.2 %        Urinalysis Basic - ( 31 Jul 2023 07:19 )    Color: x / Appearance: x / SG: x / pH: x  Gluc: 123 mg/dL / Ketone: x  / Bili: x / Urobili: x   Blood: x / Protein: x / Nitrite: x   Leuk Esterase: x / RBC: x / WBC x   Sq Epi: x / Non Sq Epi: x / Bacteria: x                Procedure Performed:  (x  )Yes     (  )No  Name of Procedure:  (x  )debridement, excisional    (  )I&D    (  )Other:  (  )partial thickness     ( x )full thickness     ( x )subcutaneous     (  )muscle/tendon     (  )bone  (  )sharp     (  )surgical

## 2023-08-01 NOTE — CONSULT NOTE ADULT - SUBJECTIVE AND OBJECTIVE BOX
HPI:   Patient is a 87y male with dementia admitted 2 days ago from nursing home due to a non healing right hip area decubitus despite being on bactrim. he has not had fever, there appears to be extending into the bone and local soft tissue of the pelvis. Of note , patient is on hospice. he is not having fever.     REVIEW OF SYSTEMS:  All other review of systems negative (Comprehensive ROS)    PAST MEDICAL & SURGICAL HISTORY:  Lactose intolerance in adult      Shellfish allergy      Osteoarthritis      Scoliosis      Vitamin D deficiency      Dementia      Dementia      History of tonsillectomy  in childhood      S/P colonoscopy      History of hip surgery  L.          Allergies    No Known Drug Allergies  shellfish (Other)    Intolerances    lactose (Diarrhea)      Antimicrobials Day #  :3  piperacillin/tazobactam IVPB.. 3.375 Gram(s) IV Intermittent every 8 hours    Other Medications:  acetaminophen     Tablet .. 650 milliGRAM(s) Oral every 6 hours PRN  aluminum hydroxide/magnesium hydroxide/simethicone Suspension 30 milliLiter(s) Oral every 4 hours PRN  ascorbic acid 500 milliGRAM(s) Oral daily  aspirin  chewable 81 milliGRAM(s) Oral daily  atorvastatin 40 milliGRAM(s) Oral at bedtime  Dakins Solution - 1/2 Strength 1 Application(s) Topical two times a day  dicyclomine 10 milliGRAM(s) Oral two times a day before meals  enoxaparin Injectable 40 milliGRAM(s) SubCutaneous every 24 hours  melatonin 3 milliGRAM(s) Oral at bedtime PRN  metoprolol tartrate 25 milliGRAM(s) Oral two times a day  multivitamin 1 Tablet(s) Oral daily  nystatin Cream 1 Application(s) Topical two times a day  ondansetron Injectable 4 milliGRAM(s) IV Push every 8 hours PRN  rivastigmine patch  4.6 mG/24 Hr(s). 1 Patch Transdermal every 24 hours  senna 2 Tablet(s) Oral at bedtime  tamsulosin 0.4 milliGRAM(s) Oral at bedtime  zinc oxide 40% Paste 1 Application(s) Topical two times a day  zinc sulfate 220 milliGRAM(s) Oral daily      FAMILY HISTORY:  No pertinent family history in first degree relatives        SOCIAL HISTORY:  Smoking: [ ]Yes [ ]xNo  ETOH: [ ]Yes [ ]xxNo  Drug Use: [ ]Yes [ ]No   [ ] Single[ ]    T(F): 97.9 (08-01-23 @ 05:11), Max: 98 (07-31-23 @ 15:30)  HR: 91 (08-01-23 @ 05:11)  BP: 150/64 (08-01-23 @ 05:11)  RR: 17 (08-01-23 @ 05:11)  SpO2: 95% (08-01-23 @ 05:11)  Wt(kg): --    PHYSICAL EXAM:  General:  no acute distress  Eyes:  anicteric, no conjunctival injection, no discharge  Oropharynx: no lesions or injection 	  Neck: supple, without adenopathy  Lungs: clear to auscultation  Heart: regular rate and rhythm; no murmur, rubs or gallops  Abdomen: soft, nondistended, nontender, without mass or organomegaly  Skin: no lesions  Extremities: necrotic malodorous wound that goes to bone over the right hip  Neurologic: alert, contracted    LAB RESULTS:                        10.8   9.83  )-----------( 263      ( 31 Jul 2023 07:19 )             33.3     07-31    143  |  109<H>  |  18  ----------------------------<  123<H>  4.1   |  26  |  1.02    Ca    9.3      31 Jul 2023 07:19    TPro  6.2  /  Alb  2.0<L>  /  TBili  0.5  /  DBili  x   /  AST  74<H>  /  ALT  42  /  AlkPhos  111  07-31    LIVER FUNCTIONS - ( 31 Jul 2023 07:19 )  Alb: 2.0 g/dL / Pro: 6.2 g/dL / ALK PHOS: 111 U/L / ALT: 42 U/L / AST: 74 U/L / GGT: x           Urinalysis Basic - ( 31 Jul 2023 07:19 )    Color: x / Appearance: x / SG: x / pH: x  Gluc: 123 mg/dL / Ketone: x  / Bili: x / Urobili: x   Blood: x / Protein: x / Nitrite: x   Leuk Esterase: x / RBC: x / WBC x   Sq Epi: x / Non Sq Epi: x / Bacteria: x        MICROBIOLOGY:  RECENT CULTURES:  07-31 @ 05:05 Wound Sp. Instructions_Additional Info: Right hip ulceration     Numerous Escherichia coli  Numerous Enterococcus faecalis            RADIOLOGY REVIEWED:    < from: CT Pelvis w/ IV Cont (07.30.23 @ 15:22) >    ACC: [id number] EXAM:  CT PELVIS ONLY IC   ORDERED BY:  KATI MORALES     PROCEDURE DATE:  07/30/2023          INTERPRETATION:  CT PELVIS WITH IV CONTRAST    HISTORY: Right greater trochanteric wound. Evaluate for osteomyelitis.    TECHNIQUE: Contiguous axial imaging was performed through the pelvis with   90 cc administered Omnipaque 350 intravenous contrast.  Coronal and   sagittal reformatting was utilized.    COMPARISON: CT pelvis dated 2/27/2023.    FINDINGS:    OSSEOUS STRUCTURES    Fractures:  Right superior pubic ramus fracture is ununited with   surrounding sclerotic change. Right inferior pubic ramus fracture is now   healed.    Erosions:  There is mild cortical erosive change of the right greater   trochanter with overlying soft tissue wound consistent with osteomyelitis.    VISUALIZED SPINE  Mild-to-moderate lower lumbar degenerative disc disease is present with   disc calcifications.    PELVIC JOINTS    Sacroiliac Joints:  Mild arthrosis is present bilaterally.    Symphysis Pubis:  Mild arthrosis is present.    RIGHT HIP JOINT    Avascular Necrosis:  None.    Joint Space:  There is mild medial joint space narrowing with tiny   osteophytes and chondrocalcinosis.    Effusion/Synovitis:  Small effusion is present.    LEFT HIPJOINT    Postoperative Changes: Left hip hemiarthroplasty is present with   press-fit femoral component. No periprosthetic lucency is seen.    SOFT TISSUES    Neurovascular:  Severe atherosclerotic calcifications are present.    Pelvis/Abdomen:  Small right renal cyst is noted. Colonic diverticuli   are present without appreciated inflammatory change. Small amount of   fluid is now seen distending the right inguinal canal with resolution of   the previously noted fluid within the left inguinal canal. This likely   reflects retained peritoneal fluid.    Musculature:  There is new hyperdense change/fluid involving the right   adductor brevis muscle measuring up to 2.2 x 2.1 cm in diameter and   extending over at least 15.0 cm in length longitudinally. Moderate severe   bilateral gluteus minimus muscle atrophy is present. There is   mild-to-moderate generalized muscle atrophy. Soft tissue wound overlies   the right greater trochanter extending to involve the gluteus maximum is   with fluid collection of the gluteus angel measuring approximately 5.3   cm craniocaudally by 6.7 x 2.7 cm axially. There is faint peripheral   enhancement.    Subcutaneous Tissues:  Mild subcutaneous edema is present. Decubitus   change is present along the right ischial tuberosity with fluid along the   ischial tuberosity measuring approximately 4.5 cm craniocaudally by 1.5 x   1.9 cm axially with faint peripheral enhancement. Subcutaneous induration   along the left ischial tuberosity suggests additionaldecubitus change.    IMPRESSION:  1.  Large soft tissue wound overlies the right greater trochanter with   mild cortical erosive change of the greater trochanter consistent with   osteomyelitis.  2.  Fluid collection extends into the adjacent right gluteus angel   muscle measuring approximately 5.3 x 6.7 x 2.7 cm concerning for abscess.  3.  Right ischial decubitus change appears to be present with 4.5 x 1.5 x   1.9 cm fluid collection adjacent to the ischial tuberosity. Underlying   osteomyelitis cannot be excluded.  4.  Subcutaneous induration along the left ischial tuberosity suggests   additional decubitus change.  5.  New fluid collection within the right adductor brevis muscle measures   approximately 15.0 x 2.2 x 2.1 cm and extends towards the ununited right   superior pubic ramus fracture. This may be related to the fracture and   reflect ganglion cyst formation or resolving hematoma. Infection cannot   be absolutely excluded although there is no peripheral enhancement.   Consider follow-up imaging to assess stability or aspiration as warranted.    --- End of Report ---        < end of copied text >        Impression:  Patient with advanced dementia, contracted , cachectic, hospice patient admitted for management of a necrotic right decubitus with extension to the bone and local soft tissue. Planned for debridement    Recommendations:  continue zosyn

## 2023-08-01 NOTE — CONSULT NOTE ADULT - SUBJECTIVE AND OBJECTIVE BOX
SASKIA CROCKETT  [id number]      HPI:    Saskia Crockett is an 87 year old man, on hospice care, patient of Dr. Zarate with past medical history of Dementia, Paroxysmal atrial fibrillation (not on anticoagulation due to high bleeding risk), history of DVT and BPH was sent in to hospital from living facility for nonhealing decubitus ulcer.    ALLERGIES:  No Known Drug Allergies  shellfish (Other)  lactose (Diarrhea)      PAST MEDICAL & SURGICAL HISTORY:  Paroxysmal atrial fibrillation   Osteoarthritis  Scoliosis  Dementia  History of hip surgery      CURRENT MEDICATIONS:  acetaminophen     Tablet .. 650 milliGRAM(s) Oral every 6 hours PRN  aluminum hydroxide/magnesium hydroxide/simethicone Suspension 30 milliLiter(s) Oral every 4 hours PRN  ascorbic acid 500 milliGRAM(s) Oral daily  aspirin  chewable 81 milliGRAM(s) Oral daily  atorvastatin 40 milliGRAM(s) Oral at bedtime  Dakins Solution - 1/2 Strength 1 Application(s) Topical two times a day  dicyclomine 10 milliGRAM(s) Oral two times a day before meals  enoxaparin Injectable 40 milliGRAM(s) SubCutaneous every 24 hours  melatonin 3 milliGRAM(s) Oral at bedtime PRN  metoprolol tartrate 25 milliGRAM(s) Oral two times a day  multivitamin 1 Tablet(s) Oral daily  nystatin Cream 1 Application(s) Topical two times a day  ondansetron Injectable 4 milliGRAM(s) IV Push every 8 hours PRN  piperacillin/tazobactam IVPB.. 3.375 Gram(s) IV Intermittent every 8 hours  rivastigmine patch  4.6 mG/24 Hr(s). 1 Patch Transdermal every 24 hours  senna 2 Tablet(s) Oral at bedtime  tamsulosin 0.4 milliGRAM(s) Oral at bedtime  zinc oxide 40% Paste 1 Application(s) Topical two times a day  zinc sulfate 220 milliGRAM(s) Oral daily      ROS:  All 10 systems reviewed and positives noted in HPI    OBJECTIVE:    VITAL SIGNS:  Vital Signs Last 24 Hrs  T(C): 36.6 (01 Aug 2023 05:11), Max: 36.7 (31 Jul 2023 15:30)  T(F): 97.9 (01 Aug 2023 05:11), Max: 98 (31 Jul 2023 15:30)  HR: 91 (01 Aug 2023 05:11) (69 - 91)  BP: 150/64 (01 Aug 2023 05:11) (106/59 - 150/64)  BP(mean): --  RR: 17 (01 Aug 2023 05:11) (17 - 19)  SpO2: 95% (01 Aug 2023 05:11) (95% - 96%)    Parameters below as of 01 Aug 2023 05:11  Patient On (Oxygen Delivery Method): room air        PHYSICAL EXAM:  General: well appearing, no distress  HEENT: sclera anicteric  Neck: supple, no carotid bruits b/l  CVS: JVP ~ 7 cm H20, RRR, s1, s2, no murmurs/rubs/gallops  Chest: unlabored respirations, clear to auscultation b/l  Abdomen: non-distended  Extremities: no lower extremity edema b/l  Neuro: awake, alert & oriented x 3  Psych: normal affect      LABS:                        10.8   9.83  )-----------( 263      ( 31 Jul 2023 07:19 )             33.3     07-31    143  |  109<H>  |  18  ----------------------------<  123<H>  4.1   |  26  |  1.02    Ca    9.3      31 Jul 2023 07:19    TPro  6.2  /  Alb  2.0<L>  /  TBili  0.5  /  DBili  x   /  AST  74<H>  /  ALT  42  /  AlkPhos  111  07-31        TTE (5/2022):  LVEF 55%    ECG (7/30/23): artifact   SASKIA CROCKETT  [id number]      HPI:    Saskia Crockett is an 87 year old man, on hospice care, patient of Dr. Zarate with past medical history of Dementia, Paroxysmal atrial fibrillation (not on anticoagulation due to high bleeding risk), history of DVT and BPH was sent in to hospital from living facility for nonhealing decubitus ulcer.    ALLERGIES:  No Known Drug Allergies  shellfish (Other)  lactose (Diarrhea)      PAST MEDICAL & SURGICAL HISTORY:  Paroxysmal atrial fibrillation   Osteoarthritis  Scoliosis  Dementia  History of hip surgery      CURRENT MEDICATIONS:  acetaminophen     Tablet .. 650 milliGRAM(s) Oral every 6 hours PRN  aluminum hydroxide/magnesium hydroxide/simethicone Suspension 30 milliLiter(s) Oral every 4 hours PRN  ascorbic acid 500 milliGRAM(s) Oral daily  aspirin  chewable 81 milliGRAM(s) Oral daily  atorvastatin 40 milliGRAM(s) Oral at bedtime  Dakins Solution - 1/2 Strength 1 Application(s) Topical two times a day  dicyclomine 10 milliGRAM(s) Oral two times a day before meals  enoxaparin Injectable 40 milliGRAM(s) SubCutaneous every 24 hours  melatonin 3 milliGRAM(s) Oral at bedtime PRN  metoprolol tartrate 25 milliGRAM(s) Oral two times a day  multivitamin 1 Tablet(s) Oral daily  nystatin Cream 1 Application(s) Topical two times a day  ondansetron Injectable 4 milliGRAM(s) IV Push every 8 hours PRN  piperacillin/tazobactam IVPB.. 3.375 Gram(s) IV Intermittent every 8 hours  rivastigmine patch  4.6 mG/24 Hr(s). 1 Patch Transdermal every 24 hours  senna 2 Tablet(s) Oral at bedtime  tamsulosin 0.4 milliGRAM(s) Oral at bedtime  zinc oxide 40% Paste 1 Application(s) Topical two times a day  zinc sulfate 220 milliGRAM(s) Oral daily      ROS:  All 10 systems reviewed and positives noted in HPI    OBJECTIVE:    VITAL SIGNS:  Vital Signs Last 24 Hrs  T(C): 36.6 (01 Aug 2023 05:11), Max: 36.7 (31 Jul 2023 15:30)  T(F): 97.9 (01 Aug 2023 05:11), Max: 98 (31 Jul 2023 15:30)  HR: 91 (01 Aug 2023 05:11) (69 - 91)  BP: 150/64 (01 Aug 2023 05:11) (106/59 - 150/64)  BP(mean): --  RR: 17 (01 Aug 2023 05:11) (17 - 19)  SpO2: 95% (01 Aug 2023 05:11) (95% - 96%)    Parameters below as of 01 Aug 2023 05:11  Patient On (Oxygen Delivery Method): room air        PHYSICAL EXAM:  General: elderly man, has dementia, no acute distress  HEENT: sclera anicteric  Neck: supple, no carotid bruits b/l  CVS: JVP ~ 7 cm H20, RRR, s1, s2  Chest: unlabored respirations, clear to auscultation b/l  Abdomen: non-distended  Extremities: no lower extremity edema b/l  Neuro: awake, alert, not oriented       LABS:                        10.8   9.83  )-----------( 263      ( 31 Jul 2023 07:19 )             33.3     07-31    143  |  109<H>  |  18  ----------------------------<  123<H>  4.1   |  26  |  1.02    Ca    9.3      31 Jul 2023 07:19    TPro  6.2  /  Alb  2.0<L>  /  TBili  0.5  /  DBili  x   /  AST  74<H>  /  ALT  42  /  AlkPhos  111  07-31        TTE (5/2022):  LVEF 55%    ECG (7/30/23): artifact   SASKIA CROCKETT  [id number]      HPI:    Saskia Crockett is an 87 year old man, on hospice care, patient of Dr. Zarate with past medical history of Dementia, Paroxysmal atrial fibrillation (not on anticoagulation due to high bleeding risk), history of DVT and BPH was sent in to hospital from living facility for nonhealing decubitus ulcer.    ALLERGIES:  No Known Drug Allergies  shellfish (Other)  lactose (Diarrhea)      PAST MEDICAL & SURGICAL HISTORY:  Paroxysmal atrial fibrillation   Osteoarthritis  Scoliosis  Dementia  History of hip surgery      CURRENT MEDICATIONS:  acetaminophen     Tablet .. 650 milliGRAM(s) Oral every 6 hours PRN  aluminum hydroxide/magnesium hydroxide/simethicone Suspension 30 milliLiter(s) Oral every 4 hours PRN  ascorbic acid 500 milliGRAM(s) Oral daily  aspirin  chewable 81 milliGRAM(s) Oral daily  atorvastatin 40 milliGRAM(s) Oral at bedtime  Dakins Solution - 1/2 Strength 1 Application(s) Topical two times a day  dicyclomine 10 milliGRAM(s) Oral two times a day before meals  enoxaparin Injectable 40 milliGRAM(s) SubCutaneous every 24 hours  melatonin 3 milliGRAM(s) Oral at bedtime PRN  metoprolol tartrate 25 milliGRAM(s) Oral two times a day  multivitamin 1 Tablet(s) Oral daily  nystatin Cream 1 Application(s) Topical two times a day  ondansetron Injectable 4 milliGRAM(s) IV Push every 8 hours PRN  piperacillin/tazobactam IVPB.. 3.375 Gram(s) IV Intermittent every 8 hours  rivastigmine patch  4.6 mG/24 Hr(s). 1 Patch Transdermal every 24 hours  senna 2 Tablet(s) Oral at bedtime  tamsulosin 0.4 milliGRAM(s) Oral at bedtime  zinc oxide 40% Paste 1 Application(s) Topical two times a day  zinc sulfate 220 milliGRAM(s) Oral daily      ROS:  All 10 systems reviewed and positives noted in HPI    OBJECTIVE:    VITAL SIGNS:  Vital Signs Last 24 Hrs  T(C): 36.6 (01 Aug 2023 05:11), Max: 36.7 (31 Jul 2023 15:30)  T(F): 97.9 (01 Aug 2023 05:11), Max: 98 (31 Jul 2023 15:30)  HR: 91 (01 Aug 2023 05:11) (69 - 91)  BP: 150/64 (01 Aug 2023 05:11) (106/59 - 150/64)  BP(mean): --  RR: 17 (01 Aug 2023 05:11) (17 - 19)  SpO2: 95% (01 Aug 2023 05:11) (95% - 96%)    Parameters below as of 01 Aug 2023 05:11  Patient On (Oxygen Delivery Method): room air        PHYSICAL EXAM:  General: elderly man, has dementia, no acute distress  HEENT: sclera anicteric  Neck: supple  CVS: JVP ~ 7 cm H20, RRR, s1, s2  Chest: unlabored respirations, clear to auscultation b/l  Abdomen: non-distended  Extremities: no lower extremity edema b/l  Neuro: awake, alert, not oriented       LABS:                        10.8   9.83  )-----------( 263      ( 31 Jul 2023 07:19 )             33.3     07-31    143  |  109<H>  |  18  ----------------------------<  123<H>  4.1   |  26  |  1.02    Ca    9.3      31 Jul 2023 07:19    TPro  6.2  /  Alb  2.0<L>  /  TBili  0.5  /  DBili  x   /  AST  74<H>  /  ALT  42  /  AlkPhos  111  07-31        TTE (5/2022):  LVEF 55%    ECG (7/30/23): artifact

## 2023-08-01 NOTE — PROGRESS NOTE ADULT - ASSESSMENT
87 year old M PMH dementia, BPH, DVT not on AC, IBS, OA with previous pelvic fracture brought in from [facility] for nonhealing decubitus ulcer admitted for possible osteomyelitis of R hip, failed outpatient abx    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 with possible OM on CT scan  plastics and ID following  CT with possible signs of osteo and abscess formation  Wound cultures growing GNR  Continue local wound care as per plastics and wound care team with dakins solution  Continue Zosyn for now, stopped vanco  Follow up ID recommendations  Will ask for cardiac clearance, Dr. Chou aware for OR debridement  Patient currently on hospice care at [facility] - daughter Salina is ok with IV abx and, if needed, a debridement of the area. If further surgical intervention is needed, she would need to think about it. Explained that abscess would likely need to be drained otherwise could cause reinfection and if patient has osteo would need surgical intervention vs long term abx and she would like to think about it    CAD  - Continue asa 81mg, atorvastatin 40mg, metoprolol tartrate 25mg BID     Dementia  - On rivastigmine 4.5mg capsule at home, will continue patch while in hospital    BPH  - Continue Flomax    IBS  - Continue Dicyclomine    DVT ppx  - Lovenox    Dispo: Pending medical course    Updated daughter Salina Quinones [phone] at bedside     Luis nurse from [facility]: [phone]   87 year old M PMH dementia, BPH, DVT not on AC, IBS, OA with previous pelvic fracture brought in from [facility] for nonhealing decubitus ulcer admitted for possible osteomyelitis of R hip, failed outpatient abx.    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 with possible OM on CT scan  plastics and ID following  CT with possible signs of osteo and abscess formation  Wound cultures growing GNR  Continue local wound care as per plastics and wound care team with dakins solution  Continue Zosyn for now, stopped vanco  Follow up ID recommendations  Will ask for cardiac clearance, Dr. Chou aware for OR debridement  Patient currently on hospice care at [facility] - daughter Salina is ok with IV abx and, if needed, a debridement of the area. Explained that abscess would likely need to be drained otherwise could cause reinfection and if patient has osteo would need surgical intervention vs long term abx and she would like to think about it    CAD  - Continue asa 81mg, atorvastatin 40mg, metoprolol tartrate 25mg BID     Dementia  - On rivastigmine 4.5mg capsule at home, will continue patch while in hospital    BPH  - Continue Flomax    IBS  - Continue Dicyclomine    DVT ppx  - Lovenox    Dispo: Pending medical course    Updated daughter Salina Quinones [phone] at bedside     Luis nurse from [facility]: [phone]

## 2023-08-02 LAB
-  AMIKACIN: SIGNIFICANT CHANGE UP
-  AMOXICILLIN/CLAVULANIC ACID: SIGNIFICANT CHANGE UP
-  AMPICILLIN/SULBACTAM: SIGNIFICANT CHANGE UP
-  AMPICILLIN: SIGNIFICANT CHANGE UP
-  AMPICILLIN: SIGNIFICANT CHANGE UP
-  AZTREONAM: SIGNIFICANT CHANGE UP
-  CEFAZOLIN: SIGNIFICANT CHANGE UP
-  CEFEPIME: SIGNIFICANT CHANGE UP
-  CEFTRIAXONE: SIGNIFICANT CHANGE UP
-  CIPROFLOXACIN: SIGNIFICANT CHANGE UP
-  ERTAPENEM: SIGNIFICANT CHANGE UP
-  GENTAMICIN: SIGNIFICANT CHANGE UP
-  IMIPENEM: SIGNIFICANT CHANGE UP
-  LEVOFLOXACIN: SIGNIFICANT CHANGE UP
-  MEROPENEM: SIGNIFICANT CHANGE UP
-  PIPERACILLIN/TAZOBACTAM: SIGNIFICANT CHANGE UP
-  TETRACYCLINE: SIGNIFICANT CHANGE UP
-  TOBRAMYCIN: SIGNIFICANT CHANGE UP
-  TRIMETHOPRIM/SULFAMETHOXAZOLE: SIGNIFICANT CHANGE UP
-  VANCOMYCIN: SIGNIFICANT CHANGE UP
ANION GAP SERPL CALC-SCNC: 10 MMOL/L — SIGNIFICANT CHANGE UP (ref 5–17)
BUN SERPL-MCNC: 17 MG/DL — SIGNIFICANT CHANGE UP (ref 7–23)
CALCIUM SERPL-MCNC: 9 MG/DL — SIGNIFICANT CHANGE UP (ref 8.4–10.5)
CHLORIDE SERPL-SCNC: 108 MMOL/L — SIGNIFICANT CHANGE UP (ref 96–108)
CO2 SERPL-SCNC: 24 MMOL/L — SIGNIFICANT CHANGE UP (ref 22–31)
CREAT SERPL-MCNC: 1.08 MG/DL — SIGNIFICANT CHANGE UP (ref 0.5–1.3)
CULTURE RESULTS: SIGNIFICANT CHANGE UP
EGFR: 68 ML/MIN/1.73M2 — SIGNIFICANT CHANGE UP
GLUCOSE SERPL-MCNC: 113 MG/DL — HIGH (ref 70–99)
HCT VFR BLD CALC: 30.2 % — LOW (ref 39–50)
HGB BLD-MCNC: 9.9 G/DL — LOW (ref 13–17)
LACTATE SERPL-SCNC: 1 MMOL/L — SIGNIFICANT CHANGE UP (ref 0.7–2)
MCHC RBC-ENTMCNC: 30 PG — SIGNIFICANT CHANGE UP (ref 27–34)
MCHC RBC-ENTMCNC: 32.8 GM/DL — SIGNIFICANT CHANGE UP (ref 32–36)
MCV RBC AUTO: 91.5 FL — SIGNIFICANT CHANGE UP (ref 80–100)
METHOD TYPE: SIGNIFICANT CHANGE UP
METHOD TYPE: SIGNIFICANT CHANGE UP
NRBC # BLD: 0 /100 WBCS — SIGNIFICANT CHANGE UP (ref 0–0)
ORGANISM # SPEC MICROSCOPIC CNT: SIGNIFICANT CHANGE UP
PLATELET # BLD AUTO: 331 K/UL — SIGNIFICANT CHANGE UP (ref 150–400)
POTASSIUM SERPL-MCNC: 3.8 MMOL/L — SIGNIFICANT CHANGE UP (ref 3.5–5.3)
POTASSIUM SERPL-SCNC: 3.8 MMOL/L — SIGNIFICANT CHANGE UP (ref 3.5–5.3)
RBC # BLD: 3.3 M/UL — LOW (ref 4.2–5.8)
RBC # FLD: 14 % — SIGNIFICANT CHANGE UP (ref 10.3–14.5)
SODIUM SERPL-SCNC: 142 MMOL/L — SIGNIFICANT CHANGE UP (ref 135–145)
SPECIMEN SOURCE: SIGNIFICANT CHANGE UP
WBC # BLD: 9.9 K/UL — SIGNIFICANT CHANGE UP (ref 3.8–10.5)
WBC # FLD AUTO: 9.9 K/UL — SIGNIFICANT CHANGE UP (ref 3.8–10.5)

## 2023-08-02 PROCEDURE — 99232 SBSQ HOSP IP/OBS MODERATE 35: CPT | Mod: FS

## 2023-08-02 PROCEDURE — 99232 SBSQ HOSP IP/OBS MODERATE 35: CPT

## 2023-08-02 RX ORDER — MEROPENEM 1 G/30ML
500 INJECTION INTRAVENOUS EVERY 8 HOURS
Refills: 0 | Status: DISCONTINUED | OUTPATIENT
Start: 2023-08-02 | End: 2023-08-07

## 2023-08-02 RX ORDER — SODIUM CHLORIDE 9 MG/ML
1700 INJECTION INTRAMUSCULAR; INTRAVENOUS; SUBCUTANEOUS ONCE
Refills: 0 | Status: COMPLETED | OUTPATIENT
Start: 2023-08-02 | End: 2023-08-02

## 2023-08-02 RX ADMIN — Medication 1 APPLICATION(S): at 17:57

## 2023-08-02 RX ADMIN — Medication 500 MILLIGRAM(S): at 11:22

## 2023-08-02 RX ADMIN — ZINC OXIDE 1 APPLICATION(S): 200 OINTMENT TOPICAL at 05:15

## 2023-08-02 RX ADMIN — Medication 650 MILLIGRAM(S): at 22:34

## 2023-08-02 RX ADMIN — NYSTATIN CREAM 1 APPLICATION(S): 100000 CREAM TOPICAL at 05:15

## 2023-08-02 RX ADMIN — PIPERACILLIN AND TAZOBACTAM 25 GRAM(S): 4; .5 INJECTION, POWDER, LYOPHILIZED, FOR SOLUTION INTRAVENOUS at 05:16

## 2023-08-02 RX ADMIN — Medication 650 MILLIGRAM(S): at 15:38

## 2023-08-02 RX ADMIN — SENNA PLUS 2 TABLET(S): 8.6 TABLET ORAL at 21:18

## 2023-08-02 RX ADMIN — TAMSULOSIN HYDROCHLORIDE 0.4 MILLIGRAM(S): 0.4 CAPSULE ORAL at 21:17

## 2023-08-02 RX ADMIN — MEROPENEM 100 MILLIGRAM(S): 1 INJECTION INTRAVENOUS at 21:13

## 2023-08-02 RX ADMIN — PIPERACILLIN AND TAZOBACTAM 25 GRAM(S): 4; .5 INJECTION, POWDER, LYOPHILIZED, FOR SOLUTION INTRAVENOUS at 13:12

## 2023-08-02 RX ADMIN — Medication 10 MILLIGRAM(S): at 15:37

## 2023-08-02 RX ADMIN — Medication 650 MILLIGRAM(S): at 16:15

## 2023-08-02 RX ADMIN — Medication 81 MILLIGRAM(S): at 11:21

## 2023-08-02 RX ADMIN — Medication 650 MILLIGRAM(S): at 23:04

## 2023-08-02 RX ADMIN — Medication 10 MILLIGRAM(S): at 06:50

## 2023-08-02 RX ADMIN — RIVASTIGMINE 1 PATCH: 4.6 PATCH, EXTENDED RELEASE TRANSDERMAL at 21:00

## 2023-08-02 RX ADMIN — Medication 1 TABLET(S): at 11:22

## 2023-08-02 RX ADMIN — NYSTATIN CREAM 1 APPLICATION(S): 100000 CREAM TOPICAL at 17:58

## 2023-08-02 RX ADMIN — ENOXAPARIN SODIUM 40 MILLIGRAM(S): 100 INJECTION SUBCUTANEOUS at 21:17

## 2023-08-02 RX ADMIN — ZINC OXIDE 1 APPLICATION(S): 200 OINTMENT TOPICAL at 17:58

## 2023-08-02 RX ADMIN — Medication 25 MILLIGRAM(S): at 17:58

## 2023-08-02 RX ADMIN — RIVASTIGMINE 1 PATCH: 4.6 PATCH, EXTENDED RELEASE TRANSDERMAL at 19:45

## 2023-08-02 RX ADMIN — ATORVASTATIN CALCIUM 40 MILLIGRAM(S): 80 TABLET, FILM COATED ORAL at 21:18

## 2023-08-02 RX ADMIN — Medication 25 MILLIGRAM(S): at 05:16

## 2023-08-02 RX ADMIN — RIVASTIGMINE 1 PATCH: 4.6 PATCH, EXTENDED RELEASE TRANSDERMAL at 21:22

## 2023-08-02 RX ADMIN — SODIUM CHLORIDE 1700 MILLILITER(S): 9 INJECTION INTRAMUSCULAR; INTRAVENOUS; SUBCUTANEOUS at 18:23

## 2023-08-02 RX ADMIN — ZINC SULFATE TAB 220 MG (50 MG ZINC EQUIVALENT) 220 MILLIGRAM(S): 220 (50 ZN) TAB at 11:22

## 2023-08-02 RX ADMIN — Medication 1 APPLICATION(S): at 05:15

## 2023-08-02 RX ADMIN — RIVASTIGMINE 1 PATCH: 4.6 PATCH, EXTENDED RELEASE TRANSDERMAL at 12:48

## 2023-08-02 NOTE — PROGRESS NOTE ADULT - SUBJECTIVE AND OBJECTIVE BOX
Patient is a 87y old  Male who presents with a chief complaint of osteo of R femur (01 Aug 2023 18:13)    Patient seen and examined at bedside.  resting at bedside    ALLERGIES:  No Known Drug Allergies  shellfish (Other)  lactose (Diarrhea)        Vital Signs Last 24 Hrs  T(F): 98.9 (02 Aug 2023 05:12), Max: 101.4 (01 Aug 2023 18:07)  HR: 110 (02 Aug 2023 05:12) (85 - 128)  BP: 124/68 (02 Aug 2023 05:12) (124/68 - 139/84)  RR: 17 (02 Aug 2023 05:12) (17 - 19)  SpO2: 95% (02 Aug 2023 05:12) (94% - 96%)  I&O's Summary    01 Aug 2023 07:01  -  02 Aug 2023 07:00  --------------------------------------------------------  IN: 100 mL / OUT: 0 mL / NET: 100 mL      MEDICATIONS:  acetaminophen     Tablet .. 650 milliGRAM(s) Oral every 6 hours PRN  aluminum hydroxide/magnesium hydroxide/simethicone Suspension 30 milliLiter(s) Oral every 4 hours PRN  ascorbic acid 500 milliGRAM(s) Oral daily  aspirin  chewable 81 milliGRAM(s) Oral daily  atorvastatin 40 milliGRAM(s) Oral at bedtime  Dakins Solution - 1/2 Strength 1 Application(s) Topical two times a day  dicyclomine 10 milliGRAM(s) Oral two times a day before meals  enoxaparin Injectable 40 milliGRAM(s) SubCutaneous every 24 hours  melatonin 3 milliGRAM(s) Oral at bedtime PRN  metoprolol tartrate 25 milliGRAM(s) Oral two times a day  multivitamin 1 Tablet(s) Oral daily  nystatin Cream 1 Application(s) Topical two times a day  ondansetron Injectable 4 milliGRAM(s) IV Push every 8 hours PRN  piperacillin/tazobactam IVPB.. 3.375 Gram(s) IV Intermittent every 8 hours  rivastigmine patch  4.6 mG/24 Hr(s). 1 Patch Transdermal every 24 hours  senna 2 Tablet(s) Oral at bedtime  tamsulosin 0.4 milliGRAM(s) Oral at bedtime  zinc oxide 40% Paste 1 Application(s) Topical two times a day  zinc sulfate 220 milliGRAM(s) Oral daily      PHYSICAL EXAM:  General: NAD, Alert but confused  ENT: MMM, no thrush  Neck: Supple, No JVD  Lungs: Clear to auscultation bilaterally, non labored, bilateral air entry  Cardio: RRR, S1/S2, No murmurs  Abdomen: Soft, Nontender, Nondistended; Bowel sounds present  Extremities: No cyanosis, No edema, RT hip and sacral dressing in place    LABS:                        9.9    9.90  )-----------( 331      ( 02 Aug 2023 06:28 )             30.2     07-31    143  |  109  |  18  ----------------------------<  123  4.1   |  26  |  1.02    Ca    9.3      31 Jul 2023 07:19    TPro  6.2  /  Alb  2.0  /  TBili  0.5  /  DBili  x   /  AST  74  /  ALT  42  /  AlkPhos  111  07-31              06-13 Chol 161 mg/dL LDL -- HDL 41 mg/dL Trig 101 mg/dL                  Urinalysis Basic - ( 31 Jul 2023 07:19 )    Color: x / Appearance: x / SG: x / pH: x  Gluc: 123 mg/dL / Ketone: x  / Bili: x / Urobili: x   Blood: x / Protein: x / Nitrite: x   Leuk Esterase: x / RBC: x / WBC x   Sq Epi: x / Non Sq Epi: x / Bacteria: x        Culture - Other (collected 31 Jul 2023 05:05)  Source: Wound Sp. Instructions_Additional Info: Right hip ulceration  Preliminary Report (01 Aug 2023 11:00):    Numerous Escherichia coli    Numerous Enterococcus faecalis          RADIOLOGY & ADDITIONAL TESTS:    Care Discussed with Consultants/Other Providers:    Patient is a 87y old  Male who presents with a chief complaint of osteo of R femur (01 Aug 2023 18:13)    Patient seen and examined at bedside.  resting at bedside    ALLERGIES:  No Known Drug Allergies  shellfish (Other)  lactose (Diarrhea)      Vital Signs Last 24 Hrs  T(F): 98.9 (02 Aug 2023 05:12), Max: 101.4 (01 Aug 2023 18:07)  HR: 110 (02 Aug 2023 05:12) (85 - 128)  BP: 124/68 (02 Aug 2023 05:12) (124/68 - 139/84)  RR: 17 (02 Aug 2023 05:12) (17 - 19)  SpO2: 95% (02 Aug 2023 05:12) (94% - 96%)    I&O's Summary    01 Aug 2023 07:01  -  02 Aug 2023 07:00  --------------------------------------------------------  IN: 100 mL / OUT: 0 mL / NET: 100 mL      MEDICATIONS:  acetaminophen     Tablet .. 650 milliGRAM(s) Oral every 6 hours PRN  aluminum hydroxide/magnesium hydroxide/simethicone Suspension 30 milliLiter(s) Oral every 4 hours PRN  ascorbic acid 500 milliGRAM(s) Oral daily  aspirin  chewable 81 milliGRAM(s) Oral daily  atorvastatin 40 milliGRAM(s) Oral at bedtime  Dakins Solution - 1/2 Strength 1 Application(s) Topical two times a day  dicyclomine 10 milliGRAM(s) Oral two times a day before meals  enoxaparin Injectable 40 milliGRAM(s) SubCutaneous every 24 hours  melatonin 3 milliGRAM(s) Oral at bedtime PRN  metoprolol tartrate 25 milliGRAM(s) Oral two times a day  multivitamin 1 Tablet(s) Oral daily  nystatin Cream 1 Application(s) Topical two times a day  ondansetron Injectable 4 milliGRAM(s) IV Push every 8 hours PRN  piperacillin/tazobactam IVPB.. 3.375 Gram(s) IV Intermittent every 8 hours  rivastigmine patch  4.6 mG/24 Hr(s). 1 Patch Transdermal every 24 hours  senna 2 Tablet(s) Oral at bedtime  tamsulosin 0.4 milliGRAM(s) Oral at bedtime  zinc oxide 40% Paste 1 Application(s) Topical two times a day  zinc sulfate 220 milliGRAM(s) Oral daily      PHYSICAL EXAM:  General: NAD, Alert but confused  ENT: MMM, no thrush  Neck: Supple, No JVD  Lungs: Clear to auscultation bilaterally, non labored, bilateral air entry  Cardio: RRR, S1/S2, No murmurs  Abdomen: Soft, Nontender, Nondistended; Bowel sounds present  Extremities: No cyanosis, No edema, RT hip and sacral dressing in place    LABS:                        9.9    9.90  )-----------( 331      ( 02 Aug 2023 06:28 )             30.2     07-31    143  |  109  |  18  ----------------------------<  123  4.1   |  26  |  1.02    Ca    9.3      31 Jul 2023 07:19    TPro  6.2  /  Alb  2.0  /  TBili  0.5  /  DBili  x   /  AST  74  /  ALT  42  /  AlkPhos  111  07-31              06-13 Chol 161 mg/dL LDL -- HDL 41 mg/dL Trig 101 mg/dL                  Urinalysis Basic - ( 31 Jul 2023 07:19 )    Color: x / Appearance: x / SG: x / pH: x  Gluc: 123 mg/dL / Ketone: x  / Bili: x / Urobili: x   Blood: x / Protein: x / Nitrite: x   Leuk Esterase: x / RBC: x / WBC x   Sq Epi: x / Non Sq Epi: x / Bacteria: x        Culture - Other (collected 31 Jul 2023 05:05)  Source: Wound Sp. Instructions_Additional Info: Right hip ulceration  Preliminary Report (01 Aug 2023 11:00):    Numerous Escherichia coli    Numerous Enterococcus faecalis          RADIOLOGY & ADDITIONAL TESTS:    Care Discussed with Consultants/Other Providers:

## 2023-08-02 NOTE — PROGRESS NOTE ADULT - SUBJECTIVE AND OBJECTIVE BOX
Progress:  resting comfortably in bed, opens eyes w/ stim , not interactive     Review of Systems: [ Unable to obtain due to poor mentation]    MEDICATIONS  (STANDING):  ascorbic acid 500 milliGRAM(s) Oral daily  aspirin  chewable 81 milliGRAM(s) Oral daily  atorvastatin 40 milliGRAM(s) Oral at bedtime  Dakins Solution - 1/2 Strength 1 Application(s) Topical two times a day  dicyclomine 10 milliGRAM(s) Oral two times a day before meals  enoxaparin Injectable 40 milliGRAM(s) SubCutaneous every 24 hours  metoprolol tartrate 25 milliGRAM(s) Oral two times a day  multivitamin 1 Tablet(s) Oral daily  nystatin Cream 1 Application(s) Topical two times a day  piperacillin/tazobactam IVPB.. 3.375 Gram(s) IV Intermittent every 8 hours  rivastigmine patch  4.6 mG/24 Hr(s). 1 Patch Transdermal every 24 hours  senna 2 Tablet(s) Oral at bedtime  tamsulosin 0.4 milliGRAM(s) Oral at bedtime  zinc oxide 40% Paste 1 Application(s) Topical two times a day  zinc sulfate 220 milliGRAM(s) Oral daily    MEDICATIONS  (PRN):  acetaminophen     Tablet .. 650 milliGRAM(s) Oral every 6 hours PRN Temp greater or equal to 38C (100.4F), Mild Pain (1 - 3)  aluminum hydroxide/magnesium hydroxide/simethicone Suspension 30 milliLiter(s) Oral every 4 hours PRN Dyspepsia  melatonin 3 milliGRAM(s) Oral at bedtime PRN Insomnia  ondansetron Injectable 4 milliGRAM(s) IV Push every 8 hours PRN Nausea and/or Vomiting      PHYSICAL EXAM:  Vital Signs Last 24 Hrs  T(C): 37.2 (02 Aug 2023 05:12), Max: 38.6 (01 Aug 2023 18:07)  T(F): 98.9 (02 Aug 2023 05:12), Max: 101.4 (01 Aug 2023 18:07)  HR: 110 (02 Aug 2023 05:12) (85 - 128)  BP: 124/68 (02 Aug 2023 05:12) (124/68 - 139/84)  BP(mean): --  RR: 17 (02 Aug 2023 05:12) (17 - 19)  SpO2: 95% (02 Aug 2023 05:12) (94% - 96%)    Parameters below as of 02 Aug 2023 05:12  Patient On (Oxygen Delivery Method): room air      General: alert w/ stim., eyes mostly closed, said 1-2 words , not interactive       HEENT: n/c, a/t, + temp wasting     Lungs: cl dim to bases, comfortable   CV: normal- tachy     GI: abd flat     : normal incontinent , wells   Musculoskeletal: contracted LE's , stiffened  ue's    Skin: pale, w/d, lrg R hip decub     Neuro: + deficits , barely interactive, does not follow    Oral intake ability:  oral feeding- full feed   Diet: pureed as anny     LABS:                          9.9    9.90  )-----------( 331      ( 02 Aug 2023 06:28 )             30.2     08-02    142  |  108  |  17  ----------------------------<  113<H>  3.8   |  24  |  1.08    Ca    9.0      02 Aug 2023 06:28      Urinalysis Basic - ( 02 Aug 2023 06:28 )    Color: x / Appearance: x / SG: x / pH: x  Gluc: 113 mg/dL / Ketone: x  / Bili: x / Urobili: x   Blood: x / Protein: x / Nitrite: x   Leuk Esterase: x / RBC: x / WBC x   Sq Epi: x / Non Sq Epi: x / Bacteria: x        RADIOLOGY & ADDITIONAL STUDIES:     ADVANCE DIRECTIVES:  hcp , living will, molst dnr/dni   Advanced Care Planning discussion total time spent:

## 2023-08-02 NOTE — PROVIDER CONTACT NOTE (OTHER) - SITUATION
Patient HR has been sustaining above 130s for past hour, patient's rectal temperature after medication given is 100.7

## 2023-08-02 NOTE — PROGRESS NOTE ADULT - ASSESSMENT
Assessment:  Madi Marte is an 87 year old man, on hospice care, patient of Dr. Zarate with past medical history of Dementia, Paroxysmal atrial fibrillation (not on anticoagulation due to high bleeding risk), history of DVT and BPH was sent in to hospital from living facility for nonhealing decubitus ulcer.    Cardiology consulted for pre-operative cardiac risk stratification prior to right hip wound ulcer debridement. The patient was evaluated at bedside and has dementia and so obtaining history is limited, appears comfortable at this time. ECG consistent with sinus rhythm with sinus arrhythmia. Prior echocardiogram consistent with normal LVEF 55%.    Recommendations:  [] Pre-operative cardiac risk stratification: The patient is at moderate cardiovascular risk prior to wound ulcer debridement, may proceed at this level of risk. Would place patient on telemetry due to history of paroxysmal atrial fibrillation.    Will sign out this case to cardiologist to follow along tomorrow.    Vladimir Chou MD  Cardiology

## 2023-08-02 NOTE — PROGRESS NOTE ADULT - SUBJECTIVE AND OBJECTIVE BOX
CC: f/u for  infected right hip decubitus  Patient reports  nothing but eating well  REVIEW OF SYSTEMS:  All other review of systems negative (Comprehensive ROS)    Antimicrobials Day #  :1  meropenem  IVPB 500 milliGRAM(s) IV Intermittent every 8 hours    Other Medications Reviewed    T(F): 101 (08-02-23 @ 18:08), Max: 101.5 (08-02-23 @ 15:38)  HR: 138 (08-02-23 @ 18:08)  BP: 136/61 (08-02-23 @ 18:08)  RR: 16 (08-02-23 @ 17:33)  SpO2: 96% (08-02-23 @ 18:08)  Wt(kg): --    PHYSICAL EXAM:  General: alert, no acute distress  Eyes:  anicteric, no conjunctival injection, no discharge  Oropharynx: no lesions or injection 	  Neck: supple, without adenopathy  Lungs: clear to auscultation  Heart: regular rate and rhythm; no murmur, rubs or gallops  Abdomen: soft, nondistended, nontender, without mass or organomegaly  Skin: no lesions  Extremities: no clubbing, cyanosis, or edema. right hip necrotic wound   Neurologic: alert, not interactive    LAB RESULTS:                        9.9    9.90  )-----------( 331      ( 02 Aug 2023 06:28 )             30.2     08-02    142  |  108  |  17  ----------------------------<  113<H>  3.8   |  24  |  1.08    Ca    9.0      02 Aug 2023 06:28        Urinalysis Basic - ( 02 Aug 2023 06:28 )    Color: x / Appearance: x / SG: x / pH: x  Gluc: 113 mg/dL / Ketone: x  / Bili: x / Urobili: x   Blood: x / Protein: x / Nitrite: x   Leuk Esterase: x / RBC: x / WBC x   Sq Epi: x / Non Sq Epi: x / Bacteria: x      MICROBIOLOGY:  RECENT CULTURES:  07-31 @ 05:05 Wound Sp. Instructions_Additional Info: Right hip ulceration Escherichia coli ESBL  Enterococcus faecalis    Numerous Escherichia coli ESBL  Numerous Enterococcus faecalis  Rare Streptococcus dysgalactiae (Group C/G) "Susceptibilities not  performed"          RADIOLOGY REVIEWED:    < from: CT Pelvis w/ IV Cont (07.30.23 @ 15:22) >  PROCEDURE DATE:  07/30/2023          INTERPRETATION:  CT PELVIS WITH IV CONTRAST    HISTORY: Right greater trochanteric wound. Evaluate for osteomyelitis.    TECHNIQUE: Contiguous axial imaging was performed through the pelvis with   90 cc administered Omnipaque 350 intravenous contrast.  Coronal and   sagittal reformatting was utilized.    COMPARISON: CT pelvis dated 2/27/2023.    FINDINGS:    OSSEOUS STRUCTURES    Fractures:  Right superior pubic ramus fracture is ununited with   surrounding sclerotic change. Right inferior pubic ramus fracture is now   healed.    Erosions:  There is mild cortical erosive change of the right greater   trochanter with overlying soft tissue wound consistent with osteomyelitis.    VISUALIZED SPINE  Mild-to-moderate lower lumbar degenerative disc disease is present with   disc calcifications.    PELVIC JOINTS    Sacroiliac Joints:  Mild arthrosis is present bilaterally.    Symphysis Pubis:  Mild arthrosis is present.    RIGHT HIP JOINT    Avascular Necrosis:  None.    Joint Space:  There is mild medial joint space narrowing with tiny   osteophytes and chondrocalcinosis.    Effusion/Synovitis:  Small effusion is present.    LEFT HIPJOINT    Postoperative Changes: Left hip hemiarthroplasty is present with   press-fit femoral component. No periprosthetic lucency is seen.    SOFT TISSUES    Neurovascular:  Severe atherosclerotic calcifications are present.    Pelvis/Abdomen:  Small right renal cyst is noted. Colonic diverticuli   are present without appreciated inflammatory change. Small amount of   fluid is now seen distending the right inguinal canal with resolution of   the previously noted fluid within the left inguinal canal. This likely   reflects retained peritoneal fluid.    Musculature:  There is new hyperdense change/fluid involving the right   adductor brevis muscle measuring up to 2.2 x 2.1 cm in diameter and   extending over at least 15.0 cm in length longitudinally. Moderate severe   bilateral gluteus minimus muscle atrophy is present. There is   mild-to-moderate generalized muscle atrophy. Soft tissue wound overlies   the right greater trochanter extending to involve the gluteus maximum is   with fluid collection of the gluteus angel measuring approximately 5.3   cm craniocaudally by 6.7 x 2.7 cm axially. There is faint peripheral   enhancement.    Subcutaneous Tissues:  Mild subcutaneous edema is present. Decubitus   change is present along the right ischial tuberosity with fluid along the   ischial tuberosity measuring approximately 4.5 cm craniocaudally by 1.5 x   1.9 cm axially with faint peripheral enhancement. Subcutaneous induration   along the left ischial tuberosity suggests additionaldecubitus change.    IMPRESSION:  1.  Large soft tissue wound overlies the right greater trochanter with   mild cortical erosive change of the greater trochanter consistent with   osteomyelitis.  2.  Fluid collection extends into the adjacent right gluteus angel   muscle measuring approximately 5.3 x 6.7 x 2.7 cm concerning for abscess.  3.  Right ischial decubitus change appears to be present with 4.5 x 1.5 x   1.9 cm fluid collection adjacent to the ischial tuberosity. Underlying   osteomyelitis cannot be excluded.  4.  Subcutaneous induration along the left ischial tuberosity suggests   additional decubitus change.  5.  New fluid collection within the right adductor brevis muscle measures   approximately 15.0 x 2.2 x 2.1 cm and extends towards the ununited right   superior pubic ramus fracture. This may be related to the fracture and   reflect ganglion cyst formation or resolving hematoma. Infection cannot   be absolutely excluded although there is no peripheral enhancement.   Consider follow-up imaging to assess stability or aspiration as warranted.    --- End of Report ---        < end of copied text >            Assessment:  Patient with advanced dementia, contracted , cachectic, hospice patient admitted for management of a necrotic right decubitus with extension to the bone and local soft tissue. Planned for debridement but he does not seem particularly uncomfortable from the ulcer and will not heal any wound in the area and is not a candidate for debridement and flap. Has temp and grew esbl coliform from wound so changed to [email or web address].m    Recommendations:    change to meropenem  local care most importan t  given hospice placement, co morbid condiitions and he seems really comfortable would not advise aggressive surgical intervention and focus on comfort as goal    Plan:

## 2023-08-02 NOTE — CONSULT NOTE ADULT - ASSESSMENT
Madi Rizzo is an 87 y.o. man with history of dementia, BPH, DVT (not on AC), IBS, and OA with previous pelvic fracture who was admitted on 7/30 from [facility] due to a nonhealing decubitus ulcer. At his care facility, the patient was prescribed bactrim in addition to wound care, without improvement. Cultures sent from the wound with ESBL and enterococcus. Dominant collection now draining from opened wound.     PLAN:  - Plastic surgery following; recommended repeat CT to evaluate if both collections were effectively drained through open wound of if there is an unresolved collection  - CT will also help monitor for evolution of right adductor collection vs chronic process  - Continue with wound care  - Antibiotics per medicine  - Would consider drainage pending repeat imaging  - No role for debridement at this time in the absence of an undrained abscess

## 2023-08-02 NOTE — PROGRESS NOTE ADULT - SUBJECTIVE AND OBJECTIVE BOX
(x  ) No complaints (  ) Complains of:     T(F): 101.5 (08-02-23 @ 15:38), Max: 101.5 (08-02-23 @ 15:38)  HR: 106 (08-02-23 @ 15:38) (85 - 128)  BP: 117/58 (08-02-23 @ 15:38) (117/58 - 139/84)  RR: 18 (08-02-23 @ 15:38) (17 - 19)  SpO2: 95% (08-02-23 @ 15:38) (94% - 96%)        Wound Location 1:  necrotic right hip wound, additional purulence expressed from wound right hip, small amount sloughing necrotic tissue excised                                 9.9    9.90  )-----------( 331      ( 02 Aug 2023 06:28 )             30.2     CBC Full  -  ( 02 Aug 2023 06:28 )  WBC Count : 9.90 K/uL  RBC Count : 3.30 M/uL  Hemoglobin : 9.9 g/dL  Hematocrit : 30.2 %  Platelet Count - Automated : 331 K/uL  Mean Cell Volume : 91.5 fl  Mean Cell Hemoglobin : 30.0 pg  Mean Cell Hemoglobin Concentration : 32.8 gm/dL  Auto Neutrophil # : x  Auto Lymphocyte # : x  Auto Monocyte # : x  Auto Eosinophil # : x  Auto Basophil # : x  Auto Neutrophil % : x  Auto Lymphocyte % : x  Auto Monocyte % : x  Auto Eosinophil % : x  Auto Basophil % : x    142|108|17<113  3.8|24|1.08  9.0,--,--  08-02 @ 06:28      Urinalysis Basic - ( 02 Aug 2023 06:28 )    Color: x / Appearance: x / SG: x / pH: x  Gluc: 113 mg/dL / Ketone: x  / Bili: x / Urobili: x   Blood: x / Protein: x / Nitrite: x   Leuk Esterase: x / RBC: x / WBC x   Sq Epi: x / Non Sq Epi: x / Bacteria: x                Procedure Performed:  (  )Yes     (x  )No  Name of Procedure:  (  )debridement    (  )I&D    (  )Other:  (  )partial thickness     (  )full thickness     (  )subcutaneous     (  )muscle/tendon     (  )bone  (  )sharp     (  )surgical

## 2023-08-02 NOTE — CONSULT NOTE ADULT - CONSULT REASON
right hip decub
Gluteal and Pelvic Abscesses
Pre-operative cardiac risk stratification prior to wound debridement
goc/verify molsts /dementia
right hip wound

## 2023-08-02 NOTE — PROGRESS NOTE ADULT - ASSESSMENT
A/P  87 year old M PMH dementia, BPH, DVT not on AC. IBS, OA with previous pelvic fracture brought in from [facility] for nonhealing decubitus ulcer admitted for possible osteomyelitis of R hip, failed outpatient abx       Assessment/Plans:   per med teams:      R hip wound patient with nonhealing wound of R femur, was placed on bactrim in    CT with possible signs of osteo and abscess formation   - s/p vanco and zosyn in the ED, will continue  -  Follow up ID recommendations   -  wound care nurse consulted  - plastics, Dr. Posada consulted- noted        CAD  - is on  asa 81mg, atorvastatin 40mg, metoprolol tartrate 25mg BID     Dementia  - on rivastigmine 4.5mg capsule         Palliative :  as f/u for  Goc assist , case/chart reviewed.  Pt from , w/ hx end stage dementia, ftt. and was on home hospice services. Here w/ likely OM of R hip, needs IV antbx and possible debridement.  Family not looking to do aggressive  measures and hopes  to avoid OR debridement . Plastics/surgery evaluating .    I spoke to daughter today , she is back in [place] for work.  Daughter agrees to antbx and local wound care, but will await surgery/plastics final recs.    Ultimately family wishes to have pt back at  when able, and wishes for  home hospice services to be back in place.

## 2023-08-02 NOTE — PROGRESS NOTE ADULT - ASSESSMENT
suspect right gluteal abscess may have decompressed int right trochanteric wound.  Additional imaging was recommended to evaluate bilateral ischial collection and pubic rami collection. Single elevated temp yesterday, wbc wnl  -suggest repeat right pel/hip CT scan with IV contrast as soon as possible  -can hold gen surg evaluation until after repeat CT scan  -if repeat CT scan shows drained collection from right buttock, will consider application of Veraflow VAC dressing  -plan discussed with wound care nurse Lisa  -will update daughter  -OR debridement plan on hold

## 2023-08-02 NOTE — PROGRESS NOTE ADULT - SUBJECTIVE AND OBJECTIVE BOX
SASKIA CROCKETT  [id number]      Chief Complaint: Wound ulcer/Paroxysmal atrial fibrillation     Interval History: The patient is comfortable at bedside today, has dementia, no complaints.     Tele: not on telemetry       Current meds:   acetaminophen     Tablet .. 650 milliGRAM(s) Oral every 6 hours PRN  aluminum hydroxide/magnesium hydroxide/simethicone Suspension 30 milliLiter(s) Oral every 4 hours PRN  ascorbic acid 500 milliGRAM(s) Oral daily  aspirin  chewable 81 milliGRAM(s) Oral daily  atorvastatin 40 milliGRAM(s) Oral at bedtime  Dakins Solution - 1/2 Strength 1 Application(s) Topical two times a day  dicyclomine 10 milliGRAM(s) Oral two times a day before meals  enoxaparin Injectable 40 milliGRAM(s) SubCutaneous every 24 hours  melatonin 3 milliGRAM(s) Oral at bedtime PRN  metoprolol tartrate 25 milliGRAM(s) Oral two times a day  multivitamin 1 Tablet(s) Oral daily  nystatin Cream 1 Application(s) Topical two times a day  ondansetron Injectable 4 milliGRAM(s) IV Push every 8 hours PRN  piperacillin/tazobactam IVPB.. 3.375 Gram(s) IV Intermittent every 8 hours  rivastigmine patch  4.6 mG/24 Hr(s). 1 Patch Transdermal every 24 hours  senna 2 Tablet(s) Oral at bedtime  tamsulosin 0.4 milliGRAM(s) Oral at bedtime  zinc oxide 40% Paste 1 Application(s) Topical two times a day  zinc sulfate 220 milliGRAM(s) Oral daily      Objective:     Vital Signs:   T(C): 37.2 (08-02-23 @ 05:12), Max: 38.6 (08-01-23 @ 18:07)  HR: 110 (08-02-23 @ 05:12) (85 - 128)  BP: 124/68 (08-02-23 @ 05:12) (124/68 - 139/84)  RR: 17 (08-02-23 @ 05:12) (17 - 19)  SpO2: 95% (08-02-23 @ 05:12) (94% - 96%)      Physical Exam:   General: elderly man, has dementia, no acute distress  HEENT: sclera anicteric  Neck: supple  CVS: JVP ~ 7 cm H20, RRR, s1, s2  Chest: unlabored respirations, clear to auscultation b/l  Abdomen: non-distended  Extremities: no lower extremity edema b/l  Neuro: awake, alert, not oriented       Labs:   02 Aug 2023 06:28    142    |  108    |  17     ----------------------------<  113    3.8     |  24     |  1.08     Ca    9.0        02 Aug 2023 06:28                            9.9    9.90  )-----------( 331      ( 02 Aug 2023 06:28 )             30.2         TTE (5/2022):  LVEF 55%    ECG (8/1/23): sinus rhythm with sinus arrhythmia

## 2023-08-02 NOTE — PROVIDER CONTACT NOTE (OTHER) - SITUATION
Final Result for R Hip Ulceration. Result given from Lina in [facility] was Numerous E. Coli ESBL, Numerous enterococcus faecalis, and rare streptococcus dysgalactiae Final Result for wound culture down on 7/31,R Hip Ulceration. Result given from Lina in [facility] was Numerous E. Coli ESBL, Numerous enterococcus faecalis, and rare streptococcus dysgalactiae

## 2023-08-02 NOTE — PROGRESS NOTE ADULT - ASSESSMENT
87 year old M PMH dementia, BPH, DVT not on AC, IBS, OA with previous pelvic fracture brought in from [facility] for nonhealing decubitus ulcer admitted for possible osteomyelitis of R hip, failed outpatient abx.    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 with possible OM on CT scan  CT with possible signs of osteo and abscess formation - reviewed with radiology - possible RT gluteal abscess - Will ask general surgery to evaluate today for possible bedside drainage  Wound cultures growing E Coli and Enterococcus Faecalis  Continue local wound care as per plastics and wound care team with dakins solution  Continue Zosyn for now, stopped vanco  Follow up ID recommendations  Cardiology cleared at moderate CV risk prior to possible wound ulcer debridement  Patient currently on hospice care at [facility] - daughter Salina is ok with IV abx and, if needed, a debridement of the area. Unclear how aggressive treatment should be for these collections (manage possible RT gluteal abscess and leave others or no surgical intervention for all collections in s/o GOC/HCP    CAD  - Continue asa 81mg, atorvastatin 40mg, metoprolol tartrate 25mg BID     Dementia  - On rivastigmine 4.5mg capsule at home, will continue patch while in hospital    BPH  - Continue Flomax    IBS  - Continue Dicyclomine    DVT ppx  - Lovenox    Dispo: Pending medical course    Updated daughter Salina Quinones [phone] at bedside     Luis nurse from [facility]: [phone]

## 2023-08-02 NOTE — CONSULT NOTE ADULT - SUBJECTIVE AND OBJECTIVE BOX
General Surgery Consult      Consulting attending: Dr. Alvarado      HPI: Madi Rizzo is an 87 y.o. man with history of dementia, BPH, DVT (not on AC), IBS, and OA with previous pelvic fracture who was admitted on 7/30 from [facility] due to a nonhealing decubitus ulcer. At his care facility, the patient was prescribed bactrim in addition to wound care, without improvement.     The patient  is currently under hospice care and his medical decision-maker is his daughter, Salina Quinones.       PAST MEDICAL HISTORY:  Lactose intolerance in adult    Shellfish allergy    Osteoarthritis    Scoliosis    Vitamin D deficiency    Dementia    Dementia      PAST SURGICAL HISTORY:  History of tonsillectomy    S/P colonoscopy    History of hip surgery      MEDICATIONS:  acetaminophen 325 mg oral tablet: 2 tab(s) orally every 6 hours As needed Temp greater or equal to 38C (100.4F), Moderate Pain (4 - 6) (30 Jul 2023 17:26)  aspirin 81 mg oral tablet: 1 orally once a day (30 Jul 2023 17:26)  atorvastatin 40 mg oral tablet: 1 tab(s) orally once a day (at bedtime) (30 Jul 2023 17:26)  Bactrim  mg-160 mg oral tablet: 1 tab(s) orally 2 times a day (30 Jul 2023 17:23)  Colace 100 mg oral capsule: 1 orally 2 times a day (30 Jul 2023 17:26)  dicyclomine 10 mg oral capsule: 1 tab(s) orally 2 times a day (30 Jul 2023 17:26)  metoprolol tartrate 25 mg oral tablet: 1 tab(s) orally 2 times a day (30 Jul 2023 17:24)  rivastigmine 4.5 mg oral capsule: 1 cap(s) orally once a day (30 Jul 2023 17:25)  senna (sennosides) 8.6 mg oral tablet: 2 tab(s) orally once a day (at bedtime) as needed for  constipation (30 Jul 2023 17:26)  tamsulosin 0.4 mg oral capsule: 1 cap(s) orally once a day (at bedtime) (30 Jul 2023 17:26)      ALLERGIES:  No Known Drug Allergies  shellfish (Other)  lactose (Diarrhea)      VITALS & I/Os:  Vital Signs Last 24 Hrs  T(C): 38.6 (02 Aug 2023 15:38), Max: 38.6 (01 Aug 2023 18:07)  T(F): 101.5 (02 Aug 2023 15:38), Max: 101.5 (02 Aug 2023 15:38)  HR: 106 (02 Aug 2023 15:38) (85 - 128)  BP: 117/58 (02 Aug 2023 15:38) (117/58 - 139/84)  BP(mean): --  RR: 18 (02 Aug 2023 15:38) (17 - 19)  SpO2: 95% (02 Aug 2023 15:38) (94% - 96%)    Parameters below as of 02 Aug 2023 15:38  Patient On (Oxygen Delivery Method): room air      I&O's Summary  01 Aug 2023 07:01  -  02 Aug 2023 07:00  --------------------------------------------------------  IN: 100 mL / OUT: 0 mL / NET: 100 mL      PHYSICAL EXAM:  General: awake, does not participate with exam, some verbal response but unable to interpret meaning   Respiratory: Nonlabored  Cardiovascular: normotensive, regular rate  Abdominal: Soft, nondistended, nontender. No rebound or guarding.   Extremities: right hip wound approximately 4 cm with mild surrounding erythema, no drainage or purulence noted with light palpation; no other areas of fluctuance noted to right hip, left hip, or buttocks      LABS:                        9.9    9.90  )-----------( 331      ( 02 Aug 2023 06:28 )             30.2     08-02    142  |  108  |  17  ----------------------------<  113<H>  3.8   |  24  |  1.08    Ca    9.0      02 Aug 2023 06:28    Urinalysis Basic - ( 02 Aug 2023 06:28 )    Color: x / Appearance: x / SG: x / pH: x  Gluc: 113 mg/dL / Ketone: x  / Bili: x / Urobili: x   Blood: x / Protein: x / Nitrite: x   Leuk Esterase: x / RBC: x / WBC x   Sq Epi: x / Non Sq Epi: x / Bacteria: x      IMAGING:  CT A/P (7/30):  OSSEOUS STRUCTURES    Fractures:  Right superior pubic ramus fracture is ununited with   surrounding sclerotic change. Right inferior pubic ramus fracture is now   healed.    Erosions:  There is mild cortical erosive change of the right greater   trochanter with overlying soft tissue wound consistent with osteomyelitis.    VISUALIZED SPINE  Mild-to-moderate lower lumbar degenerative disc disease is present with   disc calcifications.    PELVIC JOINTS    Sacroiliac Joints:  Mild arthrosis is present bilaterally.    Symphysis Pubis:  Mild arthrosis is present.    RIGHT HIP JOINT    Avascular Necrosis:  None.    Joint Space:  There is mild medial joint space narrowing with tiny   osteophytes and chondrocalcinosis.    Effusion/Synovitis:  Small effusion is present.    LEFT HIP JOINT    Postoperative Changes: Left hip hemiarthroplasty is present with   press-fit femoral component. No periprosthetic lucency is seen.    SOFT TISSUES    Neurovascular:  Severe atherosclerotic calcifications are present.    Pelvis/Abdomen:  Small right renal cyst is noted. Colonic diverticuli   are present without appreciated inflammatory change. Small amount of   fluid is now seen distending the right inguinal canal with resolution of   the previously noted fluid within the left inguinal canal. This likely   reflects retained peritoneal fluid.    Musculature:  There is new hyperdense change/fluid involving the right   adductor brevis muscle measuring up to 2.2 x 2.1 cm in diameter and   extending over at least 15.0 cm in length longitudinally. Moderate severe   bilateral gluteus minimus muscle atrophy is present. There is   mild-to-moderate generalized muscle atrophy. Soft tissue wound overlies   the right greater trochanter extending to involve the gluteus maximum is   with fluid collection of the gluteus angel measuring approximately 5.3   cm craniocaudally by 6.7 x 2.7 cm axially. There is faint peripheral   enhancement.    Subcutaneous Tissues:  Mild subcutaneous edema is present. Decubitus   change is present along the right ischial tuberosity with fluid along the   ischial tuberosity measuring approximately 4.5 cm craniocaudally by 1.5 x   1.9 cm axially with faint peripheral enhancement. Subcutaneous induration   along the left ischial tuberosity suggests additional decubitus change.    IMPRESSION:  1.  Large soft tissue wound overlies the right greater trochanter with   mild cortical erosive change of the greater trochanter consistent with   osteomyelitis.  2.  Fluid collection extends into the adjacent right gluteus angel   muscle measuring approximately 5.3 x 6.7 x 2.7 cm concerning for abscess.  3.  Right ischial decubitus change appears to be present with 4.5 x 1.5 x   1.9 cm fluid collection adjacent to the ischial tuberosity. Underlying   osteomyelitis cannot be excluded.  4.  Subcutaneous induration along the left ischial tuberosity suggests   additional decubitus change.  5.  New fluid collection within the right adductor brevis muscle measures   approximately 15.0 x 2.2 x 2.1 cm and extends towards the ununited right   superior pubic ramus fracture. This may be related to the fracture and   reflect ganglion cyst formation or resolving hematoma. Infection cannot   be absolutely excluded although there is no peripheral enhancement.   Consider follow-up imaging to assess stability or aspiration as warranted.

## 2023-08-03 LAB
ANION GAP SERPL CALC-SCNC: 8 MMOL/L — SIGNIFICANT CHANGE UP (ref 5–17)
BUN SERPL-MCNC: 19 MG/DL — SIGNIFICANT CHANGE UP (ref 7–23)
CALCIUM SERPL-MCNC: 8.7 MG/DL — SIGNIFICANT CHANGE UP (ref 8.4–10.5)
CHLORIDE SERPL-SCNC: 110 MMOL/L — HIGH (ref 96–108)
CO2 SERPL-SCNC: 25 MMOL/L — SIGNIFICANT CHANGE UP (ref 22–31)
CREAT SERPL-MCNC: 1.01 MG/DL — SIGNIFICANT CHANGE UP (ref 0.5–1.3)
EGFR: 73 ML/MIN/1.73M2 — SIGNIFICANT CHANGE UP
GLUCOSE SERPL-MCNC: 102 MG/DL — HIGH (ref 70–99)
HCT VFR BLD CALC: 30 % — LOW (ref 39–50)
HGB BLD-MCNC: 9.6 G/DL — LOW (ref 13–17)
MCHC RBC-ENTMCNC: 29.7 PG — SIGNIFICANT CHANGE UP (ref 27–34)
MCHC RBC-ENTMCNC: 32 GM/DL — SIGNIFICANT CHANGE UP (ref 32–36)
MCV RBC AUTO: 92.9 FL — SIGNIFICANT CHANGE UP (ref 80–100)
NRBC # BLD: 0 /100 WBCS — SIGNIFICANT CHANGE UP (ref 0–0)
PLATELET # BLD AUTO: 325 K/UL — SIGNIFICANT CHANGE UP (ref 150–400)
POTASSIUM SERPL-MCNC: 3.7 MMOL/L — SIGNIFICANT CHANGE UP (ref 3.5–5.3)
POTASSIUM SERPL-SCNC: 3.7 MMOL/L — SIGNIFICANT CHANGE UP (ref 3.5–5.3)
RBC # BLD: 3.23 M/UL — LOW (ref 4.2–5.8)
RBC # FLD: 14.1 % — SIGNIFICANT CHANGE UP (ref 10.3–14.5)
SODIUM SERPL-SCNC: 143 MMOL/L — SIGNIFICANT CHANGE UP (ref 135–145)
WBC # BLD: 11.66 K/UL — HIGH (ref 3.8–10.5)
WBC # FLD AUTO: 11.66 K/UL — HIGH (ref 3.8–10.5)

## 2023-08-03 PROCEDURE — 99232 SBSQ HOSP IP/OBS MODERATE 35: CPT

## 2023-08-03 PROCEDURE — 72193 CT PELVIS W/DYE: CPT | Mod: 26

## 2023-08-03 PROCEDURE — 99233 SBSQ HOSP IP/OBS HIGH 50: CPT

## 2023-08-03 RX ORDER — METOPROLOL TARTRATE 50 MG
5 TABLET ORAL ONCE
Refills: 0 | Status: COMPLETED | OUTPATIENT
Start: 2023-08-03 | End: 2023-08-03

## 2023-08-03 RX ORDER — IBUPROFEN 200 MG
400 TABLET ORAL ONCE
Refills: 0 | Status: COMPLETED | OUTPATIENT
Start: 2023-08-03 | End: 2023-08-03

## 2023-08-03 RX ADMIN — MEROPENEM 100 MILLIGRAM(S): 1 INJECTION INTRAVENOUS at 14:33

## 2023-08-03 RX ADMIN — Medication 400 MILLIGRAM(S): at 23:00

## 2023-08-03 RX ADMIN — RIVASTIGMINE 1 PATCH: 4.6 PATCH, EXTENDED RELEASE TRANSDERMAL at 21:16

## 2023-08-03 RX ADMIN — MEROPENEM 100 MILLIGRAM(S): 1 INJECTION INTRAVENOUS at 06:16

## 2023-08-03 RX ADMIN — NYSTATIN CREAM 1 APPLICATION(S): 100000 CREAM TOPICAL at 06:16

## 2023-08-03 RX ADMIN — Medication 25 MILLIGRAM(S): at 17:55

## 2023-08-03 RX ADMIN — ENOXAPARIN SODIUM 40 MILLIGRAM(S): 100 INJECTION SUBCUTANEOUS at 21:19

## 2023-08-03 RX ADMIN — Medication 1 APPLICATION(S): at 06:16

## 2023-08-03 RX ADMIN — Medication 500 MILLIGRAM(S): at 14:33

## 2023-08-03 RX ADMIN — Medication 400 MILLIGRAM(S): at 21:18

## 2023-08-03 RX ADMIN — ZINC OXIDE 1 APPLICATION(S): 200 OINTMENT TOPICAL at 06:16

## 2023-08-03 RX ADMIN — Medication 650 MILLIGRAM(S): at 18:22

## 2023-08-03 RX ADMIN — NYSTATIN CREAM 1 APPLICATION(S): 100000 CREAM TOPICAL at 17:54

## 2023-08-03 RX ADMIN — Medication 1 APPLICATION(S): at 17:54

## 2023-08-03 RX ADMIN — Medication 10 MILLIGRAM(S): at 14:33

## 2023-08-03 RX ADMIN — Medication 10 MILLIGRAM(S): at 06:19

## 2023-08-03 RX ADMIN — Medication 81 MILLIGRAM(S): at 14:34

## 2023-08-03 RX ADMIN — RIVASTIGMINE 1 PATCH: 4.6 PATCH, EXTENDED RELEASE TRANSDERMAL at 21:30

## 2023-08-03 RX ADMIN — Medication 1 TABLET(S): at 14:34

## 2023-08-03 RX ADMIN — TAMSULOSIN HYDROCHLORIDE 0.4 MILLIGRAM(S): 0.4 CAPSULE ORAL at 21:18

## 2023-08-03 RX ADMIN — SENNA PLUS 2 TABLET(S): 8.6 TABLET ORAL at 21:17

## 2023-08-03 RX ADMIN — RIVASTIGMINE 1 PATCH: 4.6 PATCH, EXTENDED RELEASE TRANSDERMAL at 19:44

## 2023-08-03 RX ADMIN — Medication 650 MILLIGRAM(S): at 17:57

## 2023-08-03 RX ADMIN — Medication 5 MILLIGRAM(S): at 02:08

## 2023-08-03 RX ADMIN — ATORVASTATIN CALCIUM 40 MILLIGRAM(S): 80 TABLET, FILM COATED ORAL at 21:18

## 2023-08-03 RX ADMIN — Medication 25 MILLIGRAM(S): at 06:15

## 2023-08-03 RX ADMIN — ZINC SULFATE TAB 220 MG (50 MG ZINC EQUIVALENT) 220 MILLIGRAM(S): 220 (50 ZN) TAB at 14:34

## 2023-08-03 RX ADMIN — ZINC OXIDE 1 APPLICATION(S): 200 OINTMENT TOPICAL at 17:54

## 2023-08-03 RX ADMIN — MEROPENEM 100 MILLIGRAM(S): 1 INJECTION INTRAVENOUS at 21:19

## 2023-08-03 NOTE — PROGRESS NOTE ADULT - ASSESSMENT
87 year old M PMH dementia, BPH, DVT not on AC, IBS, OA with previous pelvic fracture brought in from [facility] for nonhealing decubitus ulcer admitted for possible osteomyelitis of R hip, failed outpatient abx.    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 Posada plans to repeat CT RT hip and pelvis with IV contrast  ID stopped zosyn, converted to meropenem  Continue local wound care as per plastics and wound care team with [email or web address] solution  Cardiology cleared at moderate CV risk prior to possible wound ulcer debridement  Patient currently on hospice care at [facility] - daughter Salina is ok with IV abx and, if needed, a debridement of the area. Unclear how aggressive treatment should be for these collections (manage possible RT gluteal abscess and leave others or no surgical intervention for all collections in s/o GOC/HCP    CAD  - Continue asa 81mg, atorvastatin 40mg, metoprolol tartrate 25mg BID     Dementia  - On rivastigmine 4.5mg capsule at home, will continue patch while in hospital    BPH  - Continue Flomax    IBS  - Continue Dicyclomine    DVT ppx  - Lovenox    Dispo: Pending medical course    Updated daughter Salina Quinones [phone] at bedside     Luis nurse from [facility]: [phone] 87 year old M PMH dementia, BPH, DVT not on AC, IBS, OA with previous pelvic fracture brought in from [facility] for nonhealing decubitus ulcer admitted for possible osteomyelitis of R hip, failed outpatient abx.    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 Posada plans to repeat CT RT hip and pelvis with IV contrast  ID stopped zosyn, converted to meropenem  Continue local wound care as per plastics and wound care team with dakPlayScape solution  Cardiology cleared at moderate CV risk prior to possible wound ulcer debridement  Patient currently on hospice care at [facility] - daughter Salina is ok with IV abx and, if needed, a debridement of the area. Unclear how aggressive treatment should be for these collections (manage possible RT gluteal abscess and leave others or no surgical intervention for all collections in s/o GOC/HCP    CAD  - Continue asa 81mg, atorvastatin 40mg, metoprolol tartrate 25mg BID     Dementia  - On rivastigmine 4.5mg capsule at home, will continue patch while in hospital    BPH  - Continue Flomax    IBS  - Continue Dicyclomine    DVT ppx  - Lovenox    Dispo: Pending medical course    Updated daughter Salina Quinones [phone] at bedside     Luis nurse from [facility]: [phone]

## 2023-08-03 NOTE — CHART NOTE - NSCHARTNOTEFT_GEN_A_CORE
NUTRITION Follow Up Note    SOURCE: Patient [X]  Medical Record [X]  Nursing Staff [X]  Family Member []    DIET: Diet, DASH/TLC:   Sodium & Cholesterol Restricted  Lactose Restricted (Milk Sugar Intoler.)  Supplement Feeding Modality:  Oral  Ensure Plus High Protein Cans or Servings Per Day:  1       Frequency:  Two Times a day (07-31-23 @ 13:27) [Active]    Patient noted with fair appetite, consuming 51-75% of meals at this time. Patient requires total feeding assistance with meals. Continues with Ensure Plus High Protein 8oz PO BID (Provides 700kcal & 40grams of Protein) to enhance PO intakes and optimize nutritional status. Lactose intolerance and shellfish allergy noted per chart.       SKIN: Stage IV @ right:, greater trochanter (hip), Stage II @ side of foot, Stage II @ right ankle, & Stage II @ buttock    EDEMA: no edema noted     LAST BM: 8/2/23    WEIGHT TRENDS:  54.4 kG (7/30/23)    PERTINENT MEDICATIONS: MEDICATIONS  (STANDING):  ascorbic acid 500 milliGRAM(s) Oral daily  aspirin  chewable 81 milliGRAM(s) Oral daily  atorvastatin 40 milliGRAM(s) Oral at bedtime  Dakins Solution - 1/2 Strength 1 Application(s) Topical two times a day  dicyclomine 10 milliGRAM(s) Oral two times a day before meals  enoxaparin Injectable 40 milliGRAM(s) SubCutaneous every 24 hours  meropenem  IVPB 500 milliGRAM(s) IV Intermittent every 8 hours  metoprolol tartrate 25 milliGRAM(s) Oral two times a day  multivitamin 1 Tablet(s) Oral daily  nystatin Cream 1 Application(s) Topical two times a day  rivastigmine patch  4.6 mG/24 Hr(s). 1 Patch Transdermal every 24 hours  senna 2 Tablet(s) Oral at bedtime  tamsulosin 0.4 milliGRAM(s) Oral at bedtime  zinc oxide 40% Paste 1 Application(s) Topical two times a day  zinc sulfate 220 milliGRAM(s) Oral daily    MEDICATIONS  (PRN):  acetaminophen     Tablet .. 650 milliGRAM(s) Oral every 6 hours PRN Temp greater or equal to 38C (100.4F), Mild Pain (1 - 3)  aluminum hydroxide/magnesium hydroxide/simethicone Suspension 30 milliLiter(s) Oral every 4 hours PRN Dyspepsia  melatonin 3 milliGRAM(s) Oral at bedtime PRN Insomnia  ondansetron Injectable 4 milliGRAM(s) IV Push every 8 hours PRN Nausea and/or Vomiting      PERTINENT LABS:  08-03 Na143 mmol/L Glu 102 mg/dL<H> K+ 3.7 mmol/L Cr  1.01 mg/dL BUN 19 mg/dL 07-31 Alb 2.0 g/dL<L>        ESTIMATED NEEDS:   [X] No Change Since Previous Assessment    PREVIOUS NUTRITION DIAGNOSIS:  1. Severe Protein Calorie Malnutrition  2. Increased Nutrient Needs    NUTRITION DIAGNOSIS IS [X] Ongoing - Addressed wiuth Ensure Plus High Protein 8oz PO BID (Provides 700kcal & 40grams of Protein)     NEW NUTRITION DIAGNOSIS: [X] Not Applicable      INTERVENTIONS:   1. Recommend continue current nutrition plan of care as tolerated   2. Provide total feeding assistance with meals  3. Monitor daily PO intakes, GI tolerance, labs, weights, skin integrity, & BM regularity     MONITORING & EVALUATION:   1. Weights   2. PO intakes   3. Skin integrity   4. Tolerance to diet prescription   5. Labs & POCT  6. Follow up (per protocol)    Registered Dietitian/Nutritionist Remains Available.  Jimmy Russo RD, CDN    Phone# [phone] NUTRITION Follow Up Note    SOURCE: Patient [X]  Medical Record [X]  Nursing Staff [X]  Family Member []    DIET: Diet, DASH/TLC:   Sodium & Cholesterol Restricted  Lactose Restricted (Milk Sugar Intoler.)  Supplement Feeding Modality:  Oral  Ensure Plus High Protein Cans or Servings Per Day:  1       Frequency:  Two Times a day (07-31-23 @ 13:27) [Active]    Patient noted with fair appetite, consuming 51-75% of meals at this time. Patient requires total feeding assistance with meals. Continues with Ensure Plus High Protein 8oz PO BID (Provides 700kcal & 40grams of Protein) to enhance PO intakes and optimize nutritional status. Lactose intolerance and shellfish allergy noted per chart. Multiple pressure injuries noted. Patient continues with MVI, ascorbic acid (500 mg), & zinc sulfate (220 mg) to promote wound healing.      SKIN: Stage IV @ right:, greater trochanter (hip), Stage II @ side of foot, Stage II @ right ankle, & Stage II @ buttock    EDEMA: no edema noted     LAST BM: 8/2/23    WEIGHT TRENDS:  54.4 kG (7/30/23)    PERTINENT MEDICATIONS: MEDICATIONS  (STANDING):  ascorbic acid 500 milliGRAM(s) Oral daily  aspirin  chewable 81 milliGRAM(s) Oral daily  atorvastatin 40 milliGRAM(s) Oral at bedtime  Dakins Solution - 1/2 Strength 1 Application(s) Topical two times a day  dicyclomine 10 milliGRAM(s) Oral two times a day before meals  enoxaparin Injectable 40 milliGRAM(s) SubCutaneous every 24 hours  meropenem  IVPB 500 milliGRAM(s) IV Intermittent every 8 hours  metoprolol tartrate 25 milliGRAM(s) Oral two times a day  multivitamin 1 Tablet(s) Oral daily  nystatin Cream 1 Application(s) Topical two times a day  rivastigmine patch  4.6 mG/24 Hr(s). 1 Patch Transdermal every 24 hours  senna 2 Tablet(s) Oral at bedtime  tamsulosin 0.4 milliGRAM(s) Oral at bedtime  zinc oxide 40% Paste 1 Application(s) Topical two times a day  zinc sulfate 220 milliGRAM(s) Oral daily    MEDICATIONS  (PRN):  acetaminophen     Tablet .. 650 milliGRAM(s) Oral every 6 hours PRN Temp greater or equal to 38C (100.4F), Mild Pain (1 - 3)  aluminum hydroxide/magnesium hydroxide/simethicone Suspension 30 milliLiter(s) Oral every 4 hours PRN Dyspepsia  melatonin 3 milliGRAM(s) Oral at bedtime PRN Insomnia  ondansetron Injectable 4 milliGRAM(s) IV Push every 8 hours PRN Nausea and/or Vomiting      PERTINENT LABS:  08-03 Na143 mmol/L Glu 102 mg/dL<H> K+ 3.7 mmol/L Cr  1.01 mg/dL BUN 19 mg/dL 07-31 Alb 2.0 g/dL<L>        ESTIMATED NEEDS:   [X] No Change Since Previous Assessment    PREVIOUS NUTRITION DIAGNOSIS:  1. Severe Protein Calorie Malnutrition  2. Increased Nutrient Needs    NUTRITION DIAGNOSIS IS [X] Ongoing - Addressed wiuth Ensure Plus High Protein 8oz PO BID (Provides 700kcal & 40grams of Protein)     NEW NUTRITION DIAGNOSIS: [X] Not Applicable      INTERVENTIONS:   1. Recommend continue current nutrition plan of care as tolerated   2. Provide total feeding assistance with meals  3. Monitor daily PO intakes, GI tolerance, labs, weights, skin integrity, & BM regularity     MONITORING & EVALUATION:   1. Weights   2. PO intakes   3. Skin integrity   4. Tolerance to diet prescription   5. Labs & POCT  6. Follow up (per protocol)    Registered Dietitian/Nutritionist Remains Available.  Jimmy Russo RD, CDN    Phone# [phone]

## 2023-08-03 NOTE — PROGRESS NOTE ADULT - SUBJECTIVE AND OBJECTIVE BOX
CC: f/u for Rt hip decubitus    Patient reports: he is non verbal    REVIEW OF SYSTEMS:  All other review of systems negative (Comprehensive ROS): limited by condition    Antimicrobials Day #  : day 2  meropenem  IVPB 500 milliGRAM(s) IV Intermittent every 8 hours    Other Medications Reviewed    T(F): 98.9 (08-03-23 @ 05:25), Max: 101 (08-02-23 @ 17:33)  HR: 80 (08-03-23 @ 05:25)  BP: 115/62 (08-03-23 @ 05:25)  RR: 18 (08-03-23 @ 05:25)  SpO2: 96% (08-03-23 @ 05:25)  Wt(kg): --    PHYSICAL EXAM:  General: lethargic no acute distress  Eyes:  anicteric, no conjunctival injection, no discharge  Oropharynx: no lesions or injection 	  Neck: supple, without adenopathy  Lungs: clear to auscultation  Heart: regular rate and rhythm; no murmur, rubs or gallops  Abdomen: soft, nondistended, nontender, without mass or organomegaly  Skin: Rt hip wound dressed  Extremities: no clubbing, cyanosis, or edema  Neurologic: poorly interactive    LAB RESULTS:                        9.6    11.66 )-----------( 325      ( 03 Aug 2023 07:31 )             30.0     08-03    143  |  110<H>  |  19  ----------------------------<  102<H>  3.7   |  25  |  1.01    Ca    8.7      03 Aug 2023 07:31        Urinalysis Basic - ( 03 Aug 2023 07:31 )    Color: x / Appearance: x / SG: x / pH: x  Gluc: 102 mg/dL / Ketone: x  / Bili: x / Urobili: x   Blood: x / Protein: x / Nitrite: x   Leuk Esterase: x / RBC: x / WBC x   Sq Epi: x / Non Sq Epi: x / Bacteria: x      MICROBIOLOGY:  RECENT CULTURES:  07-31 @ 05:05 Wound Sp. Instructions_Additional Info: Right hip ulceration Escherichia coli ESBL  Enterococcus faecalis    Numerous Escherichia coli ESBL  Numerous Enterococcus faecalis  Rare Streptococcus dysgalactiae (Group C/G) "Susceptibilities not  performed"          RADIOLOGY REVIEWED:    < from: CT Pelvis w/ IV Cont (08.03.23 @ 12:17) >  IMPRESSION:  *  Stable deep ulcer overlying the right greater trochanter reaching the   underlying bone with underlying reactive bony changes suggestive of   osteomyelitis, unchanged from prior.  *  Fluid collection extending from the ulcer defect insinuating between   the right gluteus musculature unchanged in size measuring 6.2 x 3.6 cm.  *  Stable collection along the right hamstring insertion measuring 2.1 x   1.7 cm.  *  Stable intramuscular collection within the right adductor brevis   measuring 1.7 cm.  *  Stable induration overlying the left greater trochanter, likely also   related to decubitus change.    < end of copied text >

## 2023-08-03 NOTE — PROGRESS NOTE ADULT - ASSESSMENT
A/P  87 year old M PMH dementia, BPH, DVT not on AC. IBS, OA with previous pelvic fracture brought in from [facility] for nonhealing decubitus ulcer admitted for possible osteomyelitis of R hip, failed outpatient abx           Assessment/Plans:   per med teams:      R hip wound patient with nonhealing wound of R femur, was placed on bactrim in    CT with possible signs of osteo and abscess formation   - s/p vanco and zosyn in the ED, will continue  -  Follow up ID recommendations   -  wound care nurse consulted  - plastics, Dr. Posada consulted and following  - noted -OR debridement plan on hold   - surgery consulted -   Would consider drainage pending repeat imaging    CAD  - is on  asa 81mg, atorvastatin 40mg, metoprolol tartrate 25mg BID     Dementia  - on rivastigmine 4.5mg capsule         Palliative :  as f/u for  [facility] assist , case/chart reviewed.  Pt from , w/ hx end stage dementia, ftt. and was on home hospice services. Here w/ likely OM of R hip, needs IV antbx and possible debridement.  Family not looking to do aggressive  measures and hopes  to avoid OR debridement . Plastics and surgery also evaluating .    Spoke w/ daughter Lola today she reports she was in contact w/ Dr Posada, and they will repeat the CT at this time to see if any improvement or change.  And will cont abx over next few days .     Daughter agrees to antbx and local wound care, but will await surgery/plastics final recs. Family hopes to avoid OR     Ultimately family wishes to have pt back at  when able, and wishes for  home hospice services to be back in place.

## 2023-08-03 NOTE — PROVIDER CONTACT NOTE (OTHER) - ASSESSMENT
BP, Temp stable. HR currently 150s
BP stable. HR sustaining above 130s, patient was found to have a rectal temp of 100.5 at 22:33, at 23:03 rectal temp elevated to 100.7 and patient's HR has reached as high as 150s
VSS. No signs of distress or pain.
Vital signs

## 2023-08-03 NOTE — PROGRESS NOTE ADULT - SUBJECTIVE AND OBJECTIVE BOX
Progress:  pt in bed, not in distress, is calm , wakes to name, says 1-2 words then closes  eyes, does not follow commands         Review of Systems: [ Unable to obtain due to poor mentation]    MEDICATIONS  (STANDING):  ascorbic acid 500 milliGRAM(s) Oral daily  aspirin  chewable 81 milliGRAM(s) Oral daily  atorvastatin 40 milliGRAM(s) Oral at bedtime  Dakins Solution - 1/2 Strength 1 Application(s) Topical two times a day  dicyclomine 10 milliGRAM(s) Oral two times a day before meals  enoxaparin Injectable 40 milliGRAM(s) SubCutaneous every 24 hours  meropenem  IVPB 500 milliGRAM(s) IV Intermittent every 8 hours  metoprolol tartrate 25 milliGRAM(s) Oral two times a day  multivitamin 1 Tablet(s) Oral daily  nystatin Cream 1 Application(s) Topical two times a day  rivastigmine patch  4.6 mG/24 Hr(s). 1 Patch Transdermal every 24 hours  senna 2 Tablet(s) Oral at bedtime  tamsulosin 0.4 milliGRAM(s) Oral at bedtime  zinc oxide 40% Paste 1 Application(s) Topical two times a day  zinc sulfate 220 milliGRAM(s) Oral daily    MEDICATIONS  (PRN):  acetaminophen     Tablet .. 650 milliGRAM(s) Oral every 6 hours PRN Temp greater or equal to 38C (100.4F), Mild Pain (1 - 3)  aluminum hydroxide/magnesium hydroxide/simethicone Suspension 30 milliLiter(s) Oral every 4 hours PRN Dyspepsia  melatonin 3 milliGRAM(s) Oral at bedtime PRN Insomnia  ondansetron Injectable 4 milliGRAM(s) IV Push every 8 hours PRN Nausea and/or Vomiting      PHYSICAL EXAM:  Vital Signs Last 24 Hrs  T(C): 37.2 (03 Aug 2023 05:25), Max: 38.6 (02 Aug 2023 15:38)  T(F): 98.9 (03 Aug 2023 05:25), Max: 101.5 (02 Aug 2023 15:38)  HR: 80 (03 Aug 2023 05:25) (80 - 138)  BP: 115/62 (03 Aug 2023 05:25) (105/50 - 152/75)  BP(mean): --  RR: 18 (03 Aug 2023 05:25) (16 - 20)  SpO2: 96% (03 Aug 2023 05:25) (95% - 99%)    Parameters below as of 03 Aug 2023 05:25  Patient On (Oxygen Delivery Method): room air      General: alert to stim , knows first name , not in distress       HEENT: n/c, a/t, temp wasting , dry mouth     Lungs: cl dim bases, breathing comfortable on r/a  CV: nml-tachy    GI: abd flat, soft     : normal , incontinent   Musculoskeletal: weakened , contractures le's    Skin: pale, w/d, lrg hip decub     Neuro: + deficits alert to name, say only 1-2 words, does not follow  commands   Oral intake ability:  oral feeding w/ assist full   Diet: pureed as anny      LABS:                          9.6    11.66 )-----------( 325      ( 03 Aug 2023 07:31 )             30.0     08-03    143  |  110<H>  |  19  ----------------------------<  102<H>  3.7   |  25  |  1.01    Ca    8.7      03 Aug 2023 07:31      Urinalysis Basic - ( 03 Aug 2023 07:31 )    Color: x / Appearance: x / SG: x / pH: x  Gluc: 102 mg/dL / Ketone: x  / Bili: x / Urobili: x   Blood: x / Protein: x / Nitrite: x   Leuk Esterase: x / RBC: x / WBC x   Sq Epi: x / Non Sq Epi: x / Bacteria: x        RADIOLOGY & ADDITIONAL STUDIES:     ADVANCE DIRECTIVES:  hcp ,living will, dnr/dni  Advanced Care Planning discussion total time spent:

## 2023-08-03 NOTE — PROGRESS NOTE ADULT - SUBJECTIVE AND OBJECTIVE BOX
Patient is a 87y old  Male who presents with a chief complaint of osteo of R femur (02 Aug 2023 20:58)    Patient seen and examined at bedside.  NAD, resting at bedside    ALLERGIES:  No Known Drug Allergies  shellfish (Other)  lactose (Diarrhea)        Vital Signs Last 24 Hrs  T(F): 98.9 (03 Aug 2023 05:25), Max: 101.5 (02 Aug 2023 15:38)  HR: 80 (03 Aug 2023 05:25) (80 - 138)  BP: 115/62 (03 Aug 2023 05:25) (105/50 - 152/75)  RR: 18 (03 Aug 2023 05:25) (16 - 20)  SpO2: 96% (03 Aug 2023 05:25) (95% - 99%)  I&O's Summary    02 Aug 2023 07:01  -  03 Aug 2023 07:00  --------------------------------------------------------  IN: 580 mL / OUT: 900 mL / NET: -320 mL    03 Aug 2023 07:01  -  03 Aug 2023 10:25  --------------------------------------------------------  IN: 400 mL / OUT: 0 mL / NET: 400 mL      MEDICATIONS:  acetaminophen     Tablet .. 650 milliGRAM(s) Oral every 6 hours PRN  aluminum hydroxide/magnesium hydroxide/simethicone Suspension 30 milliLiter(s) Oral every 4 hours PRN  ascorbic acid 500 milliGRAM(s) Oral daily  aspirin  chewable 81 milliGRAM(s) Oral daily  atorvastatin 40 milliGRAM(s) Oral at bedtime  Dakins Solution - 1/2 Strength 1 Application(s) Topical two times a day  dicyclomine 10 milliGRAM(s) Oral two times a day before meals  enoxaparin Injectable 40 milliGRAM(s) SubCutaneous every 24 hours  melatonin 3 milliGRAM(s) Oral at bedtime PRN  meropenem  IVPB 500 milliGRAM(s) IV Intermittent every 8 hours  metoprolol tartrate 25 milliGRAM(s) Oral two times a day  multivitamin 1 Tablet(s) Oral daily  nystatin Cream 1 Application(s) Topical two times a day  ondansetron Injectable 4 milliGRAM(s) IV Push every 8 hours PRN  rivastigmine patch  4.6 mG/24 Hr(s). 1 Patch Transdermal every 24 hours  senna 2 Tablet(s) Oral at bedtime  tamsulosin 0.4 milliGRAM(s) Oral at bedtime  zinc oxide 40% Paste 1 Application(s) Topical two times a day  zinc sulfate 220 milliGRAM(s) Oral daily      PHYSICAL EXAM:  General: NAD, Alert but confused  ENT: MMM, no thrush  Neck: Supple, No JVD  Lungs: Clear to auscultation bilaterally, non labored, bilateral air entry  Cardio: RRR, S1/S2, No murmurs  Abdomen: Soft, Nontender, Nondistended; Bowel sounds present  Extremities: No cyanosis, No edema, RT hip and sacral dressing    LABS:                        9.6    11.66 )-----------( 325      ( 03 Aug 2023 07:31 )             30.0     08-03    143  |  110  |  19  ----------------------------<  102  3.7   |  25  |  1.01    Ca    8.7      03 Aug 2023 07:31          Lactate, Blood: 1.0 mmol/L (08-02 @ 16:44)        06-13 Chol 161 mg/dL LDL -- HDL 41 mg/dL Trig 101 mg/dL                  Urinalysis Basic - ( 03 Aug 2023 07:31 )    Color: x / Appearance: x / SG: x / pH: x  Gluc: 102 mg/dL / Ketone: x  / Bili: x / Urobili: x   Blood: x / Protein: x / Nitrite: x   Leuk Esterase: x / RBC: x / WBC x   Sq Epi: x / Non Sq Epi: x / Bacteria: x        Culture - Other (collected 31 Jul 2023 05:05)  Source: Wound Sp. Instructions_Additional Info: Right hip ulceration  Final Report (02 Aug 2023 13:38):    Numerous Escherichia coli ESBL    Numerous Enterococcus faecalis    Rare Streptococcus dysgalactiae (Group C/G) "Susceptibilities not    performed"  Organism: Escherichia coli ESBL  Enterococcus faecalis (02 Aug 2023 13:38)  Organism: Enterococcus faecalis (02 Aug 2023 13:38)      Method Type: AVERY      -  Ampicillin: S <=2 Predicts results to ampicillin/sulbactam, amoxacillin-clavulanate and  piperacillin-tazobactam.      -  Tetracycline: R >8      -  Vancomycin: S 2  Organism: Escherichia coli ESBL (02 Aug 2023 13:38)      Method Type: AVERY      -  Amikacin: S <=16      -  Amoxicillin/Clavulanic Acid: S <=8/4      -  Ampicillin: R >16 These ampicillin results predict results for amoxicillin      -  Ampicillin/Sulbactam: R 8/4 Enterobacter, Klebsiella aerogenes, Citrobacter, and Serratia may develop resistance during prolonged therapy (3-4 days)      -  Aztreonam: R 8      -  Cefazolin: R >16 Enterobacter, Klebsiella aerogenes, Citrobacter, and Serratia may develop resistance during prolonged therapy (3-4 days)      -  Cefepime: R >16      -  Ceftriaxone: R >32 Enterobacter, Klebsiella aerogenes, Citrobacter, and Serratia may develop resistance during prolonged therapy      -  Ciprofloxacin: R >2      -  Ertapenem: S <=0.5      -  Gentamicin: S <=2      -  Imipenem: S <=1      -  Levofloxacin: R >4      -  Meropenem: S <=1      -  Piperacillin/Tazobactam: R <=8      -  Tobramycin: S <=2      -  Trimethoprim/Sulfamethoxazole: R >2/38          RADIOLOGY & ADDITIONAL TESTS:    Care Discussed with Consultants/Other Providers:

## 2023-08-03 NOTE — CHART NOTE - NSCHARTNOTEFT_GEN_A_CORE
Nurse called that patients HR sustained in 150s Systolic blood pressures in 140s. Sent 1 time dose of Lopressor 2mg IV push. Nurse called that patients HR sustained in 150s Systolic blood pressures in 140s. Sent 1 time dose of Lopressor 5mg IV push.

## 2023-08-03 NOTE — PROGRESS NOTE ADULT - ASSESSMENT
86 yo male with dementia, BPH, DVT not anticoagulated, pelvic fracture, who was admitted for evaluation of RT hip decubitus.  He has a low grade fever and repeat CT scan as above.  He is on day 2 meropenem and the goals of care are to stabilize to allow for hospice care.  Debridement being considered.  Endpoint of meropenem to be decided based on surgical plans as well as response.  Blood cultures are pending  Will try not to escalate care.  Antibiotics being used in a palliative fashion.

## 2023-08-03 NOTE — PROVIDER CONTACT NOTE (OTHER) - BACKGROUND
Patient AOx1 and was admitted with osteomyelitis on the R hip
Patient AOx1 admitted with osteomyelitis of R hip
Patient AOx1 and was admitted with osteomyelitis
Patient admitted with non healing decubitis ulcers, had  101.3 temp earlier toady received tylenol.

## 2023-08-04 LAB
ANION GAP SERPL CALC-SCNC: 8 MMOL/L — SIGNIFICANT CHANGE UP (ref 5–17)
BASOPHILS # BLD AUTO: 0.02 K/UL — SIGNIFICANT CHANGE UP (ref 0–0.2)
BASOPHILS NFR BLD AUTO: 0.2 % — SIGNIFICANT CHANGE UP (ref 0–2)
BUN SERPL-MCNC: 29 MG/DL — HIGH (ref 7–23)
CALCIUM SERPL-MCNC: 9.1 MG/DL — SIGNIFICANT CHANGE UP (ref 8.4–10.5)
CHLORIDE SERPL-SCNC: 109 MMOL/L — HIGH (ref 96–108)
CO2 SERPL-SCNC: 26 MMOL/L — SIGNIFICANT CHANGE UP (ref 22–31)
CREAT SERPL-MCNC: 1.12 MG/DL — SIGNIFICANT CHANGE UP (ref 0.5–1.3)
EGFR: 65 ML/MIN/1.73M2 — SIGNIFICANT CHANGE UP
EOSINOPHIL # BLD AUTO: 0.27 K/UL — SIGNIFICANT CHANGE UP (ref 0–0.5)
EOSINOPHIL NFR BLD AUTO: 2.7 % — SIGNIFICANT CHANGE UP (ref 0–6)
GLUCOSE SERPL-MCNC: 106 MG/DL — HIGH (ref 70–99)
HCT VFR BLD CALC: 28 % — LOW (ref 39–50)
HGB BLD-MCNC: 9 G/DL — LOW (ref 13–17)
IMM GRANULOCYTES NFR BLD AUTO: 0.9 % — SIGNIFICANT CHANGE UP (ref 0–0.9)
LYMPHOCYTES # BLD AUTO: 1.02 K/UL — SIGNIFICANT CHANGE UP (ref 1–3.3)
LYMPHOCYTES # BLD AUTO: 10.3 % — LOW (ref 13–44)
MCHC RBC-ENTMCNC: 29.9 PG — SIGNIFICANT CHANGE UP (ref 27–34)
MCHC RBC-ENTMCNC: 32.1 GM/DL — SIGNIFICANT CHANGE UP (ref 32–36)
MCV RBC AUTO: 93 FL — SIGNIFICANT CHANGE UP (ref 80–100)
MONOCYTES # BLD AUTO: 0.69 K/UL — SIGNIFICANT CHANGE UP (ref 0–0.9)
MONOCYTES NFR BLD AUTO: 7 % — SIGNIFICANT CHANGE UP (ref 2–14)
NEUTROPHILS # BLD AUTO: 7.77 K/UL — HIGH (ref 1.8–7.4)
NEUTROPHILS NFR BLD AUTO: 78.9 % — HIGH (ref 43–77)
NRBC # BLD: 0 /100 WBCS — SIGNIFICANT CHANGE UP (ref 0–0)
PLATELET # BLD AUTO: 333 K/UL — SIGNIFICANT CHANGE UP (ref 150–400)
POTASSIUM SERPL-MCNC: 3.8 MMOL/L — SIGNIFICANT CHANGE UP (ref 3.5–5.3)
POTASSIUM SERPL-SCNC: 3.8 MMOL/L — SIGNIFICANT CHANGE UP (ref 3.5–5.3)
RBC # BLD: 3.01 M/UL — LOW (ref 4.2–5.8)
RBC # FLD: 14.6 % — HIGH (ref 10.3–14.5)
SODIUM SERPL-SCNC: 143 MMOL/L — SIGNIFICANT CHANGE UP (ref 135–145)
WBC # BLD: 9.86 K/UL — SIGNIFICANT CHANGE UP (ref 3.8–10.5)
WBC # FLD AUTO: 9.86 K/UL — SIGNIFICANT CHANGE UP (ref 3.8–10.5)

## 2023-08-04 PROCEDURE — 99233 SBSQ HOSP IP/OBS HIGH 50: CPT

## 2023-08-04 RX ORDER — ENOXAPARIN SODIUM 100 MG/ML
40 INJECTION SUBCUTANEOUS EVERY 24 HOURS
Refills: 0 | Status: DISCONTINUED | OUTPATIENT
Start: 2023-08-04 | End: 2023-08-05

## 2023-08-04 RX ORDER — SODIUM HYPOCHLORITE 0.125 %
1 SOLUTION, NON-ORAL MISCELLANEOUS
Refills: 0 | Status: DISCONTINUED | OUTPATIENT
Start: 2023-08-04 | End: 2023-08-07

## 2023-08-04 RX ADMIN — Medication 25 MILLIGRAM(S): at 18:01

## 2023-08-04 RX ADMIN — Medication 10 MILLIGRAM(S): at 06:59

## 2023-08-04 RX ADMIN — Medication 1 TABLET(S): at 11:48

## 2023-08-04 RX ADMIN — MEROPENEM 100 MILLIGRAM(S): 1 INJECTION INTRAVENOUS at 18:02

## 2023-08-04 RX ADMIN — Medication 500 MILLIGRAM(S): at 11:48

## 2023-08-04 RX ADMIN — RIVASTIGMINE 1 PATCH: 4.6 PATCH, EXTENDED RELEASE TRANSDERMAL at 21:33

## 2023-08-04 RX ADMIN — RIVASTIGMINE 1 PATCH: 4.6 PATCH, EXTENDED RELEASE TRANSDERMAL at 07:16

## 2023-08-04 RX ADMIN — ZINC OXIDE 1 APPLICATION(S): 200 OINTMENT TOPICAL at 18:00

## 2023-08-04 RX ADMIN — NYSTATIN CREAM 1 APPLICATION(S): 100000 CREAM TOPICAL at 18:01

## 2023-08-04 RX ADMIN — Medication 10 MILLIGRAM(S): at 18:01

## 2023-08-04 RX ADMIN — RIVASTIGMINE 1 PATCH: 4.6 PATCH, EXTENDED RELEASE TRANSDERMAL at 21:28

## 2023-08-04 RX ADMIN — NYSTATIN CREAM 1 APPLICATION(S): 100000 CREAM TOPICAL at 05:51

## 2023-08-04 RX ADMIN — SENNA PLUS 2 TABLET(S): 8.6 TABLET ORAL at 21:32

## 2023-08-04 RX ADMIN — Medication 1 APPLICATION(S): at 05:52

## 2023-08-04 RX ADMIN — ZINC OXIDE 1 APPLICATION(S): 200 OINTMENT TOPICAL at 05:51

## 2023-08-04 RX ADMIN — MEROPENEM 100 MILLIGRAM(S): 1 INJECTION INTRAVENOUS at 05:50

## 2023-08-04 RX ADMIN — ZINC SULFATE TAB 220 MG (50 MG ZINC EQUIVALENT) 220 MILLIGRAM(S): 220 (50 ZN) TAB at 11:47

## 2023-08-04 RX ADMIN — Medication 81 MILLIGRAM(S): at 11:48

## 2023-08-04 RX ADMIN — TAMSULOSIN HYDROCHLORIDE 0.4 MILLIGRAM(S): 0.4 CAPSULE ORAL at 21:33

## 2023-08-04 RX ADMIN — ENOXAPARIN SODIUM 40 MILLIGRAM(S): 100 INJECTION SUBCUTANEOUS at 21:32

## 2023-08-04 RX ADMIN — MEROPENEM 100 MILLIGRAM(S): 1 INJECTION INTRAVENOUS at 21:32

## 2023-08-04 RX ADMIN — RIVASTIGMINE 1 PATCH: 4.6 PATCH, EXTENDED RELEASE TRANSDERMAL at 20:11

## 2023-08-04 RX ADMIN — Medication 1 APPLICATION(S): at 18:01

## 2023-08-04 RX ADMIN — ATORVASTATIN CALCIUM 40 MILLIGRAM(S): 80 TABLET, FILM COATED ORAL at 21:32

## 2023-08-04 NOTE — PROGRESS NOTE ADULT - ASSESSMENT
wound left chest s/p I and D with debridement, wbc now wnl, wound culture grew mrsa  -advance packing of wound  -discontinue dakins  -nutrition support.

## 2023-08-04 NOTE — PROGRESS NOTE ADULT - SUBJECTIVE AND OBJECTIVE BOX
Vascular Surgery Consult - Daily Progress Note    SUBJECTIVE:  Doing well.   Febrile last night.   Patient denies any fever or chills.   Patient denies any pain     OBJECTIVE:     ** VITAL SIGNS / I&O's **    T(C): 36.8 (08-04-23 @ 05:41), Max: 38.5 (08-03-23 @ 16:55)  T(F): 98.3 (08-04-23 @ 05:41), Max: 101.3 (08-03-23 @ 16:55)  HR: 81 (08-04-23 @ 05:41) (81 - 91)  BP: 110/50 (08-04-23 @ 05:41) (110/50 - 117/65)  RR: 18 (08-04-23 @ 05:41) (17 - 18)  SpO2: 97% (08-04-23 @ 05:41) (94% - 97%)      03 Aug 2023 07:01  -  04 Aug 2023 07:00  --------------------------------------------------------  IN:    IV PiggyBack: 100 mL    Oral Fluid: 880 mL  Total IN: 980 mL    OUT:    Incontinent per Condom Catheter (mL): 500 mL  Total OUT: 500 mL    Total NET: 480 mL      04 Aug 2023 07:01  -  04 Aug 2023 16:18  --------------------------------------------------------  IN:    Oral Fluid: 240 mL  Total IN: 240 mL    OUT:    Voided (mL): 100 mL  Total OUT: 100 mL    Total NET: 140 mL      ** PHYSICAL EXAM **    -- CONSTITUTIONAL: Awake, alert, responding appropriately to questions   -- CARDIOVASCULAR: normotensive, regular rate   -- RESPIRATORY: breathing comfortably   -- ABDOMEN: soft, nontender, nondistended   -- EXTREMITIES: 5 cm ulceration over right trochanter with mild surrounding erythema, base of wound appears to reach underlying muscle, production of murky fluid with light pressure around wound, no erythema to right gluteal region    ** LABS **                 9.0    9.86   )----------(  333       ( 04 Aug 2023 06:43 )               28.0      143    |  109    |  29     ----------------------------<  106        ( 04 Aug 2023 06:43 )  3.8     |  26     |  1.12     Ca    9.1        ( 04 Aug 2023 06:43 )      **RADS**  CT A/P:  BLADDER: Normal.  REPRODUCTIVE ORGANS: Nonenlarged.  LYMPH NODES: No pelvic adenopathy.    VISUALIZED PORTIONS:  ABDOMINAL ORGANS: Within normal limits.  BOWEL: Moderate fecal retention throughout the colon and rectum. No   dilated bowel.  PERITONEUM: No ascites.  VESSELS:  Aortoiliac atherosclerosis without aneurysm.  ABDOMINAL WALL: Normal.  BONES: Stable deep ulcer overlying the right greater trochanter reaching   the underlying bone with underlying reactive bony changes suggestive of   osteomyelitis, unchanged from prior. Associated fluid collection   extending from the defect insinuating between the right gluteus   musculature unchanged in size measuring 6.2 x 3.6 cm. Stable collection   along the right hamstring insertion measuring 2.1 x 1.7 cm. Stable   intramuscular collection within the right adductor brevis measuring 1.7   cm. Stable induration overlying the left greater trochanter, likely also   related to decubitus change. Posttraumatic changes again noted at the   right pubic symphysis.    IMPRESSION:  *  Stable deep ulcer overlying the right greater trochanter reaching the   underlying bone with underlying reactive bony changes suggestive of   osteomyelitis, unchanged from prior.  *  Fluid collection extending from the ulcer defect insinuating between   the right gluteus musculature unchanged in size measuring 6.2 x 3.6 cm.  *  Stable collection along the right hamstring insertion measuring 2.1 x   1.7 cm.  *  Stable intramuscular collection within the right adductor brevis   measuring 1.7 cm.  *  Stable induration overlying the left greater trochanter, likely also   related to decubitus change.

## 2023-08-04 NOTE — PROGRESS NOTE ADULT - SUBJECTIVE AND OBJECTIVE BOX
( x ) No complaints (  ) Complains of:     T(F): 98.3 (08-04-23 @ 05:41), Max: 101.3 (08-03-23 @ 16:55)  HR: 81 (08-04-23 @ 05:41) (81 - 91)  BP: 110/50 (08-04-23 @ 05:41) (110/50 - 117/65)  RR: 18 (08-04-23 @ 05:41) (17 - 18)  SpO2: 97% (08-04-23 @ 05:41) (94% - 97%)        Wound Location 1:  cellulitis left chest wound decrease, no purulence expressed, drainage serosanguinous                                 9.0    9.86  )-----------( 333      ( 04 Aug 2023 06:43 )             28.0     CBC Full  -  ( 04 Aug 2023 06:43 )  WBC Count : 9.86 K/uL  RBC Count : 3.01 M/uL  Hemoglobin : 9.0 g/dL  Hematocrit : 28.0 %  Platelet Count - Automated : 333 K/uL  Mean Cell Volume : 93.0 fl  Mean Cell Hemoglobin : 29.9 pg  Mean Cell Hemoglobin Concentration : 32.1 gm/dL  Auto Neutrophil # : 7.77 K/uL  Auto Lymphocyte # : 1.02 K/uL  Auto Monocyte # : 0.69 K/uL  Auto Eosinophil # : 0.27 K/uL  Auto Basophil # : 0.02 K/uL  Auto Neutrophil % : 78.9 %  Auto Lymphocyte % : 10.3 %  Auto Monocyte % : 7.0 %  Auto Eosinophil % : 2.7 %  Auto Basophil % : 0.2 %    143|109|29<106  3.8|26|1.12  9.1,--,--  08-04 @ 06:43      Urinalysis Basic - ( 04 Aug 2023 06:43 )    Color: x / Appearance: x / SG: x / pH: x  Gluc: 106 mg/dL / Ketone: x  / Bili: x / Urobili: x   Blood: x / Protein: x / Nitrite: x   Leuk Esterase: x / RBC: x / WBC x   Sq Epi: x / Non Sq Epi: x / Bacteria: x                Procedure Performed:  (  )Yes     ( x )No  Name of Procedure:  (  )debridement    (  )I&D    (  )Other:  (  )partial thickness     (  )full thickness     (  )subcutaneous     (  )muscle/tendon     (  )bone  (  )sharp     (  )surgical

## 2023-08-04 NOTE — PROGRESS NOTE ADULT - SUBJECTIVE AND OBJECTIVE BOX
CC: f/u for  decube infected  Patient reports he is ok    REVIEW OF SYSTEMS:  All other review of systems negative (Comprehensive ROS)    Antimicrobials Day #  :3  meropenem  IVPB 500 milliGRAM(s) IV Intermittent every 8 hours    Other Medications Reviewed    Tvss per flow  PHYSICAL EXAM:  General: more alert, no acute distress  Eyes:  anicteric, no conjunctival injection, no discharge  Oropharynx: no lesions or injection 	  Neck: supple, without adenopathy  Lungs: clear to auscultation  Heart: regular rate and rhythm; no murmur, rubs or gallops  Abdomen: soft, nondistended, nontender, without mass or organomegaly  Skin: no lesions  Extremities: no clubbing, cyanosis, or edema. right hip necrotic tunneled decube  Neurologic: more awake    LAB RESULTS:               MICROBIOLOGY:  RECENT CULTURES:  08-02 @ 16:46 .Blood Blood-Peripheral     No growth at 48 Hours      08-02 @ 16:44 .Blood Blood-Peripheral     No growth at 48 Hours          RADIOLOGY REVIEWED:    < from: CT Pelvis w/ IV Cont (08.03.23 @ 12:17) >    ACC: [id number] EXAM:  CT PELVIS ONLY IC   ORDERED BY: VIJI ESPAÑA     PROCEDURE DATE:  08/03/2023          INTERPRETATION:  CLINICAL INFORMATION: Follow-up right hip wound and   gluteal abscess    COMPARISON: CT pelvis 7/30/2023    CONTRAST/COMPLICATIONS:  IV Contrast: Omnipaque 350  90 cc administered   10 cc discarded  Oral Contrast: NONE  Complications: None reported at time of study completion    PROCEDURE:  CT of the Pelvis was performed.  Sagittal and coronal reformats were performed.    FINDINGS:  BLADDER: Normal.  REPRODUCTIVE ORGANS: Nonenlarged.  LYMPH NODES: No pelvic adenopathy.    VISUALIZED PORTIONS:  ABDOMINAL ORGANS: Within normal limits.  BOWEL: Moderate fecal retention throughout the colon and rectum. No   dilated bowel.  PERITONEUM: No ascites.  VESSELS:  Aortoiliac atherosclerosis without aneurysm.  ABDOMINAL WALL: Normal.  BONES: Stable deep ulcer overlying the right greater trochanter reaching   the underlying bone with underlying reactive bony changes suggestive of   osteomyelitis, unchanged from prior. Associated fluid collection   extending from the defect insinuating between the right gluteus   musculature unchanged in size measuring 6.2 x 3.6 cm. Stable collection   along the right hamstring insertion measuring 2.1 x 1.7 cm. Stable   intramuscular collection within the right adductor brevis measuring 1.7   cm. Stable induration overlying the left greater trochanter, likely also   related to decubitus change. Posttraumatic changes again noted at the  right pubic symphysis.    IMPRESSION:  *  Stable deep ulcer overlying the right greater trochanter reaching the   underlying bone with underlying reactive bony changes suggestive of   osteomyelitis, unchanged from prior.  *  Fluid collection extending from the ulcer defect insinuating between   the right gluteus musculature unchanged in size measuring 6.2 x 3.6 cm.  *  Stable collection along the right hamstring insertion measuring 2.1 x   1.7 cm.  *  Stable intramuscular collection within the right adductor brevis   measuring 1.7 cm.  *  Stable induration overlying the left greater trochanter, likely also   related to decubitus change.      --- End of Report ---      < end of copied text >            Assessment:  Elderly man, advanced dementia, bedbound state admitte with tunneling necrotic infected decubitus. HAs been on hospice. planned for debridement in the OR  Plan:  continue ertapenem  debridement per Dr. Posada

## 2023-08-04 NOTE — PROGRESS NOTE ADULT - ASSESSMENT
87 year old M PMH dementia, BPH, DVT not on AC, IBS, OA with previous pelvic fracture brought in from [facility] for nonhealing decubitus ulcer admitted for possible osteomyelitis of R hip, failed outpatient abx.    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 Posada repeated CT RT hip and pelvis with IV contrast, discussed with general surgery will take patient to OR for today for drainage  ID stopped zosyn, converted to meropenem  Continue local wound care as per plastics and wound care team with dakins solution  Cardiology cleared at moderate CV risk prior to possible wound ulcer debridement  Patient currently on hospice care at [facility] - daughter Salina is ok with IV abx and, if needed, a debridement of the area. Unclear how aggressive treatment should be for these collections (manage possible RT gluteal abscess and leave others or no surgical intervention for all collections in s/o GOC/HCP    CAD  - Continue asa 81mg, atorvastatin 40mg, metoprolol tartrate 25mg BID     Dementia  - On rivastigmine 4.5mg capsule at home, will continue patch while in hospital    BPH  - Continue Flomax    IBS  - Continue Dicyclomine    DVT ppx  - Lovenox (hold for OR)    Dispo: Pending medical course    Updated daughter Salina Quinones [phone]     Luis, nurse from [facility]: [phone] 87 year old M PMH dementia, BPH, DVT not on AC, IBS, OA with previous pelvic fracture brought in from [facility] for nonhealing decubitus ulcer admitted for possible osteomyelitis of R hip, failed outpatient abx.    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 Posada repeated CT RT hip and pelvis with IV contrast, discussed with general surgery will take patient to OR for today for drainage  ID stopped zosyn, converted to meropenem  Continue local wound care as per plastics and wound care team with dakins solution  Cardiology cleared at moderate CV risk prior to possible wound ulcer debridement  Patient currently on hospice care at [facility] - daughter Salina is ok with IV abx and, if needed, a debridement of the area. Unclear how aggressive treatment should be for these collections (manage possible RT gluteal abscess and leave others or no surgical intervention for all collections in s/o GOC/HCP    CAD  - Continue asa 81mg, atorvastatin 40mg, metoprolol tartrate 25mg BID     Dementia  - On rivastigmine 4.5mg capsule at home, will continue patch while in hospital    BPH  - Continue Flomax    IBS  - Continue Dicyclomine    DVT ppx  - Lovenox (hold for OR)    Dispo: Pending medical course    Updated daughter Salina Quinones [phone]     Luis, nurse from [facility]: [phone]    Addendum________  -patient scheduled for OR Monday 8/7 2pm per Dr. Posada. Will restart lovenox

## 2023-08-04 NOTE — PROGRESS NOTE ADULT - SUBJECTIVE AND OBJECTIVE BOX
( x ) No complaints (  ) Complains of:     T(F): 98.3 (08-04-23 @ 05:41), Max: 101.3 (08-03-23 @ 16:55)  HR: 81 (08-04-23 @ 05:41) (81 - 91)  BP: 110/50 (08-04-23 @ 05:41) (110/50 - 117/65)  RR: 18 (08-04-23 @ 05:41) (17 - 18)  SpO2: 97% (08-04-23 @ 05:41) (94% - 97%)        Wound Location 1:  right hip wound with extensive undermining and necrotic tissue                   9.0    9.86  )-----------( 333      ( 04 Aug 2023 06:43 )             28.0     CBC Full  -  ( 04 Aug 2023 06:43 )  WBC Count : 9.86 K/uL  RBC Count : 3.01 M/uL  Hemoglobin : 9.0 g/dL  Hematocrit : 28.0 %  Platelet Count - Automated : 333 K/uL  Mean Cell Volume : 93.0 fl  Mean Cell Hemoglobin : 29.9 pg  Mean Cell Hemoglobin Concentration : 32.1 gm/dL  Auto Neutrophil # : 7.77 K/uL  Auto Lymphocyte # : 1.02 K/uL  Auto Monocyte # : 0.69 K/uL  Auto Eosinophil # : 0.27 K/uL  Auto Basophil # : 0.02 K/uL  Auto Neutrophil % : 78.9 %  Auto Lymphocyte % : 10.3 %  Auto Monocyte % : 7.0 %  Auto Eosinophil % : 2.7 %  Auto Basophil % : 0.2 %    143|109|29<106  3.8|26|1.12  9.1,--,--  08-04 @ 06:43      Urinalysis Basic - ( 04 Aug 2023 06:43 )    Color: x / Appearance: x / SG: x / pH: x  Gluc: 106 mg/dL / Ketone: x  / Bili: x / Urobili: x   Blood: x / Protein: x / Nitrite: x   Leuk Esterase: x / RBC: x / WBC x   Sq Epi: x / Non Sq Epi: x / Bacteria: x    ACC: [id number] EXAM:  CT PELVIS ONLY IC   ORDERED BY: VIJI ESPAÑA     PROCEDURE DATE:  08/03/2023          INTERPRETATION:  CLINICAL INFORMATION: Follow-up right hip wound and   gluteal abscess    COMPARISON: CT pelvis 7/30/2023    CONTRAST/COMPLICATIONS:  IV Contrast: Omnipaque 350  90 cc administered   10 cc discarded  Oral Contrast: NONE  Complications: None reported at time of study completion    PROCEDURE:  CT of the Pelvis was performed.  Sagittal and coronal reformats were performed.    FINDINGS:  BLADDER: Normal.  REPRODUCTIVE ORGANS: Nonenlarged.  LYMPH NODES: No pelvic adenopathy.    VISUALIZED PORTIONS:  ABDOMINAL ORGANS: Within normal limits.  BOWEL: Moderate fecal retention throughout the colon and rectum. No   dilated bowel.  PERITONEUM: No ascites.  VESSELS:  Aortoiliac atherosclerosis without aneurysm.  ABDOMINAL WALL: Normal.  BONES: Stable deep ulcer overlying the right greater trochanter reaching   the underlying bone with underlying reactive bony changes suggestive of   osteomyelitis, unchanged from prior. Associated fluid collection   extending from the defect insinuating between the right gluteus   musculature unchanged in size measuring 6.2 x 3.6 cm. Stable collection   along the right hamstring insertion measuring 2.1 x 1.7 cm. Stable   intramuscular collection within the right adductor brevis measuring 1.7   cm. Stable induration overlying the left greater trochanter, likely also   related to decubitus change. Posttraumatic changes again noted at the  right pubic symphysis.    IMPRESSION:  *  Stable deep ulcer overlying the right greater trochanter reaching the   underlying bone with underlying reactive bony changes suggestive of   osteomyelitis, unchanged from prior.  *  Fluid collection extending from the ulcer defect insinuating between   the right gluteus musculature unchanged in size measuring 6.2 x 3.6 cm.  *  Stable collection along the right hamstring insertion measuring 2.1 x   1.7 cm.  *  Stable intramuscular collection within the right adductor brevis   measuring 1.7 cm.  *  Stable induration overlying the left greater trochanter, likely also   related to decubitus change.      --- End of Report ---            DYLAN OSPINA MD; Attending Radiologist  This document has been electronically signed. Aug  3 2023  3:19PM              Procedure Performed:  (  )Yes     (  x)No  Name of Procedure:  (  )debridement    (  )I&D    (  )Other:  (  )partial thickness     (  )full thickness     (  )subcutaneous     (  )muscle/tendon     (  )bone  (  )sharp     (  )surgical

## 2023-08-04 NOTE — PROGRESS NOTE ADULT - ASSESSMENT
Madi Rizzo is an 87 y.o. man with history of dementia, BPH, DVT (not on AC), IBS, and OA with previous pelvic fracture who was admitted on 7/30 from [facility] due to a nonhealing decubitus ulcer. At his care facility, the patient was prescribed bactrim in addition to wound care, without improvement. Cultures sent from the wound with ESBL and enterococcus. Dominant collection unchanged on repeat CT from 8/3. Appears that gluteal collection is not in continuity with trochanteric wound.     PLAN:  - Plastic surgery following; planned for OR on 8/7 for debridement and drainage   - Continue with wound care  - Antibiotics per medicine

## 2023-08-04 NOTE — PROGRESS NOTE ADULT - ASSESSMENT
large deep right hip PU, necrotic, abscess right gluteus muscles connects to hip ulcer on CT but clinically no or little direct connections.  Pt being followed by surgery Dr. Lowe,  OR drainage preferred over bedside attempt  Discussed with daughter Brenda (hcp)  -OR monday 8/7 for debridement right hip wound drainage right gluteal abscess, possible debridement right ischial region.  No debridement planned for left ischium and pubic ramus.  Possible application micromatrix and VAC.  General surgery to assist as needed with abscess I and D.  -cardiology note appreciated  -continue local wound care  -case discussed with Dr. Machuca

## 2023-08-04 NOTE — PROGRESS NOTE ADULT - SUBJECTIVE AND OBJECTIVE BOX
Patient is a 87y old  Male who presents with a chief complaint of osteo of R femur (03 Aug 2023 16:56)      Patient seen and examined at bedside. No overnight events reported.     ALLERGIES:  No Known Drug Allergies  shellfish (Other)  lactose (Diarrhea)    MEDICATIONS  (STANDING):  ascorbic acid 500 milliGRAM(s) Oral daily  aspirin  chewable 81 milliGRAM(s) Oral daily  atorvastatin 40 milliGRAM(s) Oral at bedtime  Dakins Solution - 1/2 Strength 1 Application(s) Topical two times a day  dicyclomine 10 milliGRAM(s) Oral two times a day before meals  meropenem  IVPB 500 milliGRAM(s) IV Intermittent every 8 hours  metoprolol tartrate 25 milliGRAM(s) Oral two times a day  multivitamin 1 Tablet(s) Oral daily  nystatin Cream 1 Application(s) Topical two times a day  rivastigmine patch  4.6 mG/24 Hr(s). 1 Patch Transdermal every 24 hours  senna 2 Tablet(s) Oral at bedtime  tamsulosin 0.4 milliGRAM(s) Oral at bedtime  zinc oxide 40% Paste 1 Application(s) Topical two times a day  zinc sulfate 220 milliGRAM(s) Oral daily    MEDICATIONS  (PRN):  acetaminophen     Tablet .. 650 milliGRAM(s) Oral every 6 hours PRN Temp greater or equal to 38C (100.4F), Mild Pain (1 - 3)  aluminum hydroxide/magnesium hydroxide/simethicone Suspension 30 milliLiter(s) Oral every 4 hours PRN Dyspepsia  melatonin 3 milliGRAM(s) Oral at bedtime PRN Insomnia  ondansetron Injectable 4 milliGRAM(s) IV Push every 8 hours PRN Nausea and/or Vomiting    Vital Signs Last 24 Hrs  T(F): 98.3 (04 Aug 2023 05:41), Max: 101.3 (03 Aug 2023 16:55)  HR: 81 (04 Aug 2023 05:41) (81 - 91)  BP: 110/50 (04 Aug 2023 05:41) (110/50 - 117/65)  RR: 18 (04 Aug 2023 05:41) (17 - 18)  SpO2: 97% (04 Aug 2023 05:41) (94% - 97%)  I&O's Summary    03 Aug 2023 07:01  -  04 Aug 2023 07:00  --------------------------------------------------------  IN: 980 mL / OUT: 500 mL / NET: 480 mL    04 Aug 2023 07:01  -  04 Aug 2023 10:32  --------------------------------------------------------  IN: 240 mL / OUT: 0 mL / NET: 240 mL      PHYSICAL EXAM:  General: NAD, lying comfortably in bed  ENT: No gross hearing impairment, Moist mucous membranes, no thrush  Neck: Supple, No JVD  Lungs: Clear to auscultation bilaterally, good air entry, non-labored breathing  Cardio: RRR, S1/S2, No murmur  Abdomen: Soft, Nontender, Nondistended; Bowel sounds present  Extremities: Right hip and sacrum with dressing, No calf tenderness, No cyanosis, No pitting edema    LABS:                        9.0    9.86  )-----------( 333      ( 04 Aug 2023 06:43 )             28.0     08-04    143  |  109  |  29  ----------------------------<  106  3.8   |  26  |  1.12    Ca    9.1      04 Aug 2023 06:43              Lactate, Blood: 1.0 mmol/L (08-02 @ 16:44)          06-13 Chol 161 mg/dL LDL -- HDL 41 mg/dL Trig 101 mg/dL                  Urinalysis Basic - ( 04 Aug 2023 06:43 )    Color: x / Appearance: x / SG: x / pH: x  Gluc: 106 mg/dL / Ketone: x  / Bili: x / Urobili: x   Blood: x / Protein: x / Nitrite: x   Leuk Esterase: x / RBC: x / WBC x   Sq Epi: x / Non Sq Epi: x / Bacteria: x        Culture - Blood (collected 02 Aug 2023 16:46)  Source: .Blood Blood-Peripheral  Preliminary Report (03 Aug 2023 22:03):    No growth at 24 hours    Culture - Blood (collected 02 Aug 2023 16:44)  Source: .Blood Blood-Peripheral  Preliminary Report (03 Aug 2023 22:03):    No growth at 24 hours    Culture - Other (collected 31 Jul 2023 05:05)  Source: Wound Sp. Instructions_Additional Info: Right hip ulceration  Final Report (02 Aug 2023 13:38):    Numerous Escherichia coli ESBL    Numerous Enterococcus faecalis    Rare Streptococcus dysgalactiae (Group C/G) "Susceptibilities not    performed"  Organism: Escherichia coli ESBL  Enterococcus faecalis (02 Aug 2023 13:38)  Organism: Enterococcus faecalis (02 Aug 2023 13:38)      Method Type: AVERY      -  Ampicillin: S <=2 Predicts results to ampicillin/sulbactam, amoxacillin-clavulanate and  piperacillin-tazobactam.      -  Tetracycline: R >8      -  Vancomycin: S 2  Organism: Escherichia coli ESBL (02 Aug 2023 13:38)      Method Type: AVERY      -  Amikacin: S <=16      -  Amoxicillin/Clavulanic Acid: S <=8/4      -  Ampicillin: R >16 These ampicillin results predict results for amoxicillin      -  Ampicillin/Sulbactam: R 8/4 Enterobacter, Klebsiella aerogenes, Citrobacter, and Serratia may develop resistance during prolonged therapy (3-4 days)      -  Aztreonam: R 8      -  Cefazolin: R >16 Enterobacter, Klebsiella aerogenes, Citrobacter, and Serratia may develop resistance during prolonged therapy (3-4 days)      -  Cefepime: R >16      -  Ceftriaxone: R >32 Enterobacter, Klebsiella aerogenes, Citrobacter, and Serratia may develop resistance during prolonged therapy      -  Ciprofloxacin: R >2      -  Ertapenem: S <=0.5      -  Gentamicin: S <=2      -  Imipenem: S <=1      -  Levofloxacin: R >4      -  Meropenem: S <=1      -  Piperacillin/Tazobactam: R <=8      -  Tobramycin: S <=2      -  Trimethoprim/Sulfamethoxazole: R >2/38        RADIOLOGY & ADDITIONAL TESTS: < from: CT Pelvis w/ IV Cont (08.03.23 @ 12:17) >  IMPRESSION:  *  Stable deep ulcer overlying the right greater trochanter reaching the   underlying bone with underlying reactive bony changes suggestive of   osteomyelitis, unchanged from prior.  *  Fluid collection extending from the ulcer defect insinuating between   the right gluteus musculature unchanged in size measuring 6.2 x 3.6 cm.  *  Stable collection along the right hamstring insertion measuring 2.1 x   1.7 cm.  *  Stable intramuscular collection within the right adductor brevis   measuring 1.7 cm.  *  Stable induration overlying the left greater trochanter, likely also   related to decubitus change.    < end of copied text >      Care Discussed with Consultants/Other Providers: Discussed case with Dr. Posada, will take patient to OR today with general surgery

## 2023-08-05 LAB
ANION GAP SERPL CALC-SCNC: 8 MMOL/L — SIGNIFICANT CHANGE UP (ref 5–17)
BUN SERPL-MCNC: 24 MG/DL — HIGH (ref 7–23)
CALCIUM SERPL-MCNC: 8.8 MG/DL — SIGNIFICANT CHANGE UP (ref 8.4–10.5)
CHLORIDE SERPL-SCNC: 110 MMOL/L — HIGH (ref 96–108)
CO2 SERPL-SCNC: 25 MMOL/L — SIGNIFICANT CHANGE UP (ref 22–31)
CREAT SERPL-MCNC: 1.04 MG/DL — SIGNIFICANT CHANGE UP (ref 0.5–1.3)
EGFR: 70 ML/MIN/1.73M2 — SIGNIFICANT CHANGE UP
GLUCOSE SERPL-MCNC: 109 MG/DL — HIGH (ref 70–99)
HCT VFR BLD CALC: 26.6 % — LOW (ref 39–50)
HGB BLD-MCNC: 8.6 G/DL — LOW (ref 13–17)
MCHC RBC-ENTMCNC: 30 PG — SIGNIFICANT CHANGE UP (ref 27–34)
MCHC RBC-ENTMCNC: 32.3 GM/DL — SIGNIFICANT CHANGE UP (ref 32–36)
MCV RBC AUTO: 92.7 FL — SIGNIFICANT CHANGE UP (ref 80–100)
NRBC # BLD: 0 /100 WBCS — SIGNIFICANT CHANGE UP (ref 0–0)
PLATELET # BLD AUTO: 368 K/UL — SIGNIFICANT CHANGE UP (ref 150–400)
POTASSIUM SERPL-MCNC: 3.9 MMOL/L — SIGNIFICANT CHANGE UP (ref 3.5–5.3)
POTASSIUM SERPL-SCNC: 3.9 MMOL/L — SIGNIFICANT CHANGE UP (ref 3.5–5.3)
RBC # BLD: 2.87 M/UL — LOW (ref 4.2–5.8)
RBC # FLD: 14.8 % — HIGH (ref 10.3–14.5)
SODIUM SERPL-SCNC: 143 MMOL/L — SIGNIFICANT CHANGE UP (ref 135–145)
WBC # BLD: 7.21 K/UL — SIGNIFICANT CHANGE UP (ref 3.8–10.5)
WBC # FLD AUTO: 7.21 K/UL — SIGNIFICANT CHANGE UP (ref 3.8–10.5)

## 2023-08-05 PROCEDURE — 99233 SBSQ HOSP IP/OBS HIGH 50: CPT

## 2023-08-05 RX ORDER — ASPIRIN/CALCIUM CARB/MAGNESIUM 324 MG
81 TABLET ORAL DAILY
Refills: 0 | Status: DISCONTINUED | OUTPATIENT
Start: 2023-08-06 | End: 2023-08-07

## 2023-08-05 RX ADMIN — Medication 10 MILLIGRAM(S): at 18:28

## 2023-08-05 RX ADMIN — SENNA PLUS 2 TABLET(S): 8.6 TABLET ORAL at 22:06

## 2023-08-05 RX ADMIN — RIVASTIGMINE 1 PATCH: 4.6 PATCH, EXTENDED RELEASE TRANSDERMAL at 22:03

## 2023-08-05 RX ADMIN — Medication 25 MILLIGRAM(S): at 06:03

## 2023-08-05 RX ADMIN — Medication 500 MILLIGRAM(S): at 14:04

## 2023-08-05 RX ADMIN — NYSTATIN CREAM 1 APPLICATION(S): 100000 CREAM TOPICAL at 18:29

## 2023-08-05 RX ADMIN — ZINC SULFATE TAB 220 MG (50 MG ZINC EQUIVALENT) 220 MILLIGRAM(S): 220 (50 ZN) TAB at 14:04

## 2023-08-05 RX ADMIN — ATORVASTATIN CALCIUM 40 MILLIGRAM(S): 80 TABLET, FILM COATED ORAL at 22:05

## 2023-08-05 RX ADMIN — Medication 1 TABLET(S): at 14:04

## 2023-08-05 RX ADMIN — MEROPENEM 100 MILLIGRAM(S): 1 INJECTION INTRAVENOUS at 22:01

## 2023-08-05 RX ADMIN — ZINC OXIDE 1 APPLICATION(S): 200 OINTMENT TOPICAL at 18:29

## 2023-08-05 RX ADMIN — Medication 1 APPLICATION(S): at 18:00

## 2023-08-05 RX ADMIN — MEROPENEM 100 MILLIGRAM(S): 1 INJECTION INTRAVENOUS at 14:05

## 2023-08-05 RX ADMIN — TAMSULOSIN HYDROCHLORIDE 0.4 MILLIGRAM(S): 0.4 CAPSULE ORAL at 22:05

## 2023-08-05 RX ADMIN — NYSTATIN CREAM 1 APPLICATION(S): 100000 CREAM TOPICAL at 06:03

## 2023-08-05 RX ADMIN — Medication 1 APPLICATION(S): at 06:03

## 2023-08-05 RX ADMIN — ZINC OXIDE 1 APPLICATION(S): 200 OINTMENT TOPICAL at 06:03

## 2023-08-05 RX ADMIN — RIVASTIGMINE 1 PATCH: 4.6 PATCH, EXTENDED RELEASE TRANSDERMAL at 19:35

## 2023-08-05 RX ADMIN — MEROPENEM 100 MILLIGRAM(S): 1 INJECTION INTRAVENOUS at 06:03

## 2023-08-05 RX ADMIN — Medication 10 MILLIGRAM(S): at 06:04

## 2023-08-05 RX ADMIN — RIVASTIGMINE 1 PATCH: 4.6 PATCH, EXTENDED RELEASE TRANSDERMAL at 20:53

## 2023-08-05 RX ADMIN — RIVASTIGMINE 1 PATCH: 4.6 PATCH, EXTENDED RELEASE TRANSDERMAL at 07:11

## 2023-08-05 RX ADMIN — Medication 81 MILLIGRAM(S): at 14:04

## 2023-08-05 RX ADMIN — Medication 25 MILLIGRAM(S): at 18:28

## 2023-08-05 NOTE — PROGRESS NOTE ADULT - ASSESSMENT
87 year old M PMH dementia, BPH, DVT not on AC, IBS, OA with previous pelvic fracture brought in from [facility] for nonhealing decubitus ulcer admitted for possible osteomyelitis of R hip, failed outpatient abx.    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 Posada repeated CT RT hip and pelvis with IV contrast, discussed with general surgery will take patient to OR on Monday 8/7 for drainage   ID stopped zosyn, converted to meropenem  Continue local wound care as per plastics and wound care team with dakins solution  Cardiology cleared at moderate CV risk prior to possible wound ulcer debridement  Patient currently on hospice care at [facility] - daughter Salina is ok with IV abx and, if needed, a debridement of the area. She is ok with patient going to OR on Monday 8/7    CAD  - Continue asa 81mg, atorvastatin 40mg, metoprolol tartrate 25mg BID     Dementia  - On rivastigmine 4.5mg capsule at home, will continue patch while in hospital    BPH  - Continue Flomax    IBS  - Continue Dicyclomine    DVT ppx  - Lovenox     Dispo: Pending medical course    Updated daughter Salina Quinones [phone]     Luis, nurse from [facility]: [phone]   87 year old M PMH dementia, BPH, DVT not on AC, IBS, OA with previous pelvic fracture brought in from [facility] for nonhealing decubitus ulcer admitted for possible osteomyelitis of R hip, failed outpatient abx.    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 Posada repeated CT RT hip and pelvis with IV contrast, discussed with general surgery will take patient to OR on Monday 8/7 for drainage   ID stopped zosyn, converted to meropenem  Continue local wound care as per plastics and wound care team with dakins solution  Cardiology cleared at moderate CV risk prior to possible wound ulcer debridement  Patient currently on hospice care at [facility] - daughter Salina is ok with IV abx and, if needed, a debridement of the area. She is ok with patient going to OR on Monday 8/7    CAD  - Continue asa 81mg, atorvastatin 40mg, metoprolol tartrate 25mg BID     Anemia  -Drop in H/H noted   -Monitor CBC, discontinue lovenox    Dementia  - On rivastigmine 4.5mg capsule at home, will continue patch while in hospital    BPH  - Continue Flomax    IBS  - Continue Dicyclomine    DVT ppx  - hold lovenox in setting of anemia and dropping h/h    Dispo: Pending medical course    Updated daughter Salina Quinones [phone]     Luis, nurse from [facility]: [phone]   87 year old M PMH dementia, BPH, DVT not on AC, IBS, OA with previous pelvic fracture brought in from [facility] for nonhealing decubitus ulcer admitted for possible osteomyelitis of R hip, failed outpatient abx.    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 Posada repeated CT RT hip and pelvis with IV contrast, discussed with general surgery will take patient to OR on Monday 8/7 for drainage   ID stopped zosyn, converted to meropenem  Continue local wound care as per plastics and wound care team with dakins solution  Cardiology cleared at moderate CV risk prior to possible wound ulcer debridement  Patient currently on hospice care at [facility] - daughter Salina is ok with IV abx and, if needed, a debridement of the area. She is ok with patient going to OR on Monday 8/7    CAD  - Continue asa 81mg, atorvastatin 40mg, metoprolol tartrate 25mg BID     Anemia  -Drop in H/H noted   -Monitor CBC, discontinue lovenox    Dementia  - On rivastigmine 4.5mg capsule at home, will continue patch while in hospital    BPH  - Continue Flomax    IBS  - Continue Dicyclomine    DVT ppx  - hold lovenox in setting of anemia and dropping h/h    Dispo: Pending medical course. If complex wound care cannot return to [facility] under Hospice/Palliative care:  Admit patient to Hospice Service  Start Morphine infusion 100mg in 100ml NS @ 1mg/hr, titrate accordingly.   Morphine 2mg IVP Q4hrs prn for breakthrough pain.  Albuterol/Atrovent 1 unit neb q8hrs PRN  Oxygen via Nasal Canula at 2 L/min, keep saturation > 94% at all times.   Tylenol 650mg PO Q6hrs PRN for fever.  Start Colace 300mg PO QHS.  Fan in room  DVT Prophylaxis: Heparin 5000 units sc q8hrs   GI Prophylaxis: Protonix 40mg PO QDaily   and Hospice nurse evaluation for sub-acute placement.  DNR/DNI in effect.    Updated daughter Salina Quinones [phone]     Luis nurse from [facility]: [phone]   87 year old M PMH dementia, BPH, DVT not on AC, IBS, OA with previous pelvic fracture brought in from [facility] for nonhealing decubitus ulcer admitted for possible osteomyelitis of R hip, failed outpatient abx.    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 Posada repeated CT RT hip and pelvis with IV contrast, discussed with general surgery will take patient to OR on Monday 8/7 for drainage   ID stopped zosyn, converted to meropenem  Continue local wound care as per plastics and wound care team with dakins solution  Cardiology cleared at moderate CV risk prior to possible wound ulcer debridement  Patient currently on hospice care at [facility] - daughter Salina is ok with IV abx and, if needed, a debridement of the area. She is ok with patient going to OR on Monday 8/7    CAD  - Continue asa 81mg, atorvastatin 40mg, metoprolol tartrate 25mg BID     Anemia  -Drop in H/H noted   -Monitor CBC, discontinue lovenox    Dementia  - On rivastigmine 4.5mg capsule at home, will continue patch while in hospital    BPH  - Continue Flomax    IBS  - Continue Dicyclomine    DVT ppx  - hold lovenox in setting of anemia and dropping h/h    Dispo: Pending medical course. If complex wound care cannot return to [facility] under Hospice/Palliative care:  DNR/DNI  Updated daughter Salina Quinones [phone]     Luis nurse from [facility]: [phone]

## 2023-08-05 NOTE — PROGRESS NOTE ADULT - SUBJECTIVE AND OBJECTIVE BOX
Patient is a 87y old  Male who presents with a chief complaint of osteo of R femur (04 Aug 2023 16:17)      Patient seen and examined at bedside. No overnight events reported. Patient denies pain, sob, nausea, vomiting, hematuria.     ALLERGIES:  No Known Drug Allergies  shellfish (Other)  lactose (Diarrhea)    MEDICATIONS  (STANDING):  ascorbic acid 500 milliGRAM(s) Oral daily  aspirin  chewable 81 milliGRAM(s) Oral daily  atorvastatin 40 milliGRAM(s) Oral at bedtime  Dakins Solution - 1/2 Strength 1 Application(s) Topical two times a day  dicyclomine 10 milliGRAM(s) Oral two times a day before meals  enoxaparin Injectable 40 milliGRAM(s) SubCutaneous every 24 hours  meropenem  IVPB 500 milliGRAM(s) IV Intermittent every 8 hours  metoprolol tartrate 25 milliGRAM(s) Oral two times a day  multivitamin 1 Tablet(s) Oral daily  nystatin Cream 1 Application(s) Topical two times a day  rivastigmine patch  4.6 mG/24 Hr(s). 1 Patch Transdermal every 24 hours  senna 2 Tablet(s) Oral at bedtime  tamsulosin 0.4 milliGRAM(s) Oral at bedtime  zinc oxide 40% Paste 1 Application(s) Topical two times a day  zinc sulfate 220 milliGRAM(s) Oral daily    MEDICATIONS  (PRN):  acetaminophen     Tablet .. 650 milliGRAM(s) Oral every 6 hours PRN Temp greater or equal to 38C (100.4F), Mild Pain (1 - 3)  aluminum hydroxide/magnesium hydroxide/simethicone Suspension 30 milliLiter(s) Oral every 4 hours PRN Dyspepsia  melatonin 3 milliGRAM(s) Oral at bedtime PRN Insomnia  ondansetron Injectable 4 milliGRAM(s) IV Push every 8 hours PRN Nausea and/or Vomiting    Vital Signs Last 24 Hrs  T(F): 98.9 (05 Aug 2023 05:46), Max: 98.9 (05 Aug 2023 05:46)  HR: 76 (05 Aug 2023 05:46) (72 - 76)  BP: 112/52 (05 Aug 2023 05:46) (107/54 - 112/52)  RR: 17 (05 Aug 2023 05:46) (17 - 17)  SpO2: 94% (05 Aug 2023 05:46) (94% - 96%)  I&O's Summary    04 Aug 2023 07:01  -  05 Aug 2023 07:00  --------------------------------------------------------  IN: 340 mL / OUT: 100 mL / NET: 240 mL      PHYSICAL EXAM:  General: NAD, A/O x 2  ENT: No gross hearing impairment, Moist mucous membranes, no thrush  Neck: Supple, No JVD  Lungs: Clear to auscultation bilaterally, good air entry, non-labored breathing  Cardio: RRR, S1/S2, No murmur  Abdomen: Soft, Nontender, Nondistended; Bowel sounds present  Extremities: No calf tenderness, No cyanosis, No pitting edema  Psych: Appropriate mood and affect    LABS:                        8.6    7.21  )-----------( 368      ( 05 Aug 2023 05:55 )             26.6     08-05    143  |  110  |  24  ----------------------------<  109  3.9   |  25  |  1.04    Ca    8.8      05 Aug 2023 05:55              Lactate, Blood: 1.0 mmol/L (08-02 @ 16:44)          06-13 Chol 161 mg/dL LDL -- HDL 41 mg/dL Trig 101 mg/dL                  Urinalysis Basic - ( 05 Aug 2023 05:55 )    Color: x / Appearance: x / SG: x / pH: x  Gluc: 109 mg/dL / Ketone: x  / Bili: x / Urobili: x   Blood: x / Protein: x / Nitrite: x   Leuk Esterase: x / RBC: x / WBC x   Sq Epi: x / Non Sq Epi: x / Bacteria: x        Culture - Blood (collected 02 Aug 2023 16:46)  Source: .Blood Blood-Peripheral  Preliminary Report (04 Aug 2023 22:02):    No growth at 48 Hours    Culture - Blood (collected 02 Aug 2023 16:44)  Source: .Blood Blood-Peripheral  Preliminary Report (04 Aug 2023 22:02):    No growth at 48 Hours    Culture - Other (collected 31 Jul 2023 05:05)  Source: Wound Sp. Instructions_Additional Info: Right hip ulceration  Final Report (02 Aug 2023 13:38):    Numerous Escherichia coli ESBL    Numerous Enterococcus faecalis    Rare Streptococcus dysgalactiae (Group C/G) "Susceptibilities not    performed"  Organism: Escherichia coli ESBL  Enterococcus faecalis (02 Aug 2023 13:38)  Organism: Enterococcus faecalis (02 Aug 2023 13:38)      Method Type: AVERY      -  Ampicillin: S <=2 Predicts results to ampicillin/sulbactam, amoxacillin-clavulanate and  piperacillin-tazobactam.      -  Tetracycline: R >8      -  Vancomycin: S 2  Organism: Escherichia coli ESBL (02 Aug 2023 13:38)      Method Type: AVERY      -  Amikacin: S <=16      -  Amoxicillin/Clavulanic Acid: S <=8/4      -  Ampicillin: R >16 These ampicillin results predict results for amoxicillin      -  Ampicillin/Sulbactam: R 8/4 Enterobacter, Klebsiella aerogenes, Citrobacter, and Serratia may develop resistance during prolonged therapy (3-4 days)      -  Aztreonam: R 8      -  Cefazolin: R >16 Enterobacter, Klebsiella aerogenes, Citrobacter, and Serratia may develop resistance during prolonged therapy (3-4 days)      -  Cefepime: R >16      -  Ceftriaxone: R >32 Enterobacter, Klebsiella aerogenes, Citrobacter, and Serratia may develop resistance during prolonged therapy      -  Ciprofloxacin: R >2      -  Ertapenem: S <=0.5      -  Gentamicin: S <=2      -  Imipenem: S <=1      -  Levofloxacin: R >4      -  Meropenem: S <=1      -  Piperacillin/Tazobactam: R <=8      -  Tobramycin: S <=2      -  Trimethoprim/Sulfamethoxazole: R >2/38        RADIOLOGY & ADDITIONAL TESTS:    Care Discussed with Consultants/Other Providers:

## 2023-08-05 NOTE — PROGRESS NOTE ADULT - ASSESSMENT
Pt has undrained right buttock abscess.  surgery agrees OR drainage optimal  -debridement right hip drainage rgiht buttock abscess scheduled for 8/7  -continue local wound care, off loading, and nutrition support

## 2023-08-05 NOTE — PROGRESS NOTE ADULT - SUBJECTIVE AND OBJECTIVE BOX
( x ) No complaints (  ) Complains of:     T(F): 98.9 (08-05-23 @ 05:46), Max: 98.9 (08-05-23 @ 05:46)  HR: 76 (08-05-23 @ 05:46) (72 - 76)  BP: 112/52 (08-05-23 @ 05:46) (107/54 - 112/52)  RR: 17 (08-05-23 @ 05:46) (17 - 17)  SpO2: 94% (08-05-23 @ 05:46) (94% - 96%)        Wound Location 1:  right hip wound  sloughing necrotic tissue excised, wound and tunnel packed with dakins gauze                                 8.6    7.21  )-----------( 368      ( 05 Aug 2023 05:55 )             26.6     CBC Full  -  ( 05 Aug 2023 05:55 )  WBC Count : 7.21 K/uL  RBC Count : 2.87 M/uL  Hemoglobin : 8.6 g/dL  Hematocrit : 26.6 %  Platelet Count - Automated : 368 K/uL  Mean Cell Volume : 92.7 fl  Mean Cell Hemoglobin : 30.0 pg  Mean Cell Hemoglobin Concentration : 32.3 gm/dL  Auto Neutrophil # : x  Auto Lymphocyte # : x  Auto Monocyte # : x  Auto Eosinophil # : x  Auto Basophil # : x  Auto Neutrophil % : x  Auto Lymphocyte % : x  Auto Monocyte % : x  Auto Eosinophil % : x  Auto Basophil % : x    143|110|24<109  3.9|25|1.04  8.8,--,--  08-05 @ 05:55      Urinalysis Basic - ( 05 Aug 2023 05:55 )    Color: x / Appearance: x / SG: x / pH: x  Gluc: 109 mg/dL / Ketone: x  / Bili: x / Urobili: x   Blood: x / Protein: x / Nitrite: x   Leuk Esterase: x / RBC: x / WBC x   Sq Epi: x / Non Sq Epi: x / Bacteria: x                Procedure Performed:  (x  )Yes     (  )No  Name of Procedure:  ( x )debridement, excisional right hip   (  )I&D    (  )Other:  (  )partial thickness     (  )full thickness     (  )subcutaneous     (  )muscle/tendon     (  )bone  (  )sharp     (  )surgical

## 2023-08-06 ENCOUNTER — TRANSCRIPTION ENCOUNTER (OUTPATIENT)
Age: 88
End: 2023-08-06

## 2023-08-06 LAB
BLD GP AB SCN SERPL QL: SIGNIFICANT CHANGE UP
HCT VFR BLD CALC: 31.1 % — LOW (ref 39–50)
HGB BLD-MCNC: 9.9 G/DL — LOW (ref 13–17)
MCHC RBC-ENTMCNC: 29.3 PG — SIGNIFICANT CHANGE UP (ref 27–34)
MCHC RBC-ENTMCNC: 31.8 GM/DL — LOW (ref 32–36)
MCV RBC AUTO: 92 FL — SIGNIFICANT CHANGE UP (ref 80–100)
NRBC # BLD: 0 /100 WBCS — SIGNIFICANT CHANGE UP (ref 0–0)
PLATELET # BLD AUTO: 483 K/UL — HIGH (ref 150–400)
RBC # BLD: 3.38 M/UL — LOW (ref 4.2–5.8)
RBC # FLD: 14.6 % — HIGH (ref 10.3–14.5)
WBC # BLD: 9.14 K/UL — SIGNIFICANT CHANGE UP (ref 3.8–10.5)
WBC # FLD AUTO: 9.14 K/UL — SIGNIFICANT CHANGE UP (ref 3.8–10.5)

## 2023-08-06 PROCEDURE — 99233 SBSQ HOSP IP/OBS HIGH 50: CPT

## 2023-08-06 RX ADMIN — Medication 1 APPLICATION(S): at 06:22

## 2023-08-06 RX ADMIN — MEROPENEM 100 MILLIGRAM(S): 1 INJECTION INTRAVENOUS at 06:19

## 2023-08-06 RX ADMIN — RIVASTIGMINE 1 PATCH: 4.6 PATCH, EXTENDED RELEASE TRANSDERMAL at 21:46

## 2023-08-06 RX ADMIN — RIVASTIGMINE 1 PATCH: 4.6 PATCH, EXTENDED RELEASE TRANSDERMAL at 22:34

## 2023-08-06 RX ADMIN — NYSTATIN CREAM 1 APPLICATION(S): 100000 CREAM TOPICAL at 06:22

## 2023-08-06 RX ADMIN — Medication 10 MILLIGRAM(S): at 17:14

## 2023-08-06 RX ADMIN — Medication 25 MILLIGRAM(S): at 06:18

## 2023-08-06 RX ADMIN — Medication 81 MILLIGRAM(S): at 12:00

## 2023-08-06 RX ADMIN — SENNA PLUS 2 TABLET(S): 8.6 TABLET ORAL at 21:46

## 2023-08-06 RX ADMIN — Medication 500 MILLIGRAM(S): at 12:00

## 2023-08-06 RX ADMIN — MEROPENEM 100 MILLIGRAM(S): 1 INJECTION INTRAVENOUS at 13:41

## 2023-08-06 RX ADMIN — ZINC SULFATE TAB 220 MG (50 MG ZINC EQUIVALENT) 220 MILLIGRAM(S): 220 (50 ZN) TAB at 12:00

## 2023-08-06 RX ADMIN — ZINC OXIDE 1 APPLICATION(S): 200 OINTMENT TOPICAL at 17:14

## 2023-08-06 RX ADMIN — NYSTATIN CREAM 1 APPLICATION(S): 100000 CREAM TOPICAL at 17:13

## 2023-08-06 RX ADMIN — ATORVASTATIN CALCIUM 40 MILLIGRAM(S): 80 TABLET, FILM COATED ORAL at 21:46

## 2023-08-06 RX ADMIN — Medication 1 APPLICATION(S): at 18:59

## 2023-08-06 RX ADMIN — ZINC OXIDE 1 APPLICATION(S): 200 OINTMENT TOPICAL at 06:21

## 2023-08-06 RX ADMIN — Medication 25 MILLIGRAM(S): at 17:14

## 2023-08-06 RX ADMIN — TAMSULOSIN HYDROCHLORIDE 0.4 MILLIGRAM(S): 0.4 CAPSULE ORAL at 21:46

## 2023-08-06 RX ADMIN — MEROPENEM 100 MILLIGRAM(S): 1 INJECTION INTRAVENOUS at 21:46

## 2023-08-06 RX ADMIN — Medication 10 MILLIGRAM(S): at 06:18

## 2023-08-06 RX ADMIN — Medication 1 TABLET(S): at 12:00

## 2023-08-06 NOTE — PROGRESS NOTE ADULT - ASSESSMENT
87 year old M PMH dementia, BPH, DVT not on AC, IBS, OA with previous pelvic fracture brought in from [facility] for nonhealing decubitus ulcer admitted for possible osteomyelitis of R hip, failed outpatient abx.    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 Posada repeated CT RT hip and pelvis with IV contrast, discussed with general surgery will take patient to OR on Monday 8/7 for drainage   NPO after midnight   ID stopped zosyn, converted to meropenem  Continue local wound care as per plastics and wound care team with dakins solution  Cardiology cleared at moderate CV risk prior to possible wound ulcer debridement  Patient currently on hospice care at [facility] - daughter Salina is ok with IV abx and, if needed, a debridement of the area. She is ok with patient going to OR on Monday 8/7    CAD  - Continue asa 81mg, atorvastatin 40mg, metoprolol tartrate 25mg BID     Anemia  -Drop in H/H noted, improved this am  -Monitor CBC, discontinue lovenox    Dementia  - On rivastigmine 4.5mg capsule at home, will continue patch while in hospital    BPH  - Continue Flomax    IBS  - Continue Dicyclomine    DVT ppx  - hold lovenox in setting of anemia and dropping h/h    Dispo: Pending medical course. If complex wound care cannot return to [facility] under Hospice/Palliative care:  Admit patient to Hospice Service  Start Morphine infusion 100mg in 100ml NS @ 1mg/hr, titrate accordingly.   Morphine 2mg IVP Q4hrs prn for breakthrough pain.  Albuterol/Atrovent 1 unit neb q8hrs PRN  Oxygen via Nasal Canula at 2 L/min, keep saturation > 94% at all times.   Tylenol 650mg PO Q6hrs PRN for fever.  Start Colace 300mg PO QHS.  Fan in room  DVT Prophylaxis: Heparin 5000 units sc q8hrs   GI Prophylaxis: Protonix 40mg PO QDaily   and Hospice nurse evaluation for sub-acute placement.  DNR/DNI in effect.    Updated daughter Salina Quinones [phone]     Luis nurse from [facility]: [phone]   87 year old M PMH dementia, BPH, DVT not on AC, IBS, OA with previous pelvic fracture brought in from [facility] for nonhealing decubitus ulcer admitted for possible osteomyelitis of R hip, failed outpatient abx.    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 Posada repeated CT RT hip and pelvis with IV contrast, discussed with general surgery will take patient to OR on Monday 8/7 for drainage   NPO after midnight   ID stopped zosyn, converted to meropenem  Continue local wound care as per plastics and wound care team with dakins solution  Cardiology cleared at moderate CV risk prior to possible wound ulcer debridement  Patient currently on hospice care at [facility] - daughter Salina is ok with IV abx and, if needed, a debridement of the area. She is ok with patient going to OR on Monday 8/7    CAD  - Continue asa 81mg, atorvastatin 40mg, metoprolol tartrate 25mg BID     Anemia  -Drop in H/H noted, improved this am  -Monitor CBC, discontinued lovenox in setting of dropping H/H, now will continue to hold for OR    Dementia  - On rivastigmine 4.5mg capsule at home, will continue patch while in hospital    BPH  - Continue Flomax    IBS  - Continue Dicyclomine    DVT ppx  - hold lovenox for OR    Dispo: Pending medical course. If complex wound care cannot return to [facility] under Hospice/Palliative care:  Admit patient to Hospice Service  Start Morphine infusion 100mg in 100ml NS @ 1mg/hr, titrate accordingly.   Morphine 2mg IVP Q4hrs prn for breakthrough pain.  Albuterol/Atrovent 1 unit neb q8hrs PRN  Oxygen via Nasal Canula at 2 L/min, keep saturation > 94% at all times.   Tylenol 650mg PO Q6hrs PRN for fever.  Start Colace 300mg PO QHS.  Fan in room  DVT Prophylaxis: Heparin 5000 units sc q8hrs   GI Prophylaxis: Protonix 40mg PO QDaily   and Hospice nurse evaluation for sub-acute placement.  DNR/DNI in effect.    Updated daughter Salina Quinones [phone]     Luis nurse from [facility]: [phone]   87 year old M PMH dementia, BPH, DVT not on AC, IBS, OA with previous pelvic fracture brought in from [facility] for nonhealing decubitus ulcer admitted for possible osteomyelitis of R hip, failed outpatient abx.    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 Posada repeated CT RT hip and pelvis with IV contrast, discussed with general surgery will take patient to OR on Monday 8/7 for drainage   NPO after midnight   ID stopped zosyn, converted to meropenem  Continue local wound care as per plastics and wound care team with dakins solution  Cardiology cleared at moderate CV risk prior to possible wound ulcer debridement  Patient currently on hospice care at [facility] - daughter Salina is ok with IV abx and, if needed, a debridement of the area. She is ok with patient going to OR on Monday 8/7    CAD  - Continue asa 81mg, atorvastatin 40mg, metoprolol tartrate 25mg BID     Anemia  -Drop in H/H noted, improved this am  -Monitor CBC, discontinued lovenox in setting of dropping H/H, now will continue to hold for OR    Dementia  - On rivastigmine 4.5mg capsule at home, will continue patch while in hospital    BPH  - Continue Flomax    IBS  - Continue Dicyclomine    DVT ppx  - hold lovenox for OR    Dispo: Pending medical course. If complex wound care cannot return to [facility] under Hospice/Palliative care:  DNR/DNI  Updated daughter Salina Quinones [phone]     Luis, nurse from [facility]: [phone]

## 2023-08-06 NOTE — PROGRESS NOTE ADULT - SUBJECTIVE AND OBJECTIVE BOX
Patient is a 87y old  Male who presents with a chief complaint of osteo of R femur (05 Aug 2023 16:39)      Patient seen and examined at bedside. No overnight events reported. Patient states he feels sleepy.  He denies pain, sob, nausea, vomiting.    ALLERGIES:  No Known Drug Allergies  shellfish (Other)  lactose (Diarrhea)    MEDICATIONS  (STANDING):  ascorbic acid 500 milliGRAM(s) Oral daily  aspirin  chewable 81 milliGRAM(s) Oral daily  atorvastatin 40 milliGRAM(s) Oral at bedtime  Dakins Solution - 1/2 Strength 1 Application(s) Topical two times a day  dicyclomine 10 milliGRAM(s) Oral two times a day before meals  meropenem  IVPB 500 milliGRAM(s) IV Intermittent every 8 hours  metoprolol tartrate 25 milliGRAM(s) Oral two times a day  multivitamin 1 Tablet(s) Oral daily  nystatin Cream 1 Application(s) Topical two times a day  rivastigmine patch  4.6 mG/24 Hr(s). 1 Patch Transdermal every 24 hours  senna 2 Tablet(s) Oral at bedtime  tamsulosin 0.4 milliGRAM(s) Oral at bedtime  zinc oxide 40% Paste 1 Application(s) Topical two times a day  zinc sulfate 220 milliGRAM(s) Oral daily    MEDICATIONS  (PRN):  acetaminophen     Tablet .. 650 milliGRAM(s) Oral every 6 hours PRN Temp greater or equal to 38C (100.4F), Mild Pain (1 - 3)  aluminum hydroxide/magnesium hydroxide/simethicone Suspension 30 milliLiter(s) Oral every 4 hours PRN Dyspepsia  melatonin 3 milliGRAM(s) Oral at bedtime PRN Insomnia  ondansetron Injectable 4 milliGRAM(s) IV Push every 8 hours PRN Nausea and/or Vomiting    Vital Signs Last 24 Hrs  T(F): 97.5 (06 Aug 2023 04:45), Max: 98.2 (05 Aug 2023 20:33)  HR: 60 (06 Aug 2023 04:45) (60 - 73)  BP: 118/69 (06 Aug 2023 04:45) (112/59 - 118/69)  RR: 18 (06 Aug 2023 04:45) (17 - 18)  SpO2: 98% (06 Aug 2023 04:45) (97% - 98%)  I&O's Summary    05 Aug 2023 07:01  -  06 Aug 2023 07:00  --------------------------------------------------------  IN: 100 mL / OUT: 0 mL / NET: 100 mL      PHYSICAL EXAM:  General: NAD, Awake, answering questions appropriately, tired  ENT: No gross hearing impairment, Moist mucous membranes, no thrush  Neck: Supple, No JVD  Lungs: Clear to auscultation bilaterally, good air entry, non-labored breathing  Cardio: RRR, S1/S2, No murmur  Abdomen: Soft, Nontender, Nondistended; Bowel sounds present  Extremities: No calf tenderness, No cyanosis, No pitting edema  Psych: Appropriate mood and affect    LABS:                        9.9    9.14  )-----------( 483      ( 06 Aug 2023 06:19 )             31.1     08-05    143  |  110  |  24  ----------------------------<  109  3.9   |  25  |  1.04    Ca    8.8      05 Aug 2023 05:55                      06-13 Chol 161 mg/dL LDL -- HDL 41 mg/dL Trig 101 mg/dL                  Urinalysis Basic - ( 05 Aug 2023 05:55 )    Color: x / Appearance: x / SG: x / pH: x  Gluc: 109 mg/dL / Ketone: x  / Bili: x / Urobili: x   Blood: x / Protein: x / Nitrite: x   Leuk Esterase: x / RBC: x / WBC x   Sq Epi: x / Non Sq Epi: x / Bacteria: x        Culture - Blood (collected 02 Aug 2023 16:46)  Source: .Blood Blood-Peripheral  Preliminary Report (05 Aug 2023 22:01):    No growth at 72 Hours    Culture - Blood (collected 02 Aug 2023 16:44)  Source: .Blood Blood-Peripheral  Preliminary Report (05 Aug 2023 22:01):    No growth at 72 Hours    Culture - Other (collected 31 Jul 2023 05:05)  Source: Wound Sp. Instructions_Additional Info: Right hip ulceration  Final Report (02 Aug 2023 13:38):    Numerous Escherichia coli ESBL    Numerous Enterococcus faecalis    Rare Streptococcus dysgalactiae (Group C/G) "Susceptibilities not    performed"  Organism: Escherichia coli ESBL  Enterococcus faecalis (02 Aug 2023 13:38)  Organism: Enterococcus faecalis (02 Aug 2023 13:38)      Method Type: AVERY      -  Ampicillin: S <=2 Predicts results to ampicillin/sulbactam, amoxacillin-clavulanate and  piperacillin-tazobactam.      -  Tetracycline: R >8      -  Vancomycin: S 2  Organism: Escherichia coli ESBL (02 Aug 2023 13:38)      Method Type: AVERY      -  Amikacin: S <=16      -  Amoxicillin/Clavulanic Acid: S <=8/4      -  Ampicillin: R >16 These ampicillin results predict results for amoxicillin      -  Ampicillin/Sulbactam: R 8/4 Enterobacter, Klebsiella aerogenes, Citrobacter, and Serratia may develop resistance during prolonged therapy (3-4 days)      -  Aztreonam: R 8      -  Cefazolin: R >16 Enterobacter, Klebsiella aerogenes, Citrobacter, and Serratia may develop resistance during prolonged therapy (3-4 days)      -  Cefepime: R >16      -  Ceftriaxone: R >32 Enterobacter, Klebsiella aerogenes, Citrobacter, and Serratia may develop resistance during prolonged therapy      -  Ciprofloxacin: R >2      -  Ertapenem: S <=0.5      -  Gentamicin: S <=2      -  Imipenem: S <=1      -  Levofloxacin: R >4      -  Meropenem: S <=1      -  Piperacillin/Tazobactam: R <=8      -  Tobramycin: S <=2      -  Trimethoprim/Sulfamethoxazole: R >2/38        RADIOLOGY & ADDITIONAL TESTS:    Care Discussed with Consultants/Other Providers:

## 2023-08-07 ENCOUNTER — TRANSCRIPTION ENCOUNTER (OUTPATIENT)
Age: 88
End: 2023-08-07

## 2023-08-07 ENCOUNTER — RESULT REVIEW (OUTPATIENT)
Age: 88
End: 2023-08-07

## 2023-08-07 LAB
ANION GAP SERPL CALC-SCNC: 6 MMOL/L — SIGNIFICANT CHANGE UP (ref 5–17)
BUN SERPL-MCNC: 19 MG/DL — SIGNIFICANT CHANGE UP (ref 7–23)
CALCIUM SERPL-MCNC: 8.5 MG/DL — SIGNIFICANT CHANGE UP (ref 8.4–10.5)
CHLORIDE SERPL-SCNC: 108 MMOL/L — SIGNIFICANT CHANGE UP (ref 96–108)
CO2 SERPL-SCNC: 29 MMOL/L — SIGNIFICANT CHANGE UP (ref 22–31)
CREAT SERPL-MCNC: 0.87 MG/DL — SIGNIFICANT CHANGE UP (ref 0.5–1.3)
CULTURE RESULTS: SIGNIFICANT CHANGE UP
CULTURE RESULTS: SIGNIFICANT CHANGE UP
EGFR: 85 ML/MIN/1.73M2 — SIGNIFICANT CHANGE UP
GLUCOSE SERPL-MCNC: 105 MG/DL — HIGH (ref 70–99)
HCT VFR BLD CALC: 28.9 % — LOW (ref 39–50)
HGB BLD-MCNC: 9.3 G/DL — LOW (ref 13–17)
MAGNESIUM SERPL-MCNC: 2 MG/DL — SIGNIFICANT CHANGE UP (ref 1.6–2.6)
MCHC RBC-ENTMCNC: 29.9 PG — SIGNIFICANT CHANGE UP (ref 27–34)
MCHC RBC-ENTMCNC: 32.2 GM/DL — SIGNIFICANT CHANGE UP (ref 32–36)
MCV RBC AUTO: 92.9 FL — SIGNIFICANT CHANGE UP (ref 80–100)
NRBC # BLD: 0 /100 WBCS — SIGNIFICANT CHANGE UP (ref 0–0)
PLATELET # BLD AUTO: 401 K/UL — HIGH (ref 150–400)
POTASSIUM SERPL-MCNC: 4.2 MMOL/L — SIGNIFICANT CHANGE UP (ref 3.5–5.3)
POTASSIUM SERPL-SCNC: 4.2 MMOL/L — SIGNIFICANT CHANGE UP (ref 3.5–5.3)
RBC # BLD: 3.11 M/UL — LOW (ref 4.2–5.8)
RBC # FLD: 14.5 % — SIGNIFICANT CHANGE UP (ref 10.3–14.5)
SODIUM SERPL-SCNC: 143 MMOL/L — SIGNIFICANT CHANGE UP (ref 135–145)
SPECIMEN SOURCE: SIGNIFICANT CHANGE UP
SPECIMEN SOURCE: SIGNIFICANT CHANGE UP
WBC # BLD: 8.38 K/UL — SIGNIFICANT CHANGE UP (ref 3.8–10.5)
WBC # FLD AUTO: 8.38 K/UL — SIGNIFICANT CHANGE UP (ref 3.8–10.5)

## 2023-08-07 PROCEDURE — 88304 TISSUE EXAM BY PATHOLOGIST: CPT | Mod: 26

## 2023-08-07 PROCEDURE — 99233 SBSQ HOSP IP/OBS HIGH 50: CPT

## 2023-08-07 PROCEDURE — 97605 NEG PRS WND THER DME<=50SQCM: CPT | Mod: AS

## 2023-08-07 PROCEDURE — 99232 SBSQ HOSP IP/OBS MODERATE 35: CPT

## 2023-08-07 DEVICE — PARTICLE POWDER MATRISTEM MICROMATRIX 1000MG: Type: IMPLANTABLE DEVICE | Site: RIGHT | Status: FUNCTIONAL

## 2023-08-07 DEVICE — SHEET MESH MATRISTEM BURN MATRIX 10X15CM: Type: IMPLANTABLE DEVICE | Site: RIGHT | Status: FUNCTIONAL

## 2023-08-07 DEVICE — IMPLANTABLE DEVICE: Type: IMPLANTABLE DEVICE | Site: RIGHT | Status: FUNCTIONAL

## 2023-08-07 RX ORDER — ONDANSETRON 8 MG/1
4 TABLET, FILM COATED ORAL EVERY 8 HOURS
Refills: 0 | Status: DISCONTINUED | OUTPATIENT
Start: 2023-08-07 | End: 2023-08-07

## 2023-08-07 RX ORDER — METOPROLOL TARTRATE 50 MG
25 TABLET ORAL
Refills: 0 | Status: DISCONTINUED | OUTPATIENT
Start: 2023-08-07 | End: 2023-08-16

## 2023-08-07 RX ORDER — ACETAMINOPHEN 500 MG
650 TABLET ORAL EVERY 6 HOURS
Refills: 0 | Status: DISCONTINUED | OUTPATIENT
Start: 2023-08-07 | End: 2023-08-07

## 2023-08-07 RX ORDER — LANOLIN ALCOHOL/MO/W.PET/CERES
3 CREAM (GRAM) TOPICAL AT BEDTIME
Refills: 0 | Status: DISCONTINUED | OUTPATIENT
Start: 2023-08-07 | End: 2023-08-07

## 2023-08-07 RX ORDER — SENNA PLUS 8.6 MG/1
2 TABLET ORAL AT BEDTIME
Refills: 0 | Status: DISCONTINUED | OUTPATIENT
Start: 2023-08-07 | End: 2023-08-16

## 2023-08-07 RX ORDER — ZINC SULFATE TAB 220 MG (50 MG ZINC EQUIVALENT) 220 (50 ZN) MG
220 TAB ORAL DAILY
Refills: 0 | Status: DISCONTINUED | OUTPATIENT
Start: 2023-08-07 | End: 2023-08-07

## 2023-08-07 RX ORDER — ATORVASTATIN CALCIUM 80 MG/1
40 TABLET, FILM COATED ORAL AT BEDTIME
Refills: 0 | Status: DISCONTINUED | OUTPATIENT
Start: 2023-08-07 | End: 2023-08-16

## 2023-08-07 RX ORDER — MEROPENEM 1 G/30ML
500 INJECTION INTRAVENOUS EVERY 8 HOURS
Refills: 0 | Status: DISCONTINUED | OUTPATIENT
Start: 2023-08-07 | End: 2023-08-07

## 2023-08-07 RX ORDER — MEROPENEM 1 G/30ML
500 INJECTION INTRAVENOUS EVERY 8 HOURS
Refills: 0 | Status: DISCONTINUED | OUTPATIENT
Start: 2023-08-07 | End: 2023-08-10

## 2023-08-07 RX ORDER — SENNA PLUS 8.6 MG/1
2 TABLET ORAL AT BEDTIME
Refills: 0 | Status: DISCONTINUED | OUTPATIENT
Start: 2023-08-07 | End: 2023-08-07

## 2023-08-07 RX ORDER — ATORVASTATIN CALCIUM 80 MG/1
40 TABLET, FILM COATED ORAL AT BEDTIME
Refills: 0 | Status: DISCONTINUED | OUTPATIENT
Start: 2023-08-07 | End: 2023-08-07

## 2023-08-07 RX ORDER — METOPROLOL TARTRATE 50 MG
25 TABLET ORAL
Refills: 0 | Status: DISCONTINUED | OUTPATIENT
Start: 2023-08-07 | End: 2023-08-07

## 2023-08-07 RX ORDER — ASCORBIC ACID 60 MG
500 TABLET,CHEWABLE ORAL DAILY
Refills: 0 | Status: DISCONTINUED | OUTPATIENT
Start: 2023-08-07 | End: 2023-08-07

## 2023-08-07 RX ORDER — TAMSULOSIN HYDROCHLORIDE 0.4 MG/1
0.4 CAPSULE ORAL AT BEDTIME
Refills: 0 | Status: DISCONTINUED | OUTPATIENT
Start: 2023-08-07 | End: 2023-08-16

## 2023-08-07 RX ORDER — ACETAMINOPHEN 500 MG
650 TABLET ORAL EVERY 6 HOURS
Refills: 0 | Status: DISCONTINUED | OUTPATIENT
Start: 2023-08-07 | End: 2023-08-16

## 2023-08-07 RX ORDER — ASCORBIC ACID 60 MG
500 TABLET,CHEWABLE ORAL DAILY
Refills: 0 | Status: DISCONTINUED | OUTPATIENT
Start: 2023-08-07 | End: 2023-08-16

## 2023-08-07 RX ORDER — LANOLIN ALCOHOL/MO/W.PET/CERES
3 CREAM (GRAM) TOPICAL AT BEDTIME
Refills: 0 | Status: DISCONTINUED | OUTPATIENT
Start: 2023-08-07 | End: 2023-08-16

## 2023-08-07 RX ORDER — TAMSULOSIN HYDROCHLORIDE 0.4 MG/1
0.4 CAPSULE ORAL AT BEDTIME
Refills: 0 | Status: DISCONTINUED | OUTPATIENT
Start: 2023-08-07 | End: 2023-08-07

## 2023-08-07 RX ORDER — ZINC SULFATE TAB 220 MG (50 MG ZINC EQUIVALENT) 220 (50 ZN) MG
220 TAB ORAL DAILY
Refills: 0 | Status: DISCONTINUED | OUTPATIENT
Start: 2023-08-07 | End: 2023-08-16

## 2023-08-07 RX ORDER — ONDANSETRON 8 MG/1
4 TABLET, FILM COATED ORAL EVERY 8 HOURS
Refills: 0 | Status: DISCONTINUED | OUTPATIENT
Start: 2023-08-07 | End: 2023-08-16

## 2023-08-07 RX ADMIN — MEROPENEM 100 MILLIGRAM(S): 1 INJECTION INTRAVENOUS at 22:06

## 2023-08-07 RX ADMIN — MEROPENEM 100 MILLIGRAM(S): 1 INJECTION INTRAVENOUS at 14:30

## 2023-08-07 RX ADMIN — Medication 1 APPLICATION(S): at 06:45

## 2023-08-07 RX ADMIN — Medication 25 MILLIGRAM(S): at 06:24

## 2023-08-07 RX ADMIN — RIVASTIGMINE 1 PATCH: 4.6 PATCH, EXTENDED RELEASE TRANSDERMAL at 21:13

## 2023-08-07 RX ADMIN — ATORVASTATIN CALCIUM 40 MILLIGRAM(S): 80 TABLET, FILM COATED ORAL at 21:21

## 2023-08-07 RX ADMIN — MEROPENEM 100 MILLIGRAM(S): 1 INJECTION INTRAVENOUS at 06:24

## 2023-08-07 RX ADMIN — NYSTATIN CREAM 1 APPLICATION(S): 100000 CREAM TOPICAL at 06:23

## 2023-08-07 RX ADMIN — SENNA PLUS 2 TABLET(S): 8.6 TABLET ORAL at 21:22

## 2023-08-07 RX ADMIN — ZINC OXIDE 1 APPLICATION(S): 200 OINTMENT TOPICAL at 06:23

## 2023-08-07 RX ADMIN — TAMSULOSIN HYDROCHLORIDE 0.4 MILLIGRAM(S): 0.4 CAPSULE ORAL at 21:22

## 2023-08-07 RX ADMIN — RIVASTIGMINE 1 PATCH: 4.6 PATCH, EXTENDED RELEASE TRANSDERMAL at 08:03

## 2023-08-07 RX ADMIN — Medication 10 MILLIGRAM(S): at 06:24

## 2023-08-07 NOTE — BRIEF OPERATIVE NOTE - NSICDXBRIEFPROCEDURE_GEN_ALL_CORE_FT
PROCEDURES:  Debridement, soft tissue and bone, hip 07-Aug-2023 18:10:24 right hip Ingrid Inman   PROCEDURES:  Debridement, fascia 07-Aug-2023 18:37:41 right hip; application of micromatrix and wound vac Ingrid Inman

## 2023-08-07 NOTE — PROGRESS NOTE ADULT - SUBJECTIVE AND OBJECTIVE BOX
Patient is a 87y old  Male who presents with a chief complaint of osteo of R femur (06 Aug 2023 10:36)      Patient seen and examined at bedside. No overnight events reported. Pt offers no complaints.    ALLERGIES:  No Known Drug Allergies  shellfish (Other)  lactose (Diarrhea)    MEDICATIONS  (STANDING):  ascorbic acid 500 milliGRAM(s) Oral daily  aspirin  chewable 81 milliGRAM(s) Oral daily  atorvastatin 40 milliGRAM(s) Oral at bedtime  Dakins Solution - 1/2 Strength 1 Application(s) Topical two times a day  dicyclomine 10 milliGRAM(s) Oral two times a day before meals  meropenem  IVPB 500 milliGRAM(s) IV Intermittent every 8 hours  metoprolol tartrate 25 milliGRAM(s) Oral two times a day  multivitamin 1 Tablet(s) Oral daily  nystatin Cream 1 Application(s) Topical two times a day  rivastigmine patch  4.6 mG/24 Hr(s). 1 Patch Transdermal every 24 hours  senna 2 Tablet(s) Oral at bedtime  tamsulosin 0.4 milliGRAM(s) Oral at bedtime  zinc oxide 40% Paste 1 Application(s) Topical two times a day  zinc sulfate 220 milliGRAM(s) Oral daily    MEDICATIONS  (PRN):  acetaminophen     Tablet .. 650 milliGRAM(s) Oral every 6 hours PRN Temp greater or equal to 38C (100.4F), Mild Pain (1 - 3)  aluminum hydroxide/magnesium hydroxide/simethicone Suspension 30 milliLiter(s) Oral every 4 hours PRN Dyspepsia  melatonin 3 milliGRAM(s) Oral at bedtime PRN Insomnia  ondansetron Injectable 4 milliGRAM(s) IV Push every 8 hours PRN Nausea and/or Vomiting    Vital Signs Last 24 Hrs  T(F): 98 (07 Aug 2023 05:00), Max: 99.4 (06 Aug 2023 15:30)  HR: 82 (07 Aug 2023 05:00) (82 - 95)  BP: 146/69 (07 Aug 2023 05:00) (121/63 - 146/69)  RR: 16 (07 Aug 2023 05:00) (12 - 16)  SpO2: 93% (07 Aug 2023 05:00) (93% - 97%)  I&O's Summary    PHYSICAL EXAM:  General: NAD, Alert, but sleepy.  ENT: No gross hearing impairment, Moist mucous membranes, no thrush  Neck: Supple, No JVD  Lungs: Clear to auscultation bilaterally, good air entry, non-labored breathing  Cardio: RRR, S1/S2, No murmur  Abdomen: Soft, Nontender, Nondistended; Bowel sounds present  Extremities: right hip wound dressed, No calf tenderness, No cyanosis, No pitting edema  Neuro: sleepy but arousable, no focal deficits    LABS:                        9.3    8.38  )-----------( 401      ( 07 Aug 2023 07:27 )             28.9     08-07    143  |  108  |  19  ----------------------------<  105  4.2   |  29  |  0.87    Ca    8.5      07 Aug 2023 07:27  Mg     2.0     08-07                      06-13 Chol 161 mg/dL LDL -- HDL 41 mg/dL Trig 101 mg/dL                  Urinalysis Basic - ( 07 Aug 2023 07:27 )    Color: x / Appearance: x / SG: x / pH: x  Gluc: 105 mg/dL / Ketone: x  / Bili: x / Urobili: x   Blood: x / Protein: x / Nitrite: x   Leuk Esterase: x / RBC: x / WBC x   Sq Epi: x / Non Sq Epi: x / Bacteria: x        Culture - Blood (collected 02 Aug 2023 16:46)  Source: .Blood Blood-Peripheral  Preliminary Report (06 Aug 2023 22:01):    No growth at 4 days    Culture - Blood (collected 02 Aug 2023 16:44)  Source: .Blood Blood-Peripheral  Preliminary Report (06 Aug 2023 22:01):    No growth at 4 days        RADIOLOGY & ADDITIONAL TESTS:  < from: CT Pelvis w/ IV Cont (08.03.23 @ 12:17) >    IMPRESSION:  *  Stable deep ulcer overlying the right greater trochanter reaching the   underlying bone with underlying reactive bony changes suggestive of   osteomyelitis, unchanged from prior.  *  Fluid collection extending from the ulcer defect insinuating between   the right gluteus musculature unchanged in size measuring 6.2 x 3.6 cm.  *  Stable collection along the right hamstring insertion measuring 2.1 x   1.7 cm.  *  Stable intramuscular collection within the right adductor brevis   measuring 1.7 cm.  *  Stable induration overlying the left greater trochanter, likely also   related to decubitus change.      < end of copied text >    Care Discussed with Consultants/Other Providers:

## 2023-08-07 NOTE — PROGRESS NOTE ADULT - ASSESSMENT
A/P 87 year old M PMH dementia, BPH, DVT not on AC. IBS, OA with previous pelvic fracture brought in from [facility] for nonhealing decubitus ulcer admitted for possible osteomyelitis of R hip, failed outpatient abx     chart reviewed, noted pt scheduled for OR today for debridement and drainage of  abscess        Assessments/Plans:  per med teams        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 with possible OM on CT scan  CT with possible signs of osteo and abscess formation, may have RT gluteal abscess   Wound cultures growing ESBL E Coli and Enterococcus Faecalis     -  ID stopped zosyn, converted to meropenem     Dr. Posada, Plastics following-- un drained right buttock abscess,   surgery following  agrees OR drainage optimal  -debridement right hip drainage right  buttock abscess scheduled for Today  8/7  -continue local wound care, off loading, and nutrition support     OR today for drainage of wound, may need a wound vac    Patient was on hospice care at [facility] family was agreeable to IV antibx and possible debridement     CAD  - is on  asa 81mg, atorvastatin 40mg, metoprolol tartrate 25mg BID     Dementia  - on rivastigmine 4.5mg capsule       Palliative  :  as a follow  up today , chart reviewed, pt seen bedside. Pt not much change noted, in bed, not very interactive, says 1-2 words, confused. vss  Per chart review , pt scheduled for OR today for R hip wound, debridement and drainage of abscess .  Possible may need wound vac.   palliative   had  recommended and d/w daughter,  no aggressive  interventions. Also  had reviewed, comfort care/hospice  and likelihood that pts wound would not heal due to poor nutritional status and decreased mobility .   Will cont to  follow  clinical course w/ med team.     POC as  per interdisciplinary  care teams.

## 2023-08-07 NOTE — PROGRESS NOTE ADULT - SUBJECTIVE AND OBJECTIVE BOX
(x  ) No complaints (  ) Complains of:     T(F): 97.9 (08-07-23 @ 10:57), Max: 99.4 (08-06-23 @ 15:30)  HR: 93 (08-07-23 @ 10:57) (82 - 95)  BP: 108/63 (08-07-23 @ 10:57) (108/63 - 146/69)  RR: 16 (08-07-23 @ 10:57) (12 - 16)  SpO2: 96% (08-07-23 @ 10:57) (93% - 97%)    Weight (kg): 54.4 (08-07-23 @ 10:57)      Wound Location 1:  left chest drainage serosanguinous, wound base pink granulation tissue, minor slough, tunnel now 6.7 cm, periwound blanches                      9.3    8.38  )-----------( 401      ( 07 Aug 2023 07:27 )             28.9     CBC Full  -  ( 07 Aug 2023 07:27 )  WBC Count : 8.38 K/uL  RBC Count : 3.11 M/uL  Hemoglobin : 9.3 g/dL  Hematocrit : 28.9 %  Platelet Count - Automated : 401 K/uL  Mean Cell Volume : 92.9 fl  Mean Cell Hemoglobin : 29.9 pg  Mean Cell Hemoglobin Concentration : 32.2 gm/dL  Auto Neutrophil # : x  Auto Lymphocyte # : x  Auto Monocyte # : x  Auto Eosinophil # : x  Auto Basophil # : x  Auto Neutrophil % : x  Auto Lymphocyte % : x  Auto Monocyte % : x  Auto Eosinophil % : x  Auto Basophil % : x    143|108|19<105  4.2|29|0.87  8.5,2.0,--  08-07 @ 07:27      Urinalysis Basic - ( 07 Aug 2023 07:27 )    Color: x / Appearance: x / SG: x / pH: x  Gluc: 105 mg/dL / Ketone: x  / Bili: x / Urobili: x   Blood: x / Protein: x / Nitrite: x   Leuk Esterase: x / RBC: x / WBC x   Sq Epi: x / Non Sq Epi: x / Bacteria: x                Procedure Performed:  (  )Yes     ( x )No  Name of Procedure:  (  )debridement    (  )I&D    (  )Other:  (  )partial thickness     (  )full thickness     (  )subcutaneous     (  )muscle/tendon     (  )bone  (  )sharp     (  )surgical

## 2023-08-07 NOTE — PROGRESS NOTE ADULT - SUBJECTIVE AND OBJECTIVE BOX
Progress:  in bed, not very interactive, eyes closed, looked comfortable, no distress.     Per chart pt  for OR today     Review of Systems: [ Unable to obtain due to poor mentation]    MEDICATIONS  (STANDING):  ascorbic acid 500 milliGRAM(s) Oral daily  aspirin  chewable 81 milliGRAM(s) Oral daily  atorvastatin 40 milliGRAM(s) Oral at bedtime  Dakins Solution - 1/2 Strength 1 Application(s) Topical two times a day  dicyclomine 10 milliGRAM(s) Oral two times a day before meals  meropenem  IVPB 500 milliGRAM(s) IV Intermittent every 8 hours  metoprolol tartrate 25 milliGRAM(s) Oral two times a day  multivitamin 1 Tablet(s) Oral daily  nystatin Cream 1 Application(s) Topical two times a day  rivastigmine patch  4.6 mG/24 Hr(s). 1 Patch Transdermal every 24 hours  senna 2 Tablet(s) Oral at bedtime  tamsulosin 0.4 milliGRAM(s) Oral at bedtime  zinc oxide 40% Paste 1 Application(s) Topical two times a day  zinc sulfate 220 milliGRAM(s) Oral daily    MEDICATIONS  (PRN):  acetaminophen     Tablet .. 650 milliGRAM(s) Oral every 6 hours PRN Temp greater or equal to 38C (100.4F), Mild Pain (1 - 3)  aluminum hydroxide/magnesium hydroxide/simethicone Suspension 30 milliLiter(s) Oral every 4 hours PRN Dyspepsia  melatonin 3 milliGRAM(s) Oral at bedtime PRN Insomnia  ondansetron Injectable 4 milliGRAM(s) IV Push every 8 hours PRN Nausea and/or Vomiting      PHYSICAL EXAM:  Vital Signs Last 24 Hrs  T(C): 36.6 (07 Aug 2023 10:57), Max: 37.4 (06 Aug 2023 15:30)  T(F): 97.9 (07 Aug 2023 10:57), Max: 99.4 (06 Aug 2023 15:30)  HR: 93 (07 Aug 2023 10:57) (82 - 95)  BP: 108/63 (07 Aug 2023 10:57) (108/63 - 146/69)  BP(mean): --  RR: 16 (07 Aug 2023 10:57) (12 - 16)  SpO2: 96% (07 Aug 2023 10:57) (93% - 97%)    Parameters below as of 07 Aug 2023 05:00  Patient On (Oxygen Delivery Method): room air      General: wakes w/ stim , confused      HEENT: n/c, a/t + temp wasting     Lungs: ess cl dim bases , non labored    CV: normal  -tachy  GI: abd soft, n/t    : normal , incontinent    Musculoskeletal: normal w/ weakness, no edema    Skin:  pale, w/d     Neuro: +  deficits , confused   Oral intake ability:  oral feeding as anny   Diet: as anny      LABS:                          9.3    8.38  )-----------( 401      ( 07 Aug 2023 07:27 )             28.9     08-07    143  |  108  |  19  ----------------------------<  105<H>  4.2   |  29  |  0.87    Ca    8.5      07 Aug 2023 07:27  Mg     2.0     08-07      Urinalysis Basic - ( 07 Aug 2023 07:27 )    Color: x / Appearance: x / SG: x / pH: x  Gluc: 105 mg/dL / Ketone: x  / Bili: x / Urobili: x   Blood: x / Protein: x / Nitrite: x   Leuk Esterase: x / RBC: x / WBC x   Sq Epi: x / Non Sq Epi: x / Bacteria: x        RADIOLOGY & ADDITIONAL STUDIES: < from: CT Pelvis w/ IV Cont (08.03.23 @ 12:17) >  PROCEDURE DATE:  08/03/2023          INTERPRETATION:  CLINICAL INFORMATION: Follow-up right hip wound and   gluteal abscess    COMPARISON: CT pelvis 7/30/2023    < end of copied text >  < from: CT Pelvis w/ IV Cont (08.03.23 @ 12:17) >  BONES: Stable deep ulcer overlying the right greater trochanter reaching   the underlying bone with underlying reactive bony changes suggestive of   osteomyelitis, unchanged from prior. Associated fluid collection   extending from the defect insinuating between the right gluteus   musculature unchanged in size measuring 6.2 x 3.6 cm. Stable collection   along the right hamstring insertion measuring 2.1 x 1.7 cm. Stable   intramuscular collection within the right adductor brevis measuring 1.7   cm. Stable induration overlying the left greater trochanter, likely also   related to decubitus change. Posttraumatic changes again noted at the  right pubic symphysis.    IMPRESSION:  *  Stable deep ulcer overlying the right greater trochanter reaching the   underlying bone with underlying reactive bony changes suggestive of   osteomyelitis, unchanged from prior.  *  Fluid collection extending from the ulcer defect insinuating between   the right gluteus musculature unchanged in size measuring 6.2 x 3.6 cm.  *  Stable collection along the right hamstring insertion measuring 2.1 x   1.7 cm.  *  Stable intramuscular collection within the right adductor brevis   measuring 1.7 cm.  *  Stable induration overlying the left greater trochanter, likely also   related to decubitus change.          ADVANCE DIRECTIVES:  hcp, living will, molst dnr/dni no feed tube   Advanced Care Planning discussion total time spent:

## 2023-08-07 NOTE — BRIEF OPERATIVE NOTE - NSICDXBRIEFPREOP_GEN_ALL_CORE_FT
PRE-OP DIAGNOSIS:  Deep wound 07-Aug-2023 18:15:01 right hip Ingrid Inman   PRE-OP DIAGNOSIS:  Deep wound 07-Aug-2023 18:15:01 right hip Ingrid Inman  Abscess 07-Aug-2023 18:45:53 deep, right buttock Ingrid Inman

## 2023-08-07 NOTE — PROGRESS NOTE ADULT - ASSESSMENT
healing wound left chest after debridement and I and D>  wbc decreasing.  -ok for discharge to [facility] with wound care as ordered  -abx per ID

## 2023-08-07 NOTE — PROGRESS NOTE ADULT - ASSESSMENT
87 year old M PMH dementia, BPH, DVT not on AC, IBS, OA with previous pelvic fracture brought in from [facility] for nonhealing decubitus ulcer admitted for possible osteomyelitis of R hip, failed outpatient abx.    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 oPsada, Plastics following--for the OR today for drainage of wound, may need a woundvac  ID stopped zosyn, converted to meropenem  Cardiology cleared at moderate CV risk prior to possible wound ulcer debridement  Patient currently on hospice care at [facility] family is agreeable to IV antibx and debridement     hx of CAD  - Continue asa 81mg, atorvastatin 40mg, metoprolol tartrate 25mg BID     Anemia  -H/H stable, 9.3/28  -Monitor CBC, holding lovenox dvt ppx for the OR    Dementia  - On rivastigmine 4.5mg capsule at home, continue patch while in hospital    BPH  - Continue Flomax    IBS  - Continue Dicyclomine    DVT ppx  - hold lovenox for OR    Dispo: Pending medical course. If complex wound care cannot return to [facility] under Hospice/Palliative care.  He is DNR/DNI, updated Daughter Salina Quinones [phone]     Luis, nurse from [facility]: [phone]   87 year old M PMH dementia, BPH, DVT not on AC, IBS, OA with previous pelvic fracture brought in from [facility] for nonhealing decubitus ulcer admitted for possible osteomyelitis of R hip, failed outpatient abx.    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 Posada, Plastics following--for the OR today for drainage of wound, may need a woundvac  ID stopped zosyn, converted to meropenem  Cardiology cleared at moderate CV risk prior to possible wound ulcer debridement  Patient currently on hospice care at [facility] family is agreeable to IV antibx and debridement     hx of CAD  - Continue asa 81mg, atorvastatin 40mg, metoprolol tartrate 25mg BID     Anemia  -H/H stable, 9.3/28  -Monitor CBC, holding lovenox dvt ppx for the OR    Dementia  - On rivastigmine 4.5mg capsule at home, continue patch while in hospital    BPH  - Continue Flomax    IBS  - Continue Dicyclomine    DVT ppx  - hold lovenox for OR    Dispo: Pending medical course. If complex wound care cannot return to [facility] under Hospice/Palliative care.  He is DNR/DNI, left telephone message with Daughter Lola Quinones [phone].     Luis nurse from [facility]: [phone]

## 2023-08-08 PROCEDURE — 99233 SBSQ HOSP IP/OBS HIGH 50: CPT

## 2023-08-08 RX ADMIN — TAMSULOSIN HYDROCHLORIDE 0.4 MILLIGRAM(S): 0.4 CAPSULE ORAL at 21:26

## 2023-08-08 RX ADMIN — MEROPENEM 100 MILLIGRAM(S): 1 INJECTION INTRAVENOUS at 05:39

## 2023-08-08 RX ADMIN — MEROPENEM 100 MILLIGRAM(S): 1 INJECTION INTRAVENOUS at 14:20

## 2023-08-08 RX ADMIN — Medication 10 MILLIGRAM(S): at 17:22

## 2023-08-08 RX ADMIN — Medication 500 MILLIGRAM(S): at 11:48

## 2023-08-08 RX ADMIN — Medication 1 TABLET(S): at 11:49

## 2023-08-08 RX ADMIN — Medication 25 MILLIGRAM(S): at 17:22

## 2023-08-08 RX ADMIN — ZINC SULFATE TAB 220 MG (50 MG ZINC EQUIVALENT) 220 MILLIGRAM(S): 220 (50 ZN) TAB at 11:48

## 2023-08-08 RX ADMIN — MEROPENEM 100 MILLIGRAM(S): 1 INJECTION INTRAVENOUS at 21:26

## 2023-08-08 RX ADMIN — SENNA PLUS 2 TABLET(S): 8.6 TABLET ORAL at 21:26

## 2023-08-08 RX ADMIN — Medication 10 MILLIGRAM(S): at 06:40

## 2023-08-08 RX ADMIN — ATORVASTATIN CALCIUM 40 MILLIGRAM(S): 80 TABLET, FILM COATED ORAL at 21:27

## 2023-08-08 NOTE — PROGRESS NOTE ADULT - SUBJECTIVE AND OBJECTIVE BOX
( x ) No complaints (  ) Complains of:     T(F): 98.5 (08-09-23 @ 08:19), Max: 99.5 (08-08-23 @ 16:11)  HR: 84 (08-09-23 @ 08:19) (84 - 90)  BP: 110/63 (08-09-23 @ 08:19) (110/63 - 129/71)  RR: 17 (08-09-23 @ 08:19) (16 - 17)  SpO2: 97% (08-09-23 @ 08:19) (96% - 99%)        Wound Location 1:  VAC dressing intact right hip with serosanguinous drainage     sero-sanguinous (  ) sanguinous (  ) purulent (  ) cloudy (  ) clear  (  ) other:                         Procedure Performed:  (  )Yes     (x  )No  Name of Procedure:  (  )debridement    (  )I&D    (  )Other:  (  )partial thickness     (  )full thickness     (  )subcutaneous     (  )muscle/tendon     (  )bone  (  )sharp     (  )surgical

## 2023-08-08 NOTE — PROGRESS NOTE ADULT - ASSESSMENT
87 year old M PMH dementia, BPH, DVT not on AC, IBS, OA with previous pelvic fracture brought in from [facility] for nonhealing decubitus ulcer admitted for possible osteomyelitis of R hip, failed outpatient abx.    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 with possible OM on CT scan  s/p OR drainage of right hip wound; POD #1, now with woundvac  CT with possible signs of osteo and abscess formation, may have RT gluteal abscess   Wound cultures growing ESBL E Coli and Enterococcus Faecalis - sensitivities reviewed  ID stopped zosyn, converted to meropenem  Cardiology cleared at moderate CV risk prior to possible wound ulcer debridement  Patient currently on hospice care at [facility]-- family is agreeable to IV antibx and debridement     hx of CAD  - Continue asa 81mg, atorvastatin 40mg, metoprolol tartrate 25mg BID     Anemia  -H/H stable, 9.3/28  -Monitor CBC, holding lovenox dvt ppx for the OR    Dementia  - On rivastigmine 4.5mg capsule at home, continue patch while in hospital    BPH  - Continue Flomax    IBS  - Continue Dicyclomine    DVT ppx  - hold lovenox for OR    Dispo: Pending medical course. If complex wound care cannot return to [facility] under Hospice/Palliative care.  He is DNR/DNI, left telephone message with Daughter Lola Quinones [phone].     Luis nurse from [facility]: [phone]   87 year old M PMH dementia, BPH, DVT not on AC, IBS, OA with previous pelvic fracture brought in from [facility] for nonhealing decubitus ulcer admitted for possible osteomyelitis of R hip, failed outpatient abx.    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 Posada pt to have woundvac removed on Friday, 8/11    hx of CAD  - Continue asa 81mg, atorvastatin 40mg, metoprolol tartrate 25mg BID     Anemia  -H/H stable, 9.3/28  -Monitor cbc    Dementia  - On rivastigmine 4.5mg capsule at home, continue patch while in hospital    BPH  - Continue Flomax    IBS  - Continue Dicyclomine    DVT ppx- restart lovenox today    Dispo: Pending medical course. Pt was on Hospice at [facility] prior to admission.  He is DNR/DNI, left telephone message with Daughter Lola Quinones [phone].     Luis, nurse from [facility]: [phone]

## 2023-08-08 NOTE — PROGRESS NOTE ADULT - NS ATTEND AMEND GEN_ALL_CORE FT
Right hip wound, nonhealing with possible osteo and abscess on CT  - Continue Vanc Zosyn, f/u wound Cx  - Follow up WC and plastic surgery recs
Patient for OR today plastics and Gen Surgery. Currently NPO. Will f/u with plastics if patient needs to have a wound vac
Patient for further plastics and gen surgery eval. Continue Abx. F/U ID
Patient to have intervention today as per plastics with surgery. NPO for now. Holding lovenox
NPO after midnight for intervention tomorrow
Patient seen and examined. He states he is resting. He looks comfortable. Trending H/H. Seems as if his H/H is trending down. Will see if Hg further decreases. Will discuss with family if further decreases
Right hip wound, nonhealing with possible osteo and abscess on CT  - Vanc dc'd, continue on Zosyn  - Wound Cx with E coli and E faecalis  - Plastic surgeon Dr. Posada discussing plan with patient's HCP/daughter  - General surgery consult  - ID, WC, and palliative care following - patient on hospice
Continue current abx. ID on board. wound vac in place. Friday for possible removal
Right hip wound, nonhealing with possible osteo and abscess on CT  - Vanc dc'd, continue on Zosyn  - Prelim wound Cx with E coli and E faecalis  - Possible plan for OR debridement  - ID, WC and plastic surgery following

## 2023-08-08 NOTE — PROGRESS NOTE ADULT - SUBJECTIVE AND OBJECTIVE BOX
CC: f/u for infected right hip decubitus    Patient reports he is feeling ok    REVIEW OF SYSTEMS:  All other review of systems negative (Comprehensive ROS)    Antimicrobials Day #  :pod 1, day 7 total  meropenem  IVPB 500 milliGRAM(s) IV Intermittent every 8 hours    Other Medications Reviewed    T(F): 97.9 (08-08-23 @ 05:36), Max: 98.2 (08-07-23 @ 18:00)  HR: 70 (08-08-23 @ 05:36)  BP: 111/65 (08-08-23 @ 05:36)  RR: 16 (08-08-23 @ 05:36)  SpO2: 97% (08-08-23 @ 05:36)  Wt(kg): --    PHYSICAL EXAM:  General: alert, no acute distress  Eyes:  anicteric, no conjunctival injection, no discharge  Oropharynx: no lesions or injection 	  Neck: supple, without adenopathy  Lungs: clear to auscultation  Heart: regular rate and rhythm; no murmur, rubs or gallops  Abdomen: soft, nondistended, nontender, without mass or organomegaly  Skin: no lesions  Extremities: no clubbing, cyanosis, or edema. right hip with vac  Neurologic: alert,  moves all extremities    LAB RESULTS:                        9.3    8.38  )-----------( 401      ( 07 Aug 2023 07:27 )             28.9     08-07    143  |  108  |  19  ----------------------------<  105<H>  4.2   |  29  |  0.87    Ca    8.5      07 Aug 2023 07:27  Mg     2.0     08-07        Urinalysis Basic - ( 07 Aug 2023 07:27 )    Color: x / Appearance: x / SG: x / pH: x  Gluc: 105 mg/dL / Ketone: x  / Bili: x / Urobili: x   Blood: x / Protein: x / Nitrite: x   Leuk Esterase: x / RBC: x / WBC x   Sq Epi: x / Non Sq Epi: x / Bacteria: x      MICROBIOLOGY:  RECENT CULTURES:      RADIOLOGY REVIEWED:  < from: CT Pelvis w/ IV Cont (08.03.23 @ 12:17) >    ACC: [id number] EXAM:  CT PELVIS ONLY IC   ORDERED BY: VIJI ESPAÑA     PROCEDURE DATE:  08/03/2023          INTERPRETATION:  CLINICAL INFORMATION: Follow-up right hip wound and   gluteal abscess    COMPARISON: CT pelvis 7/30/2023    CONTRAST/COMPLICATIONS:  IV Contrast: Omnipaque 350  90 cc administered   10 cc discarded  Oral Contrast: NONE  Complications: None reported at time of study completion    PROCEDURE:  CT of the Pelvis was performed.  Sagittal and coronal reformats were performed.    FINDINGS:  BLADDER: Normal.  REPRODUCTIVE ORGANS: Nonenlarged.  LYMPH NODES: No pelvic adenopathy.    VISUALIZED PORTIONS:  ABDOMINAL ORGANS: Within normal limits.  BOWEL: Moderate fecal retention throughout the colon and rectum. No   dilated bowel.  PERITONEUM: No ascites.  VESSELS:  Aortoiliac atherosclerosis without aneurysm.  ABDOMINAL WALL: Normal.  BONES: Stable deep ulcer overlying the right greater trochanter reaching   the underlying bone with underlying reactive bony changes suggestive of   osteomyelitis, unchanged from prior. Associated fluid collection   extending from the defect insinuating between the right gluteus   musculature unchanged in size measuring 6.2 x 3.6 cm. Stable collection   along the right hamstring insertion measuring 2.1 x 1.7 cm. Stable   intramuscular collection within the right adductor brevis measuring 1.7   cm. Stable induration overlying the left greater trochanter, likely also   related to decubitus change. Posttraumatic changes again noted at the  right pubic symphysis.    IMPRESSION:  *  Stable deep ulcer overlying the right greater trochanter reaching the   underlying bone with underlying reactive bony changes suggestive of   osteomyelitis, unchanged from prior.  *  Fluid collection extending from the ulcer defect insinuating between   the right gluteus musculature unchanged in size measuring 6.2 x 3.6 cm.  *  Stable collection along the right hamstring insertion measuring 2.1 x   1.7 cm.  *  Stable intramuscular collection within the right adductor brevis   measuring 1.7 cm.  *  Stable induration overlying the left greater trochanter, likely also   related to decubitus change.      --- End of Report ---      < end of copied text >              Assessment:  Elderly man with contracted state, developed right hip decube with fever, now underwent debridement and vac. Fver resolved  Plan:  will continue meropenem through planned vac change on Friday  local wound care per dr villatoro

## 2023-08-08 NOTE — PROGRESS NOTE ADULT - SUBJECTIVE AND OBJECTIVE BOX
Patient is a 87y old  Male who presents with a chief complaint of osteo of R femur (07 Aug 2023 14:59)      Patient seen and examined at bedside. No overnight events reported.     ALLERGIES:  No Known Drug Allergies  shellfish (Other)  lactose (Diarrhea)    MEDICATIONS  (STANDING):  ascorbic acid 500 milliGRAM(s) Oral daily  atorvastatin 40 milliGRAM(s) Oral at bedtime  dicyclomine 10 milliGRAM(s) Oral two times a day before meals  meropenem  IVPB 500 milliGRAM(s) IV Intermittent every 8 hours  metoprolol tartrate 25 milliGRAM(s) Oral two times a day  multivitamin 1 Tablet(s) Oral daily  senna 2 Tablet(s) Oral at bedtime  tamsulosin 0.4 milliGRAM(s) Oral at bedtime  zinc sulfate 220 milliGRAM(s) Oral daily    MEDICATIONS  (PRN):  acetaminophen     Tablet .. 650 milliGRAM(s) Oral every 6 hours PRN Temp greater or equal to 38C (100.4F), Mild Pain (1 - 3)  aluminum hydroxide/magnesium hydroxide/simethicone Suspension 30 milliLiter(s) Oral every 4 hours PRN Dyspepsia  melatonin 3 milliGRAM(s) Oral at bedtime PRN Insomnia  ondansetron Injectable 4 milliGRAM(s) IV Push every 8 hours PRN Nausea and/or Vomiting    Vital Signs Last 24 Hrs  T(F): 97.9 (08 Aug 2023 05:36), Max: 98.2 (07 Aug 2023 18:00)  HR: 70 (08 Aug 2023 05:36) (66 - 93)  BP: 111/65 (08 Aug 2023 05:36) (105/46 - 120/60)  RR: 16 (08 Aug 2023 05:36) (16 - 25)  SpO2: 97% (08 Aug 2023 05:36) (96% - 99%)  I&O's Summary    07 Aug 2023 07:01  -  08 Aug 2023 07:00  --------------------------------------------------------  IN: 150 mL / OUT: 400 mL / NET: -250 mL      PHYSICAL EXAM:  General: NAD, A/O x 3  ENT: No gross hearing impairment, Moist mucous membranes, no thrush  Neck: Supple, No JVD  Lungs: Clear to auscultation bilaterally, good air entry, non-labored breathing  Cardio: RRR, S1/S2, No murmur  Abdomen: Soft, Nontender, Nondistended; Bowel sounds present  Extremities: No calf tenderness, No cyanosis, No pitting edema  Psych: Appropriate mood and affect    LABS:                        9.3    8.38  )-----------( 401      ( 07 Aug 2023 07:27 )             28.9     08-07    143  |  108  |  19  ----------------------------<  105  4.2   |  29  |  0.87    Ca    8.5      07 Aug 2023 07:27  Mg     2.0     08-07                      06-13 Chol 161 mg/dL LDL -- HDL 41 mg/dL Trig 101 mg/dL                  Urinalysis Basic - ( 07 Aug 2023 07:27 )    Color: x / Appearance: x / SG: x / pH: x  Gluc: 105 mg/dL / Ketone: x  / Bili: x / Urobili: x   Blood: x / Protein: x / Nitrite: x   Leuk Esterase: x / RBC: x / WBC x   Sq Epi: x / Non Sq Epi: x / Bacteria: x        Culture - Blood (collected 02 Aug 2023 16:46)  Source: .Blood Blood-Peripheral  Final Report (07 Aug 2023 22:01):    No growth at 5 days    Culture - Blood (collected 02 Aug 2023 16:44)  Source: .Blood Blood-Peripheral  Final Report (07 Aug 2023 22:01):    No growth at 5 days        RADIOLOGY & ADDITIONAL TESTS:    Care Discussed with Consultants/Other Providers:    Patient is a 87y old  Male who presents with a chief complaint of osteo of R femur (07 Aug 2023 14:59)      Patient seen and examined at bedside. No overnight events reported.  Pt with no complaints.    ALLERGIES:  No Known Drug Allergies  shellfish (Other)  lactose (Diarrhea)    MEDICATIONS  (STANDING):  ascorbic acid 500 milliGRAM(s) Oral daily  atorvastatin 40 milliGRAM(s) Oral at bedtime  dicyclomine 10 milliGRAM(s) Oral two times a day before meals  meropenem  IVPB 500 milliGRAM(s) IV Intermittent every 8 hours  metoprolol tartrate 25 milliGRAM(s) Oral two times a day  multivitamin 1 Tablet(s) Oral daily  senna 2 Tablet(s) Oral at bedtime  tamsulosin 0.4 milliGRAM(s) Oral at bedtime  zinc sulfate 220 milliGRAM(s) Oral daily    MEDICATIONS  (PRN):  acetaminophen     Tablet .. 650 milliGRAM(s) Oral every 6 hours PRN Temp greater or equal to 38C (100.4F), Mild Pain (1 - 3)  aluminum hydroxide/magnesium hydroxide/simethicone Suspension 30 milliLiter(s) Oral every 4 hours PRN Dyspepsia  melatonin 3 milliGRAM(s) Oral at bedtime PRN Insomnia  ondansetron Injectable 4 milliGRAM(s) IV Push every 8 hours PRN Nausea and/or Vomiting    Vital Signs Last 24 Hrs  T(F): 97.9 (08 Aug 2023 05:36), Max: 98.2 (07 Aug 2023 18:00)  HR: 70 (08 Aug 2023 05:36) (66 - 93)  BP: 111/65 (08 Aug 2023 05:36) (105/46 - 120/60)  RR: 16 (08 Aug 2023 05:36) (16 - 25)  SpO2: 97% (08 Aug 2023 05:36) (96% - 99%)  I&O's Summary    07 Aug 2023 07:01  -  08 Aug 2023 07:00  --------------------------------------------------------  IN: 150 mL / OUT: 400 mL / NET: -250 mL      PHYSICAL EXAM:  General: NAD, A/O x 1-2.  ENT: No gross hearing impairment, Moist mucous membranes, no thrush  Neck: Supple, No JVD  Lungs: Clear to auscultation bilaterally, good air entry, non-labored breathing  Cardio: RRR, S1/S2, No murmur  Abdomen: Soft, Nontender, Nondistended; Bowel sounds present  Extremities: right hip wound dressed, +wound vac draining blood tinged fluid, +contractures  Neuro: alert, nonfocal    LABS:                        9.3    8.38  )-----------( 401      ( 07 Aug 2023 07:27 )             28.9     08-07    143  |  108  |  19  ----------------------------<  105  4.2   |  29  |  0.87    Ca    8.5      07 Aug 2023 07:27  Mg     2.0     08-07                      06-13 Chol 161 mg/dL LDL -- HDL 41 mg/dL Trig 101 mg/dL                  Urinalysis Basic - ( 07 Aug 2023 07:27 )    Color: x / Appearance: x / SG: x / pH: x  Gluc: 105 mg/dL / Ketone: x  / Bili: x / Urobili: x   Blood: x / Protein: x / Nitrite: x   Leuk Esterase: x / RBC: x / WBC x   Sq Epi: x / Non Sq Epi: x / Bacteria: x        Culture - Blood (collected 02 Aug 2023 16:46)  Source: .Blood Blood-Peripheral  Final Report (07 Aug 2023 22:01):    No growth at 5 days    Culture - Blood (collected 02 Aug 2023 16:44)  Source: .Blood Blood-Peripheral  Final Report (07 Aug 2023 22:01):    No growth at 5 days        RADIOLOGY & ADDITIONAL TESTS:  < from: CT Pelvis w/ IV Cont (08.03.23 @ 12:17) >    IMPRESSION:  *  Stable deep ulcer overlying the right greater trochanter reaching the   underlying bone with underlying reactive bony changes suggestive of   osteomyelitis, unchanged from prior.  *  Fluid collection extending from the ulcer defect insinuating between   the right gluteus musculature unchanged in size measuring 6.2 x 3.6 cm.  *  Stable collection along the right hamstring insertion measuring 2.1 x   1.7 cm.  *  Stable intramuscular collection within the right adductor brevis   measuring 1.7 cm.  *  Stable induration overlying the left greater trochanter, likely also   related to decubitus change.        < end of copied text >    Care Discussed with Consultants/Other Providers:

## 2023-08-08 NOTE — CHART NOTE - NSCHARTNOTEFT_GEN_A_CORE
NUTRITION Follow Up Note    SOURCE: Patient [X]  Medical Record [X]  Nursing Staff [X]  Family Member []    DIET:  Diet, DASH/TLC:   Sodium & Cholesterol Restricted  Lactose Restricted (Milk Sugar Intoler.)  Supplement Feeding Modality:  Oral  Ensure Plus High Protein Cans or Servings Per Day:  1       Frequency:  Two Times a day (08-07-23 @ 18:31) [Active]    Patient noted with variable PO intakes, consuming % of meals as per nursing flow sheet. Per RN, patient with decreased appetite at this time. Recommend increasing ensure supplement to Ensure Plus High Protein 8oz PO TID (Provides 1,050kcal & 60grams of Protein) to enhance PO intakes and optimize nutritional status. Multiple pressure injuries noted. Continues with MVI, ascorbic acid (500 mg), & zinc sulfate (220 mg) to promote wound healing.    SKIN: Stage IV @ right:, greater trochanter (hip), Stage II @ side of foot, Stage II @ right ankle, & Stage II @ buttock    EDEMA: no edema noted     LAST BM: 8/2/23    WEIGHT TRENDS: 54.4 kG (7/30/23)      PERTINENT MEDICATIONS: MEDICATIONS  (STANDING):  ascorbic acid 500 milliGRAM(s) Oral daily  atorvastatin 40 milliGRAM(s) Oral at bedtime  dicyclomine 10 milliGRAM(s) Oral two times a day before meals  meropenem  IVPB 500 milliGRAM(s) IV Intermittent every 8 hours  metoprolol tartrate 25 milliGRAM(s) Oral two times a day  multivitamin 1 Tablet(s) Oral daily  senna 2 Tablet(s) Oral at bedtime  tamsulosin 0.4 milliGRAM(s) Oral at bedtime  zinc sulfate 220 milliGRAM(s) Oral daily    MEDICATIONS  (PRN):  acetaminophen     Tablet .. 650 milliGRAM(s) Oral every 6 hours PRN Temp greater or equal to 38C (100.4F), Mild Pain (1 - 3)  aluminum hydroxide/magnesium hydroxide/simethicone Suspension 30 milliLiter(s) Oral every 4 hours PRN Dyspepsia  melatonin 3 milliGRAM(s) Oral at bedtime PRN Insomnia  ondansetron Injectable 4 milliGRAM(s) IV Push every 8 hours PRN Nausea and/or Vomiting      PERTINENT LABS:  08-07 Na143 mmol/L Glu 105 mg/dL<H> K+ 4.2 mmol/L Cr  0.87 mg/dL BUN 19 mg/dL          ESTIMATED NEEDS:   [X] No Change Since Previous Assessment    PREVIOUS NUTRITION DIAGNOSIS:  1. Severe Protein Calorie Malnutrition  2. Increased Nutrient Needs    NUTRITION DIAGNOSIS IS [X] Ongoing    NEW NUTRITION DIAGNOSIS: [X] Not Applicable      INTERVENTIONS:   1. Ensure Plus High Protein 8oz PO TID (Provides 1,050kcal & 60grams of Protein)   2. Monitor daily PO intakes, GI tolerance, labs, weights, skin integrity, & BM regularity     MONITORING & EVALUATION:   1. Weights   2. PO intakes   3. Skin integrity   4. Tolerance to diet prescription   5. Labs & POCT  6. Follow up (per protocol)    Registered Dietitian/Nutritionist Remains Available.  Jimmy Russo RD, CDN    Phone# [phone]

## 2023-08-09 LAB
-  AMIKACIN: SIGNIFICANT CHANGE UP
-  AMOXICILLIN/CLAVULANIC ACID: SIGNIFICANT CHANGE UP
-  AMPICILLIN/SULBACTAM: SIGNIFICANT CHANGE UP
-  AMPICILLIN: SIGNIFICANT CHANGE UP
-  AZTREONAM: SIGNIFICANT CHANGE UP
-  CEFAZOLIN: SIGNIFICANT CHANGE UP
-  CEFEPIME: SIGNIFICANT CHANGE UP
-  CEFTRIAXONE: SIGNIFICANT CHANGE UP
-  CIPROFLOXACIN: SIGNIFICANT CHANGE UP
-  ERTAPENEM: SIGNIFICANT CHANGE UP
-  GENTAMICIN: SIGNIFICANT CHANGE UP
-  IMIPENEM: SIGNIFICANT CHANGE UP
-  LEVOFLOXACIN: SIGNIFICANT CHANGE UP
-  MEROPENEM: SIGNIFICANT CHANGE UP
-  PIPERACILLIN/TAZOBACTAM: SIGNIFICANT CHANGE UP
-  TOBRAMYCIN: SIGNIFICANT CHANGE UP
-  TRIMETHOPRIM/SULFAMETHOXAZOLE: SIGNIFICANT CHANGE UP
HCT VFR BLD CALC: 27.9 % — LOW (ref 39–50)
HGB BLD-MCNC: 8.9 G/DL — LOW (ref 13–17)
MCHC RBC-ENTMCNC: 29.9 PG — SIGNIFICANT CHANGE UP (ref 27–34)
MCHC RBC-ENTMCNC: 31.9 GM/DL — LOW (ref 32–36)
MCV RBC AUTO: 93.6 FL — SIGNIFICANT CHANGE UP (ref 80–100)
METHOD TYPE: SIGNIFICANT CHANGE UP
NRBC # BLD: 0 /100 WBCS — SIGNIFICANT CHANGE UP (ref 0–0)
PLATELET # BLD AUTO: 417 K/UL — HIGH (ref 150–400)
RBC # BLD: 2.98 M/UL — LOW (ref 4.2–5.8)
RBC # FLD: 14.4 % — SIGNIFICANT CHANGE UP (ref 10.3–14.5)
WBC # BLD: 9.02 K/UL — SIGNIFICANT CHANGE UP (ref 3.8–10.5)
WBC # FLD AUTO: 9.02 K/UL — SIGNIFICANT CHANGE UP (ref 3.8–10.5)

## 2023-08-09 PROCEDURE — 99233 SBSQ HOSP IP/OBS HIGH 50: CPT

## 2023-08-09 PROCEDURE — 99232 SBSQ HOSP IP/OBS MODERATE 35: CPT

## 2023-08-09 RX ORDER — RIVASTIGMINE 4.6 MG/24H
1 PATCH, EXTENDED RELEASE TRANSDERMAL EVERY 24 HOURS
Refills: 0 | Status: DISCONTINUED | OUTPATIENT
Start: 2023-08-09 | End: 2023-08-16

## 2023-08-09 RX ADMIN — SENNA PLUS 2 TABLET(S): 8.6 TABLET ORAL at 21:11

## 2023-08-09 RX ADMIN — ATORVASTATIN CALCIUM 40 MILLIGRAM(S): 80 TABLET, FILM COATED ORAL at 21:11

## 2023-08-09 RX ADMIN — Medication 10 MILLIGRAM(S): at 15:43

## 2023-08-09 RX ADMIN — TAMSULOSIN HYDROCHLORIDE 0.4 MILLIGRAM(S): 0.4 CAPSULE ORAL at 21:11

## 2023-08-09 RX ADMIN — MEROPENEM 100 MILLIGRAM(S): 1 INJECTION INTRAVENOUS at 05:30

## 2023-08-09 RX ADMIN — Medication 25 MILLIGRAM(S): at 17:10

## 2023-08-09 RX ADMIN — Medication 1 TABLET(S): at 12:17

## 2023-08-09 RX ADMIN — MEROPENEM 100 MILLIGRAM(S): 1 INJECTION INTRAVENOUS at 21:11

## 2023-08-09 RX ADMIN — Medication 500 MILLIGRAM(S): at 12:17

## 2023-08-09 RX ADMIN — Medication 25 MILLIGRAM(S): at 05:25

## 2023-08-09 RX ADMIN — MEROPENEM 100 MILLIGRAM(S): 1 INJECTION INTRAVENOUS at 13:07

## 2023-08-09 RX ADMIN — ZINC SULFATE TAB 220 MG (50 MG ZINC EQUIVALENT) 220 MILLIGRAM(S): 220 (50 ZN) TAB at 12:17

## 2023-08-09 RX ADMIN — Medication 10 MILLIGRAM(S): at 05:24

## 2023-08-09 RX ADMIN — RIVASTIGMINE 1 PATCH: 4.6 PATCH, EXTENDED RELEASE TRANSDERMAL at 19:30

## 2023-08-09 RX ADMIN — RIVASTIGMINE 1 PATCH: 4.6 PATCH, EXTENDED RELEASE TRANSDERMAL at 12:18

## 2023-08-09 NOTE — PROGRESS NOTE ADULT - ASSESSMENT
A/P 87 year old M PMH dementia, BPH, DVT not on AC. 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 Posada pt to have wound vac removed on Friday, 8/11      hx of CAD  - Continue asa 81mg, atorvastatin 40mg, metoprolol tartrate 25mg BID     Anemia  -H/H stable, 9.3/28  -Monitor cbc    Dementia  - On rivastigmine 4.5mg capsule at home, continue       Dispo: Pending medical course, and wound care    pt  [facility]        Palliative :   as a follow up, chart reviewed, pt seen bedside. He was sitting up, awake, but very weakened, speaks 1-2 words at a time.   will  eat modified diet if fed.  Pt s/p OR debridement 8/7.  Now w/ wound vac in place - per plastics , likely to be removed Friday 8/11.    Call placed to  daughter  Lola  today , spoke , reviewed pt condition today .   Daughter waiting to see what is involved w/ pt wound care after vac removed.   Her hope is to return pt to  with Hospice Services back in place.      CM assistance needed  for d/c plans  when pt ready .

## 2023-08-09 NOTE — PROGRESS NOTE ADULT - ASSESSMENT
87 year old M PMH dementia, BPH, DVT not on AC, IBS, OA with previous pelvic fracture brought in from [facility] for nonhealing decubitus ulcer admitted for possible osteomyelitis of R hip, failed outpatient abx.    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 with possible OM on CT scan  s/p OR drainage of right hip wound; POD #1, now with woundvac  CT of pelvis: possible signs of osteo and abscess formation, may have RT gluteal abscess   Wound cultures growing ESBL E Coli and Enterococcus Faecalis - sensitivities reviewed  Culture from intervention 8/7 show Moderate Enterococcus faecalis and Rare Escherichia coli  ID continue meropenem as per ID  per Plastics, Dr. Posada pt to have wound vac removed on Friday, 8/11  D/C on Friday after wound vac removed as per Dr. Posada    hx of CAD  - Continue asa 81mg, atorvastatin 40mg, metoprolol tartrate 25mg BID     Anemia  -H/H stable, 9.3/28  -Monitor cbc    Dementia  - On rivastigmine 4.5mg capsule at home, continue patch while in hospital    BPH  - Continue Flomax    IBS  - Continue Dicyclomine    DVT ppx- lovenox    Dispo: Pending medical course. Pt was on Hospice at [facility] prior to admission.  He is DNR/DNI  Daughter Lola Quinones [phone].     Luis, nurse from [facility]: [phone]   87 year old M PMH dementia, BPH, DVT not on AC, IBS, OA with previous pelvic fracture brought in from [facility] for nonhealing decubitus ulcer admitted for possible osteomyelitis of R hip, failed outpatient abx.    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 with possible OM on CT scan s/p debridement  s/p OR drainage of right hip wound; POD #1, now with woundvac  CT of pelvis: possible signs of osteo and abscess formation, may have RT gluteal abscess   Wound cultures growing ESBL E Coli and Enterococcus Faecalis - sensitivities reviewed  Culture from intervention 8/7 show Moderate Enterococcus faecalis and Rare Escherichia coli  ID continue meropenem as per ID  per Plastics, Dr. Posada pt to have wound vac removed on Friday, 8/11  D/C on Friday after wound vac removed as per Dr. Posada    hx of CAD  - Continue asa 81mg, atorvastatin 40mg, metoprolol tartrate 25mg BID     Anemia  -H/H stable, 9.3/28  -Monitor cbc    Dementia  - On rivastigmine 4.5mg capsule at home, continue patch while in hospital    BPH  - Continue Flomax    IBS  - Continue Dicyclomine    DVT ppx- lovenox    Dispo: Pending medical course. Pt was on Hospice at [facility] prior to admission.  He is DNR/DNI  Daughter Lola Quinones [phone].     Luis nurse from [facility]: [phone]   87 year old M PMH dementia, BPH, DVT not on AC, IBS, OA with previous pelvic fracture brought in from [facility] for nonhealing decubitus ulcer admitted for possible osteomyelitis of R hip, failed outpatient abx.    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 with possible OM on CT scan s/p debridement  s/p OR drainage of right hip wound; POD #1, now with woundvac  CT of pelvis: possible signs of osteo and abscess formation, may have RT gluteal abscess   Wound cultures growing ESBL E Coli and Enterococcus Faecalis - sensitivities reviewed  Culture from intervention 8/7 show Moderate Enterococcus faecalis and Rare Escherichia coli  ID continue meropenem as per ID  per Plastics, Dr. Posada pt to have wound vac removed on Friday, 8/11  D/C on Friday after wound vac removed as per Dr. Posada    hx of CAD  - Continue asa 81mg, atorvastatin 40mg, metoprolol tartrate 25mg BID     Anemia  -H/H stable, 9.3/28  -Monitor cbc    Dementia  - On rivastigmine 4.5mg capsule at home, continue patch while in hospital    BPH  - Continue Flomax    IBS  - Continue Dicyclomine    DVT ppx- lovenox    Dispo: Pending medical course. Pt was on Hospice at [facility] prior to admission.  He is DNR/DNI  Daughter Lola Quinones [phone].     Luis nurse from [facility]: [phone] (Updated about the plan and dispo. )    ADDENDUM: Wound Vac removed

## 2023-08-09 NOTE — PROGRESS NOTE ADULT - SUBJECTIVE AND OBJECTIVE BOX
CC: Patient is a 87y old  Male who presents with a chief complaint of osteo of R femur (08 Aug 2023 14:00)      Interval History:  Patient seen and examined at bedside.  No overnight events  No complaints this morning. Denies pain    ALLERGIES:  No Known Drug Allergies  shellfish (Other)  lactose (Diarrhea)    MEDICATIONS  (STANDING):  ascorbic acid 500 milliGRAM(s) Oral daily  atorvastatin 40 milliGRAM(s) Oral at bedtime  dicyclomine 10 milliGRAM(s) Oral two times a day before meals  meropenem  IVPB 500 milliGRAM(s) IV Intermittent every 8 hours  metoprolol tartrate 25 milliGRAM(s) Oral two times a day  multivitamin 1 Tablet(s) Oral daily  senna 2 Tablet(s) Oral at bedtime  tamsulosin 0.4 milliGRAM(s) Oral at bedtime  zinc sulfate 220 milliGRAM(s) Oral daily    MEDICATIONS  (PRN):  acetaminophen     Tablet .. 650 milliGRAM(s) Oral every 6 hours PRN Temp greater or equal to 38C (100.4F), Mild Pain (1 - 3)  aluminum hydroxide/magnesium hydroxide/simethicone Suspension 30 milliLiter(s) Oral every 4 hours PRN Dyspepsia  melatonin 3 milliGRAM(s) Oral at bedtime PRN Insomnia  ondansetron Injectable 4 milliGRAM(s) IV Push every 8 hours PRN Nausea and/or Vomiting    Vital Signs Last 24 Hrs  T(F): 98.5 (09 Aug 2023 08:19), Max: 99.5 (08 Aug 2023 16:11)  HR: 84 (09 Aug 2023 08:19) (84 - 90)  BP: 110/63 (09 Aug 2023 08:19) (110/63 - 129/71)  RR: 17 (09 Aug 2023 08:19) (16 - 17)  SpO2: 97% (09 Aug 2023 08:19) (96% - 99%)  I&O's Summary    08 Aug 2023 07:01  -  09 Aug 2023 07:00  --------------------------------------------------------  IN: 990 mL / OUT: 700 mL / NET: 290 mL    PHYSICAL EXAM:  GENERAL: NAD  CHEST/LUNG: Clear to percussion bilaterally; No rales, rhonchi, wheezing, or rubs; normal respiratory effort, no intercostal retractions  HEART: Regular rate and rhythm; No murmurs, rubs, or gallops; No pitting edema  ABDOMEN: Soft, Nontender, Nondistended; Bowel sounds present; No HSM or masses  MUSCULOSKELETAL/EXTREMITIES:  2+ Peripheral Pulses, No clubbing or digital cyanosis  PSYCH: Appropriate affect, Alert & Awake  + wound vac    LABS:                        9.3    8.38  )-----------( 401      ( 07 Aug 2023 07:27 )             28.9       08-07    143  |  108  |  19  ----------------------------<  105  4.2   |  29  |  0.87    Ca    8.5      07 Aug 2023 07:27  Mg     2.0     08-07    06-13 Chol 161 mg/dL LDL -- HDL 41 mg/dL Trig 101 mg/dL    Urinalysis Basic - ( 07 Aug 2023 07:27 )  Color: x / Appearance: x / SG: x / pH: x  Gluc: 105 mg/dL / Ketone: x  / Bili: x / Urobili: x   Blood: x / Protein: x / Nitrite: x   Leuk Esterase: x / RBC: x / WBC x   Sq Epi: x / Non Sq Epi: x / Bacteria: x    Culture - Other (collected 07 Aug 2023 17:51)  Source: .Other C/S WOUND RIGHT HIP(BUTTICK) AEROBIC &amp; ANAEROBIC FLUID COLLECTION  Preliminary Report (08 Aug 2023 18:36):    Moderate Enterococcus faecalis    Rare Escherichia coli    Culture - Blood (collected 02 Aug 2023 16:46)  Source: .Blood Blood-Peripheral  Final Report (07 Aug 2023 22:01):    No growth at 5 days    Culture - Blood (collected 02 Aug 2023 16:44)  Source: .Blood Blood-Peripheral  Final Report (07 Aug 2023 22:01):    No growth at 5 days    Care Discussed with Consultants/Other Providers: Yes

## 2023-08-09 NOTE — PROGRESS NOTE ADULT - ASSESSMENT
POD 2, debridement right trochanteric PU, drainage right buttock abscess  -VAC discontinued  -Local wound care daily (see order)

## 2023-08-09 NOTE — PROGRESS NOTE ADULT - SUBJECTIVE AND OBJECTIVE BOX
Progress:  awake, in bed, weak, pale, speaks 1-2 words, denies pain     Present Symptoms:   Dyspnea: no  Nausea/Vomiting:   Anxiety:    Depressed Mood:   Fatigue:   Loss of appetite:   Pain:            no       location:   Review of Systems: [ Unable to obtain due to poor mentation]    MEDICATIONS  (STANDING):  ascorbic acid 500 milliGRAM(s) Oral daily  atorvastatin 40 milliGRAM(s) Oral at bedtime  dicyclomine 10 milliGRAM(s) Oral two times a day before meals  meropenem  IVPB 500 milliGRAM(s) IV Intermittent every 8 hours  metoprolol tartrate 25 milliGRAM(s) Oral two times a day  multivitamin 1 Tablet(s) Oral daily  rivastigmine patch  4.6 mG/24 Hr(s) 1 Patch Transdermal every 24 hours  senna 2 Tablet(s) Oral at bedtime  tamsulosin 0.4 milliGRAM(s) Oral at bedtime  zinc sulfate 220 milliGRAM(s) Oral daily    MEDICATIONS  (PRN):  acetaminophen     Tablet .. 650 milliGRAM(s) Oral every 6 hours PRN Temp greater or equal to 38C (100.4F), Mild Pain (1 - 3)  aluminum hydroxide/magnesium hydroxide/simethicone Suspension 30 milliLiter(s) Oral every 4 hours PRN Dyspepsia  melatonin 3 milliGRAM(s) Oral at bedtime PRN Insomnia  ondansetron Injectable 4 milliGRAM(s) IV Push every 8 hours PRN Nausea and/or Vomiting      PHYSICAL EXAM:  Vital Signs Last 24 Hrs  T(C): 36.9 (09 Aug 2023 08:19), Max: 37.5 (08 Aug 2023 16:11)  T(F): 98.5 (09 Aug 2023 08:19), Max: 99.5 (08 Aug 2023 16:11)  HR: 84 (09 Aug 2023 08:19) (84 - 90)  BP: 110/63 (09 Aug 2023 08:19) (110/63 - 129/71)  BP(mean): --  RR: 17 (09 Aug 2023 08:19) (16 - 17)  SpO2: 97% (09 Aug 2023 08:19) (96% - 99%)    Parameters below as of 09 Aug 2023 08:19  Patient On (Oxygen Delivery Method): room air      General: alert  oriented x 1, weakened, frail ,       HEENT: n/c, a/t, + temp wasting , dry lips     Lungs: ess cl dim bases , non labored resps    CV: normal  rate  GI: abd soft,     : normal, incontinent     Musculoskeletal: weakened , contracted le's    Skin: pale, w/d , R hip wound vac     Neuro: + deficits awake, says few words, oriented x 1   Oral intake ability:  oral feeding needs full feed   Diet: soft as anny      LABS:                RADIOLOGY & ADDITIONAL STUDIES:     ADVANCE DIRECTIVES:  hcp  living will, Molst dnr/dni , no feed tubes    Advanced Care Planning discussion total time spent:

## 2023-08-09 NOTE — PROGRESS NOTE ADULT - SUBJECTIVE AND OBJECTIVE BOX
(x  ) No complaints (  ) Complains of:     T(F): 98.5 (08-09-23 @ 08:19), Max: 99.5 (08-08-23 @ 16:11)  HR: 84 (08-09-23 @ 08:19) (84 - 90)  BP: 110/63 (08-09-23 @ 08:19) (110/63 - 129/71)  RR: 17 (08-09-23 @ 08:19) (16 - 17)  SpO2: 97% (08-09-23 @ 08:19) (96% - 99%)        Wound Location 1: Right hip VAC dressing removed, micromatrix in place beneath adaptic gauze, wet to moist dressing applied                      Procedure Performed:  (  )Yes     ( x )No  Name of Procedure:  (  )debridement    (  )I&D    (  )Other:  (  )partial thickness     (  )full thickness     (  )subcutaneous     (  )muscle/tendon     (  )bone  (  )sharp     (  )surgical

## 2023-08-10 ENCOUNTER — TRANSCRIPTION ENCOUNTER (OUTPATIENT)
Age: 88
End: 2023-08-10

## 2023-08-10 LAB
-  AMPICILLIN: SIGNIFICANT CHANGE UP
-  DAPTOMYCIN: SIGNIFICANT CHANGE UP
-  LEVOFLOXACIN: SIGNIFICANT CHANGE UP
-  LINEZOLID: SIGNIFICANT CHANGE UP
-  TETRACYCLINE: SIGNIFICANT CHANGE UP
-  VANCOMYCIN: SIGNIFICANT CHANGE UP
CULTURE RESULTS: SIGNIFICANT CHANGE UP
METHOD TYPE: SIGNIFICANT CHANGE UP
ORGANISM # SPEC MICROSCOPIC CNT: SIGNIFICANT CHANGE UP
SARS-COV-2 RNA SPEC QL NAA+PROBE: SIGNIFICANT CHANGE UP
SPECIMEN SOURCE: SIGNIFICANT CHANGE UP

## 2023-08-10 PROCEDURE — 99239 HOSP IP/OBS DSCHRG MGMT >30: CPT

## 2023-08-10 PROCEDURE — 99232 SBSQ HOSP IP/OBS MODERATE 35: CPT

## 2023-08-10 RX ORDER — ASCORBIC ACID 60 MG
1 TABLET,CHEWABLE ORAL
Qty: 0 | Refills: 0 | DISCHARGE
Start: 2023-08-10

## 2023-08-10 RX ORDER — ZINC SULFATE TAB 220 MG (50 MG ZINC EQUIVALENT) 220 (50 ZN) MG
1 TAB ORAL
Qty: 30 | Refills: 0
Start: 2023-08-10 | End: 2023-09-08

## 2023-08-10 RX ADMIN — SENNA PLUS 2 TABLET(S): 8.6 TABLET ORAL at 21:28

## 2023-08-10 RX ADMIN — Medication 25 MILLIGRAM(S): at 18:17

## 2023-08-10 RX ADMIN — ZINC SULFATE TAB 220 MG (50 MG ZINC EQUIVALENT) 220 MILLIGRAM(S): 220 (50 ZN) TAB at 11:50

## 2023-08-10 RX ADMIN — Medication 10 MILLIGRAM(S): at 05:19

## 2023-08-10 RX ADMIN — RIVASTIGMINE 1 PATCH: 4.6 PATCH, EXTENDED RELEASE TRANSDERMAL at 12:00

## 2023-08-10 RX ADMIN — Medication 1 TABLET(S): at 11:50

## 2023-08-10 RX ADMIN — Medication 500 MILLIGRAM(S): at 11:50

## 2023-08-10 RX ADMIN — Medication 10 MILLIGRAM(S): at 18:17

## 2023-08-10 RX ADMIN — RIVASTIGMINE 1 PATCH: 4.6 PATCH, EXTENDED RELEASE TRANSDERMAL at 11:50

## 2023-08-10 RX ADMIN — Medication 25 MILLIGRAM(S): at 05:19

## 2023-08-10 RX ADMIN — MEROPENEM 100 MILLIGRAM(S): 1 INJECTION INTRAVENOUS at 05:29

## 2023-08-10 RX ADMIN — RIVASTIGMINE 1 PATCH: 4.6 PATCH, EXTENDED RELEASE TRANSDERMAL at 06:21

## 2023-08-10 RX ADMIN — ATORVASTATIN CALCIUM 40 MILLIGRAM(S): 80 TABLET, FILM COATED ORAL at 21:28

## 2023-08-10 RX ADMIN — TAMSULOSIN HYDROCHLORIDE 0.4 MILLIGRAM(S): 0.4 CAPSULE ORAL at 21:28

## 2023-08-10 NOTE — DISCHARGE NOTE PROVIDER - DETAILS OF MALNUTRITION DIAGNOSIS/DIAGNOSES
This patient has been assessed with a concern for Malnutrition and was treated during this hospitalization for the following Nutrition diagnosis/diagnoses:     -  07/31/2023: Severe protein-calorie malnutrition   -  07/31/2023: Underweight (BMI < 19)

## 2023-08-10 NOTE — PROGRESS NOTE ADULT - SUBJECTIVE AND OBJECTIVE BOX
CC: f/u for tunneling infected decubitus ulcer    Patient reports  nothing he is asleep  REVIEW OF SYSTEMS:  All other review of systems negative (Comprehensive ROS)    Antimicrobials Day #  :pod 3, day 9 total  meropenem  IVPB 500 milliGRAM(s) IV Intermittent every 8 hours    Other Medications Reviewed    T(F): 98.8 (08-09-23 @ 19:34), Max: 98.8 (08-09-23 @ 19:34)  HR: 90 (08-10-23 @ 05:30)  BP: 116/52 (08-10-23 @ 05:30)  RR: 16 (08-10-23 @ 05:30)  SpO2: 94% (08-10-23 @ 05:30)  Wt(kg): --    PHYSICAL EXAM:  General: alert, no acute distress  Eyes:  anicteric, no conjunctival injection, no discharge  Oropharynx: no lesions or injection 	  Neck: supple, without adenopathy  Lungs: clear to auscultation  Heart: regular rate and rhythm; no murmur, rubs or gallops  Abdomen: soft, nondistended, nontender, without mass or organomegaly  Skin: no lesions  Extremities: no clubbing, cyanosis, or edema. right hip wound is clean and dry with micromatrix   Neurologic: sleeps    LAB RESULTS:                        8.9    9.02  )-----------( 417      ( 09 Aug 2023 15:13 )             27.9               MICROBIOLOGY:  RECENT CULTURES:  08-07 @ 17:51 .Other C/S WOUND RIGHT HIP(BUTTICK) AEROBIC & ANAEROBIC FLUID COLLECTION Escherichia coli ESBL    Moderate Enterococcus faecalis  Rare Escherichia coli          RADIOLOGY REVIEWED:  < from: CT Pelvis w/ IV Cont (08.03.23 @ 12:17) >  ACC: [id number] EXAM:  CT PELVIS ONLY IC   ORDERED BY: VIJI ESPAÑA     PROCEDURE DATE:  08/03/2023          INTERPRETATION:  CLINICAL INFORMATION: Follow-up right hip wound and   gluteal abscess    COMPARISON: CT pelvis 7/30/2023    CONTRAST/COMPLICATIONS:  IV Contrast: Omnipaque 350  90 cc administered   10 cc discarded  Oral Contrast: NONE  Complications: None reported at time of study completion    PROCEDURE:  CT of the Pelvis was performed.  Sagittal and coronal reformats were performed.    FINDINGS:  BLADDER: Normal.  REPRODUCTIVE ORGANS: Nonenlarged.  LYMPH NODES: No pelvic adenopathy.    VISUALIZED PORTIONS:  ABDOMINAL ORGANS: Within normal limits.  BOWEL: Moderate fecal retention throughout the colon and rectum. No   dilated bowel.  PERITONEUM: No ascites.  VESSELS:  Aortoiliac atherosclerosis without aneurysm.  ABDOMINAL WALL: Normal.  BONES: Stable deep ulcer overlying the right greater trochanter reaching   the underlying bone with underlying reactive bony changes suggestive of   osteomyelitis, unchanged from prior. Associated fluid collection   extending from the defect insinuating between the right gluteus   musculature unchanged in size measuring 6.2 x 3.6 cm. Stable collection   along the right hamstring insertion measuring 2.1 x 1.7 cm. Stable   intramuscular collection within the right adductor brevis measuring 1.7   cm. Stable induration overlying the left greater trochanter, likely also   related to decubitus change. Posttraumatic changes again noted at the  right pubic symphysis.    IMPRESSION:  *  Stable deep ulcer overlying the right greater trochanter reaching the   underlying bone with underlying reactive bony changes suggestive of   osteomyelitis, unchanged from prior.  *  Fluid collection extending from the ulcer defect insinuating between   the right gluteus musculature unchanged in size measuring 6.2 x 3.6 cm.  *  Stable collection along the right hamstring insertion measuring 2.1 x   1.7 cm.  *  Stable intramuscular collection within the right adductor brevis   measuring 1.7 cm.  *  Stable induration overlying the left greater trochanter, likely also   related to decubitus change.      --- End of Report ---            DYLAN OSPINA MD; Attending Radiologist  This document has been electronically signed. Aug  3 2023  3:19PM    < end of copied text >              Assessment:  Elderly man with contracted state, advanced dementia admitted with fever from necrotic right hip tunneling decubitus ulcer. He has now undergone debridement with micromatrix placed, soft tissue infection component has been well controlled. No use in giving prolonged abx for the osteo since he has open wound.   Plan:  for transfer back to his facility, no further antibiotics are needed  continue local wound care

## 2023-08-10 NOTE — DISCHARGE NOTE PROVIDER - NSDCFUADDINST_GEN_ALL_CORE_FT
needs 24/7 super and assist. mobility with assistance. supervised meal. fall and aspiration precautions  please bring all DC papers and medications to all health care providers. Return to ER if symptoms recur and any new concerning symptoms.

## 2023-08-10 NOTE — PROGRESS NOTE ADULT - SUBJECTIVE AND OBJECTIVE BOX
( x ) No complaints (  ) Complains of:     T(F): 98.8 (08-09-23 @ 19:34), Max: 98.8 (08-09-23 @ 19:34)  HR: 90 (08-10-23 @ 05:30) (90 - 104)  BP: 116/52 (08-10-23 @ 05:30) (108/62 - 126/53)  RR: 16 (08-10-23 @ 05:30) (16 - 18)  SpO2: 94% (08-10-23 @ 05:30) (94% - 97%)        Wound Location 1:  dressing changed right hip, micromatrix visible below adaptic dressing, drainage is clean and serosanguinous, no odor                             8.9    9.02  )-----------( 417      ( 09 Aug 2023 15:13 )             27.9     CBC Full  -  ( 09 Aug 2023 15:13 )  WBC Count : 9.02 K/uL  RBC Count : 2.98 M/uL  Hemoglobin : 8.9 g/dL  Hematocrit : 27.9 %  Platelet Count - Automated : 417 K/uL  Mean Cell Volume : 93.6 fl  Mean Cell Hemoglobin : 29.9 pg  Mean Cell Hemoglobin Concentration : 31.9 gm/dL  Auto Neutrophil # : x  Auto Lymphocyte # : x  Auto Monocyte # : x  Auto Eosinophil # : x  Auto Basophil # : x  Auto Neutrophil % : x  Auto Lymphocyte % : x  Auto Monocyte % : x  Auto Eosinophil % : x  Auto Basophil % : x                    Procedure Performed:  (  )Yes     ( x )No  Name of Procedure:  (  )debridement    (  )I&D    (  )Other:  (  )partial thickness     (  )full thickness     (  )subcutaneous     (  )muscle/tendon     (  )bone  (  )sharp     (  )surgical

## 2023-08-10 NOTE — DISCHARGE NOTE PROVIDER - NSDCMRMEDTOKEN_GEN_ALL_CORE_FT
acetaminophen 325 mg oral tablet: 2 tab(s) orally every 6 hours As needed Temp greater or equal to 38C (100.4F), Moderate Pain (4 - 6)  aluminum hydroxide-magnesium hydroxide 200 mg-200 mg/5 mL oral suspension: 30 milliliter(s) orally every 4 hours As needed Dyspepsia  ascorbic acid 500 mg oral tablet: 1 tab(s) orally once a day  aspirin 81 mg oral tablet: 1 orally once a day  atorvastatin 40 mg oral tablet: 1 tab(s) orally once a day (at bedtime)  Colace 100 mg oral capsule: 1 orally 2 times a day  dicyclomine 10 mg oral capsule: 1 tab(s) orally 2 times a day  metoprolol tartrate 25 mg oral tablet: 1 tab(s) orally 2 times a day  Multiple Vitamins oral tablet: 1 tab(s) orally once a day  rivastigmine 4.5 mg oral capsule: 1 cap(s) orally once a day  senna (sennosides) 8.6 mg oral tablet: 2 tab(s) orally once a day (at bedtime) as needed for  constipation  tamsulosin 0.4 mg oral capsule: 1 cap(s) orally once a day (at bedtime)  zinc sulfate 220 mg oral capsule: 1 cap(s) orally once a day   acetaminophen 325 mg oral tablet: 2 tab(s) orally every 6 hours As needed Temp greater or equal to 38C (100.4F), Moderate Pain (4 - 6)  aluminum hydroxide-magnesium hydroxide 200 mg-200 mg/5 mL oral suspension: 30 milliliter(s) orally every 4 hours As needed Dyspepsia  ascorbic acid 500 mg oral tablet: 1 tab(s) orally once a day  aspirin 81 mg oral tablet: 1 orally once a day  atorvastatin 40 mg oral tablet: 1 tab(s) orally once a day (at bedtime)  Colace 100 mg oral capsule: 1 orally 2 times a day  collagenase 250 units/g topical ointment: 1 Apply topically to affected area once a day  dicyclomine 10 mg oral capsule: 1 tab(s) orally 2 times a day  metoprolol tartrate 25 mg oral tablet: 1 tab(s) orally 2 times a day  Multiple Vitamins oral tablet: 1 tab(s) orally once a day  rivastigmine 4.5 mg oral capsule: 1 cap(s) orally once a day  senna (sennosides) 8.6 mg oral tablet: 2 tab(s) orally once a day (at bedtime) as needed for  constipation  tamsulosin 0.4 mg oral capsule: 1 cap(s) orally once a day (at bedtime)  zinc sulfate 220 mg oral capsule: 1 cap(s) orally once a day

## 2023-08-10 NOTE — DISCHARGE NOTE PROVIDER - CARE PROVIDER_API CALL
Symone Posada  Plastic Surgery  [address] [place] [address], [address]  [place] [address]  Phone: [phone]  Fax: [phone]  Follow Up Time: 1 week    Madi Powell  [address] [address]  Phone: [phone]  Fax: [phone]  Follow Up Time: 1-3 days

## 2023-08-10 NOTE — DISCHARGE NOTE PROVIDER - PROVIDER TOKENS
PROVIDER:[TOKEN:[[id number]],FOLLOWUP:[1 week]],PROVIDER:[TOKEN:[[id number]],FOLLOWUP:[1-3 days]]

## 2023-08-10 NOTE — PROGRESS NOTE ADULT - SUBJECTIVE AND OBJECTIVE BOX
Medicine Progress Note    Patient is a 87y old  Male who presents with a chief complaint of osteo of R femur (10 Aug 2023 11:43)      SUBJECTIVE / OVERNIGHT EVENTS:  did not answer qs. asked me to pull blanket on him    ADDITIONAL REVIEW OF SYSTEMS:  unable to obtain    MEDICATIONS  (STANDING):  ascorbic acid 500 milliGRAM(s) Oral daily  atorvastatin 40 milliGRAM(s) Oral at bedtime  dicyclomine 10 milliGRAM(s) Oral two times a day before meals  metoprolol tartrate 25 milliGRAM(s) Oral two times a day  multivitamin 1 Tablet(s) Oral daily  rivastigmine patch  4.6 mG/24 Hr(s) 1 Patch Transdermal every 24 hours  senna 2 Tablet(s) Oral at bedtime  tamsulosin 0.4 milliGRAM(s) Oral at bedtime  zinc sulfate 220 milliGRAM(s) Oral daily    MEDICATIONS  (PRN):  acetaminophen     Tablet .. 650 milliGRAM(s) Oral every 6 hours PRN Temp greater or equal to 38C (100.4F), Mild Pain (1 - 3)  aluminum hydroxide/magnesium hydroxide/simethicone Suspension 30 milliLiter(s) Oral every 4 hours PRN Dyspepsia  melatonin 3 milliGRAM(s) Oral at bedtime PRN Insomnia  ondansetron Injectable 4 milliGRAM(s) IV Push every 8 hours PRN Nausea and/or Vomiting    CAPILLARY BLOOD GLUCOSE        I&O's Summary    10 Aug 2023 07:01  -  11 Aug 2023 07:00  --------------------------------------------------------  IN: 320 mL / OUT: 675 mL / NET: -355 mL    11 Aug 2023 07:01  -  11 Aug 2023 11:31  --------------------------------------------------------  IN: 420 mL / OUT: 0 mL / NET: 420 mL        PHYSICAL EXAM:  T(F): 98.8 (08-09-23 @ 19:34), Max: 98.8 (08-09-23 @ 19:34)  HR: 90 (08-10-23 @ 05:30) (90 - 104)  BP: 116/52 (08-10-23 @ 05:30) (108/62 - 126/53)  RR: 16 (08-10-23 @ 05:30) (16 - 18)  SpO2: 94% (08-10-23 @ 05:30) (94% - 97%    GENERAL: Not in distress. Alert .eating breakfast for aide without any problm  HEENT: clear conjunctiva, MMM. no pallor or icterus  CARDIOVASCULAR: RRR S1, S2. No murmur/rubs/gallop  LUNGS: BLAE+, no rales, no wheezing, no rhonchi.    ABDOMEN: ND. Soft,  NT, no guarding / rebound / rigidity. BS normoactive  BACK: No spine tenderness.  EXTREMITIES: no edema. no leg or calf TP. right hip on dressing. contractures  SKIN: warm and dry  PSYCHIATRIC: Calm.  No agitation.  CNS: Alert, awake. does not follow commands        LABS:                        8.9    9.02  )-----------( 417      ( 09 Aug 2023 15:13 )             27.9                     COVID-19 PCR: NotDetec (10 Aug 2023 12:01)  SARS-CoV-2: NotDetec (12 Jun 2023 11:45)  SARS-CoV-2: NotDetec (27 Feb 2023 21:40)      RADIOLOGY & ADDITIONAL TESTS:  Imaging from Last 24 Hours:    Electrocardiogram/QTc Interval:    COORDINATION OF CARE:  Care Discussed with Consultants/Other Providers:

## 2023-08-10 NOTE — PROGRESS NOTE ADULT - ASSESSMENT
stage 4 right trochanter s/p debridement and drainage right buttock 3 days ago  -ok for d/c  -d/c wound care... see current wound care order

## 2023-08-10 NOTE — DISCHARGE NOTE PROVIDER - ATTENDING DISCHARGE PHYSICAL EXAMINATION:
GENERAL: Not in distress. Alert .eating breakfast for aide without any problm  HEENT: clear conjunctiva, MMM. no pallor or icterus  CARDIOVASCULAR: RRR S1, S2. No murmur/rubs/gallop  LUNGS: BLAE+, no rales, no wheezing, no rhonchi.    ABDOMEN: ND. Soft,  NT, no guarding / rebound / rigidity. BS normoactive  BACK: No spine tenderness.  EXTREMITIES: no edema. no leg or calf TP. right hip on dressing. contractures  SKIN: warm and dry  PSYCHIATRIC: Calm.  No agitation.  CNS: Alert, awake. does not follow commands GENERAL: Not in distress. Alert .eating breakfast for aide without any problm  HEENT: clear conjunctiva, MMM. no pallor or icterus  CARDIOVASCULAR: RRR S1, S2. No murmur/rubs/gallop  LUNGS: BLAE+, no rales, no wheezing, no rhonchi.    ABDOMEN: ND. Soft,  NT, no guarding / rebound / rigidity. BS normoactive  BACK: No spine tenderness.  EXTREMITIES: no edema. no leg or calf TP. Contractures  SKIN: warm and dry  PSYCHIATRIC: Calm.  No agitation.  CNS: Alert, awake. does not follow commands

## 2023-08-10 NOTE — PROGRESS NOTE ADULT - ASSESSMENT
87 year old M PMH dementia, BPH, DVT not on AC, IBS, OA with previous pelvic fracture brought in from [facility] for nonhealing decubitus ulcer admitted for possible osteomyelitis of R hip, failed outpatient abx.    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 with possible OM on CT scan s/p debridement  s/p OR drainage of right hip wound; woundvac removed 8/10  CT of pelvis: possible signs of osteo and abscess formation, may have RT gluteal abscess   Wound cultures growing ESBL E Coli and Enterococcus Faecalis - sensitivities reviewed  Culture from intervention 8/7 show Moderate Enterococcus faecalis and Rare Escherichia coli  ID continue meropenem until DC. then stop  spoke to Dr. Quintero this am. cleared for DC    hx of CAD  - Continue asa 81mg, atorvastatin 40mg, metoprolol tartrate 25mg BID     Anemia  -H/H stable, 9.3/28  -Monitor cbc    Dementia  - On rivastigmine 4.5mg capsule at home, continue patch while in hospital    BPH  - Continue Flomax    IBS  - Continue Dicyclomine    DVT ppx- lovenox    Dispo: stable for DC today with home hospice t [facility]. He is DNR/DNI  Daughter Lola Quinones [phone]. plan of care discussed. agreed with the plan.

## 2023-08-10 NOTE — DISCHARGE NOTE PROVIDER - HOSPITAL COURSE
87 year old M PMH dementia, BPH, DVT not on AC, IBS, OA with previous pelvic fracture brought in from [facility] for nonhealing decubitus ulcer admitted for possible osteomyelitis of R hip, failed outpatient abx.    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 with possible OM on CT scan s/p debridement  s/p OR drainage of right hip wound; woundvac removed 8/10  CT of pelvis: possible signs of osteo and abscess formation, may have RT gluteal abscess   Wound cultures growing ESBL E Coli and Enterococcus Faecalis - sensitivities reviewed  Culture from intervention 8/7 show Moderate Enterococcus faecalis and Rare Escherichia coli  ID continue meropenem until DC. then stop  spoke to Dr. Quintero this am. cleared for DC    hx of CAD  - Continue asa 81mg, atorvastatin 40mg, metoprolol tartrate 25mg BID     Anemia  -H/H stable, 9.3/28  -Monitor cbc    Dementia  - On rivastigmine 4.5mg capsule at home, continue patch while in hospital    BPH  - Continue Flomax    IBS  - Continue Dicyclomine    DVT ppx- lovenox    Dispo: stable for DC today with home hospice t [facility]. He is DNR/DNI  Daughter Lola Quinones [phone].   Luis, nurse from [facility]: [phone] (was Updated about the plan and dispo. )   87 year old M PMH dementia, BPH, DVT not on AC, IBS, OA with previous pelvic fracture brought in from [facility] for nonhealing decubitus ulcer admitted for possible osteomyelitis of R hip, failed outpatient abx.    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 with possible OM on CT scan s/p debridement  s/p OR drainage of right hip wound; woundvac removed 8/10  CT of pelvis: possible signs of osteo and abscess formation, may have RT gluteal abscess   Wound cultures growing ESBL E Coli and Enterococcus Faecalis - sensitivities reviewed  Culture from intervention 8/7 show Moderate Enterococcus faecalis and Rare Escherichia coli  ID continue meropenem until DC. then stop  spoke to Dr. Quintero this am. cleared for DC    hx of CAD  - Continue asa 81mg, atorvastatin 40mg, metoprolol tartrate 25mg BID     Anemia  -H/H stable, 9.3/28  -Monitor cbc    Dementia  - On rivastigmine 4.5mg capsule at home, continue patch while in hospital    BPH  - Continue Flomax    IBS  - Continue Dicyclomine    DVT ppx- lovenox    Dispo: stable for DC today with home hospice t [facility]. He is DNR/DNI  Daughter Lola Quinones [phone]. plan of care discussed. agreed with the plan.   Luis nurse from [facility]: [phone] (was Updated about the plan and dispo. )   87 year old M PMH dementia, BPH, DVT not on AC, IBS, OA with previous pelvic fracture brought in from [facility] for nonhealing decubitus ulcer admitted for possible osteomyelitis of R hip, failed outpatient abx.    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 with possible OM on CT scan s/p debridement  s/p OR drainage of right hip wound; woundvac removed 8/10  CT of pelvis: possible signs of osteo and abscess formation, may have RT gluteal abscess   Wound cultures growing ESBL E Coli and Enterococcus Faecalis [VRE] - sensitive to zahira  Culture from intervention 8/7 show Moderate Enterococcus faecalis and Rare Escherichia coli  meropenem continued until 8/2-8/10. final results today ESBL E.coli. MATIAS enterococcus. awating further recommendations from ID after final culture with VRE   plastic cleared for DC    hx of CAD  - Continue asa 81mg, atorvastatin 40mg, metoprolol tartrate 25mg BID     Anemia  -H/H stable, 9.3/28  -Monitor cbc    Dementia  - On rivastigmine 4.5mg capsule at home, continue patch while in hospital    BPH  - Continue Flomax    IBS  - Continue Dicyclomine    DVT ppx- lovenox    Dispo: stable for DC today with home hospice t [facility]. He is DNR/DNI  Daughter Lola Quinones [phone]. plan of care discussed. agreed with the plan.   Luis nurse from [facility]: [phone] (was Updated about the plan and dispo. )

## 2023-08-10 NOTE — DISCHARGE NOTE PROVIDER - NSDCCPCAREPLAN_GEN_ALL_CORE_FT
PRINCIPAL DISCHARGE DIAGNOSIS  Diagnosis: Osteomyelitis of right femur  Assessment and Plan of Treatment: s/p debridgement. s/p wound vac, wound vac was removed 8/9. continur wound care as ordered.  Right hip wound:  apply normal saline  moistened 4 inch gauze over the adaptic, cover with dry 4inch gauze the apply allevyn dressing.  Please note that patient has micromatrix wound healing product on the wound bed beneath the adaptic, do no remove the micromatrix, the micromatrix will dissolve after over 1-2 weeks at which time the adptic can be removed and begin packing wound with normal saline moistened 2 inch maxime and cover with foam dressing"  follow up with diane MCCLELLAN in 1-3 days and plastic in 1 week. return to ER if fever on any sogn of worening infection. fall precautions.      SECONDARY DISCHARGE DIAGNOSES  Diagnosis: Non-healing wound of right lower extremity  Assessment and Plan of Treatment:      PRINCIPAL DISCHARGE DIAGNOSIS  Diagnosis: Osteomyelitis of right femur  Assessment and Plan of Treatment: s/p debridgement. s/p wound vac, wound vac was removed 8/9. continur wound care as ordered.  Right hip wound:  apply normal saline  moistened 4 inch gauze over the adaptic, cover with dry 4inch gauze the apply allevyn dressing.  Please note that patient has micromatrix wound healing product on the wound bed beneath the adaptic, do no remove the micromatrix, the micromatrix will dissolve after over 1-2 weeks at which time the adptic can be removed and begin packing wound with normal saline moistened 2 inch maxime and cover with foam dressing. dressing canbe done every other day.   follow up with diane MCCLELLAN in 1-3 days and plastic in 1 week. return to ER if fever on any sogn of worening infection. fall precautions.      SECONDARY DISCHARGE DIAGNOSES  Diagnosis: Non-healing wound of right lower extremity  Assessment and Plan of Treatment:      PRINCIPAL DISCHARGE DIAGNOSIS  Diagnosis: Osteomyelitis of right femur  Assessment and Plan of Treatment: s/p debridgement. s/p wound vac, wound vac was removed 8/9. continur wound care as ordered.  Right hip wound:  apply normal saline  moistened 4 inch gauze over the adaptic, cover with dry 4inch gauze the apply allevyn dressing.  Please note that patient has micromatrix wound healing product on the wound bed beneath the adaptic, do no remove the micromatrix, the micromatrix will dissolve after over 1-2 weeks at which time the adptic can be removed and begin packing wound with normal saline moistened 2 inch maxime and cover with foam dressing. dressing canbe done every other day.   follow up with diane MCCLELLAN in 1-3 days and plastic in 1 week. return to ER if fever on any sogn of worening infection. fall precautions.  Offload all bony prominences with strict Turn & Position at least every 2 hours.  Offload heels by floating on pillow or with offloading booties at all times while in bed.   Nutritional support per Dietician's recommendations.      SECONDARY DISCHARGE DIAGNOSES  Diagnosis: Non-healing wound of right lower extremity  Assessment and Plan of Treatment:      PRINCIPAL DISCHARGE DIAGNOSIS  Diagnosis: Osteomyelitis of right femur  Assessment and Plan of Treatment: s/p debridgement. s/p wound vac, wound vac was removed 8/9. culture showed VRE enterococcus and ESBL E.coli. treated with meropenem per ID [ 8/2-8/10].   continur wound care as ordered.  Right hip wound:  apply normal saline  moistened 4 inch gauze over the adaptic, cover with dry 4inch gauze the apply allevyn dressing.  Please note that patient has micromatrix wound healing product on the wound bed beneath the adaptic, do no remove the micromatrix, the micromatrix will dissolve after over 1-2 weeks at which time the adptic can be removed and begin packing wound with normal saline moistened 2 inch maxime and cover with foam dressing. dressing canbe done every other day.   follow up with diane MCCLELLAN in 1-3 days and plastic in 1 week. return to ER if fever on any sogn of worening infection. fall precautions.  Offload all bony prominences with strict Turn & Position at least every 2 hours.  Offload heels by floating on pillow or with offloading booties at all times while in bed.   Nutritional support per Dietician's recommendations.      SECONDARY DISCHARGE DIAGNOSES  Diagnosis: Non-healing wound of right lower extremity  Assessment and Plan of Treatment:      PRINCIPAL DISCHARGE DIAGNOSIS  Diagnosis: Osteomyelitis of right femur  Assessment and Plan of Treatment: s/p debridgement. s/p wound vac, wound vac was removed 8/9. culture showed VRE enterococcus and ESBL E.coli. treated with meropenem per ID [ 8/2-8/10].   continur wound care as ordered.  Right hip wound:  -change wound care order to santyl with calcium alginate packing, cover with allevyn dressing, change daily for 2 weeks, then after 2 weeks, discontinue santyl collagense and pack with calcium alginate silver and change every other day  -off load  -nutrition support  follow up with diane MCCLELLAN in 1-3 days and plastic in 1 week. return to ER if fever on any sign of worening infection. fall precautions.  to prevent decub:  Offload all bony prominences with strict Turn & Position at least every 2 hours.  Offload heels by floating on pillow or with offloading booties at all times while in bed.   Nutritional support per Dietician's recommendations.      SECONDARY DISCHARGE DIAGNOSES  Diagnosis: Non-healing wound of right lower extremity  Assessment and Plan of Treatment:      PRINCIPAL DISCHARGE DIAGNOSIS  Diagnosis: Osteomyelitis of right femur  Assessment and Plan of Treatment: s/p debridgement. s/p wound vac, wound vac was removed 8/9. culture showed VRE enterococcus and ESBL E.coli. treated with meropenem per ID [ 8/2-8/10].   continur wound care as ordered.  Right hip wound:  -change wound care order to santyl with calcium alginate packing, cover with allevyn dressing, change daily for 2 weeks, then after 2 weeks, discontinue santyl collagense and pack with calcium alginate silver and change every other day  -off load  -nutrition support  follow up with diane MCCLELLAN in 1-3 days and plastic in 1 week. return to ER if fever on any sign of worening infection. fall precautions.  to prevent decub: follow facility and hospice protcol.   protocol followed in hospital as below:   Offload all bony prominences with strict Turn & Position at least every 2 hours.  Offload heels by floating on pillow or with offloading booties at all times while in bed.   Nutritional support per Dietician's recommendations.      SECONDARY DISCHARGE DIAGNOSES  Diagnosis: Non-healing wound of right lower extremity  Assessment and Plan of Treatment:      PRINCIPAL DISCHARGE DIAGNOSIS  Diagnosis: Osteomyelitis of right femur  Assessment and Plan of Treatment: s/p debridgement. s/p wound vac, wound vac was removed 8/9. culture showed VRE enterococcus and ESBL E.coli. treated with meropenem per ID [ 8/2-8/10].   continur wound care as ordered.  Right hip wound:  -change wound care order to santyl with calcium alginate packing, cover with allevyn dressing, change daily for 6 days a week for 2 weeks, then after 2 weeks, discontinue santyl collagense and pack with calcium alginate silver and change every other day  -off load  -nutrition support  follow up with diane MCCLELLAN in 1-3 days and plastic in 1 week. return to ER if fever on any sign of worening infection. fall precautions.  to prevent decub: follow facility and hospice protcol.   protocol followed in hospital as below:   Offload all bony prominences with strict Turn & Position at least every 2 hours.  Offload heels by floating on pillow or with offloading booties at all times while in bed.   Nutritional support per Dietician's recommendations.      SECONDARY DISCHARGE DIAGNOSES  Diagnosis: Non-healing wound of right lower extremity  Assessment and Plan of Treatment:

## 2023-08-11 ENCOUNTER — TRANSCRIPTION ENCOUNTER (OUTPATIENT)
Age: 88
End: 2023-08-11

## 2023-08-11 PROCEDURE — 99232 SBSQ HOSP IP/OBS MODERATE 35: CPT

## 2023-08-11 RX ADMIN — TAMSULOSIN HYDROCHLORIDE 0.4 MILLIGRAM(S): 0.4 CAPSULE ORAL at 22:00

## 2023-08-11 RX ADMIN — Medication 10 MILLIGRAM(S): at 17:34

## 2023-08-11 RX ADMIN — ZINC SULFATE TAB 220 MG (50 MG ZINC EQUIVALENT) 220 MILLIGRAM(S): 220 (50 ZN) TAB at 12:46

## 2023-08-11 RX ADMIN — ATORVASTATIN CALCIUM 40 MILLIGRAM(S): 80 TABLET, FILM COATED ORAL at 22:00

## 2023-08-11 RX ADMIN — RIVASTIGMINE 1 PATCH: 4.6 PATCH, EXTENDED RELEASE TRANSDERMAL at 19:20

## 2023-08-11 RX ADMIN — Medication 1 TABLET(S): at 12:46

## 2023-08-11 RX ADMIN — RIVASTIGMINE 1 PATCH: 4.6 PATCH, EXTENDED RELEASE TRANSDERMAL at 12:44

## 2023-08-11 RX ADMIN — RIVASTIGMINE 1 PATCH: 4.6 PATCH, EXTENDED RELEASE TRANSDERMAL at 08:34

## 2023-08-11 RX ADMIN — RIVASTIGMINE 1 PATCH: 4.6 PATCH, EXTENDED RELEASE TRANSDERMAL at 12:43

## 2023-08-11 RX ADMIN — Medication 25 MILLIGRAM(S): at 05:49

## 2023-08-11 RX ADMIN — SENNA PLUS 2 TABLET(S): 8.6 TABLET ORAL at 22:01

## 2023-08-11 RX ADMIN — Medication 25 MILLIGRAM(S): at 17:34

## 2023-08-11 RX ADMIN — Medication 500 MILLIGRAM(S): at 12:46

## 2023-08-11 RX ADMIN — Medication 10 MILLIGRAM(S): at 05:49

## 2023-08-11 NOTE — PROGRESS NOTE ADULT - ASSESSMENT
87 year old M PMH dementia, BPH, DVT not on AC, IBS, OA with previous pelvic fracture brought in from formerly [facility] for nonhealing decubitus ulcer admitted for possible osteomyelitis of R hip, failed outpatient abx.    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 with possible OM on CT scan s/p debridement  s/p OR drainage of right hip wound; woundvac removed 8/10  CT of pelvis: possible signs of osteo and abscess formation, may have RT gluteal abscess   Wound cultures growing ESBL E Coli and Enterococcus Faecalis [VRE] - sensitive to zahira  Culture from intervention 8/7 show Moderate Enterococcus faecalis and Rare Escherichia coli  meropenem continued until 8/2-8/10. final results today ESBL E.coli. MATIAS enterococcus. awating further recommendations from ID after final culture with VRE   plastic cleared for DC    hx of CAD  - Continue asa 81mg, atorvastatin 40mg, metoprolol tartrate 25mg BID     Anemia  -H/H stable, 9.3/28  -Monitor cbc    Dementia  - On rivastigmine 4.5mg capsule at home, continue patch while in hospital    BPH  - Continue Flomax    IBS  - Continue Dicyclomine    DVT ppx- lovenox    Dispo: stable for DC today if ID clears, with home hospice t [facility]. He is DNR/DNI  8/10: Daughter Lola Quinones [phone]. plan of care discussed. agreed with the plan. will call today after ID recommendations. CM to update [facility].

## 2023-08-11 NOTE — DISCHARGE NOTE NURSING/CASE MANAGEMENT/SOCIAL WORK - PATIENT PORTAL LINK FT
You can access the FollowMyHealth Patient Portal offered by [facility] by registering at the following website: [email or web address]. By joining [employer]’s FollowMyHealth portal, you will also be able to view your health information using other applications (apps) compatible with our system.

## 2023-08-11 NOTE — PROGRESS NOTE ADULT - SUBJECTIVE AND OBJECTIVE BOX
Medicine Progress Note    Patient is a 87y old  Male who presents with a chief complaint of osteo of R femur (10 Aug 2023 11:43)      SUBJECTIVE / OVERNIGHT EVENTS:    ADDITIONAL REVIEW OF SYSTEMS:    MEDICATIONS  (STANDING):  ascorbic acid 500 milliGRAM(s) Oral daily  atorvastatin 40 milliGRAM(s) Oral at bedtime  dicyclomine 10 milliGRAM(s) Oral two times a day before meals  metoprolol tartrate 25 milliGRAM(s) Oral two times a day  multivitamin 1 Tablet(s) Oral daily  rivastigmine patch  4.6 mG/24 Hr(s) 1 Patch Transdermal every 24 hours  senna 2 Tablet(s) Oral at bedtime  tamsulosin 0.4 milliGRAM(s) Oral at bedtime  zinc sulfate 220 milliGRAM(s) Oral daily    MEDICATIONS  (PRN):  acetaminophen     Tablet .. 650 milliGRAM(s) Oral every 6 hours PRN Temp greater or equal to 38C (100.4F), Mild Pain (1 - 3)  aluminum hydroxide/magnesium hydroxide/simethicone Suspension 30 milliLiter(s) Oral every 4 hours PRN Dyspepsia  melatonin 3 milliGRAM(s) Oral at bedtime PRN Insomnia  ondansetron Injectable 4 milliGRAM(s) IV Push every 8 hours PRN Nausea and/or Vomiting    CAPILLARY BLOOD GLUCOSE        I&O's Summary    10 Aug 2023 07:01  -  11 Aug 2023 07:00  --------------------------------------------------------  IN: 320 mL / OUT: 675 mL / NET: -355 mL    11 Aug 2023 07:01  -  11 Aug 2023 11:38  --------------------------------------------------------  IN: 420 mL / OUT: 0 mL / NET: 420 mL        PHYSICAL EXAM:  Vital Signs Last 24 Hrs  T(C): 36 (11 Aug 2023 05:27), Max: 37.5 (10 Aug 2023 15:29)  T(F): 96.8 (11 Aug 2023 05:27), Max: 99.5 (10 Aug 2023 15:29)  HR: 71 (11 Aug 2023 05:27) (71 - 90)  BP: 129/55 (11 Aug 2023 05:27) (116/59 - 129/55)  BP(mean): --  RR: 16 (11 Aug 2023 05:27) (16 - 18)  SpO2: 98% (11 Aug 2023 05:27) (95% - 98%)    Parameters below as of 11 Aug 2023 05:27  Patient On (Oxygen Delivery Method): room air    GENERAL: Not in distress. Alert .  HEENT: clear conjunctiva, MMM. no pallor or icterus  CARDIOVASCULAR: RRR S1, S2. No murmur/rubs/gallop  LUNGS: BLAE+, no rales, no wheezing, no rhonchi.    ABDOMEN: ND. Soft,  NT, no guarding / rebound / rigidity. BS normoactive  EXTREMITIES: no edema. no leg or calf TP. right hip on dressing. contractures  SKIN: warm and dry  PSYCHIATRIC: Calm.  No agitation.  CNS: Alert, awake. does not follow commands    LABS:                        8.9    9.02  )-----------( 417      ( 09 Aug 2023 15:13 )             27.9                     COVID-19 PCR: NotDetec (10 Aug 2023 12:01)  SARS-CoV-2: NotDetec (12 Jun 2023 11:45)  SARS-CoV-2: NotDetec (27 Feb 2023 21:40)      RADIOLOGY & ADDITIONAL TESTS:  Imaging from Last 24 Hours:    Electrocardiogram/QTc Interval:    COORDINATION OF CARE:  Care Discussed with Consultants/Other Providers:   Medicine Progress Note    Patient is a 87y old  Male who presents with a chief complaint of osteo of R femur (10 Aug 2023 11:43)      SUBJECTIVE / OVERNIGHT EVENTS:  did not answer qs    ADDITIONAL REVIEW OF SYSTEMS:  unable to obtain    MEDICATIONS  (STANDING):  ascorbic acid 500 milliGRAM(s) Oral daily  atorvastatin 40 milliGRAM(s) Oral at bedtime  dicyclomine 10 milliGRAM(s) Oral two times a day before meals  metoprolol tartrate 25 milliGRAM(s) Oral two times a day  multivitamin 1 Tablet(s) Oral daily  rivastigmine patch  4.6 mG/24 Hr(s) 1 Patch Transdermal every 24 hours  senna 2 Tablet(s) Oral at bedtime  tamsulosin 0.4 milliGRAM(s) Oral at bedtime  zinc sulfate 220 milliGRAM(s) Oral daily    MEDICATIONS  (PRN):  acetaminophen     Tablet .. 650 milliGRAM(s) Oral every 6 hours PRN Temp greater or equal to 38C (100.4F), Mild Pain (1 - 3)  aluminum hydroxide/magnesium hydroxide/simethicone Suspension 30 milliLiter(s) Oral every 4 hours PRN Dyspepsia  melatonin 3 milliGRAM(s) Oral at bedtime PRN Insomnia  ondansetron Injectable 4 milliGRAM(s) IV Push every 8 hours PRN Nausea and/or Vomiting    CAPILLARY BLOOD GLUCOSE        I&O's Summary    10 Aug 2023 07:01  -  11 Aug 2023 07:00  --------------------------------------------------------  IN: 320 mL / OUT: 675 mL / NET: -355 mL    11 Aug 2023 07:01  -  11 Aug 2023 11:38  --------------------------------------------------------  IN: 420 mL / OUT: 0 mL / NET: 420 mL        PHYSICAL EXAM:  Vital Signs Last 24 Hrs  T(C): 36 (11 Aug 2023 05:27), Max: 37.5 (10 Aug 2023 15:29)  T(F): 96.8 (11 Aug 2023 05:27), Max: 99.5 (10 Aug 2023 15:29)  HR: 71 (11 Aug 2023 05:27) (71 - 90)  BP: 129/55 (11 Aug 2023 05:27) (116/59 - 129/55)  BP(mean): --  RR: 16 (11 Aug 2023 05:27) (16 - 18)  SpO2: 98% (11 Aug 2023 05:27) (95% - 98%)    Parameters below as of 11 Aug 2023 05:27  Patient On (Oxygen Delivery Method): room air    GENERAL: Not in distress. Alert .  HEENT: clear conjunctiva, MMM. no pallor or icterus  CARDIOVASCULAR: RRR S1, S2. No murmur/rubs/gallop  LUNGS: BLAE+, no rales, no wheezing, no rhonchi.    ABDOMEN: ND. Soft,  NT, no guarding / rebound / rigidity. BS normoactive  EXTREMITIES: no edema. no leg or calf TP. right hip on dressing. contractures  SKIN: warm and dry  PSYCHIATRIC: Calm.  No agitation.  CNS: Alert, awake. does not follow commands    LABS:                        8.9    9.02  )-----------( 417      ( 09 Aug 2023 15:13 )             27.9                     COVID-19 PCR: NotDetec (10 Aug 2023 12:01)  SARS-CoV-2: NotDetec (12 Jun 2023 11:45)  SARS-CoV-2: NotDetec (27 Feb 2023 21:40)      RADIOLOGY & ADDITIONAL TESTS:  Imaging from Last 24 Hours:    Electrocardiogram/QTc Interval:    COORDINATION OF CARE:  Care Discussed with Consultants/Other Providers:

## 2023-08-11 NOTE — DISCHARGE NOTE NURSING/CASE MANAGEMENT/SOCIAL WORK - NSDCPEFALRISK_GEN_ALL_CORE
For information on Fall & Injury Prevention, visit: [email or web address] OR  [email or web address] OR  https://www.cdc.gov/steadi/patient.html

## 2023-08-12 PROCEDURE — 99232 SBSQ HOSP IP/OBS MODERATE 35: CPT

## 2023-08-12 RX ADMIN — ZINC SULFATE TAB 220 MG (50 MG ZINC EQUIVALENT) 220 MILLIGRAM(S): 220 (50 ZN) TAB at 11:30

## 2023-08-12 RX ADMIN — Medication 1 TABLET(S): at 11:30

## 2023-08-12 RX ADMIN — Medication 10 MILLIGRAM(S): at 17:20

## 2023-08-12 RX ADMIN — RIVASTIGMINE 1 PATCH: 4.6 PATCH, EXTENDED RELEASE TRANSDERMAL at 12:00

## 2023-08-12 RX ADMIN — RIVASTIGMINE 1 PATCH: 4.6 PATCH, EXTENDED RELEASE TRANSDERMAL at 20:06

## 2023-08-12 RX ADMIN — Medication 25 MILLIGRAM(S): at 17:20

## 2023-08-12 RX ADMIN — RIVASTIGMINE 1 PATCH: 4.6 PATCH, EXTENDED RELEASE TRANSDERMAL at 11:30

## 2023-08-12 RX ADMIN — RIVASTIGMINE 1 PATCH: 4.6 PATCH, EXTENDED RELEASE TRANSDERMAL at 07:22

## 2023-08-12 RX ADMIN — Medication 25 MILLIGRAM(S): at 06:12

## 2023-08-12 RX ADMIN — TAMSULOSIN HYDROCHLORIDE 0.4 MILLIGRAM(S): 0.4 CAPSULE ORAL at 21:35

## 2023-08-12 RX ADMIN — Medication 10 MILLIGRAM(S): at 06:12

## 2023-08-12 RX ADMIN — ATORVASTATIN CALCIUM 40 MILLIGRAM(S): 80 TABLET, FILM COATED ORAL at 21:36

## 2023-08-12 RX ADMIN — SENNA PLUS 2 TABLET(S): 8.6 TABLET ORAL at 21:36

## 2023-08-12 RX ADMIN — Medication 500 MILLIGRAM(S): at 11:30

## 2023-08-12 NOTE — PROGRESS NOTE ADULT - ASSESSMENT
87 year old M PMH dementia, BPH, DVT not on AC, IBS, OA with previous pelvic fracture brought in from [facility] for nonhealing decubitus ulcer admitted for possible osteomyelitis of R hip, failed outpatient abx.    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 with possible OM on CT scan s/p debridement  s/p OR drainage of right hip wound; woundvac removed 8/10  CT of pelvis: possible signs of osteo and abscess formation, may have RT gluteal abscess   Wound cultures growing ESBL E Coli and Enterococcus Faecalis [VRE] - sensitive to zahira  Culture from intervention 8/7 show Moderate Enterococcus faecalis and Rare Escherichia coli  meropenem continued until 8/2-8/10. final results today ESBL E.coli. MATIAS enterococcus. awating further recommendations from ID after final culture with VRE --> further Abx  plastic and ID cleared for DC    hx of CAD  - Continue asa 81mg, atorvastatin 40mg, metoprolol tartrate 25mg BID     Anemia  -H/H stable, 9.3/28  -Monitor cbc    Dementia  - On rivastigmine 4.5mg capsule at home, continue patch while in hospital    BPH  - Continue Flomax    IBS  - Continue Dicyclomine    DVT ppx- lovenox    Dispo: stable for DC  with home hospice t [facility]. He is DNR/DNI. [facility] did nto take yesterday as it was 3 pm. they wants to take him on Monday early am  8/11: Daughter Lola Quinones [phone]. will update today

## 2023-08-12 NOTE — PROGRESS NOTE ADULT - SUBJECTIVE AND OBJECTIVE BOX
Medicine Progress Note    Patient is a 87y old  Male who presents with a chief complaint of osteo of R femur (11 Aug 2023 11:38)      SUBJECTIVE / OVERNIGHT EVENTS:  no overnight  did not answer qs    ADDITIONAL REVIEW OF SYSTEMS:  unable to obtain    MEDICATIONS  (STANDING):  ascorbic acid 500 milliGRAM(s) Oral daily  atorvastatin 40 milliGRAM(s) Oral at bedtime  dicyclomine 10 milliGRAM(s) Oral two times a day before meals  metoprolol tartrate 25 milliGRAM(s) Oral two times a day  multivitamin 1 Tablet(s) Oral daily  rivastigmine patch  4.6 mG/24 Hr(s) 1 Patch Transdermal every 24 hours  senna 2 Tablet(s) Oral at bedtime  tamsulosin 0.4 milliGRAM(s) Oral at bedtime  zinc sulfate 220 milliGRAM(s) Oral daily    MEDICATIONS  (PRN):  acetaminophen     Tablet .. 650 milliGRAM(s) Oral every 6 hours PRN Temp greater or equal to 38C (100.4F), Mild Pain (1 - 3)  aluminum hydroxide/magnesium hydroxide/simethicone Suspension 30 milliLiter(s) Oral every 4 hours PRN Dyspepsia  melatonin 3 milliGRAM(s) Oral at bedtime PRN Insomnia  ondansetron Injectable 4 milliGRAM(s) IV Push every 8 hours PRN Nausea and/or Vomiting    CAPILLARY BLOOD GLUCOSE        I&O's Summary    11 Aug 2023 07:01  -  12 Aug 2023 07:00  --------------------------------------------------------  IN: 570 mL / OUT: 900 mL / NET: -330 mL        PHYSICAL EXAM:  Vital Signs Last 24 Hrs  T(C): 36.2 (12 Aug 2023 05:50), Max: 36.8 (11 Aug 2023 17:02)  T(F): 97.2 (12 Aug 2023 05:50), Max: 98.3 (11 Aug 2023 17:02)  HR: 73 (12 Aug 2023 05:50) (73 - 81)  BP: 122/55 (12 Aug 2023 05:50) (122/55 - 134/46)  BP(mean): --  RR: 18 (12 Aug 2023 05:50) (16 - 18)  SpO2: 98% (12 Aug 2023 05:50) (95% - 98%)    Parameters below as of 12 Aug 2023 05:50  Patient On (Oxygen Delivery Method): room air    GENERAL: Not in distress. Alert .  HEENT: clear conjunctiva, MMM. no pallor or icterus  CARDIOVASCULAR: RRR S1, S2. No murmur/rubs/gallop  LUNGS: BLAE+, no rales, no wheezing, no rhonchi.    ABDOMEN: ND. Soft,  NT, no guarding / rebound / rigidity. BS normoactive  EXTREMITIES: no edema. no leg or calf TP. right hip on dressing. contractures  SKIN: warm and dry  PSYCHIATRIC: Calm.  No agitation.  CNS: Alert, awake. does not follow commands    LABS:                    COVID-19 PCR: NotDetec (10 Aug 2023 12:01)  SARS-CoV-2: NotDetec (12 Jun 2023 11:45)  SARS-CoV-2: NotDetec (27 Feb 2023 21:40)      RADIOLOGY & ADDITIONAL TESTS:  Imaging from Last 24 Hours:    Electrocardiogram/QTc Interval:    COORDINATION OF CARE:  Care Discussed with Consultants/Other Providers:

## 2023-08-13 PROCEDURE — 99232 SBSQ HOSP IP/OBS MODERATE 35: CPT

## 2023-08-13 RX ADMIN — ZINC SULFATE TAB 220 MG (50 MG ZINC EQUIVALENT) 220 MILLIGRAM(S): 220 (50 ZN) TAB at 11:32

## 2023-08-13 RX ADMIN — Medication 650 MILLIGRAM(S): at 11:33

## 2023-08-13 RX ADMIN — Medication 25 MILLIGRAM(S): at 06:23

## 2023-08-13 RX ADMIN — Medication 10 MILLIGRAM(S): at 06:24

## 2023-08-13 RX ADMIN — Medication 10 MILLIGRAM(S): at 17:04

## 2023-08-13 RX ADMIN — Medication 1 TABLET(S): at 11:31

## 2023-08-13 RX ADMIN — Medication 25 MILLIGRAM(S): at 17:04

## 2023-08-13 RX ADMIN — RIVASTIGMINE 1 PATCH: 4.6 PATCH, EXTENDED RELEASE TRANSDERMAL at 11:32

## 2023-08-13 RX ADMIN — SENNA PLUS 2 TABLET(S): 8.6 TABLET ORAL at 21:45

## 2023-08-13 RX ADMIN — TAMSULOSIN HYDROCHLORIDE 0.4 MILLIGRAM(S): 0.4 CAPSULE ORAL at 21:45

## 2023-08-13 RX ADMIN — Medication 650 MILLIGRAM(S): at 17:04

## 2023-08-13 RX ADMIN — RIVASTIGMINE 1 PATCH: 4.6 PATCH, EXTENDED RELEASE TRANSDERMAL at 11:30

## 2023-08-13 RX ADMIN — Medication 650 MILLIGRAM(S): at 12:33

## 2023-08-13 RX ADMIN — RIVASTIGMINE 1 PATCH: 4.6 PATCH, EXTENDED RELEASE TRANSDERMAL at 19:45

## 2023-08-13 RX ADMIN — Medication 650 MILLIGRAM(S): at 17:34

## 2023-08-13 RX ADMIN — ATORVASTATIN CALCIUM 40 MILLIGRAM(S): 80 TABLET, FILM COATED ORAL at 21:45

## 2023-08-13 RX ADMIN — Medication 500 MILLIGRAM(S): at 11:32

## 2023-08-13 RX ADMIN — RIVASTIGMINE 1 PATCH: 4.6 PATCH, EXTENDED RELEASE TRANSDERMAL at 07:00

## 2023-08-13 NOTE — PROGRESS NOTE ADULT - SUBJECTIVE AND OBJECTIVE BOX
Medicine Progress Note    Patient is a 87y old  Male who presents with a chief complaint of osteo of R femur (12 Aug 2023 12:08)      SUBJECTIVE / OVERNIGHT EVENTS:    ADDITIONAL REVIEW OF SYSTEMS:    MEDICATIONS  (STANDING):  ascorbic acid 500 milliGRAM(s) Oral daily  atorvastatin 40 milliGRAM(s) Oral at bedtime  dicyclomine 10 milliGRAM(s) Oral two times a day before meals  metoprolol tartrate 25 milliGRAM(s) Oral two times a day  multivitamin 1 Tablet(s) Oral daily  rivastigmine patch  4.6 mG/24 Hr(s) 1 Patch Transdermal every 24 hours  senna 2 Tablet(s) Oral at bedtime  tamsulosin 0.4 milliGRAM(s) Oral at bedtime  zinc sulfate 220 milliGRAM(s) Oral daily    MEDICATIONS  (PRN):  acetaminophen     Tablet .. 650 milliGRAM(s) Oral every 6 hours PRN Temp greater or equal to 38C (100.4F), Mild Pain (1 - 3)  aluminum hydroxide/magnesium hydroxide/simethicone Suspension 30 milliLiter(s) Oral every 4 hours PRN Dyspepsia  melatonin 3 milliGRAM(s) Oral at bedtime PRN Insomnia  ondansetron Injectable 4 milliGRAM(s) IV Push every 8 hours PRN Nausea and/or Vomiting    CAPILLARY BLOOD GLUCOSE        I&O's Summary    12 Aug 2023 07:01  -  13 Aug 2023 07:00  --------------------------------------------------------  IN: 25 mL / OUT: 525 mL / NET: -500 mL    13 Aug 2023 07:01  -  13 Aug 2023 12:42  --------------------------------------------------------  IN: 440 mL / OUT: 0 mL / NET: 440 mL        PHYSICAL EXAM:  Vital Signs Last 24 Hrs  T(C): 37.1 (13 Aug 2023 04:54), Max: 37.1 (13 Aug 2023 04:54)  T(F): 98.8 (13 Aug 2023 04:54), Max: 98.8 (13 Aug 2023 04:54)  HR: 100 (13 Aug 2023 04:54) (73 - 100)  BP: 110/57 (13 Aug 2023 04:54) (110/57 - 133/60)  BP(mean): --  RR: 18 (13 Aug 2023 04:54) (18 - 19)  SpO2: 94% (13 Aug 2023 04:54) (94% - 96%)    Parameters below as of 13 Aug 2023 04:54  Patient On (Oxygen Delivery Method): room air      CONSTITUTIONAL: NAD, well-developed, well-groomed  ENMT: Moist oral mucosa, no pharyngeal injection or exudates; normal dentition  RESPIRATORY: Normal respiratory effort; lungs are clear to auscultation bilaterally  CARDIOVASCULAR: Regular rate and rhythm, normal S1 and S2, no murmur/rub/gallop; No lower extremity edema; Peripheral pulses are 2+ bilaterally  ABDOMEN: Nontender to palpation, normoactive bowel sounds, no rebound/guarding; No hepatosplenomegaly  PSYCH: A+O to person, place, and time; affect appropriate  NEUROLOGY: CN 2-12 are intact and symmetric; no gross sensory deficits   SKIN: No rashes; no palpable lesions    LABS:                    COVID-19 PCR: NotDetec (10 Aug 2023 12:01)  SARS-CoV-2: NotDetec (12 Jun 2023 11:45)  SARS-CoV-2: NotDetec (27 Feb 2023 21:40)      RADIOLOGY & ADDITIONAL TESTS:  Imaging from Last 24 Hours:    Electrocardiogram/QTc Interval:    COORDINATION OF CARE:  Care Discussed with Consultants/Other Providers:   Medicine Progress Note    Patient is a 87y old  Male who presents with a chief complaint of osteo of R femur (12 Aug 2023 12:08)      SUBJECTIVE / OVERNIGHT EVENTS:  denied complaints/pain  did not answer further  for DC tomorrow    ADDITIONAL REVIEW OF SYSTEMS:  unable to obatin      MEDICATIONS  (STANDING):  ascorbic acid 500 milliGRAM(s) Oral daily  atorvastatin 40 milliGRAM(s) Oral at bedtime  dicyclomine 10 milliGRAM(s) Oral two times a day before meals  metoprolol tartrate 25 milliGRAM(s) Oral two times a day  multivitamin 1 Tablet(s) Oral daily  rivastigmine patch  4.6 mG/24 Hr(s) 1 Patch Transdermal every 24 hours  senna 2 Tablet(s) Oral at bedtime  tamsulosin 0.4 milliGRAM(s) Oral at bedtime  zinc sulfate 220 milliGRAM(s) Oral daily    MEDICATIONS  (PRN):  acetaminophen     Tablet .. 650 milliGRAM(s) Oral every 6 hours PRN Temp greater or equal to 38C (100.4F), Mild Pain (1 - 3)  aluminum hydroxide/magnesium hydroxide/simethicone Suspension 30 milliLiter(s) Oral every 4 hours PRN Dyspepsia  melatonin 3 milliGRAM(s) Oral at bedtime PRN Insomnia  ondansetron Injectable 4 milliGRAM(s) IV Push every 8 hours PRN Nausea and/or Vomiting    CAPILLARY BLOOD GLUCOSE        I&O's Summary    12 Aug 2023 07:01  -  13 Aug 2023 07:00  --------------------------------------------------------  IN: 25 mL / OUT: 525 mL / NET: -500 mL    13 Aug 2023 07:01  -  13 Aug 2023 12:42  --------------------------------------------------------  IN: 440 mL / OUT: 0 mL / NET: 440 mL        PHYSICAL EXAM:  Vital Signs Last 24 Hrs  T(C): 37.1 (13 Aug 2023 04:54), Max: 37.1 (13 Aug 2023 04:54)  T(F): 98.8 (13 Aug 2023 04:54), Max: 98.8 (13 Aug 2023 04:54)  HR: 100 (13 Aug 2023 04:54) (73 - 100)  BP: 110/57 (13 Aug 2023 04:54) (110/57 - 133/60)  BP(mean): --  RR: 18 (13 Aug 2023 04:54) (18 - 19)  SpO2: 94% (13 Aug 2023 04:54) (94% - 96%)    Parameters below as of 13 Aug 2023 04:54  Patient On (Oxygen Delivery Method): room air      GENERAL: Not in distress. Alert .  HEENT: clear conjunctiva, MMM. no pallor or icterus  CARDIOVASCULAR: RRR S1, S2. No murmur/rubs/gallop  LUNGS: BLAE+, no rales, no wheezing, no rhonchi.    ABDOMEN: ND. Soft,  NT, no guarding / rebound / rigidity. BS normoactive  EXTREMITIES: no edema. no leg or calf TP. right hip on dressing. contractures  SKIN: warm and dry  PSYCHIATRIC: Calm.  No agitation.  CNS: Alert, awake. does not follow commands      LABS:                    COVID-19 PCR: NotDetec (10 Aug 2023 12:01)  SARS-CoV-2: NotDetec (12 Jun 2023 11:45)  SARS-CoV-2: NotDetec (27 Feb 2023 21:40)      RADIOLOGY & ADDITIONAL TESTS:  Imaging from Last 24 Hours:    Electrocardiogram/QTc Interval:    COORDINATION OF CARE:  Care Discussed with Consultants/Other Providers:

## 2023-08-13 NOTE — PROGRESS NOTE ADULT - ASSESSMENT
87 year old M PMH dementia, BPH, DVT not on AC, IBS, OA with previous pelvic fracture brought in from [facility] for nonhealing decubitus ulcer admitted for possible osteomyelitis of R hip, failed outpatient abx.    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 with possible OM on CT scan s/p debridement  s/p OR drainage of right hip wound; woundvac removed 8/10  CT of pelvis: possible signs of osteo and abscess formation, may have RT gluteal abscess   Wound cultures growing ESBL E Coli and Enterococcus Faecalis [VRE] - sensitive to zahira  Culture from intervention 8/7 show Moderate Enterococcus faecalis and Rare Escherichia coli  meropenem continued until 8/2-8/10. final results today ESBL E.coli. MATIAS enterococcus. spoke to ID on friday. suggested no abx needed. cleared for DC  plastic cleared for DC    hx of CAD  - Continue asa 81mg, atorvastatin 40mg, metoprolol tartrate 25mg BID     Anemia  -H/H stable, 9.3/28  -Monitor cbc    Dementia  - On rivastigmine 4.5mg capsule at home, continue patch while in hospital    BPH  - Continue Flomax    IBS  - Continue Dicyclomine    DVT ppx- lovenox    Dispo: stable for DC  with home hospice t [facility]. He is DNR/DNI. DC on Monday early am  8/11: Daughter Lola Quinones [phone].

## 2023-08-14 LAB
ANION GAP SERPL CALC-SCNC: 4 MMOL/L — LOW (ref 5–17)
APPEARANCE UR: CLEAR — SIGNIFICANT CHANGE UP
BACTERIA # UR AUTO: NEGATIVE /HPF — SIGNIFICANT CHANGE UP
BASOPHILS # BLD AUTO: 0.03 K/UL — SIGNIFICANT CHANGE UP (ref 0–0.2)
BASOPHILS NFR BLD AUTO: 0.4 % — SIGNIFICANT CHANGE UP (ref 0–2)
BILIRUB UR-MCNC: NEGATIVE — SIGNIFICANT CHANGE UP
BUN SERPL-MCNC: 19 MG/DL — SIGNIFICANT CHANGE UP (ref 7–23)
CALCIUM SERPL-MCNC: 8.4 MG/DL — SIGNIFICANT CHANGE UP (ref 8.4–10.5)
CHLORIDE SERPL-SCNC: 103 MMOL/L — SIGNIFICANT CHANGE UP (ref 96–108)
CO2 SERPL-SCNC: 31 MMOL/L — SIGNIFICANT CHANGE UP (ref 22–31)
COLOR SPEC: YELLOW — SIGNIFICANT CHANGE UP
CREAT SERPL-MCNC: 0.8 MG/DL — SIGNIFICANT CHANGE UP (ref 0.5–1.3)
DIFF PNL FLD: NEGATIVE — SIGNIFICANT CHANGE UP
EGFR: 87 ML/MIN/1.73M2 — SIGNIFICANT CHANGE UP
EOSINOPHIL # BLD AUTO: 0.28 K/UL — SIGNIFICANT CHANGE UP (ref 0–0.5)
EOSINOPHIL NFR BLD AUTO: 3.6 % — SIGNIFICANT CHANGE UP (ref 0–6)
EPI CELLS # UR: 0 — SIGNIFICANT CHANGE UP
GLUCOSE SERPL-MCNC: 131 MG/DL — HIGH (ref 70–99)
GLUCOSE UR QL: NEGATIVE MG/DL — SIGNIFICANT CHANGE UP
HCT VFR BLD CALC: 27.7 % — LOW (ref 39–50)
HGB BLD-MCNC: 9 G/DL — LOW (ref 13–17)
IMM GRANULOCYTES NFR BLD AUTO: 0.5 % — SIGNIFICANT CHANGE UP (ref 0–0.9)
KETONES UR-MCNC: NEGATIVE MG/DL — SIGNIFICANT CHANGE UP
LEUKOCYTE ESTERASE UR-ACNC: ABNORMAL
LYMPHOCYTES # BLD AUTO: 0.91 K/UL — LOW (ref 1–3.3)
LYMPHOCYTES # BLD AUTO: 11.6 % — LOW (ref 13–44)
MCHC RBC-ENTMCNC: 30.3 PG — SIGNIFICANT CHANGE UP (ref 27–34)
MCHC RBC-ENTMCNC: 32.5 GM/DL — SIGNIFICANT CHANGE UP (ref 32–36)
MCV RBC AUTO: 93.3 FL — SIGNIFICANT CHANGE UP (ref 80–100)
MONOCYTES # BLD AUTO: 0.6 K/UL — SIGNIFICANT CHANGE UP (ref 0–0.9)
MONOCYTES NFR BLD AUTO: 7.7 % — SIGNIFICANT CHANGE UP (ref 2–14)
NEUTROPHILS # BLD AUTO: 5.96 K/UL — SIGNIFICANT CHANGE UP (ref 1.8–7.4)
NEUTROPHILS NFR BLD AUTO: 76.2 % — SIGNIFICANT CHANGE UP (ref 43–77)
NITRITE UR-MCNC: NEGATIVE — SIGNIFICANT CHANGE UP
NRBC # BLD: 0 /100 WBCS — SIGNIFICANT CHANGE UP (ref 0–0)
PH UR: 6 — SIGNIFICANT CHANGE UP (ref 5–8)
PLATELET # BLD AUTO: 352 K/UL — SIGNIFICANT CHANGE UP (ref 150–400)
POTASSIUM SERPL-MCNC: 4.2 MMOL/L — SIGNIFICANT CHANGE UP (ref 3.5–5.3)
POTASSIUM SERPL-SCNC: 4.2 MMOL/L — SIGNIFICANT CHANGE UP (ref 3.5–5.3)
PROT UR-MCNC: NEGATIVE MG/DL — SIGNIFICANT CHANGE UP
RBC # BLD: 2.97 M/UL — LOW (ref 4.2–5.8)
RBC # FLD: 14.4 % — SIGNIFICANT CHANGE UP (ref 10.3–14.5)
RBC CASTS # UR COMP ASSIST: 0 /HPF — SIGNIFICANT CHANGE UP (ref 0–4)
SODIUM SERPL-SCNC: 138 MMOL/L — SIGNIFICANT CHANGE UP (ref 135–145)
SP GR SPEC: 1.01 — SIGNIFICANT CHANGE UP (ref 1–1.03)
UROBILINOGEN FLD QL: 0.2 MG/DL — SIGNIFICANT CHANGE UP (ref 0.2–1)
WBC # BLD: 7.82 K/UL — SIGNIFICANT CHANGE UP (ref 3.8–10.5)
WBC # FLD AUTO: 7.82 K/UL — SIGNIFICANT CHANGE UP (ref 3.8–10.5)
WBC UR QL: 1 /HPF — SIGNIFICANT CHANGE UP (ref 0–5)

## 2023-08-14 PROCEDURE — 99233 SBSQ HOSP IP/OBS HIGH 50: CPT

## 2023-08-14 PROCEDURE — 71045 X-RAY EXAM CHEST 1 VIEW: CPT | Mod: 26

## 2023-08-14 RX ORDER — ENOXAPARIN SODIUM 100 MG/ML
40 INJECTION SUBCUTANEOUS EVERY 24 HOURS
Refills: 0 | Status: DISCONTINUED | OUTPATIENT
Start: 2023-08-14 | End: 2023-08-16

## 2023-08-14 RX ORDER — ASPIRIN/CALCIUM CARB/MAGNESIUM 324 MG
81 TABLET ORAL DAILY
Refills: 0 | Status: DISCONTINUED | OUTPATIENT
Start: 2023-08-14 | End: 2023-08-16

## 2023-08-14 RX ORDER — HEPARIN SODIUM 5000 [USP'U]/ML
5000 INJECTION INTRAVENOUS; SUBCUTANEOUS EVERY 8 HOURS
Refills: 0 | Status: DISCONTINUED | OUTPATIENT
Start: 2023-08-14 | End: 2023-08-14

## 2023-08-14 RX ADMIN — SENNA PLUS 2 TABLET(S): 8.6 TABLET ORAL at 22:05

## 2023-08-14 RX ADMIN — Medication 10 MILLIGRAM(S): at 17:29

## 2023-08-14 RX ADMIN — ATORVASTATIN CALCIUM 40 MILLIGRAM(S): 80 TABLET, FILM COATED ORAL at 22:05

## 2023-08-14 RX ADMIN — Medication 10 MILLIGRAM(S): at 06:03

## 2023-08-14 RX ADMIN — ENOXAPARIN SODIUM 40 MILLIGRAM(S): 100 INJECTION SUBCUTANEOUS at 17:29

## 2023-08-14 RX ADMIN — RIVASTIGMINE 1 PATCH: 4.6 PATCH, EXTENDED RELEASE TRANSDERMAL at 19:00

## 2023-08-14 RX ADMIN — ZINC SULFATE TAB 220 MG (50 MG ZINC EQUIVALENT) 220 MILLIGRAM(S): 220 (50 ZN) TAB at 12:13

## 2023-08-14 RX ADMIN — RIVASTIGMINE 1 PATCH: 4.6 PATCH, EXTENDED RELEASE TRANSDERMAL at 07:00

## 2023-08-14 RX ADMIN — Medication 1 TABLET(S): at 12:13

## 2023-08-14 RX ADMIN — RIVASTIGMINE 1 PATCH: 4.6 PATCH, EXTENDED RELEASE TRANSDERMAL at 12:00

## 2023-08-14 RX ADMIN — Medication 500 MILLIGRAM(S): at 12:13

## 2023-08-14 RX ADMIN — TAMSULOSIN HYDROCHLORIDE 0.4 MILLIGRAM(S): 0.4 CAPSULE ORAL at 22:06

## 2023-08-14 RX ADMIN — RIVASTIGMINE 1 PATCH: 4.6 PATCH, EXTENDED RELEASE TRANSDERMAL at 12:14

## 2023-08-14 NOTE — PROGRESS NOTE ADULT - ASSESSMENT
Right hip wound debridem 1 week ago, no micromatrix present, some necrotic tissue that can be treated with collagenase  -change wound care order to santyl with calcium alginate packing, cover with allevyn dressing, change daily for 2 weeks, then after 2 weeks, discontinue santyl collagense and pack with calcium alginate silver and change every other day  -off load  -nutrition support  -if patient continues to have temp suggest repeat CT of pelvis with IV contrast.

## 2023-08-14 NOTE — PROGRESS NOTE ADULT - SUBJECTIVE AND OBJECTIVE BOX
(x  T(F): 98.1 (23 @ 15:24), Max: 98.3 (23 @ 17:34)  HR: 93 (23 @ 15:24) (73 - 94)  BP: 113/65 (23 @ 15:24) (108/67 - 113/65)  RR: 17 (23 @ 15:24) (17 - 18)  SpO2: 98% (23 @ 15:24) (94% - 98%)        Wound Location 1:  right hip wound 6.5 cm x 4.5 cm x 2.2 cm with 7.5 cm track to deep left lower buttock, deep wound clean, no purulence, base of general wound with some sloughing fascial tissue that was debrided                                  9.0    7.82  )-----------( 352      ( 14 Aug 2023 13:15 )             27.7     CBC Full  -  ( 14 Aug 2023 13:15 )  WBC Count : 7.82 K/uL  RBC Count : 2.97 M/uL  Hemoglobin : 9.0 g/dL  Hematocrit : 27.7 %  Platelet Count - Automated : 352 K/uL  Mean Cell Volume : 93.3 fl  Mean Cell Hemoglobin : 30.3 pg  Mean Cell Hemoglobin Concentration : 32.5 gm/dL  Auto Neutrophil # : 5.96 K/uL  Auto Lymphocyte # : 0.91 K/uL  Auto Monocyte # : 0.60 K/uL  Auto Eosinophil # : 0.28 K/uL  Auto Basophil # : 0.03 K/uL  Auto Neutrophil % : 76.2 %  Auto Lymphocyte % : 11.6 %  Auto Monocyte % : 7.7 %  Auto Eosinophil % : 3.6 %  Auto Basophil % : 0.4 %    138|103|19<131  4.2|31|0.80  8.4,--,--   @ 13:15      Urinalysis Basic - ( 14 Aug 2023 16:00 )    Color: Yellow / Appearance: Clear / S.012 / pH: x  Gluc: x / Ketone: Negative mg/dL  / Bili: Negative / Urobili: 0.2 mg/dL   Blood: x / Protein: Negative mg/dL / Nitrite: Negative   Leuk Esterase: Trace / RBC: x / WBC x   Sq Epi: x / Non Sq Epi: x / Bacteria: x                Procedure Performed:  ( x )Yes     (  )No  Name of Procedure:  (x  )debridement, excisional see description above    (  )I&D    (  )Other:  (  )partial thickness     (  )full thickness     (  )subcutaneous     (  )muscle/tendon     (  )bone  (  )sharp     (  )surgical

## 2023-08-14 NOTE — PROGRESS NOTE ADULT - ASSESSMENT
87 year old M PMH dementia, BPH, DVT not on AC, IBS, OA with previous pelvic fracture brought in from [facility] for nonhealing decubitus ulcer admitted for possible osteomyelitis of R hip, failed outpatient abx.      low grade temp 100.4 this am 8/14  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 with possible OM on CT scan s/p debridement  s/p OR drainage of right hip wound; woundvac removed 8/10  CT of pelvis: possible signs of osteo and abscess formation, may have RT gluteal abscess   Wound cultures growing ESBL E Coli and Enterococcus Faecalis [VRE] - sensitive to zahira  Culture from intervention 8/7 show Moderate Enterococcus faecalis and Rare Escherichia coli  meropenem continued until 8/2-8/10. final results today ESBL E.coli. MATIAS enterococcus. spoke to ID on friday. suggested no abx needed. cleared for DC  plastic cleared for DC  low grade temp today. wbc notmal. bmp ok. UA, Xr and RVP pending. ID suggested monitor off Abx for now. will start abx if any + wor up. no need to resume Abx for wound cx    hx of CAD  - Continue asa 81mg, atorvastatin 40mg, metoprolol tartrate 25mg BID     Anemia  -H/H stable, 9.3/28  -Monitor cbc    Dementia  - On rivastigmine 4.5mg capsule at home, continue patch while in hospital    BPH  - Continue Flomax    IBS  - Continue Dicyclomine    DVT ppx- lovenox    Dispo: Home hospice to [facility]. He is DNR/DNI. DC held for low grade temp this am. infection w/u pending.   8/14: Daughter Lola Quinones [phone].

## 2023-08-14 NOTE — PROGRESS NOTE ADULT - SUBJECTIVE AND OBJECTIVE BOX
CC: f/u for low grade fever    Patient reports: he is non verbal, he is being fed with assistance    REVIEW OF SYSTEMS:  All other review of systems negative (Comprehensive ROS): limited by condition    Antimicrobials Day #  :off    Other Medications Reviewed  MEDICATIONS  (STANDING):  ascorbic acid 500 milliGRAM(s) Oral daily  atorvastatin 40 milliGRAM(s) Oral at bedtime  dicyclomine 10 milliGRAM(s) Oral two times a day before meals  metoprolol tartrate 25 milliGRAM(s) Oral two times a day  multivitamin 1 Tablet(s) Oral daily  rivastigmine patch  4.6 mG/24 Hr(s) 1 Patch Transdermal every 24 hours  senna 2 Tablet(s) Oral at bedtime  tamsulosin 0.4 milliGRAM(s) Oral at bedtime  zinc sulfate 220 milliGRAM(s) Oral daily    T(F): 98.1 (08-14-23 @ 05:11), Max: 100.5 (08-13-23 @ 15:56)  HR: 94 (08-14-23 @ 05:11)  BP: 108/67 (08-14-23 @ 05:11)  RR: 17 (08-14-23 @ 05:11)  SpO2: 94% (08-14-23 @ 05:11)  Wt(kg): --    PHYSICAL EXAM:  General: poorly interactive  no acute distress  Eyes:  anicteric, no conjunctival injection, no discharge  Oropharynx: no lesions or injection 	  Neck: supple, without adenopathy  Lungs: clear to auscultation, decreased  at bases  Heart: regular rate and rhythm; no murmur, rubs or gallops  Abdomen: soft, nondistended, nontender, without mass or organomegaly  Skin: no rash  Extremities: no clubbing, cyanosis, or edema, contracted  Neurologic: poorly interactive  Rt hip wound deep, packed, no surrounding cellulitis    LAB RESULTS:              MICROBIOLOGY:  RECENT CULTURES:      RADIOLOGY REVIEWED:    < from: CT Pelvis w/ IV Cont (08.03.23 @ 12:17) >    IMPRESSION:  *  Stable deep ulcer overlying the right greater trochanter reaching the   underlying bone with underlying reactive bony changes suggestive of   osteomyelitis, unchanged from prior.  *  Fluid collection extending from the ulcer defect insinuating between   the right gluteus musculature unchanged in size measuring 6.2 x 3.6 cm.  *  Stable collection along the right hamstring insertion measuring 2.1 x   1.7 cm.  *  Stable intramuscular collection within the right adductor brevis   measuring 1.7 cm.  *  Stable induration overlying the left greater trochanter, likely also   related to decubitus change.      --- End of Report ---    < end of copied text >

## 2023-08-14 NOTE — CHART NOTE - NSCHARTNOTEFT_GEN_A_CORE
Nutrition Follow Up Note  Hospital Course   (Per Electronic Medical Record)    Source:  Patient [X]  Nursing Staff [X] PCA  Medical Record [X]      Diet: Diet, DASH/TLC:   Sodium & Cholesterol Restricted  Lactose Restricted (Milk Sugar Intoler.)  Supplement Feeding Modality:  Oral  Ensure Plus High Protein Cans or Servings Per Day:  1       Frequency:  Three Times a day (08-08-23 @ 13:14) [Pending Verification By Attending]  Diet, DASH/TLC:   Sodium & Cholesterol Restricted  Lactose Restricted (Milk Sugar Intoler.)  Supplement Feeding Modality:  Oral  Ensure Plus High Protein Cans or Servings Per Day:  1       Frequency:  Two Times a day (08-07-23 @ 18:31) [Active]    At this time patient tolerating diet w/ varied appetite/intake consuming 0-75% of meals. Observed during breakfast with good PO intake, 1:1 assist w/ meal by PCA, patient  consumed 100% of meal. PCA reports good acceptance on Ensure Plus High Protein Daily 8 oz (Provides 350 kcal, 20 grams of protein). No issues w/ dentition or chewing and swallowing current diet texture. No reported N/V/C/D, last BM 8/13 Per nursing flowsheets. Multiple pressure injuries noted, recommend continue with MVI, ascorbic acid (500 mg), & zinc sulfate (220 mg) to promote wound healing.    Enteral/Parenteral Nutrition: Not Applicable    Current Weight: 119.9lb on 8/7  WEIGHT TRENDS: 54.4 kG (7/30/23)    Pertinent Medications: MEDICATIONS  (STANDING):  ascorbic acid 500 milliGRAM(s) Oral daily  atorvastatin 40 milliGRAM(s) Oral at bedtime  dicyclomine 10 milliGRAM(s) Oral two times a day before meals  metoprolol tartrate 25 milliGRAM(s) Oral two times a day  multivitamin 1 Tablet(s) Oral daily  rivastigmine patch  4.6 mG/24 Hr(s) 1 Patch Transdermal every 24 hours  senna 2 Tablet(s) Oral at bedtime  tamsulosin 0.4 milliGRAM(s) Oral at bedtime  zinc sulfate 220 milliGRAM(s) Oral daily    MEDICATIONS  (PRN):  acetaminophen     Tablet .. 650 milliGRAM(s) Oral every 6 hours PRN Temp greater or equal to 38C (100.4F), Mild Pain (1 - 3)  aluminum hydroxide/magnesium hydroxide/simethicone Suspension 30 milliLiter(s) Oral every 4 hours PRN Dyspepsia  melatonin 3 milliGRAM(s) Oral at bedtime PRN Insomnia  ondansetron Injectable 4 milliGRAM(s) IV Push every 8 hours PRN Nausea and/or Vomiting      Skin: Pressure Injury; Stage 2 right foot, right ankle, right buttock     Edema: No edema noted per nursing flowsheet    Last Bowel Movement: on 8/7 Per nursing flowsheets     Estimated Needs:   [X] No Change Since Previous Assessment    Previous Nutrition Diagnosis:   1. Severe Protein Calorie Malnutrition  2. Increased Nutrient Needs    NUTRITION DIAGNOSIS IS [X] Ongoing- addressed w/ Ensure Plus High Protein Daily 8 oz (Provides 350 kcal, 20 grams of protein)       New Nutrition Diagnosis: [X] Not Applicable    Interventions:   1. Ensure Plus High Protein 8oz PO TID (Provides 1,050kcal & 60grams of Protein)   2. Monitor daily PO intakes, GI tolerance, labs, weights, skin integrity, & BM regularity     Monitoring & Evaluation:   [X] Weights   [X] PO Intake   [X] Skin Integrity   [X] Follow Up (Per Protocol)  [X] Tolerance to Diet Prescription   [X] Other: Labs     Registered Dietitian/Nutritionist Remains Available.  Azalia Vasquez RD, MS, CDN    Phone# [phone]

## 2023-08-14 NOTE — PROGRESS NOTE ADULT - ASSESSMENT
86 yo male with advanced dementia admitted from hospice to assess Rt hip decubitus with associated osteo of femur.(greater trochanter)  He was on TMP-SMX prior to admission.  He received 9 days of meropenem and underwent debridement on 8/11.  He has been off antibiotics x 72 hours, discharge was being planned, now on hold with a low grade temp.  His Covid RVP is negative.  His wound is colonized with VRE and ESBL E Coli  We do not have any non toxic oral meds to suppress him with.  If we restart meropenem the benefit may be marginal and of limited duration.  I do not see any other easily treatable infections and I would favor a limited w/u  Suggest:  1 Local wound care to wound  2 Favor non antimicrobial management at this point  3 We are approaching end of life care and favor a more palliative approach unless clear benefit can be derived from interventions  4. Monitor fever and reassess goals of care. ? If he needs temp monitoring.? If he will be a hospice candidate . Extent of ID w/u should be coordinated with goals of care.

## 2023-08-14 NOTE — PROGRESS NOTE ADULT - SUBJECTIVE AND OBJECTIVE BOX
Medicine Progress Note    Patient is a 87y old  Male who presents with a chief complaint of osteo of R femur (14 Aug 2023 11:46)      SUBJECTIVE / OVERNIGHT EVENTS:  more alert and awake today. reported no complaints. did not answer further   low grade temp in am. infection w/u sent    ADDITIONAL REVIEW OF SYSTEMS:  unable to obtain    MEDICATIONS  (STANDING):  ascorbic acid 500 milliGRAM(s) Oral daily  aspirin  chewable 81 milliGRAM(s) Oral daily  atorvastatin 40 milliGRAM(s) Oral at bedtime  dicyclomine 10 milliGRAM(s) Oral two times a day before meals  enoxaparin Injectable 40 milliGRAM(s) SubCutaneous every 24 hours  metoprolol tartrate 25 milliGRAM(s) Oral two times a day  multivitamin 1 Tablet(s) Oral daily  rivastigmine patch  4.6 mG/24 Hr(s) 1 Patch Transdermal every 24 hours  senna 2 Tablet(s) Oral at bedtime  tamsulosin 0.4 milliGRAM(s) Oral at bedtime  zinc sulfate 220 milliGRAM(s) Oral daily    MEDICATIONS  (PRN):  acetaminophen     Tablet .. 650 milliGRAM(s) Oral every 6 hours PRN Temp greater or equal to 38C (100.4F), Mild Pain (1 - 3)  aluminum hydroxide/magnesium hydroxide/simethicone Suspension 30 milliLiter(s) Oral every 4 hours PRN Dyspepsia  melatonin 3 milliGRAM(s) Oral at bedtime PRN Insomnia  ondansetron Injectable 4 milliGRAM(s) IV Push every 8 hours PRN Nausea and/or Vomiting    CAPILLARY BLOOD GLUCOSE        I&O's Summary    13 Aug 2023 07:01  -  14 Aug 2023 07:00  --------------------------------------------------------  IN: 440 mL / OUT: 0 mL / NET: 440 mL    14 Aug 2023 07:01  -  14 Aug 2023 13:52  --------------------------------------------------------  IN: 520 mL / OUT: 0 mL / NET: 520 mL        PHYSICAL EXAM:  Vital Signs Last 24 Hrs  T(C): 36.7 (14 Aug 2023 05:11), Max: 38.1 (13 Aug 2023 15:56)  T(F): 98.1 (14 Aug 2023 05:11), Max: 100.5 (13 Aug 2023 15:56)  HR: 94 (14 Aug 2023 05:11) (73 - 132)  BP: 108/67 (14 Aug 2023 05:11) (108/67 - 123/69)  BP(mean): --  RR: 17 (14 Aug 2023 05:11) (17 - 18)  SpO2: 94% (14 Aug 2023 05:11) (94% - 97%)    Parameters below as of 14 Aug 2023 05:11  Patient On (Oxygen Delivery Method): room air      GENERAL: Not in distress. Alert .  HEENT: clear conjunctiva, MMM. no pallor or icterus  CARDIOVASCULAR: RRR S1, S2. No murmur/rubs/gallop  LUNGS: BLAE+, no rales, no wheezing, no rhonchi.    ABDOMEN: ND. Soft,  NT, no guarding / rebound / rigidity. BS normoactive  EXTREMITIES: no edema. no leg or calf TP. right hip on dressing. contractures  SKIN: warm and dry  PSYCHIATRIC: Calm.  No agitation.  CNS: Alert, awake. does not follow commands    LABS:                        9.0    7.82  )-----------( 352      ( 14 Aug 2023 13:15 )             27.7     08-14    138  |  103  |  19  ----------------------------<  131<H>  4.2   |  31  |  0.80    Ca    8.4      14 Aug 2023 13:15            Urinalysis Basic - ( 14 Aug 2023 13:15 )    Color: x / Appearance: x / SG: x / pH: x  Gluc: 131 mg/dL / Ketone: x  / Bili: x / Urobili: x   Blood: x / Protein: x / Nitrite: x   Leuk Esterase: x / RBC: x / WBC x   Sq Epi: x / Non Sq Epi: x / Bacteria: x        COVID-19 PCR: NotDetec (10 Aug 2023 12:01)  SARS-CoV-2: NotDetec (12 Jun 2023 11:45)  SARS-CoV-2: NotDete (27 Feb 2023 21:40)      RADIOLOGY & ADDITIONAL TESTS:  Imaging from Last 24 Hours:    Electrocardiogram/QTc Interval:    COORDINATION OF CARE:  Care Discussed with Consultants/Other Providers:

## 2023-08-15 LAB
RAPID RVP RESULT: SIGNIFICANT CHANGE UP
SARS-COV-2 RNA SPEC QL NAA+PROBE: SIGNIFICANT CHANGE UP
SURGICAL PATHOLOGY STUDY: SIGNIFICANT CHANGE UP

## 2023-08-15 PROCEDURE — 99497 ADVNCD CARE PLAN 30 MIN: CPT

## 2023-08-15 PROCEDURE — 99232 SBSQ HOSP IP/OBS MODERATE 35: CPT

## 2023-08-15 RX ORDER — COLLAGENASE CLOSTRIDIUM HIST. 250 UNIT/G
1 OINTMENT (GRAM) TOPICAL DAILY
Refills: 0 | Status: DISCONTINUED | OUTPATIENT
Start: 2023-08-15 | End: 2023-08-16

## 2023-08-15 RX ADMIN — ATORVASTATIN CALCIUM 40 MILLIGRAM(S): 80 TABLET, FILM COATED ORAL at 21:13

## 2023-08-15 RX ADMIN — Medication 10 MILLIGRAM(S): at 18:38

## 2023-08-15 RX ADMIN — RIVASTIGMINE 1 PATCH: 4.6 PATCH, EXTENDED RELEASE TRANSDERMAL at 12:26

## 2023-08-15 RX ADMIN — Medication 1 TABLET(S): at 12:26

## 2023-08-15 RX ADMIN — Medication 650 MILLIGRAM(S): at 19:14

## 2023-08-15 RX ADMIN — ZINC SULFATE TAB 220 MG (50 MG ZINC EQUIVALENT) 220 MILLIGRAM(S): 220 (50 ZN) TAB at 12:25

## 2023-08-15 RX ADMIN — Medication 650 MILLIGRAM(S): at 18:37

## 2023-08-15 RX ADMIN — RIVASTIGMINE 1 PATCH: 4.6 PATCH, EXTENDED RELEASE TRANSDERMAL at 07:00

## 2023-08-15 RX ADMIN — Medication 10 MILLIGRAM(S): at 06:13

## 2023-08-15 RX ADMIN — RIVASTIGMINE 1 PATCH: 4.6 PATCH, EXTENDED RELEASE TRANSDERMAL at 19:48

## 2023-08-15 RX ADMIN — Medication 1 APPLICATION(S): at 18:38

## 2023-08-15 RX ADMIN — Medication 81 MILLIGRAM(S): at 12:25

## 2023-08-15 RX ADMIN — SENNA PLUS 2 TABLET(S): 8.6 TABLET ORAL at 21:13

## 2023-08-15 RX ADMIN — Medication 500 MILLIGRAM(S): at 12:26

## 2023-08-15 RX ADMIN — RIVASTIGMINE 1 PATCH: 4.6 PATCH, EXTENDED RELEASE TRANSDERMAL at 12:15

## 2023-08-15 RX ADMIN — Medication 25 MILLIGRAM(S): at 18:36

## 2023-08-15 RX ADMIN — TAMSULOSIN HYDROCHLORIDE 0.4 MILLIGRAM(S): 0.4 CAPSULE ORAL at 21:13

## 2023-08-15 RX ADMIN — ENOXAPARIN SODIUM 40 MILLIGRAM(S): 100 INJECTION SUBCUTANEOUS at 18:36

## 2023-08-15 NOTE — PROGRESS NOTE ADULT - CONVERSATION/DISCUSSION
Diagnosis/Prognosis/Hospice Referral/Palliative Care Referral
Diagnosis/Prognosis/Hospice Referral/Palliative Care Referral

## 2023-08-15 NOTE — PROGRESS NOTE ADULT - SUBJECTIVE AND OBJECTIVE BOX
CC: f/u for infected decubitus ulcer     Patient reports nothing    REVIEW OF SYSTEMS:  All other review of systems negative (Comprehensive ROS)    Antimicrobials Day #  :    Other Medications Reviewed    T(F): 97.8 (08-15-23 @ 05:10), Max: 98.8 (23 @ 20:23)  HR: 80 (08-15-23 @ 05:10)  BP: 105/66 (08-15-23 @ 05:10)  RR: 18 (08-15-23 @ 05:10)  SpO2: 96% (08-15-23 @ 05:10)  Wt(kg): --    PHYSICAL EXAM:  General: alert, no acute distress  Eyes:  anicteric, no conjunctival injection, no discharge  Oropharynx: no lesions or injection 	  Neck: supple, without adenopathy  Lungs: clear to auscultation  Heart: regular rate and rhythm; no murmur, rubs or gallops  Abdomen: soft, nondistended, nontender, without mass or organomegaly  Skin: no lesions  Extremities: no clubbing, cyanosis, or edema  Neurologic: alert,contracted  right hip wound very deep, tunnels, packed, no odor  LAB RESULTS:                        9.0    7.82  )-----------( 352      ( 14 Aug 2023 13:15 )             27.7     08-14    138  |  103  |  19  ----------------------------<  131<H>  4.2   |  31  |  0.80    Ca    8.4      14 Aug 2023 13:15        Urinalysis Basic - ( 14 Aug 2023 16:00 )    Color: Yellow / Appearance: Clear / S.012 / pH: x  Gluc: x / Ketone: Negative mg/dL  / Bili: Negative / Urobili: 0.2 mg/dL   Blood: x / Protein: Negative mg/dL / Nitrite: Negative   Leuk Esterase: Trace / RBC: 0 /HPF / WBC 1 /HPF   Sq Epi: x / Non Sq Epi: x / Bacteria: Negative /HPF      MICROBIOLOGY:  RECENT CULTURES:      RADIOLOGY REVIEWED:    < from: CT Pelvis w/ IV Cont (23 @ 12:17) >  ACC: [id number] EXAM:  CT PELVIS ONLY IC   ORDERED BY: VIJI ESPAÑA     PROCEDURE DATE:  2023          INTERPRETATION:  CLINICAL INFORMATION: Follow-up right hip wound and   gluteal abscess    COMPARISON: CT pelvis 2023    CONTRAST/COMPLICATIONS:  IV Contrast: Omnipaque 350  90 cc administered   10 cc discarded  Oral Contrast: NONE  Complications: None reported at time of study completion    PROCEDURE:  CT of the Pelvis was performed.  Sagittal and coronal reformats were performed.    FINDINGS:  BLADDER: Normal.  REPRODUCTIVE ORGANS: Nonenlarged.  LYMPH NODES: No pelvic adenopathy.    VISUALIZED PORTIONS:  ABDOMINAL ORGANS: Within normal limits.  BOWEL: Moderate fecal retention throughout the colon and rectum. No   dilated bowel.  PERITONEUM: No ascites.  VESSELS:  Aortoiliac atherosclerosis without aneurysm.  ABDOMINAL WALL: Normal.  BONES: Stable deep ulcer overlying the right greater trochanter reaching   the underlying bone with underlying reactive bony changes suggestive of   osteomyelitis, unchanged from prior. Associated fluid collection   extending from the defect insinuating between the right gluteus   musculature unchanged in size measuring 6.2 x 3.6 cm. Stable collection   along the right hamstring insertion measuring 2.1 x 1.7 cm. Stable   intramuscular collection within the right adductor brevis measuring 1.7   cm. Stable induration overlying the left greater trochanter, likely also   related to decubitus change. Posttraumatic changes again noted at the  right pubic symphysis.    IMPRESSION:  *  Stable deep ulcer overlying the right greater trochanter reaching the   underlying bone with underlying reactive bony changes suggestive of   osteomyelitis, unchanged from prior.  *  Fluid collection extending from the ulcer defect insinuating between   the right gluteus musculature unchanged in size measuring 6.2 x 3.6 cm.  *  Stable collection along the right hamstring insertion measuring 2.1 x   1.7 cm.  *  Stable intramuscular collection within the right adductor brevis   measuring 1.7 cm.  *  Stable induration overlying the left greater trochanter, likely also   related to decubitus change.      --- End of Report ---      < end of copied text >            Assessment:  Elderly man on hospice admitted for necrotic decubitus ulcer. Had a course of antibiotics and underwent debridement of the decubitus. He will not heal this ulcer and osteomyelitis is not curable in this setting. He appears comfortable, no fever in the past 24 hours and hospice disposition is most rational   Plan:  discharge back to hospice  no role for antibiotics at present.

## 2023-08-15 NOTE — PROGRESS NOTE ADULT - ASSESSMENT
87 year old M PMH dementia, BPH, DVT not on AC, IBS, OA with previous pelvic fracture brought in from [facility] for nonhealing decubitus ulcer admitted for possible osteomyelitis of R hip, failed outpatient abx.      low grade temp 100.4 this am 8/14. did not recur, all infection w/u neg  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 with possible OM on CT scan s/p debridement  s/p OR drainage of right hip wound; woundvac removed 8/10  CT of pelvis: possible signs of osteo and abscess formation, may have RT gluteal abscess   Wound cultures growing ESBL E Coli and Enterococcus Faecalis [VRE] - sensitive to zahira  Culture from intervention 8/7 show Moderate Enterococcus faecalis and Rare Escherichia coli  meropenem continued until 8/2-8/10. final results today ESBL E.coli. MATIAS enterococcus. spoke to ID on friday. suggested no abx needed. cleared for DC  plastic cleared for DC. changed wound care instruction which was further modified s per hospice/HH protocol and DC papers updated.   ID cleared again today 8/15    hx of CAD  - Continue asa 81mg, atorvastatin 40mg, metoprolol tartrate 25mg BID     Anemia  -H/H stable, 9.3/28  -Monitor cbc    Dementia  - On rivastigmine 4.5mg capsule at home, continue patch while in hospital    BPH  - Continue Flomax    IBS  - Continue Dicyclomine    DVT ppx- lovenox    Dispo: Home hospice to [facility]. He is DNR/DNI. DC held for low grade temp no temp today. all infection w/u neg, dc order placed/ however HH and hospice wanted some edit in DC instructions. refused to take him today. will take him tomorrow. family wants only HH with home hospice.   8/15: Daughter Lola Quinones [phone].

## 2023-08-15 NOTE — PROGRESS NOTE ADULT - CONVERSATION DETAILS
Spoke w/ daughter Lola today as f/u. She is back in [place] for work . Discussed pts present condition , remains weakened, but alert when stimulated, says few words only.  Does eat when fed.  he looks comfortable, and not in distress.  daughter says family goal is for pt to remain comfortable, they do want home hospice involved and they are planning to go back to .  I did discuss again w/ Daughter today , the need to supplement his care w/ additional HHA while at  even w/ hospice in place.  Daughter stated understanding and she is going to have that in place. Daughter is aware pts wound remains  large and not likely to ever heal given pts immobility  and nutritional status.  Daughter just wants pt to be comfortable at this point .
Spoke w/ daughter Salina today, she had to go back to [place] for work.  Reviewed pt condition today , not much change from overnight .  Reviewed that plastics was going to ask surgery to eval pts wound  and give input at what minimally invasive procedure could be done bedside , if any , to avoid OR, as per family wishes.    Also that mainstay of care could be local wound care w/ chemical debridement. daughter receptive to the local wound care, and antibiotics, and realizes this will likely never heal and pt at risk for continued/ re- infections. Daughter aware and sees cognitive decline in pt and poor quality of life at this point. When pt is ready , she would like pt to be back on hospice services at  if possible.  Otherwise she will have to consider a LTC facility . Daughter thankful for support and conversation.

## 2023-08-15 NOTE — PROGRESS NOTE ADULT - REASON FOR ADMISSION
osteo of R femur

## 2023-08-15 NOTE — PROGRESS NOTE ADULT - PROVIDER SPECIALTY LIST ADULT
Hospitalist
Infectious Disease
Palliative Care
Palliative Care
Plastic Surgery
Cardiology
Hospitalist
Infectious Disease
Infectious Disease
Palliative Care
Hospitalist
Infectious Disease
Plastic Surgery
Hospitalist
Plastic Surgery
Hospitalist
Palliative Care
Plastic Surgery
Surgery
Palliative Care

## 2023-08-15 NOTE — PROGRESS NOTE ADULT - SUBJECTIVE AND OBJECTIVE BOX
Progress:  in bed, wakes w/ stim., weak, pale,  says few words, looks very calm, comfortable     Review of Systems: [ Unable to obtain due to poor mentation]    MEDICATIONS  (STANDING):  ascorbic acid 500 milliGRAM(s) Oral daily  aspirin  chewable 81 milliGRAM(s) Oral daily  atorvastatin 40 milliGRAM(s) Oral at bedtime  dicyclomine 10 milliGRAM(s) Oral two times a day before meals  enoxaparin Injectable 40 milliGRAM(s) SubCutaneous every 24 hours  metoprolol tartrate 25 milliGRAM(s) Oral two times a day  multivitamin 1 Tablet(s) Oral daily  rivastigmine patch  4.6 mG/24 Hr(s) 1 Patch Transdermal every 24 hours  senna 2 Tablet(s) Oral at bedtime  tamsulosin 0.4 milliGRAM(s) Oral at bedtime  zinc sulfate 220 milliGRAM(s) Oral daily    MEDICATIONS  (PRN):  acetaminophen     Tablet .. 650 milliGRAM(s) Oral every 6 hours PRN Temp greater or equal to 38C (100.4F), Mild Pain (1 - 3)  aluminum hydroxide/magnesium hydroxide/simethicone Suspension 30 milliLiter(s) Oral every 4 hours PRN Dyspepsia  melatonin 3 milliGRAM(s) Oral at bedtime PRN Insomnia  ondansetron Injectable 4 milliGRAM(s) IV Push every 8 hours PRN Nausea and/or Vomiting      PHYSICAL EXAM:  Vital Signs Last 24 Hrs  T(C): 36.7 (15 Aug 2023 15:21), Max: 37.1 (14 Aug 2023 20:23)  T(F): 98.1 (15 Aug 2023 15:21), Max: 98.8 (14 Aug 2023 20:23)  HR: 96 (15 Aug 2023 15:21) (80 - 96)  BP: 105/55 (15 Aug 2023 15:21) (105/55 - 110/61)  BP(mean): --  RR: 18 (15 Aug 2023 15:21) (17 - 18)  SpO2: 94% (15 Aug 2023 15:21) (94% - 96%)    Parameters below as of 15 Aug 2023 15:21  Patient On (Oxygen Delivery Method): room air      General: alert  oriented x1 , calm comfortable        HEENT: n/c, a/t + temp wasting     Lungs: cl dim to bases, resp non labored c   CV: normal rate    GI: abd flat, soft    : normal    Musculoskeletal: contracted le's, cachectic    Skin: pale, w/d  lrg decub     Neuro: + deficits , alert w/ stim., speaks few 1-2 words only   Oral intake ability:  oral feeding- needs full feed   Diet: soft, as anny      LABS:                          9.0    7.82  )-----------( 352      ( 14 Aug 2023 13:15 )             27.7     08-14    138  |  103  |  19  ----------------------------<  131<H>  4.2   |  31  |  0.80    Ca    8.4      14 Aug 2023 13:15      Urinalysis Basic - ( 14 Aug 2023 16:00 )    Color: Yellow / Appearance: Clear / S.012 / pH: x  Gluc: x / Ketone: Negative mg/dL  / Bili: Negative / Urobili: 0.2 mg/dL   Blood: x / Protein: Negative mg/dL / Nitrite: Negative   Leuk Esterase: Trace / RBC: 0 /HPF / WBC 1 /HPF   Sq Epi: x / Non Sq Epi: x / Bacteria: Negative /HPF        RADIOLOGY & ADDITIONAL STUDIES:     ADVANCE DIRECTIVES:  hcp Molst dnr/dni no feed tubes   Advanced Care Planning discussion total time spent:

## 2023-08-15 NOTE — PROGRESS NOTE ADULT - SUBJECTIVE AND OBJECTIVE BOX
Medicine Progress Note    Patient is a 87y old  Male who presents with a chief complaint of osteo of R femur (15 Aug 2023 15:35)      SUBJECTIVE / OVERNIGHT EVENTS:  [facility] did not accept today. will go tomorrow  no complaints    ADDITIONAL REVIEW OF SYSTEMS:  unable to obtain as patient did not answer qs    MEDICATIONS  (STANDING):  ascorbic acid 500 milliGRAM(s) Oral daily  aspirin  chewable 81 milliGRAM(s) Oral daily  atorvastatin 40 milliGRAM(s) Oral at bedtime  dicyclomine 10 milliGRAM(s) Oral two times a day before meals  enoxaparin Injectable 40 milliGRAM(s) SubCutaneous every 24 hours  metoprolol tartrate 25 milliGRAM(s) Oral two times a day  multivitamin 1 Tablet(s) Oral daily  rivastigmine patch  4.6 mG/24 Hr(s) 1 Patch Transdermal every 24 hours  senna 2 Tablet(s) Oral at bedtime  tamsulosin 0.4 milliGRAM(s) Oral at bedtime  zinc sulfate 220 milliGRAM(s) Oral daily    MEDICATIONS  (PRN):  acetaminophen     Tablet .. 650 milliGRAM(s) Oral every 6 hours PRN Temp greater or equal to 38C (100.4F), Mild Pain (1 - 3)  aluminum hydroxide/magnesium hydroxide/simethicone Suspension 30 milliLiter(s) Oral every 4 hours PRN Dyspepsia  melatonin 3 milliGRAM(s) Oral at bedtime PRN Insomnia  ondansetron Injectable 4 milliGRAM(s) IV Push every 8 hours PRN Nausea and/or Vomiting    CAPILLARY BLOOD GLUCOSE        I&O's Summary    14 Aug 2023 07:  -  15 Aug 2023 07:00  --------------------------------------------------------  IN: 1000 mL / OUT: 500 mL / NET: 500 mL    15 Aug 2023 07:  -  15 Aug 2023 17:06  --------------------------------------------------------  IN: 620 mL / OUT: 0 mL / NET: 620 mL        PHYSICAL EXAM:  Vital Signs Last 24 Hrs  T(C): 36.7 (15 Aug 2023 15:21), Max: 37.1 (14 Aug 2023 20:23)  T(F): 98.1 (15 Aug 2023 15:21), Max: 98.8 (14 Aug 2023 20:23)  HR: 96 (15 Aug 2023 15:21) (80 - 96)  BP: 105/55 (15 Aug 2023 15:21) (105/55 - 110/61)  BP(mean): --  RR: 18 (15 Aug 2023 15:21) (17 - 18)  SpO2: 94% (15 Aug 2023 15:21) (94% - 96%)    Parameters below as of 15 Aug 2023 15:21  Patient On (Oxygen Delivery Method): room air    GENERAL: Not in distress. Alert .  HEENT: clear conjunctiva, MMM. no pallor or icterus  CARDIOVASCULAR: RRR S1, S2. No murmur/rubs/gallop  LUNGS: BLAE+, no rales, no wheezing, no rhonchi.    ABDOMEN: ND. Soft,  NT, no guarding / rebound / rigidity. BS normoactive  EXTREMITIES: no edema. no leg or calf TP. right hip on dressing. contractures  SKIN: warm and dry  PSYCHIATRIC: Calm.  No agitation.  CNS: Alert, awake. does not follow commands    LABS:                        9.0    7.82  )-----------( 352      ( 14 Aug 2023 13:15 )             27.7     08-14    138  |  103  |  19  ----------------------------<  131<H>  4.2   |  31  |  0.80    Ca    8.4      14 Aug 2023 13:15      Urinalysis Basic - ( 14 Aug 2023 16:00 )    Color: Yellow / Appearance: Clear / S.012 / pH: x  Gluc: x / Ketone: Negative mg/dL  / Bili: Negative / Urobili: 0.2 mg/dL   Blood: x / Protein: Negative mg/dL / Nitrite: Negative   Leuk Esterase: Trace / RBC: 0 /HPF / WBC 1 /HPF   Sq Epi: x / Non Sq Epi: x / Bacteria: Negative /HPF        SARS-CoV-2: NotDetec (15 Aug 2023 04:00)  COVID-19 PCR: NotDetec (10 Aug 2023 12:01)  SARS-CoV-2: NotDetec (2023 11:45)  SARS-CoV-2: NotDetec (2023 21:40)      RADIOLOGY & ADDITIONAL TESTS:  Imaging from Last 24 Hours:    Electrocardiogram/QTc Interval:    COORDINATION OF CARE:  Care Discussed with Consultants/Other Providers:

## 2023-08-15 NOTE — PROGRESS NOTE ADULT - NUTRITIONAL ASSESSMENT
This patient has been assessed with a concern for Malnutrition and has been determined to have a diagnosis/diagnoses of Severe protein-calorie malnutrition and Underweight (BMI < 19).    This patient is being managed with:   Diet DASH/TLC-  Sodium & Cholesterol Restricted  Lactose Restricted (Milk Sugar Intoler.)  Supplement Feeding Modality:  Oral  Ensure Plus High Protein Cans or Servings Per Day:  1       Frequency:  Three Times a day  Entered: Aug  8 2023  1:14PM    Diet DASH/TLC-  Sodium & Cholesterol Restricted  Lactose Restricted (Milk Sugar Intoler.)  Supplement Feeding Modality:  Oral  Ensure Plus High Protein Cans or Servings Per Day:  1       Frequency:  Two Times a day  Entered: Aug  7 2023  8:17PM    The following pending diet order is being considered for treatment of Severe protein-calorie malnutrition and Underweight (BMI < 19):null
This patient has been assessed with a concern for Malnutrition and has been determined to have a diagnosis/diagnoses of Underweight (BMI < 19) and Severe protein-calorie malnutrition.    This patient is being managed with:   Diet DASH/TLC-  Sodium & Cholesterol Restricted  Lactose Restricted (Milk Sugar Intoler.)  Supplement Feeding Modality:  Oral  Ensure Plus High Protein Cans or Servings Per Day:  1       Frequency:  Two Times a day  Entered: Aug  7 2023  8:17PM  
This patient has been assessed with a concern for Malnutrition and has been determined to have a diagnosis/diagnoses of Severe protein-calorie malnutrition and Underweight (BMI < 19).    This patient is being managed with:   Diet DASH/TLC-  Sodium & Cholesterol Restricted  Lactose Restricted (Milk Sugar Intoler.)  Supplement Feeding Modality:  Oral  Ensure Plus High Protein Cans or Servings Per Day:  1       Frequency:  Three Times a day  Entered: Aug  8 2023  1:14PM    Diet DASH/TLC-  Sodium & Cholesterol Restricted  Lactose Restricted (Milk Sugar Intoler.)  Supplement Feeding Modality:  Oral  Ensure Plus High Protein Cans or Servings Per Day:  1       Frequency:  Two Times a day  Entered: Aug  7 2023  8:17PM    The following pending diet order is being considered for treatment of Severe protein-calorie malnutrition and Underweight (BMI < 19):null
This patient has been assessed with a concern for Malnutrition and has been determined to have a diagnosis/diagnoses of Severe protein-calorie malnutrition and Underweight (BMI < 19).    This patient is being managed with:   Diet DASH/TLC-  Sodium & Cholesterol Restricted  Lactose Restricted (Milk Sugar Intoler.)  Supplement Feeding Modality:  Oral  Ensure Plus High Protein Cans or Servings Per Day:  1       Frequency:  Three Times a day  Entered: Aug  8 2023  1:14PM    Diet DASH/TLC-  Sodium & Cholesterol Restricted  Lactose Restricted (Milk Sugar Intoler.)  Supplement Feeding Modality:  Oral  Ensure Plus High Protein Cans or Servings Per Day:  1       Frequency:  Two Times a day  Entered: Aug  7 2023  8:17PM    The following pending diet order is being considered for treatment of Severe protein-calorie malnutrition and Underweight (BMI < 19):null
This patient has been assessed with a concern for Malnutrition and has been determined to have a diagnosis/diagnoses of Severe protein-calorie malnutrition and Underweight (BMI < 19).    This patient is being managed with:   Diet NPO-  NPO for Procedure/Test     NPO Start Date: 04-Aug-2023   NPO Start Time: 10:20  Entered: Aug  4 2023 10:20AM    Diet DASH/TLC-  Sodium & Cholesterol Restricted  Lactose Restricted (Milk Sugar Intoler.)  Supplement Feeding Modality:  Oral  Ensure Plus High Protein Cans or Servings Per Day:  1       Frequency:  Two Times a day  Entered: Jul 31 2023  1:26PM  
This patient has been assessed with a concern for Malnutrition and has been determined to have a diagnosis/diagnoses of Severe protein-calorie malnutrition and Underweight (BMI < 19).    This patient is being managed with:   Diet DASH/TLC-  Sodium & Cholesterol Restricted  Lactose Restricted (Milk Sugar Intoler.)  Supplement Feeding Modality:  Oral  Ensure Plus High Protein Cans or Servings Per Day:  1       Frequency:  Three Times a day  Entered: Aug  8 2023  1:14PM    Diet DASH/TLC-  Sodium & Cholesterol Restricted  Lactose Restricted (Milk Sugar Intoler.)  Supplement Feeding Modality:  Oral  Ensure Plus High Protein Cans or Servings Per Day:  1       Frequency:  Two Times a day  Entered: Aug  7 2023  8:17PM    The following pending diet order is being considered for treatment of Severe protein-calorie malnutrition and Underweight (BMI < 19):null
This patient has been assessed with a concern for Malnutrition and has been determined to have a diagnosis/diagnoses of Severe protein-calorie malnutrition and Underweight (BMI < 19).    This patient is being managed with:   Diet DASH/TLC-  Sodium & Cholesterol Restricted  Lactose Restricted (Milk Sugar Intoler.)  Supplement Feeding Modality:  Oral  Ensure Plus High Protein Cans or Servings Per Day:  1       Frequency:  Three Times a day  Entered: Aug  8 2023  1:14PM    Diet DASH/TLC-  Sodium & Cholesterol Restricted  Lactose Restricted (Milk Sugar Intoler.)  Supplement Feeding Modality:  Oral  Ensure Plus High Protein Cans or Servings Per Day:  1       Frequency:  Two Times a day  Entered: Aug  7 2023  8:17PM    The following pending diet order is being considered for treatment of Severe protein-calorie malnutrition and Underweight (BMI < 19):null
This patient has been assessed with a concern for Malnutrition and has been determined to have a diagnosis/diagnoses of Severe protein-calorie malnutrition and Underweight (BMI < 19).    This patient is being managed with:   Diet DASH/TLC-  Sodium & Cholesterol Restricted  Lactose Restricted (Milk Sugar Intoler.)  Supplement Feeding Modality:  Oral  Ensure Plus High Protein Cans or Servings Per Day:  1       Frequency:  Two Times a day  Entered: Jul 31 2023  1:26PM  
This patient has been assessed with a concern for Malnutrition and has been determined to have a diagnosis/diagnoses of Severe protein-calorie malnutrition and Underweight (BMI < 19).    This patient is being managed with:   Diet NPO after Midnight-     NPO Start Date: 06-Aug-2023   NPO Start Time: 23:59  Entered: Aug  6 2023 10:40AM    Diet DASH/TLC-  Sodium & Cholesterol Restricted  Lactose Restricted (Milk Sugar Intoler.)  Supplement Feeding Modality:  Oral  Ensure Plus High Protein Cans or Servings Per Day:  1       Frequency:  Two Times a day  Entered: Jul 31 2023  1:26PM  
This patient has been assessed with a concern for Malnutrition and has been determined to have a diagnosis/diagnoses of Severe protein-calorie malnutrition and Underweight (BMI < 19).    This patient is being managed with:   Diet NPO-  NPO for Procedure/Test     NPO Start Date: 04-Aug-2023   NPO Start Time: 10:20  Entered: Aug  4 2023 10:20AM    Diet DASH/TLC-  Sodium & Cholesterol Restricted  Lactose Restricted (Milk Sugar Intoler.)  Supplement Feeding Modality:  Oral  Ensure Plus High Protein Cans or Servings Per Day:  1       Frequency:  Two Times a day  Entered: Jul 31 2023  1:26PM  
This patient has been assessed with a concern for Malnutrition and has been determined to have a diagnosis/diagnoses of Underweight (BMI < 19) and Severe protein-calorie malnutrition.    This patient is being managed with:   Diet NPO-  NPO for Procedure/Test     NPO Start Date: 04-Aug-2023   NPO Start Time: 10:20  Entered: Aug  4 2023 10:20AM    Diet DASH/TLC-  Sodium & Cholesterol Restricted  Lactose Restricted (Milk Sugar Intoler.)  Supplement Feeding Modality:  Oral  Ensure Plus High Protein Cans or Servings Per Day:  1       Frequency:  Two Times a day  Entered: Jul 31 2023  1:26PM  
This patient has been assessed with a concern for Malnutrition and has been determined to have a diagnosis/diagnoses of Severe protein-calorie malnutrition and Underweight (BMI < 19).    This patient is being managed with:   Diet DASH/TLC-  Sodium & Cholesterol Restricted  Lactose Restricted (Milk Sugar Intoler.)  Supplement Feeding Modality:  Oral  Ensure Plus High Protein Cans or Servings Per Day:  1       Frequency:  Two Times a day  Entered: Jul 31 2023  1:26PM  
This patient has been assessed with a concern for Malnutrition and has been determined to have a diagnosis/diagnoses of Severe protein-calorie malnutrition and Underweight (BMI < 19).    This patient is being managed with:   Diet NPO after Midnight-     NPO Start Date: 06-Aug-2023   NPO Start Time: 23:59  Entered: Aug  6 2023 10:40AM    Diet DASH/TLC-  Sodium & Cholesterol Restricted  Lactose Restricted (Milk Sugar Intoler.)  Supplement Feeding Modality:  Oral  Ensure Plus High Protein Cans or Servings Per Day:  1       Frequency:  Two Times a day  Entered: Jul 31 2023  1:26PM  
This patient has been assessed with a concern for Malnutrition and has been determined to have a diagnosis/diagnoses of Severe protein-calorie malnutrition and Underweight (BMI < 19).    This patient is being managed with:   Diet NPO after Midnight-     NPO Start Date: 06-Aug-2023   NPO Start Time: 23:59  Entered: Aug  6 2023 10:40AM    Diet DASH/TLC-  Sodium & Cholesterol Restricted  Lactose Restricted (Milk Sugar Intoler.)  Supplement Feeding Modality:  Oral  Ensure Plus High Protein Cans or Servings Per Day:  1       Frequency:  Two Times a day  Entered: Jul 31 2023  1:26PM  
This patient has been assessed with a concern for Malnutrition and has been determined to have a diagnosis/diagnoses of Underweight (BMI < 19) and Severe protein-calorie malnutrition.    This patient is being managed with:   Diet DASH/TLC-  Sodium & Cholesterol Restricted  Lactose Restricted (Milk Sugar Intoler.)  Supplement Feeding Modality:  Oral  Ensure Plus High Protein Cans or Servings Per Day:  1       Frequency:  Three Times a day  Entered: Aug  8 2023  1:14PM    Diet DASH/TLC-  Sodium & Cholesterol Restricted  Lactose Restricted (Milk Sugar Intoler.)  Supplement Feeding Modality:  Oral  Ensure Plus High Protein Cans or Servings Per Day:  1       Frequency:  Two Times a day  Entered: Aug  7 2023  8:17PM    The following pending diet order is being considered for treatment of Underweight (BMI < 19) and Severe protein-calorie malnutrition:null
This patient has been assessed with a concern for Malnutrition and has been determined to have a diagnosis/diagnoses of Severe protein-calorie malnutrition and Underweight (BMI < 19).    This patient is being managed with:   Diet DASH/TLC-  Sodium & Cholesterol Restricted  Lactose Restricted (Milk Sugar Intoler.)  Supplement Feeding Modality:  Oral  Ensure Plus High Protein Cans or Servings Per Day:  1       Frequency:  Three Times a day  Entered: Aug  8 2023  1:14PM    Diet DASH/TLC-  Sodium & Cholesterol Restricted  Lactose Restricted (Milk Sugar Intoler.)  Supplement Feeding Modality:  Oral  Ensure Plus High Protein Cans or Servings Per Day:  1       Frequency:  Two Times a day  Entered: Aug  7 2023  8:17PM    The following pending diet order is being considered for treatment of Severe protein-calorie malnutrition and Underweight (BMI < 19):null
This patient has been assessed with a concern for Malnutrition and has been determined to have a diagnosis/diagnoses of Severe protein-calorie malnutrition and Underweight (BMI < 19).    This patient is being managed with:   Diet DASH/TLC-  Sodium & Cholesterol Restricted  Lactose Restricted (Milk Sugar Intoler.)  Supplement Feeding Modality:  Oral  Ensure Plus High Protein Cans or Servings Per Day:  1       Frequency:  Two Times a day  Entered: Jul 31 2023  1:26PM  
This patient has been assessed with a concern for Malnutrition and has been determined to have a diagnosis/diagnoses of Severe protein-calorie malnutrition and Underweight (BMI < 19).    This patient is being managed with:   Diet NPO-  NPO for Procedure/Test     NPO Start Date: 04-Aug-2023   NPO Start Time: 10:20  Entered: Aug  4 2023 10:20AM    Diet DASH/TLC-  Sodium & Cholesterol Restricted  Lactose Restricted (Milk Sugar Intoler.)  Supplement Feeding Modality:  Oral  Ensure Plus High Protein Cans or Servings Per Day:  1       Frequency:  Two Times a day  Entered: Jul 31 2023  1:26PM  

## 2023-08-15 NOTE — PROGRESS NOTE ADULT - ASSESSMENT
A/P  87 year old M , hospice pt, PMH dementia, BPH, DVT not on AC, IBS, OA with previous pelvic fracture brought in from [facility] for nonhealing decubitus ulcer admitted for possible osteomyelitis of R hip, failed outpatient abx.          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       Palliative ;   as f/u today , pt to be going back to HH on home hospice services  , chart reviewed. Pt had low grade temp ? x 1,  now resolved , no further temps.  ID note appreciated.  pt looks  comfortable , not in distress.    Family wants him back to HH with  hospice in place.      Spoke w/ daughter today confirmed this is plan, focusing  on pts  comfort  and discussed they will supplement HHA for his care.  med team aware of this plan.

## 2023-08-16 VITALS
SYSTOLIC BLOOD PRESSURE: 104 MMHG | TEMPERATURE: 98 F | HEART RATE: 83 BPM | OXYGEN SATURATION: 96 % | RESPIRATION RATE: 16 BRPM | DIASTOLIC BLOOD PRESSURE: 58 MMHG

## 2023-08-16 PROCEDURE — 99261: CPT

## 2023-08-16 PROCEDURE — 86901 BLOOD TYPING SEROLOGIC RH(D): CPT

## 2023-08-16 PROCEDURE — 86850 RBC ANTIBODY SCREEN: CPT

## 2023-08-16 PROCEDURE — 83690 ASSAY OF LIPASE: CPT

## 2023-08-16 PROCEDURE — 85027 COMPLETE CBC AUTOMATED: CPT

## 2023-08-16 PROCEDURE — 80202 ASSAY OF VANCOMYCIN: CPT

## 2023-08-16 PROCEDURE — 99239 HOSP IP/OBS DSCHRG MGMT >30: CPT

## 2023-08-16 PROCEDURE — 88304 TISSUE EXAM BY PATHOLOGIST: CPT

## 2023-08-16 PROCEDURE — 96374 THER/PROPH/DIAG INJ IV PUSH: CPT

## 2023-08-16 PROCEDURE — 80053 COMPREHEN METABOLIC PANEL: CPT

## 2023-08-16 PROCEDURE — 36415 COLL VENOUS BLD VENIPUNCTURE: CPT

## 2023-08-16 PROCEDURE — 83735 ASSAY OF MAGNESIUM: CPT

## 2023-08-16 PROCEDURE — 96361 HYDRATE IV INFUSION ADD-ON: CPT

## 2023-08-16 PROCEDURE — 87070 CULTURE OTHR SPECIMN AEROBIC: CPT

## 2023-08-16 PROCEDURE — 99285 EMERGENCY DEPT VISIT HI MDM: CPT

## 2023-08-16 PROCEDURE — 93005 ELECTROCARDIOGRAM TRACING: CPT

## 2023-08-16 PROCEDURE — 0225U NFCT DS DNA&RNA 21 SARSCOV2: CPT

## 2023-08-16 PROCEDURE — 87077 CULTURE AEROBIC IDENTIFY: CPT

## 2023-08-16 PROCEDURE — 80048 BASIC METABOLIC PNL TOTAL CA: CPT

## 2023-08-16 PROCEDURE — 87040 BLOOD CULTURE FOR BACTERIA: CPT

## 2023-08-16 PROCEDURE — 72193 CT PELVIS W/DYE: CPT | Mod: MA

## 2023-08-16 PROCEDURE — 83605 ASSAY OF LACTIC ACID: CPT

## 2023-08-16 PROCEDURE — 81001 URINALYSIS AUTO W/SCOPE: CPT

## 2023-08-16 PROCEDURE — 71045 X-RAY EXAM CHEST 1 VIEW: CPT

## 2023-08-16 PROCEDURE — 87635 SARS-COV-2 COVID-19 AMP PRB: CPT

## 2023-08-16 PROCEDURE — 85025 COMPLETE CBC W/AUTO DIFF WBC: CPT

## 2023-08-16 PROCEDURE — 87186 SC STD MICRODIL/AGAR DIL: CPT

## 2023-08-16 PROCEDURE — 86900 BLOOD TYPING SEROLOGIC ABO: CPT

## 2023-08-16 RX ORDER — COLLAGENASE CLOSTRIDIUM HIST. 250 UNIT/G
1 OINTMENT (GRAM) TOPICAL
Qty: 0 | Refills: 0 | DISCHARGE
Start: 2023-08-16

## 2023-08-16 RX ADMIN — Medication 25 MILLIGRAM(S): at 05:43

## 2023-08-16 RX ADMIN — Medication 10 MILLIGRAM(S): at 06:01

## 2023-08-16 RX ADMIN — RIVASTIGMINE 1 PATCH: 4.6 PATCH, EXTENDED RELEASE TRANSDERMAL at 08:38

## 2023-09-21 NOTE — H&P ADULT - NSTOBACCOSCREENHP_GEN_A_CS
1. Do you have an Epinephrine auto injector with you? Yes    2. What antihistamine did you take today (Examples: Zyrtec/Cetirizine, Claritin, Xyzal, Benadryl, Allegra)? Zyrtec/Cetirizine    3. If you have asthma: Have you had any shortness of breath, wheezing or cough within the last 48 hours? No    4. Since your last injection, have you been seen in the Emergency Department or Urgent Care Clinic for your breathing? No    5. Are you taking any Beta Blocker Medications (examples: Metoprolol, Atenolol)?  No    6. Women of Childbearing Age: Since your last allergy injection, have you become pregnant or do you think that you are pregnant?  No    7. Have you been prescribed any antibiotics in the last 5 days? No    8. Have you had a reaction to your last allergy injection? If yes, what was your reaction? No   > Any symptoms other than at the site of injection (Generalized itching, breathing difficulty, redness, hives, swelling, etc.) No    
No

## 2024-08-26 NOTE — DISCHARGE NOTE NURSING/CASE MANAGEMENT/SOCIAL WORK - DATE OF LAST VACCINATION
HUB to read    PA was sent for Humira Pen (CF) 40MG/0.4ML pen-injector kit    Waiting on the outcome from insurance.       12-Jan-2022

## 2025-04-16 NOTE — ASSESSMENT
[FreeTextEntry1] : Patient is stable no beneficial effect from the an episode yet noted we will provide with a DMV parking permit form filled out as his gait is in stable and he is quite weak and arthritic otherwise no change Metamucil will be attempted as a useful medication in controlling his irritable bowel syndrome he will be followed up again in 3 months or as needed he has received his Covid shots none

## 2025-07-22 NOTE — REVIEW OF SYSTEMS
Rx Refill Note  Requested Prescriptions     Pending Prescriptions Disp Refills    busPIRone (BUSPAR) 15 MG tablet 90 tablet 2     Sig: Take 1 tablet by mouth 3 (Three) Times a Day.    Vortioxetine HBr (Trintellix) 5 MG tablet tablet 30 tablet      Sig: Take 1 tablet by mouth Daily With Breakfast.      Last office visit with prescribing clinician: 6/12/2025     Office Visit with Ijeoma Teresa APRN (06/12/2025)     Lynne Guzmán MA  07/22/25, 10:56 EDT    [Fever] : no fever [Chills] : no chills [Fatigue] : fatigue [Night Sweats] : no night sweats [Vision Problems] : no vision problems [Hearing Loss] : no hearing loss [Postnasal Drip] : no postnasal drip [Nasal Discharge] : no nasal discharge [Sore Throat] : no sore throat [Chest Pain] : no chest pain [Palpitations] : no palpitations [Lower Ext Edema] : no lower extremity edema [Orthopena] : no orthopnea [Paroxysmal Nocturnal Dyspnea] : no paroxysmal nocturnal dyspnea [Shortness Of Breath] : no shortness of breath [Wheezing] : no wheezing [Cough] : no cough [Dyspnea on Exertion] : not dyspnea on exertion [Abdominal Pain] : no abdominal pain [Nausea] : no nausea [Constipation] : no constipation [Diarrhea] : no diarrhea [Vomiting] : no vomiting [Heartburn] : no heartburn [Melena] : no melena [Dysuria] : no dysuria [Hematuria] : no hematuria [Joint Stiffness] : joint stiffness [Headache] : no headache [Dizziness] : no dizziness [Fainting] : no fainting [Unsteady Walk] : ataxia [Memory Loss] : memory loss [FreeTextEntry7] : Some loose stools

## 2025-07-24 NOTE — PHYSICAL THERAPY INITIAL EVALUATION ADULT - PERTINENT HX OF CURRENT PROBLEM, REHAB EVAL
[facility]   Physician Orders and Discharge Instructions  [address] [place]  [address]  Telephone: [phone]      [phone]        NAME:  Khari Diaz                                                                                                YOB: 1953  MEDICAL RECORD NUMBER:  [id number]     Your  is:  Rosanna     Home Care/Facility: None     Wound Location: Left 3rd, 4th toe and lateral ankle     Dressing orders:   Cleanse with Dakin's solution  2. Apply dry HYDROFERA BLUE CLASSIC or equivalent to wound bed.  NOTE: If your HYDROFRERA BLUE is not soft and pliable, moisten with saline until it is sponge like and then ring out excess saline and apply to wound bed.  3. Cover with 4x4's and wrap with gauze (lazarus or kerlix)  4. Change  Every other day or Monday, Wednesday, and Friday     Compression: .Apply 10-20mmHg Spandagrip to Left lower leg, apply first thing in the morning, remove at bedtime, elevate legs as much as possible during the day.(38)  or   Wear your own compression on the right lower leg.     Offloading Device: Wear your Prevalon Boot as much as possible when not ambulating.       Other Instructions: Let the area \"air out\" for an hour daily.  Try to slow down or quit smoking. The surgery center will call you with details of your surgery.     Keep all dressings clean, dry and intact.  Keep pressure off the wound(s) at all times.      Follow up visit  2  Week(s)  August 7, 2025  @  3:45     Please give 24 hour notice if unable to keep appointment. [phone]     If you experience any of the following, please call the Wound Care Service at  [phone] or go to the nearest emergency room.        *Increase in pain*Temperature over 101*Increase in drainage from your wound or a foul odor  *Uncontrolled swelling*Need for compression bandage changes due to slippage, 
pt is an 87 year-old male PMHx dementia, ASHD, IBS, BPH, HTN, constipation, DVT previously on AC admitted from [facility] ([facility]) after episode of decreased responsiveness and diaphoresis earlier this morning, which had resolved by the time of ER arrival. ER team admitted patient for suspected ?syncope; chart suggestive of decreased responsiveness

## (undated) DEVICE — BLADE SCALPEL SAFETYLOCK #10

## (undated) DEVICE — DRAIN RESERVOIR FOR JACKSON PRATT 100CC CARDINAL

## (undated) DEVICE — DRAPE 3/4 SHEET W REINFORCEMENT 56X77"

## (undated) DEVICE — WARMING BLANKET LOWER ADULT

## (undated) DEVICE — SOL IRR POUR H2O 250ML

## (undated) DEVICE — DRSG VAC GRANUFOAM LARGE (BLACK)

## (undated) DEVICE — BLADE SCALPEL SAFETYLOCK #15

## (undated) DEVICE — DRAIN JACKSON PRATT 10MM FLAT FULL NO TROCAR

## (undated) DEVICE — PACK MINOR

## (undated) DEVICE — GOWN TRIMAX LG

## (undated) DEVICE — LAP PAD 18 X 18"

## (undated) DEVICE — DRSG VAC GRANUFOAM SMALL (BLACK)

## (undated) DEVICE — GLV 7 PROTEXIS (WHITE)

## (undated) DEVICE — WARMING BLANKET UPPER ADULT

## (undated) DEVICE — SOL IRR BAG NS 0.9% 3000ML

## (undated) DEVICE — STRYKER INTERPULSE HANDPIECE W IRR SUCTION TUBE

## (undated) DEVICE — SOL IRR POUR NS 0.9% 500ML

## (undated) DEVICE — PACK MAJOR ABDOMINAL SUPINE

## (undated) DEVICE — SPECIMEN CONTAINER 100ML

## (undated) DEVICE — GLV 8.5 PROTEXIS (WHITE)

## (undated) DEVICE — DRAPE TOWEL BLUE 17" X 24"

## (undated) DEVICE — DRSG XEROFORM 1 X 8"

## (undated) DEVICE — DRAPE LIGHT HANDLE COVER (BLUE)

## (undated) DEVICE — VENODYNE/SCD SLEEVE CALF LARGE

## (undated) DEVICE — DRSG VAC GRANUFOAM MEDIUM (BLACK)

## (undated) DEVICE — VISITEC 4X4

## (undated) DEVICE — STAPLER SKIN VISI-STAT 35 WIDE

## (undated) DEVICE — FOLEY TRAY 16FR 5CC LTX UMETER CLOSED

## (undated) DEVICE — DRSG XEROFORM 5 X 9"

## (undated) DEVICE — DRAPE MAYO STAND 30"

## (undated) DEVICE — TUBING SUCTION 20FT

## (undated) DEVICE — DRAPE 1/2 SHEET 40X57"

## (undated) DEVICE — CANISTER W/GEL INFOVAC 1000ML

## (undated) DEVICE — GLV 7.5 PROTEXIS (WHITE)

## (undated) DEVICE — DRAIN JACKSON PRATT 7MM FLAT FULL NO TROCAR

## (undated) DEVICE — MEDICATION LABELS W MARKER

## (undated) DEVICE — CANISTER W/GEL INFOVAC 500ML

## (undated) DEVICE — GLV 8 PROTEXIS (WHITE)

## (undated) DEVICE — GLV 6.5 PROTEXIS (WHITE)

## (undated) DEVICE — POSITIONER FOAM EGG CRATE ULNAR 2PCS (PINK)

## (undated) DEVICE — CANISTER KCI 500ML GEL SENSA TRAC